# Patient Record
Sex: FEMALE | Race: OTHER | Employment: UNEMPLOYED | URBAN - METROPOLITAN AREA
[De-identification: names, ages, dates, MRNs, and addresses within clinical notes are randomized per-mention and may not be internally consistent; named-entity substitution may affect disease eponyms.]

---

## 2017-01-18 RX ORDER — ALBUTEROL SULFATE 90 UG/1
2 AEROSOL, METERED RESPIRATORY (INHALATION) EVERY 6 HOURS PRN
Qty: 1 INHALER | Refills: 3 | Status: SHIPPED | OUTPATIENT
Start: 2017-01-18 | End: 2018-04-19 | Stop reason: ALTCHOICE

## 2017-01-18 RX ORDER — ESOMEPRAZOLE MAGNESIUM 40 MG/1
40 CAPSULE, DELAYED RELEASE ORAL
Qty: 90 CAPSULE | Refills: 1 | Status: SHIPPED | OUTPATIENT
Start: 2017-01-18 | End: 2017-07-07

## 2017-01-18 NOTE — TELEPHONE ENCOUNTER
From: Jonathan Alberst  To: Imani Marmolejo MD  Sent: 1/18/2017 10:03 AM CST  Subject: Medication Renewal Request    Original authorizing provider: MD Jonathan Garcia would like a refill of the following medications:  Esomeprazole

## 2017-03-01 ENCOUNTER — OFFICE VISIT (OUTPATIENT)
Dept: FAMILY MEDICINE CLINIC | Facility: CLINIC | Age: 27
End: 2017-03-01

## 2017-03-01 VITALS
TEMPERATURE: 98 F | OXYGEN SATURATION: 98 % | HEART RATE: 82 BPM | DIASTOLIC BLOOD PRESSURE: 78 MMHG | BODY MASS INDEX: 37.49 KG/M2 | RESPIRATION RATE: 20 BRPM | HEIGHT: 65 IN | SYSTOLIC BLOOD PRESSURE: 122 MMHG | WEIGHT: 225 LBS

## 2017-03-01 DIAGNOSIS — J02.9 PHARYNGITIS, UNSPECIFIED ETIOLOGY: Primary | ICD-10-CM

## 2017-03-01 LAB — CONTROL LINE PRESENT WITH A CLEAR BACKGROUND (YES/NO): YES YES/NO

## 2017-03-01 PROCEDURE — 87147 CULTURE TYPE IMMUNOLOGIC: CPT | Performed by: PHYSICIAN ASSISTANT

## 2017-03-01 PROCEDURE — 87880 STREP A ASSAY W/OPTIC: CPT | Performed by: PHYSICIAN ASSISTANT

## 2017-03-01 PROCEDURE — 87081 CULTURE SCREEN ONLY: CPT | Performed by: PHYSICIAN ASSISTANT

## 2017-03-01 PROCEDURE — 99213 OFFICE O/P EST LOW 20 MIN: CPT | Performed by: PHYSICIAN ASSISTANT

## 2017-03-01 RX ORDER — AMOXICILLIN 875 MG/1
875 TABLET, COATED ORAL 2 TIMES DAILY
Qty: 20 TABLET | Refills: 0 | Status: SHIPPED | OUTPATIENT
Start: 2017-03-01 | End: 2017-05-31 | Stop reason: ALTCHOICE

## 2017-03-01 NOTE — PATIENT INSTRUCTIONS
Viral Pharyngitis (Sore Throat)    You (or your child, if your child is the patient) have pharyngitis (sore throat). This infection is caused by a virus. It can cause throat pain that is worse when swallowing, aching all over, headache, and fever.  The in · Fever as directed by your doctor.  For children, seek care if:  ¨ Your child is of any age and has repeated fevers above 104°F (40°C). ¨ Your child is younger than 3years of age and has a fever of 100.4°F (38°C) that continues for more than 1 day.   Regina Harrison

## 2017-03-01 NOTE — PROGRESS NOTES
CHIEF COMPLAINT:   Patient presents with:  Throat Problem: white spots at back of throat for 3 days        HPI:   Earlean Flank is 31year old female presents to clinic with complaint of right sided sore throat and noted white material on right tons nourished,in no apparent distress  SKIN: no rashes,no suspicious lesions  HEAD: atraumatic, normocephalic  EYES: conjunctiva clear, sclera white  EARS: TM's clear, non-injected, no bulging, retraction, or fluid bilaterally  NOSE: nares patent, mild nasal d days to a week.

## 2017-05-11 ENCOUNTER — TELEPHONE (OUTPATIENT)
Dept: INTERNAL MEDICINE CLINIC | Facility: CLINIC | Age: 27
End: 2017-05-11

## 2017-05-11 DIAGNOSIS — M54.2 NECK PAIN: Primary | ICD-10-CM

## 2017-05-11 NOTE — TELEPHONE ENCOUNTER
LMTCB, please transfer to [id number].need to Triage  Left detailed message need Appt for evaluation of New Problem/assessment

## 2017-05-11 NOTE — TELEPHONE ENCOUNTER
Pt is requesting to have a referral to see a chiropractor  Pt stts she is having tingling hands her hand are cold   Pt thinks she may have a pinched nerve   Please advise

## 2017-05-12 NOTE — TELEPHONE ENCOUNTER
Actions Requested: Referral for chiropractor/ pt declined OV   Pt states that she would like to try natural/alternative approaches for pain relief before making OV appt,  Chiropractor referral pended for MD approval  Situation/Background   Problem: muscle further assistance. Instructed pt to go to the ER if Sx worsen. Patient provided with clinic contact information, hours of operation and will call back if needed.  Patient was able to restate instructions in their own words, verbalizes understanding and

## 2017-05-12 NOTE — TELEPHONE ENCOUNTER
Patient called and informed with understanding. Patient stated she scheduled an appointment via [facility] for 5/31/17 and will discuss at that time.

## 2017-05-31 ENCOUNTER — OFFICE VISIT (OUTPATIENT)
Dept: INTERNAL MEDICINE CLINIC | Facility: CLINIC | Age: 27
End: 2017-05-31

## 2017-05-31 VITALS
HEART RATE: 86 BPM | RESPIRATION RATE: 16 BRPM | DIASTOLIC BLOOD PRESSURE: 77 MMHG | HEIGHT: 65 IN | TEMPERATURE: 99 F | SYSTOLIC BLOOD PRESSURE: 135 MMHG | BODY MASS INDEX: 40.82 KG/M2 | WEIGHT: 245 LBS

## 2017-05-31 DIAGNOSIS — L70.9 ACNE, UNSPECIFIED ACNE TYPE: ICD-10-CM

## 2017-05-31 DIAGNOSIS — M50.90 CERVICAL DISC DISEASE: Primary | ICD-10-CM

## 2017-05-31 DIAGNOSIS — E66.01 MORBID OBESITY DUE TO EXCESS CALORIES (HCC): ICD-10-CM

## 2017-05-31 PROCEDURE — 99212 OFFICE O/P EST SF 10 MIN: CPT | Performed by: INTERNAL MEDICINE

## 2017-05-31 PROCEDURE — 99214 OFFICE O/P EST MOD 30 MIN: CPT | Performed by: INTERNAL MEDICINE

## 2017-05-31 NOTE — PROGRESS NOTES
HPI:    Patient ID: Joan Quinonez is a 31year old female.     HPI    Ruptured disc lumbar  Workman's comp  2012  In that injured neck  As well was cleared neck problem 2014  when she injured back  PT does not think current neck pain is associated with Disp: 3 Package Rfl: 3     Allergies:No Known Allergies    HISTORY:  Past Medical History   Diagnosis Date   • Asthma    • Elective       EAB x 1   • Bulging lumbar disc      PT x 6 mos, cortisone injections   • Other ill-defined conditions(799.22) obesity due to excess calories ([facility] [address]  Plan: BARIATRICS - INTERNAL        Referral  WT loss adivsed lower carb intake  Lower overall caloric intake    (L70.9) Acne, unspecified acne type  Plan: DERM - INTERNAL            Patient voiced understanding  and ag

## 2017-06-29 ENCOUNTER — TELEPHONE (OUTPATIENT)
Dept: INTERNAL MEDICINE CLINIC | Facility: CLINIC | Age: 27
End: 2017-06-29

## 2017-06-29 NOTE — TELEPHONE ENCOUNTER
Lab called stating lab previously ordered almost a year ago for H Pylori is not being done anymore. If you want this test done it needs to be reordered as a stool test [id number] or breath test [id number]. Other test needs to be cancelled.

## 2017-07-07 ENCOUNTER — HOSPITAL ENCOUNTER (OUTPATIENT)
Dept: MRI IMAGING | Age: 27
Discharge: HOME | End: 2017-07-07
Attending: INTERNAL MEDICINE
Payer: COMMERCIAL

## 2017-07-07 DIAGNOSIS — M50.90 CERVICAL DISC DISEASE: ICD-10-CM

## 2017-07-07 PROCEDURE — 72141 MRI NECK SPINE W/O DYE: CPT | Performed by: INTERNAL MEDICINE

## 2017-07-07 RX ORDER — ESOMEPRAZOLE MAGNESIUM 40 MG/1
40 CAPSULE, DELAYED RELEASE ORAL
Qty: 90 CAPSULE | Refills: 1 | Status: SHIPPED
Start: 2017-07-07 | End: 2018-01-28

## 2017-07-07 NOTE — TELEPHONE ENCOUNTER
From: Milli Veloz  Sent: 7/7/2017 10:25 AM CDT  Subject: Medication Renewal Request    Milli Veloz would like a refill of the following medications:  Esomeprazole Magnesium (NEXIUM) 40 MG Oral Capsule Delayed Release Bess Mohr MD]

## 2017-07-10 ENCOUNTER — TELEPHONE (OUTPATIENT)
Dept: INTERNAL MEDICINE CLINIC | Facility: CLINIC | Age: 27
End: 2017-07-10

## 2017-07-10 DIAGNOSIS — M47.812 OSTEOARTHRITIS OF CERVICAL SPINE, UNSPECIFIED SPINAL OSTEOARTHRITIS COMPLICATION STATUS: Primary | ICD-10-CM

## 2017-07-10 NOTE — TELEPHONE ENCOUNTER
LMTCB. Transfer to [id number].    ----- Message from Aston Colorado MD sent at 7/8/2017  3:04 AM CDT -----    Ángel Myers  Female, 31year old, 09/21/1990  Last Weight:   245 lb (111.1 kg)  Preferred Phone:   [phone]  Home#:   None  Mobile#:   410 spinal stenosis, or foraminal narrowing.     CRANIOCERVICAL AREA:  Normal foramen magnum with no Chiari malformation.    PARASPINAL AREA:  Normal with no visible mass.    CORD:  Normal caliber, contour, and signal intensity.         Impression    CONCLUSIO

## 2017-07-10 NOTE — TELEPHONE ENCOUNTER
Patient returning call, reviewed Dr Rayo George note with her, patient had already reviewed the radiologist's report on MyChart. Patient is concerned about \"C4-C5:  Posterior disc osteophyte pelvic stone. No spinal canal stenosis.  Left paracentral uncal os

## 2017-07-14 NOTE — TELEPHONE ENCOUNTER
Called pt but she was unable to take the call, she requested a call back in 15 minutes. Upon call back, no answer, left message to call back for Dr's response.  (Per AFUA, unable to leave detailed message on cell phone.)    Referral to see Physiatry signed b

## 2017-07-14 NOTE — TELEPHONE ENCOUNTER
Spoke to patient   chiro and neuro referral generated  I answered her questions   Agreeable with plan

## 2018-01-02 RX ORDER — NORGESTIMATE-ETHINYL ESTRADIOL 7DAYSX3 28
1 TABLET ORAL DAILY
Qty: 84 TABLET | Refills: 0 | OUTPATIENT
Start: 2018-01-02

## 2018-01-28 RX ORDER — NORGESTIMATE AND ETHINYL ESTRADIOL 7DAYSX3 28
1 KIT ORAL DAILY
Qty: 3 PACKAGE | Refills: 3
Start: 2018-01-28

## 2018-01-30 RX ORDER — NORGESTIMATE AND ETHINYL ESTRADIOL 7DAYSX3 28
1 KIT ORAL DAILY
Qty: 3 PACKAGE | Refills: 3
Start: 2018-01-30

## 2018-01-31 RX ORDER — ESOMEPRAZOLE MAGNESIUM 40 MG/1
40 CAPSULE, DELAYED RELEASE ORAL
Qty: 90 CAPSULE | Refills: 0 | Status: SHIPPED
Start: 2018-01-31 | End: 2018-04-19

## 2018-01-31 NOTE — TELEPHONE ENCOUNTER
From: Matthew Patel  Sent: 1/28/2018 2:25 PM CST  Subject: Medication Renewal Request    [facility] would like a refill of the following medications:     Esomeprazole Magnesium (NEXIUM) 40 MG Oral Capsule Delayed Release Austin Gray MD]    Preferred pharmacy: [address] [place] [address] [address] AT [place] [address] [address] , [phone], [phone]        Medication renewals requested in this message routed separately:     Norgestim-Eth Estrad Triphasic (TRINESSA, 28,) 0.18/0.215/0.25 MG-35 MCG Oral Tab Sury Palencia MD]

## 2018-02-02 RX ORDER — ESOMEPRAZOLE MAGNESIUM 40 MG/1
40 CAPSULE, DELAYED RELEASE ORAL
Qty: 90 CAPSULE | Refills: 1 | OUTPATIENT
Start: 2018-02-02

## 2018-04-19 ENCOUNTER — OFFICE VISIT (OUTPATIENT)
Dept: FAMILY MEDICINE CLINIC | Facility: CLINIC | Age: 28
End: 2018-04-19

## 2018-04-19 VITALS
BODY MASS INDEX: 40.82 KG/M2 | DIASTOLIC BLOOD PRESSURE: 80 MMHG | WEIGHT: 245 LBS | RESPIRATION RATE: 16 BRPM | SYSTOLIC BLOOD PRESSURE: 130 MMHG | HEIGHT: 65 IN | TEMPERATURE: 98 F | HEART RATE: 76 BPM

## 2018-04-19 DIAGNOSIS — Z79.899 MEDICATION MANAGEMENT: ICD-10-CM

## 2018-04-19 DIAGNOSIS — M47.22 OSTEOARTHRITIS OF SPINE WITH RADICULOPATHY, CERVICAL REGION: ICD-10-CM

## 2018-04-19 DIAGNOSIS — Z00.00 LABORATORY EXAM ORDERED AS PART OF ROUTINE GENERAL MEDICAL EXAMINATION: ICD-10-CM

## 2018-04-19 DIAGNOSIS — Z30.09 ENCOUNTER FOR OTHER GENERAL COUNSELING OR ADVICE ON CONTRACEPTION: ICD-10-CM

## 2018-04-19 DIAGNOSIS — F41.9 ANXIETY: ICD-10-CM

## 2018-04-19 DIAGNOSIS — K21.9 GASTROESOPHAGEAL REFLUX DISEASE, ESOPHAGITIS PRESENCE NOT SPECIFIED: ICD-10-CM

## 2018-04-19 DIAGNOSIS — Z00.00 ROUTINE MEDICAL EXAM: Primary | ICD-10-CM

## 2018-04-19 PROBLEM — M51.26 BULGING LUMBAR DISC: Status: ACTIVE | Noted: 2018-04-19

## 2018-04-19 PROBLEM — M51.36 BULGING LUMBAR DISC: Status: ACTIVE | Noted: 2018-04-19

## 2018-04-19 PROBLEM — M51.369 BULGING LUMBAR DISC: Status: ACTIVE | Noted: 2018-04-19

## 2018-04-19 PROCEDURE — 99214 OFFICE O/P EST MOD 30 MIN: CPT | Performed by: FAMILY MEDICINE

## 2018-04-19 PROCEDURE — 99385 PREV VISIT NEW AGE 18-39: CPT | Performed by: FAMILY MEDICINE

## 2018-04-19 RX ORDER — ESOMEPRAZOLE MAGNESIUM 40 MG/1
40 CAPSULE, DELAYED RELEASE ORAL
Qty: 90 CAPSULE | Refills: 3 | Status: SHIPPED | OUTPATIENT
Start: 2018-04-19 | End: 2018-07-25

## 2018-04-19 NOTE — PROGRESS NOTES
Patient presents with:  New Patient: Here to Establish  Physical  Anxiety      HPI:  Ildefonso Lawrence is a 28year old female here today for preventative visit. Imms-  Patient is up-to-date with her Tdap.   She did have HPV vaccine ×2 but did not get t disease)    • Mild intermittent asthma      Past Surgical History:  2013: SIGMOIDOSCOPY,DIAGNOSTIC      Comment: normal per pt    Current Outpatient Prescriptions on File Prior to Visit:  Norgestim-Eth Estrad Triphasic (TRINESSA, 28,) 0.18/0.215/0.25 MG-35 thyromegaly or masses. PULMONARY:  Lungs clear to auscultation bilaterally. No wheezes, rales, or rhonchi. Normal respiratory effort. CARDIOVASCULAR:  Regular rate and rhythm. No murmurs, gallops, or rubs.   ABDOMEN:  + bowel sounds, soft, nontender, nond OBG - INTERNAL        Patient verbalized understanding and agrees to plan. Return if symptoms worsen or fail to improve, for we will call with results.

## 2018-07-25 DIAGNOSIS — K21.9 GASTROESOPHAGEAL REFLUX DISEASE, ESOPHAGITIS PRESENCE NOT SPECIFIED: ICD-10-CM

## 2018-07-25 RX ORDER — ESOMEPRAZOLE MAGNESIUM 40 MG/1
CAPSULE, DELAYED RELEASE ORAL
Qty: 90 CAPSULE | Refills: 0 | Status: SHIPPED | OUTPATIENT
Start: 2018-07-25 | End: 2019-08-22

## 2018-07-25 RX ORDER — ESOMEPRAZOLE MAGNESIUM 40 MG/1
40 CAPSULE, DELAYED RELEASE ORAL
Qty: 90 CAPSULE | Refills: 3 | Status: CANCELLED
Start: 2018-07-25

## 2018-07-25 NOTE — TELEPHONE ENCOUNTER
Requesting Nexium   LOV: 4/19/18  RTC: none   Last Relevant Labs: n/a   Filled: 4/19/18 #90 with 3 refills - denied, mychart message sent to patient. No future appointments.

## 2018-07-25 NOTE — TELEPHONE ENCOUNTER
From: Sami Patel  Sent: 7/25/2018 10:58 AM CDT  Subject: Medication Renewal Request    Rashida Romero would like a refill of the following medications:     Esomeprazole Magnesium (NEXIUM) 40 MG Oral Capsule Delayed Release [facility] Maribel Bryant,

## 2018-07-25 NOTE — TELEPHONE ENCOUNTER
Refill Protocol Appointment Criteria  · Appointment scheduled in the past 12 months or in the next 3 months  Recent Outpatient Visits            3 months ago Routine medical exam    Alesha Weiss, [address] [place] , [place], Judeth Sacks, [place]

## 2018-09-11 ENCOUNTER — TELEPHONE (OUTPATIENT)
Dept: OBGYN CLINIC | Facility: CLINIC | Age: 28
End: 2018-09-11

## 2018-09-11 NOTE — TELEPHONE ENCOUNTER
Patient calling to schedule NOB  LMP 8/8/2018  Insurance  [employer] PPO  Good time to return phone call  [phone]    Can call pt anytime

## 2018-09-11 NOTE — TELEPHONE ENCOUNTER
LMP: 18  EDC based on lmp: 19    8 wks on 10/2/18        Are cycles regular?: yes    Medical problems: back problems- L4-L5 and S1-S2 \"tears,\" IBS    Current medications: Nexium 40 mg/day, magnesium 400 mg/day, fish oil 1200 mg/day (ome

## 2018-10-03 ENCOUNTER — APPOINTMENT (OUTPATIENT)
Dept: OBGYN CLINIC | Facility: CLINIC | Age: 28
End: 2018-10-03
Payer: COMMERCIAL

## 2018-10-03 ENCOUNTER — LAB ENCOUNTER (OUTPATIENT)
Dept: LAB | Facility: HOSPITAL | Age: 28
End: 2018-10-03
Attending: OBSTETRICS & GYNECOLOGY
Payer: COMMERCIAL

## 2018-10-03 ENCOUNTER — INITIAL PRENATAL (OUTPATIENT)
Dept: OBGYN CLINIC | Facility: CLINIC | Age: 28
End: 2018-10-03
Payer: COMMERCIAL

## 2018-10-03 VITALS
HEIGHT: 65 IN | WEIGHT: 237 LBS | DIASTOLIC BLOOD PRESSURE: 70 MMHG | BODY MASS INDEX: 39.49 KG/M2 | SYSTOLIC BLOOD PRESSURE: 130 MMHG

## 2018-10-03 DIAGNOSIS — Z3A.08 8 WEEKS GESTATION OF PREGNANCY: Primary | ICD-10-CM

## 2018-10-03 DIAGNOSIS — Z79.899 MEDICATION MANAGEMENT: ICD-10-CM

## 2018-10-03 DIAGNOSIS — Z00.00 LABORATORY EXAM ORDERED AS PART OF ROUTINE GENERAL MEDICAL EXAMINATION: ICD-10-CM

## 2018-10-03 DIAGNOSIS — Z34.01 ENCOUNTER FOR SUPERVISION OF NORMAL FIRST PREGNANCY IN FIRST TRIMESTER: ICD-10-CM

## 2018-10-03 DIAGNOSIS — Z3A.08 8 WEEKS GESTATION OF PREGNANCY: ICD-10-CM

## 2018-10-03 PROCEDURE — 86901 BLOOD TYPING SEROLOGIC RH(D): CPT

## 2018-10-03 PROCEDURE — 86850 RBC ANTIBODY SCREEN: CPT

## 2018-10-03 PROCEDURE — 87591 N.GONORRHOEAE DNA AMP PROB: CPT | Performed by: OBSTETRICS & GYNECOLOGY

## 2018-10-03 PROCEDURE — 87086 URINE CULTURE/COLONY COUNT: CPT | Performed by: OBSTETRICS & GYNECOLOGY

## 2018-10-03 PROCEDURE — 86900 BLOOD TYPING SEROLOGIC ABO: CPT

## 2018-10-03 PROCEDURE — 80053 COMPREHEN METABOLIC PANEL: CPT

## 2018-10-03 PROCEDURE — 88175 CYTOPATH C/V AUTO FLUID REDO: CPT | Performed by: OBSTETRICS & GYNECOLOGY

## 2018-10-03 PROCEDURE — 87340 HEPATITIS B SURFACE AG IA: CPT

## 2018-10-03 PROCEDURE — 87491 CHLMYD TRACH DNA AMP PROBE: CPT | Performed by: OBSTETRICS & GYNECOLOGY

## 2018-10-03 PROCEDURE — 85025 COMPLETE CBC W/AUTO DIFF WBC: CPT

## 2018-10-03 PROCEDURE — 83735 ASSAY OF MAGNESIUM: CPT

## 2018-10-03 PROCEDURE — 80061 LIPID PANEL: CPT

## 2018-10-03 PROCEDURE — 86762 RUBELLA ANTIBODY: CPT

## 2018-10-03 PROCEDURE — 36415 COLL VENOUS BLD VENIPUNCTURE: CPT

## 2018-10-03 PROCEDURE — 87389 HIV-1 AG W/HIV-1&-2 AB AG IA: CPT

## 2018-10-03 PROCEDURE — 86780 TREPONEMA PALLIDUM: CPT

## 2018-10-03 NOTE — PATIENT INSTRUCTIONS
Medications Safe in Pregnancy  The following over-the-counter medications may be taken safely after 12 weeks gestation by any patient who is pregnant. Please follow the instructions on the package for adult dosage.   If you experience any symptoms leroy

## 2018-10-03 NOTE — PROGRESS NOTES
Here for initial prenatal visit with our group. 33year old  at [address] Planned pt. Some nausea otherwise no complaints    Patient's last menstrual period was 2018. Vasquez Bales      Last pap smear      OB History    Para Term  AB Living

## 2018-10-31 ENCOUNTER — ROUTINE PRENATAL (OUTPATIENT)
Dept: OBGYN CLINIC | Facility: CLINIC | Age: 28
End: 2018-10-31
Payer: COMMERCIAL

## 2018-10-31 VITALS — SYSTOLIC BLOOD PRESSURE: 134 MMHG | DIASTOLIC BLOOD PRESSURE: 76 MMHG | WEIGHT: 240 LBS | BODY MASS INDEX: 40 KG/M2

## 2018-10-31 DIAGNOSIS — O99.210 OBESITY AFFECTING PREGNANCY, ANTEPARTUM: ICD-10-CM

## 2018-10-31 DIAGNOSIS — Z34.01 ENCOUNTER FOR SUPERVISION OF NORMAL FIRST PREGNANCY IN FIRST TRIMESTER: Primary | ICD-10-CM

## 2018-10-31 NOTE — PROGRESS NOTES
Some bleeding from gums with brushing and flossing. Has seen regular dentist and no abnormalities found. If persists, may need to see periodontist.  No obstetrical issues. AFP / QS option for next time discussed.   BMI 39.5 - growth ultrasound at 32 weeks

## 2018-11-26 ENCOUNTER — TELEPHONE (OUTPATIENT)
Dept: FAMILY MEDICINE CLINIC | Facility: CLINIC | Age: 28
End: 2018-11-26

## 2018-11-26 NOTE — TELEPHONE ENCOUNTER
Patient dropped off physical form, physical was done 4/2018. Patient informed of $25 fee at . Will call her when form is ready. Placed in MA's folder.

## 2018-11-27 NOTE — TELEPHONE ENCOUNTER
Form completed and faxed to [phone]. Copy saved to green triage folder. Original to front to call patient to pickup.

## 2018-11-28 ENCOUNTER — ROUTINE PRENATAL (OUTPATIENT)
Dept: OBGYN CLINIC | Facility: CLINIC | Age: 28
End: 2018-11-28
Payer: COMMERCIAL

## 2018-11-28 VITALS
BODY MASS INDEX: 41.82 KG/M2 | HEIGHT: 65 IN | WEIGHT: 251 LBS | SYSTOLIC BLOOD PRESSURE: 144 MMHG | DIASTOLIC BLOOD PRESSURE: 70 MMHG

## 2018-11-28 DIAGNOSIS — Z34.02 ENCOUNTER FOR SUPERVISION OF NORMAL FIRST PREGNANCY IN SECOND TRIMESTER: ICD-10-CM

## 2018-11-28 DIAGNOSIS — Z3A.16 16 WEEKS GESTATION OF PREGNANCY: Primary | ICD-10-CM

## 2018-11-28 RX ORDER — MULTIVITAMIN WITH IRON
250 TABLET ORAL
COMMUNITY
End: 2019-09-05

## 2018-11-28 NOTE — PROGRESS NOTES
LUIS ALBERTO  Doing well  No complaints.  No LOF/VB/uctx  RH +  Genetic testing declines (first, quad/AFP)  Anatomy Scan on rtc (18-20weeks)

## 2018-12-20 ENCOUNTER — TELEPHONE (OUTPATIENT)
Dept: OBGYN CLINIC | Facility: CLINIC | Age: 28
End: 2018-12-20

## 2018-12-20 ENCOUNTER — ROUTINE PRENATAL (OUTPATIENT)
Dept: OBGYN CLINIC | Facility: CLINIC | Age: 28
End: 2018-12-20
Payer: COMMERCIAL

## 2018-12-20 VITALS — DIASTOLIC BLOOD PRESSURE: 56 MMHG | SYSTOLIC BLOOD PRESSURE: 140 MMHG | BODY MASS INDEX: 43 KG/M2 | WEIGHT: 259 LBS

## 2018-12-20 DIAGNOSIS — S30.1XXA CONTUSION OF ABDOMINAL WALL, INITIAL ENCOUNTER: ICD-10-CM

## 2018-12-20 DIAGNOSIS — Z3A.19 19 WEEKS GESTATION OF PREGNANCY: ICD-10-CM
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
## 2018-12-20 PROCEDURE — 99213 OFFICE O/P EST LOW 20 MIN: CPT | Performed by: OBSTETRICS & GYNECOLOGY

## 2018-12-20 NOTE — TELEPHONE ENCOUNTER
The records the patient brought to her appointment were discharge instructions.   Nurses can you please call [address] [place] [address] to try to obtain lab or ultrasound results if done at her ER visit per Dr. Ciara Hairston request.  Isa Hathaway is going to get a signed release fo

## 2018-12-20 NOTE — TELEPHONE ENCOUNTER
Medical records received from [facility] and in Dr. Rae Zaman office in [facility]
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
## 2018-12-20 NOTE — TELEPHONE ENCOUNTER
[facility] ER; per Carlene Solano, fax a signed release to [phone] and she will fax records to our office. Contacted [place] office, Alex Carrillo is faxing release.

## 2018-12-20 NOTE — TELEPHONE ENCOUNTER
Medical records request form completed and faxed to  [facility] Records [phone]  Forms in [facility] office

## 2018-12-21 ENCOUNTER — LAB ENCOUNTER (OUTPATIENT)
Dept: LAB | Age: 28
End: 2018-12-21
Attending: OBSTETRICS & GYNECOLOGY
Payer: COMMERCIAL

## 2018-12-21 ENCOUNTER — TELEPHONE (OUTPATIENT)
Dept: OBGYN CLINIC | Facility: CLINIC | Age: 28
End: 2018-12-21
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
## 2018-12-21 DIAGNOSIS — S30.1XXA CONTUSION OF ABDOMINAL WALL, INITIAL ENCOUNTER: ICD-10-CM

## 2018-12-21 PROCEDURE — 85460 HEMOGLOBIN FETAL: CPT

## 2018-12-21 PROCEDURE — 85025 COMPLETE CBC W/AUTO DIFF WBC: CPT

## 2018-12-21 PROCEDURE — 36415 COLL VENOUS BLD VENIPUNCTURE: CPT

## 2018-12-21 PROCEDURE — 85384 FIBRINOGEN ACTIVITY: CPT

## 2018-12-22 NOTE — TELEPHONE ENCOUNTER
Doing okay. Sore but no cramping or bleeding. Call for any issues. See as planned for visit and ultrasound next week.

## 2018-12-22 NOTE — PROGRESS NOTES
Never received labs from [facility] [address] today unremarkable. Discussed with pt.

## 2018-12-28 ENCOUNTER — ULTRASOUND ENCOUNTER (OUTPATIENT)
Dept: OBGYN CLINIC | Facility: CLINIC | Age: 28
End: 2018-12-28
Payer: COMMERCIAL

## 2018-12-28 ENCOUNTER — ROUTINE PRENATAL (OUTPATIENT)
Dept: OBGYN CLINIC | Facility: CLINIC | Age: 28
End: 2018-12-28
Payer: COMMERCIAL

## 2018-12-28 VITALS
SYSTOLIC BLOOD PRESSURE: 130 MMHG | DIASTOLIC BLOOD PRESSURE: 68 MMHG | BODY MASS INDEX: 43.62 KG/M2 | WEIGHT: 261.81 LBS | HEIGHT: 65 IN

## 2018-12-28 DIAGNOSIS — Z34.82 ENCOUNTER FOR SUPERVISION OF OTHER NORMAL PREGNANCY IN SECOND TRIMESTER: Primary | ICD-10-CM

## 2018-12-28 PROCEDURE — 76805 OB US >/= 14 WKS SNGL FETUS: CPT | Performed by: OBSTETRICS & GYNECOLOGY
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
## 2019-01-02 ENCOUNTER — HOSPITAL ENCOUNTER (OUTPATIENT)
Dept: GENERAL RADIOLOGY | Age: 29
Discharge: HOME | End: 2019-01-02
Attending: FAMILY MEDICINE
Payer: COMMERCIAL

## 2019-01-02 ENCOUNTER — OFFICE VISIT (OUTPATIENT)
Dept: FAMILY MEDICINE CLINIC | Facility: CLINIC | Age: 29
End: 2019-01-02
Payer: COMMERCIAL

## 2019-01-02 VITALS
WEIGHT: 264 LBS | BODY MASS INDEX: 43.99 KG/M2 | HEART RATE: 90 BPM | DIASTOLIC BLOOD PRESSURE: 70 MMHG | RESPIRATION RATE: 16 BRPM | HEIGHT: 65 IN | TEMPERATURE: 98 F | SYSTOLIC BLOOD PRESSURE: 140 MMHG
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
## 2019-01-02 DIAGNOSIS — M54.12 CERVICAL RADICULOPATHY: ICD-10-CM

## 2019-01-02 DIAGNOSIS — M79.641 PAIN OF RIGHT HAND: ICD-10-CM

## 2019-01-02 DIAGNOSIS — M54.2 NECK PAIN, ACUTE: ICD-10-CM

## 2019-01-02 DIAGNOSIS — M79.641 PAIN OF RIGHT HAND: Primary | ICD-10-CM

## 2019-01-02 DIAGNOSIS — Z3A.21 21 WEEKS GESTATION OF PREGNANCY: ICD-10-CM

## 2019-01-02 PROCEDURE — 72050 X-RAY EXAM NECK SPINE 4/5VWS: CPT | Performed by: FAMILY MEDICINE

## 2019-01-02 PROCEDURE — 99214 OFFICE O/P EST MOD 30 MIN: CPT | Performed by: FAMILY MEDICINE
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
## 2019-01-08 ENCOUNTER — TELEPHONE (OUTPATIENT)
Dept: FAMILY MEDICINE CLINIC | Facility: CLINIC | Age: 29
End: 2019-01-08

## 2019-01-08 NOTE — TELEPHONE ENCOUNTER
Dr. Mary Lagunas- Can you call patient with results from xrays for your cervical spine. Please call [phone].

## 2019-01-09 DIAGNOSIS — M62.838 NECK MUSCLE SPASM: Primary | ICD-10-CM

## 2019-01-14 ENCOUNTER — OFFICE VISIT (OUTPATIENT)
Dept: PERINATAL CARE | Facility: HOSPITAL | Age: 29
End: 2019-01-14
Attending: OBSTETRICS & GYNECOLOGY
Payer: COMMERCIAL

## 2019-01-14 VITALS
HEIGHT: 65 IN | SYSTOLIC BLOOD PRESSURE: 140 MMHG | WEIGHT: 261 LBS | BODY MASS INDEX: 43.49 KG/M2 | DIASTOLIC BLOOD PRESSURE: 83 MMHG | HEART RATE: 106 BPM

## 2019-01-14 DIAGNOSIS — O99.210 OBESITY AFFECTING PREGNANCY, ANTEPARTUM: ICD-10-CM

## 2019-01-14 DIAGNOSIS — E66.01 CLASS 3 SEVERE OBESITY DUE TO EXCESS CALORIES WITHOUT SERIOUS COMORBIDITY WITH BODY MASS INDEX (BMI) OF 40.0 TO 44.9 IN ADULT (HCC): ICD-10-CM

## 2019-01-14 DIAGNOSIS — O44.40 LOW-LYING PLACENTA: ICD-10-CM

## 2019-01-14 PROCEDURE — 99243 OFF/OP CNSLTJ NEW/EST LOW 30: CPT | Performed by: OBSTETRICS & GYNECOLOGY

## 2019-01-14 PROCEDURE — 76817 TRANSVAGINAL US OBSTETRIC: CPT | Performed by: OBSTETRICS & GYNECOLOGY

## 2019-01-14 PROCEDURE — 76811 OB US DETAILED SNGL FETUS: CPT | Performed by: OBSTETRICS & GYNECOLOGY

## 2019-01-14 NOTE — PROGRESS NOTES
Indication: sub views outflow tracts.   ____________________________________________________________________________  History: Age: 33 years.  : 2 Para: 0.  ____________________________________________________________________________  Dating:  LMP: Cervical length: 47 mm.  ____________________________________________________________________________  Comments:    Dear Dr. Bharat Cherry,       Thank you for requesting  an ultrasound and consultation for  [facility] regarding suboptimal heart views and ob obese state. It is reasonable to screen obese gravidas for undiagnosed pregestational diabetes in the first trimester.    Glucose intolerance associated with gestational diabetes generally resolves postpartum; however, obese women with a history of gestatio recommended. /83   Pulse 106   Ht 5' 5\" (1.651 m)   Wt 261 lb (118.4 kg)   LMP 08/17/2018   BMI 43.43 kg/m²   Alert and Oriented. No acute distress. Abdomen: soft, nontender      Ultrasound findings:     The fetal measurements are consistent 2.  Scan consistent with dates     3. No ultrasound evidence of structural abnormalities are seen   4.  morbid obesity  5.  low lying placenta 1.8 cm from internal os    Recommendations:    1.   Weekly NST at  34  wks     2.  growth US in 8wks

## 2019-01-24 ENCOUNTER — MED REC SCAN ONLY (OUTPATIENT)
Dept: OBGYN CLINIC | Facility: CLINIC | Age: 29
End: 2019-01-24

## 2019-01-26 ENCOUNTER — ROUTINE PRENATAL (OUTPATIENT)
Dept: OBGYN CLINIC | Facility: CLINIC | Age: 29
End: 2019-01-26
Payer: COMMERCIAL

## 2019-01-26 VITALS
HEIGHT: 65 IN | BODY MASS INDEX: 45.92 KG/M2 | DIASTOLIC BLOOD PRESSURE: 70 MMHG | HEART RATE: 116 BPM | WEIGHT: 275.63 LBS | SYSTOLIC BLOOD PRESSURE: 138 MMHG

## 2019-01-26 DIAGNOSIS — Z34.82 ENCOUNTER FOR SUPERVISION OF OTHER NORMAL PREGNANCY IN SECOND TRIMESTER: Primary | ICD-10-CM

## 2019-01-26 DIAGNOSIS — Z36.9 ENCOUNTER FOR ANTENATAL SCREENING OF MOTHER: ICD-10-CM

## 2019-01-26 DIAGNOSIS — O99.210 OBESITY AFFECTING PREGNANCY, ANTEPARTUM: ICD-10-CM

## 2019-01-26 NOTE — PROGRESS NOTES
No problems since last seen. Good FM. Did not find out sex with ultrasound. Posterior low lying placenta seen. GL ordered and asked to get asap. Repeat ultrasound for placental location at 32 weeks. See in one month.

## 2019-02-01 ENCOUNTER — HOSPITAL ENCOUNTER (OUTPATIENT)
Dept: MRI IMAGING | Age: 29
Discharge: HOME | End: 2019-02-01
Attending: FAMILY MEDICINE
Payer: COMMERCIAL

## 2019-02-01 DIAGNOSIS — M54.2 ACUTE NECK PAIN: ICD-10-CM

## 2019-02-01 DIAGNOSIS — S13.100D SUBLUXATION OF CERVICAL VERTEBRA, SUBSEQUENT ENCOUNTER: ICD-10-CM

## 2019-02-01 PROCEDURE — 72141 MRI NECK SPINE W/O DYE: CPT | Performed by: FAMILY MEDICINE

## 2019-02-05 ENCOUNTER — TELEPHONE (OUTPATIENT)
Dept: FAMILY MEDICINE CLINIC | Facility: CLINIC | Age: 29
End: 2019-02-05

## 2019-02-12 ENCOUNTER — TELEPHONE (OUTPATIENT)
Dept: NEUROLOGY | Facility: CLINIC | Age: 29
End: 2019-02-12

## 2019-02-12 NOTE — TELEPHONE ENCOUNTER
Left voice mail Dr. Alfonso Shields out of office 2/18/2019 rescheduled to [facility] 2/19/2019 @ 11am.  Please give office a call back to confirm

## 2019-02-20 ENCOUNTER — TELEPHONE (OUTPATIENT)
Dept: FAMILY MEDICINE CLINIC | Facility: CLINIC | Age: 29
End: 2019-02-20

## 2019-02-20 ENCOUNTER — LAB ENCOUNTER (OUTPATIENT)
Dept: LAB | Age: 29
End: 2019-02-20
Attending: OBSTETRICS & GYNECOLOGY
Payer: COMMERCIAL

## 2019-02-20 ENCOUNTER — APPOINTMENT (OUTPATIENT)
Dept: LAB | Age: 29
End: 2019-02-20
Attending: OBSTETRICS & GYNECOLOGY
Payer: COMMERCIAL

## 2019-02-20 DIAGNOSIS — Z36.9 ENCOUNTER FOR ANTENATAL SCREENING OF MOTHER: ICD-10-CM

## 2019-02-20 LAB
BASOPHILS # BLD AUTO: 0.03 X10(3) UL (ref 0–0.2)
BASOPHILS NFR BLD AUTO: 0.2 %
DEPRECATED RDW RBC AUTO: 47.5 FL (ref 35.1–46.3)
EOSINOPHIL # BLD AUTO: 0.13 X10(3) UL (ref 0–0.7)
EOSINOPHIL NFR BLD AUTO: 1.1 %
ERYTHROCYTE [DISTWIDTH] IN BLOOD BY AUTOMATED COUNT: 14 % (ref 11–15)
GLUCOSE 1H P GLC SERPL-MCNC: 129 MG/DL
HCT VFR BLD AUTO: 33 % (ref 35–48)
HGB BLD-MCNC: 10.8 G/DL (ref 12–16)
IMM GRANULOCYTES # BLD AUTO: 0.07 X10(3) UL (ref 0–1)
IMM GRANULOCYTES NFR BLD: 0.6 %
LYMPHOCYTES # BLD AUTO: 1.83 X10(3) UL (ref 1–4)
LYMPHOCYTES NFR BLD AUTO: 14.8 %
MCH RBC QN AUTO: 30.2 PG (ref 26–34)
MCHC RBC AUTO-ENTMCNC: 32.7 G/DL (ref 31–37)
MCV RBC AUTO: 92.2 FL (ref 80–100)
MONOCYTES # BLD AUTO: 1.01 X10(3) UL (ref 0.1–1)
MONOCYTES NFR BLD AUTO: 8.2 %
NEUTROPHILS # BLD AUTO: 9.3 X10 (3) UL (ref 1.5–7.7)
NEUTROPHILS # BLD AUTO: 9.3 X10(3) UL (ref 1.5–7.7)
NEUTROPHILS NFR BLD AUTO: 75.1 %
PLATELET # BLD AUTO: 341 10(3)UL (ref 150–450)
RBC # BLD AUTO: 3.58 X10(6)UL (ref 3.8–5.3)
WBC # BLD AUTO: 12.4 X10(3) UL (ref 4–11)

## 2019-02-20 PROCEDURE — 36415 COLL VENOUS BLD VENIPUNCTURE: CPT

## 2019-02-20 PROCEDURE — 82950 GLUCOSE TEST: CPT

## 2019-02-20 PROCEDURE — 85025 COMPLETE CBC W/AUTO DIFF WBC: CPT

## 2019-02-20 NOTE — TELEPHONE ENCOUNTER
Patient completed authorization for the release of her recent radiology reports. Reports given to patient.

## 2019-02-21 ENCOUNTER — ROUTINE PRENATAL (OUTPATIENT)
Dept: OBGYN CLINIC | Facility: CLINIC | Age: 29
End: 2019-02-21
Payer: COMMERCIAL

## 2019-02-21 VITALS — DIASTOLIC BLOOD PRESSURE: 78 MMHG | SYSTOLIC BLOOD PRESSURE: 120 MMHG | WEIGHT: 285.19 LBS | BODY MASS INDEX: 47 KG/M2

## 2019-02-21 DIAGNOSIS — O44.43 LOW LYING PLACENTA NOS OR WITHOUT HEMORRHAGE, THIRD TRIMESTER: ICD-10-CM

## 2019-02-21 DIAGNOSIS — Z34.03 ENCOUNTER FOR SUPERVISION OF NORMAL FIRST PREGNANCY IN THIRD TRIMESTER: Primary | ICD-10-CM

## 2019-02-21 NOTE — PATIENT INSTRUCTIONS
FETAL MOVEMENT CHART    Begin counting fetal movements at 32 weeks of pregnancy. You may find that your baby is more active after eating or drinking. We want you to time how long it takes to feel 10 movements (kicks, flutters, swishes or rolls).   Mando Isidro

## 2019-02-21 NOTE — PROGRESS NOTES
LUIS ALBERTO  Doing well, c/o congestion- discussed comfort measures  Wondering about normal fetal movement-Fetal movement instructions given, discussed normal patterns. She is to call if she feels she is not getting good movement after doing kick counts.   Denies V

## 2019-02-27 ENCOUNTER — OFFICE VISIT (OUTPATIENT)
Dept: SURGERY | Facility: CLINIC | Age: 29
End: 2019-02-27
Payer: COMMERCIAL

## 2019-02-27 VITALS
HEIGHT: 64 IN | BODY MASS INDEX: 48.65 KG/M2 | HEART RATE: 80 BPM | WEIGHT: 285 LBS | DIASTOLIC BLOOD PRESSURE: 78 MMHG | SYSTOLIC BLOOD PRESSURE: 128 MMHG

## 2019-02-27 DIAGNOSIS — G56.03 BILATERAL CARPAL TUNNEL SYNDROME: ICD-10-CM

## 2019-02-27 DIAGNOSIS — M54.2 CERVICALGIA: ICD-10-CM
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
## 2019-02-27 PROCEDURE — 99204 OFFICE O/P NEW MOD 45 MIN: CPT | Performed by: NEUROLOGICAL SURGERY
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
## 2019-02-27 NOTE — PROGRESS NOTES
Location of Pain:  Pt states that she has cervical pain. Pt states that she has bilateral shoulder pain and bilateral radiating arm pain. Pt states that she has numbness and tingling in the bilateral hands.  Pt states that she has issues with weakness in th

## 2019-03-06 PROBLEM — Z34.93 ENCOUNTER FOR SUPERVISION OF NORMAL PREGNANCY IN THIRD TRIMESTER (HCC): Status: ACTIVE | Noted: 2019-03-06

## 2019-03-06 PROBLEM — Z34.93 ENCOUNTER FOR SUPERVISION OF NORMAL PREGNANCY IN THIRD TRIMESTER: Status: ACTIVE | Noted: 2019-03-06

## 2019-03-06 PROBLEM — O44.40 LOW-LYING PLACENTA: Status: ACTIVE | Noted: 2019-03-06

## 2019-03-06 PROBLEM — O44.40 LOW-LYING PLACENTA (HCC): Status: ACTIVE | Noted: 2019-03-06

## 2019-03-07 ENCOUNTER — ROUTINE PRENATAL (OUTPATIENT)
Dept: OBGYN CLINIC | Facility: CLINIC | Age: 29
End: 2019-03-07
Payer: COMMERCIAL

## 2019-03-07 VITALS — DIASTOLIC BLOOD PRESSURE: 74 MMHG | WEIGHT: 290 LBS | SYSTOLIC BLOOD PRESSURE: 118 MMHG | BODY MASS INDEX: 50 KG/M2

## 2019-03-07 DIAGNOSIS — O44.40 LOW-LYING PLACENTA: ICD-10-CM

## 2019-03-07 DIAGNOSIS — Z23 NEED FOR VACCINATION: ICD-10-CM

## 2019-03-07 DIAGNOSIS — O99.210 OBESITY AFFECTING PREGNANCY, ANTEPARTUM: Primary | ICD-10-CM

## 2019-03-07 DIAGNOSIS — Z34.93 ENCOUNTER FOR SUPERVISION OF NORMAL PREGNANCY IN THIRD TRIMESTER, UNSPECIFIED GRAVIDITY: ICD-10-CM

## 2019-03-07 PROCEDURE — 90471 IMMUNIZATION ADMIN: CPT | Performed by: OBSTETRICS & GYNECOLOGY

## 2019-03-07 PROCEDURE — 90715 TDAP VACCINE 7 YRS/> IM: CPT | Performed by: OBSTETRICS & GYNECOLOGY

## 2019-03-07 NOTE — PROGRESS NOTES
LUIS ALBERTO   Doing well, +FM  Denies LOF/VB/uctx  Rh positive, TDAP received, EPDS 0  Fetal movement count given  Obesity, NST weekly at 34 weeks   Low Lying placenta about 1.8 cm from internal os 01/14/19, s/p MFM consult. Repeat in 8 weeks.  NST weekly at 34 wee

## 2019-03-07 NOTE — PATIENT INSTRUCTIONS
Tdap Vaccine: What You Need To Know    1. Why Get Vaccinated:    · Tetanus, diphtheria, and pertussis can be very serious diseases, even for adolescents and adults. Tdap vaccine can protect us from these diseases.     · Tetanus (Lockjaw) causes painful mu tetanus infection. Medications Safe in Pregnancy  The following over-the-counter medications may be taken safely after 12 weeks gestation by any patient who is pregnant. Please follow the instructions on the package for adult dosage.   If you

## 2019-03-18 ENCOUNTER — OFFICE VISIT (OUTPATIENT)
Dept: PERINATAL CARE | Facility: HOSPITAL | Age: 29
End: 2019-03-18
Attending: OBSTETRICS & GYNECOLOGY
Payer: COMMERCIAL

## 2019-03-18 VITALS
DIASTOLIC BLOOD PRESSURE: 88 MMHG | WEIGHT: 290 LBS | BODY MASS INDEX: 50 KG/M2 | SYSTOLIC BLOOD PRESSURE: 144 MMHG | HEART RATE: 102 BPM

## 2019-03-18 DIAGNOSIS — O16.3 ELEVATED BLOOD PRESSURE AFFECTING PREGNANCY IN THIRD TRIMESTER, ANTEPARTUM: ICD-10-CM

## 2019-03-18 DIAGNOSIS — O99.210 OBESITY AFFECTING PREGNANCY, ANTEPARTUM: ICD-10-CM

## 2019-03-18 PROCEDURE — 76819 FETAL BIOPHYS PROFIL W/O NST: CPT | Performed by: OBSTETRICS & GYNECOLOGY

## 2019-03-18 PROCEDURE — 76805 OB US >/= 14 WKS SNGL FETUS: CPT | Performed by: OBSTETRICS & GYNECOLOGY

## 2019-03-18 PROCEDURE — 99214 OFFICE O/P EST MOD 30 MIN: CPT | Performed by: OBSTETRICS & GYNECOLOGY
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
## 2019-03-21 ENCOUNTER — ROUTINE PRENATAL (OUTPATIENT)
Dept: OBGYN CLINIC | Facility: CLINIC | Age: 29
End: 2019-03-21
Payer: COMMERCIAL

## 2019-03-21 VITALS — BODY MASS INDEX: 51 KG/M2 | WEIGHT: 293 LBS | DIASTOLIC BLOOD PRESSURE: 68 MMHG | SYSTOLIC BLOOD PRESSURE: 152 MMHG

## 2019-03-21 DIAGNOSIS — Z34.93 ENCOUNTER FOR SUPERVISION OF NORMAL PREGNANCY IN THIRD TRIMESTER, UNSPECIFIED GRAVIDITY: Primary | ICD-10-CM

## 2019-03-21 DIAGNOSIS — O16.3 ELEVATED BLOOD PRESSURE AFFECTING PREGNANCY IN THIRD TRIMESTER, ANTEPARTUM: ICD-10-CM

## 2019-03-22 ENCOUNTER — ROUTINE PRENATAL (OUTPATIENT)
Dept: OBGYN CLINIC | Facility: CLINIC | Age: 29
End: 2019-03-22
Payer: COMMERCIAL

## 2019-03-22 ENCOUNTER — LAB ENCOUNTER (OUTPATIENT)
Dept: LAB | Age: 29
End: 2019-03-22
Attending: NURSE PRACTITIONER
Payer: COMMERCIAL

## 2019-03-22 VITALS — BODY MASS INDEX: 51 KG/M2 | SYSTOLIC BLOOD PRESSURE: 148 MMHG | WEIGHT: 293 LBS | DIASTOLIC BLOOD PRESSURE: 64 MMHG

## 2019-03-22 DIAGNOSIS — O16.3 ELEVATED BLOOD PRESSURE AFFECTING PREGNANCY IN THIRD TRIMESTER, ANTEPARTUM: ICD-10-CM

## 2019-03-22 DIAGNOSIS — Z34.03 ENCOUNTER FOR SUPERVISION OF NORMAL FIRST PREGNANCY IN THIRD TRIMESTER: Primary | ICD-10-CM

## 2019-03-22 DIAGNOSIS — Z34.03 ENCOUNTER FOR SUPERVISION OF NORMAL FIRST PREGNANCY IN THIRD TRIMESTER: ICD-10-CM

## 2019-03-22 LAB
ALBUMIN SERPL-MCNC: 2.6 G/DL (ref 3.4–5)
ALBUMIN/GLOB SERPL: 0.6 {RATIO} (ref 1–2)
ALP LIVER SERPL-CCNC: 82 U/L (ref 37–98)
ALT SERPL-CCNC: 18 U/L (ref 13–56)
ANION GAP SERPL CALC-SCNC: 9 MMOL/L (ref 0–18)
AST SERPL-CCNC: 18 U/L (ref 15–37)
BASOPHILS # BLD AUTO: 0.02 X10(3) UL (ref 0–0.2)
BASOPHILS NFR BLD AUTO: 0.2 %
BILIRUB SERPL-MCNC: 0.2 MG/DL (ref 0.1–2)
BUN BLD-MCNC: 8 MG/DL (ref 7–18)
BUN/CREAT SERPL: 13.3 (ref 10–20)
CALCIUM BLD-MCNC: 9.4 MG/DL (ref 8.5–10.1)
CHLORIDE SERPL-SCNC: 107 MMOL/L (ref 98–107)
CO2 SERPL-SCNC: 21 MMOL/L (ref 21–32)
CREAT BLD-MCNC: 0.6 MG/DL (ref 0.55–1.02)
CREAT UR-SCNC: 2.21 G/24 HR (ref 0.6–1.8)
DEPRECATED RDW RBC AUTO: 46.5 FL (ref 35.1–46.3)
EOSINOPHIL # BLD AUTO: 0.11 X10(3) UL (ref 0–0.7)
EOSINOPHIL NFR BLD AUTO: 0.9 %
ERYTHROCYTE [DISTWIDTH] IN BLOOD BY AUTOMATED COUNT: 14 % (ref 11–15)
GLOBULIN PLAS-MCNC: 4.6 G/DL (ref 2.8–4.4)
GLUCOSE BLD-MCNC: 104 MG/DL (ref 70–99)
HCT VFR BLD AUTO: 34.4 % (ref 35–48)
HGB BLD-MCNC: 11.3 G/DL (ref 12–16)
IMM GRANULOCYTES # BLD AUTO: 0.1 X10(3) UL (ref 0–1)
IMM GRANULOCYTES NFR BLD: 0.8 %
LYMPHOCYTES # BLD AUTO: 1.77 X10(3) UL (ref 1–4)
LYMPHOCYTES NFR BLD AUTO: 14 %
M PROTEIN 24H UR ELPH-MRATE: 200 MG/24 HR (ref ?–149.1)
M PROTEIN MFR SERPL ELPH: 7.2 G/DL (ref 6.4–8.2)
MCH RBC QN AUTO: 29.8 PG (ref 26–34)
MCHC RBC AUTO-ENTMCNC: 32.8 G/DL (ref 31–37)
MCV RBC AUTO: 90.8 FL (ref 80–100)
MONOCYTES # BLD AUTO: 0.91 X10(3) UL (ref 0.1–1)
MONOCYTES NFR BLD AUTO: 7.2 %
NEUTROPHILS # BLD AUTO: 9.76 X10 (3) UL (ref 1.5–7.7)
NEUTROPHILS # BLD AUTO: 9.76 X10(3) UL (ref 1.5–7.7)
NEUTROPHILS NFR BLD AUTO: 76.9 %
OSMOLALITY SERPL CALC.SUM OF ELEC: 283 MOSM/KG (ref 275–295)
PLATELET # BLD AUTO: 305 10(3)UL (ref 150–450)
POTASSIUM SERPL-SCNC: 3.7 MMOL/L (ref 3.5–5.1)
RBC # BLD AUTO: 3.79 X10(6)UL (ref 3.8–5.3)
SODIUM SERPL-SCNC: 137 MMOL/L (ref 136–145)
SPECIMEN VOL UR: 1250 ML
SPECIMEN VOL UR: 1250 ML
URATE SERPL-MCNC: 4.6 MG/DL (ref 2.6–6)
WBC # BLD AUTO: 12.7 X10(3) UL (ref 4–11)

## 2019-03-22 PROCEDURE — 84550 ASSAY OF BLOOD/URIC ACID: CPT

## 2019-03-22 PROCEDURE — 85025 COMPLETE CBC W/AUTO DIFF WBC: CPT

## 2019-03-22 PROCEDURE — 87389 HIV-1 AG W/HIV-1&-2 AB AG IA: CPT

## 2019-03-22 PROCEDURE — 84156 ASSAY OF PROTEIN URINE: CPT

## 2019-03-22 PROCEDURE — 80053 COMPREHEN METABOLIC PANEL: CPT

## 2019-03-22 PROCEDURE — 36415 COLL VENOUS BLD VENIPUNCTURE: CPT

## 2019-03-22 PROCEDURE — 82570 ASSAY OF URINE CREATININE: CPT

## 2019-03-22 NOTE — PROGRESS NOTES
Patient is here for a blood pressure reading, she dropped off her 24 hour urine collection earlier today. She denies any headaches, vision changes, or epigastric pain.   So far her [address] labs have been resulted and are all normal, we will anticipate the 24 h

## 2019-03-23 ENCOUNTER — TELEPHONE (OUTPATIENT)
Dept: OBGYN CLINIC | Facility: CLINIC | Age: 29
End: 2019-03-23

## 2019-03-23 NOTE — TELEPHONE ENCOUNTER
Pt called answering service. Concerned that she got in contact with someone who has influenza A (was visiting her)  No fever or chills, no tachycardia, CP or SOB. Advised to not get in contact with person again.    Watch for symptoms   Call if any of symp

## 2019-03-27 ENCOUNTER — ROUTINE PRENATAL (OUTPATIENT)
Dept: OBGYN CLINIC | Facility: CLINIC | Age: 29
End: 2019-03-27
Payer: COMMERCIAL

## 2019-03-27 ENCOUNTER — APPOINTMENT (OUTPATIENT)
Dept: OBGYN CLINIC | Facility: CLINIC | Age: 29
End: 2019-03-27
Payer: COMMERCIAL

## 2019-03-27 VITALS — DIASTOLIC BLOOD PRESSURE: 78 MMHG | SYSTOLIC BLOOD PRESSURE: 138 MMHG | BODY MASS INDEX: 52 KG/M2 | WEIGHT: 293 LBS

## 2019-03-27 DIAGNOSIS — Z34.03 ENCOUNTER FOR SUPERVISION OF NORMAL FIRST PREGNANCY IN THIRD TRIMESTER: Primary | ICD-10-CM

## 2019-03-27 DIAGNOSIS — O99.210 OBESITY AFFECTING PREGNANCY, ANTEPARTUM: ICD-10-CM

## 2019-03-27 DIAGNOSIS — O16.3 ELEVATED BLOOD PRESSURE AFFECTING PREGNANCY IN THIRD TRIMESTER, ANTEPARTUM: ICD-10-CM

## 2019-03-27 PROCEDURE — 59025 FETAL NON-STRESS TEST: CPT | Performed by: NURSE PRACTITIONER

## 2019-03-27 NOTE — PROGRESS NOTES
LUIS ALBERTO  Doing well,  GOOD FM  Denies VB/LOF/uctx  Swelling improved since she is resting/ hydrating.  Her home BP has been normal.   RTC in 1 week with an NST  NST reactive today  Fetal movement discussed

## 2019-04-04 ENCOUNTER — ROUTINE PRENATAL (OUTPATIENT)
Dept: OBGYN CLINIC | Facility: CLINIC | Age: 29
End: 2019-04-04
Payer: COMMERCIAL

## 2019-04-04 ENCOUNTER — HOSPITAL ENCOUNTER (INPATIENT)
Facility: HOSPITAL | Age: 29
LOS: 2 days | Discharge: HOME | DRG: 833 | End: 2019-04-06
Attending: OBSTETRICS & GYNECOLOGY | Admitting: OBSTETRICS & GYNECOLOGY
Payer: COMMERCIAL

## 2019-04-04 ENCOUNTER — APPOINTMENT (OUTPATIENT)
Dept: OBGYN CLINIC | Facility: CLINIC | Age: 29
End: 2019-04-04
Payer: COMMERCIAL

## 2019-04-04 VITALS — WEIGHT: 293 LBS | BODY MASS INDEX: 52 KG/M2 | DIASTOLIC BLOOD PRESSURE: 82 MMHG | SYSTOLIC BLOOD PRESSURE: 148 MMHG

## 2019-04-04 DIAGNOSIS — O13.3 GESTATIONAL HYPERTENSION, THIRD TRIMESTER: ICD-10-CM

## 2019-04-04 DIAGNOSIS — O99.210 OBESITY AFFECTING PREGNANCY, ANTEPARTUM: Primary | ICD-10-CM

## 2019-04-04 PROBLEM — Z34.90 PREGNANCY (HCC): Status: ACTIVE | Noted: 2019-04-04

## 2019-04-04 PROBLEM — O99.213 OBESITY AFFECTING PREGNANCY IN THIRD TRIMESTER: Status: ACTIVE | Noted: 2018-10-31

## 2019-04-04 PROBLEM — O99.213 OBESITY AFFECTING PREGNANCY IN THIRD TRIMESTER (HCC): Status: ACTIVE | Noted: 2018-10-31

## 2019-04-04 PROBLEM — Z34.90 PREGNANCY: Status: ACTIVE | Noted: 2019-04-04

## 2019-04-04 PROCEDURE — 87081 CULTURE SCREEN ONLY: CPT | Performed by: OBSTETRICS & GYNECOLOGY

## 2019-04-04 PROCEDURE — 59025 FETAL NON-STRESS TEST: CPT | Performed by: OBSTETRICS & GYNECOLOGY

## 2019-04-04 PROCEDURE — 99221 1ST HOSP IP/OBS SF/LOW 40: CPT | Performed by: OBSTETRICS & GYNECOLOGY

## 2019-04-04 RX ORDER — ZOLPIDEM TARTRATE 5 MG/1
5 TABLET ORAL NIGHTLY PRN
Status: DISCONTINUED | OUTPATIENT
Start: 2019-04-04 | End: 2019-04-06

## 2019-04-04 RX ORDER — ACETAMINOPHEN 500 MG
1000 TABLET ORAL EVERY 6 HOURS PRN
Status: DISCONTINUED | OUTPATIENT
Start: 2019-04-04 | End: 2019-04-06

## 2019-04-04 RX ORDER — CHOLECALCIFEROL (VITAMIN D3) 25 MCG
1 TABLET,CHEWABLE ORAL DAILY
Status: DISCONTINUED | OUTPATIENT
Start: 2019-04-04 | End: 2019-04-05

## 2019-04-04 RX ORDER — BETAMETHASONE SODIUM PHOSPHATE AND BETAMETHASONE ACETATE 3; 3 MG/ML; MG/ML
12 INJECTION, SUSPENSION INTRA-ARTICULAR; INTRALESIONAL; INTRAMUSCULAR; SOFT TISSUE EVERY 24 HOURS
Status: COMPLETED | OUTPATIENT
Start: 2019-04-04 | End: 2019-04-04

## 2019-04-04 RX ORDER — ACETAMINOPHEN 500 MG
500 TABLET ORAL EVERY 6 HOURS PRN
Status: DISCONTINUED | OUTPATIENT
Start: 2019-04-04 | End: 2019-04-06

## 2019-04-04 RX ORDER — DOCUSATE SODIUM 100 MG/1
100 CAPSULE, LIQUID FILLED ORAL 2 TIMES DAILY
Status: DISCONTINUED | OUTPATIENT
Start: 2019-04-04 | End: 2019-04-06

## 2019-04-04 RX ORDER — CALCIUM CARBONATE 200(500)MG
1000 TABLET,CHEWABLE ORAL
Status: DISCONTINUED | OUTPATIENT
Start: 2019-04-04 | End: 2019-04-06

## 2019-04-04 RX ORDER — NIFEDIPINE 10 MG/1
10 CAPSULE ORAL ONCE AS NEEDED
Status: ACTIVE | OUTPATIENT
Start: 2019-04-04 | End: 2019-04-04

## 2019-04-04 NOTE — PROGRESS NOTES
Pt is a 33year old female admitted to TRG5/TRG5-A. Patient presents with:  R/o Pih: NST in office, high blood pressures noted. Denies LOF or VB. Reports positive fetal movement. Pt is  34w4d intra-uterine pregnancy.   History obtained, consen

## 2019-04-04 NOTE — PROGRESS NOTES
LUIS ALBERTO  Doing well, +FM  Denies VB/LOF/uctx  Mode of delivery:  anticipated  GHTN. [address] labs wnl. 24 hr urine 200mg on 3/22. Elevated bp in office, sent to triage for [address] labs and BP monitoring.  discussed w/ MD on call Dr Martha Gayle deferred   GBS collected

## 2019-04-05 ENCOUNTER — APPOINTMENT (OUTPATIENT)
Dept: CT IMAGING | Facility: HOSPITAL | Age: 29
DRG: 833 | End: 2019-04-05
Attending: OBSTETRICS & GYNECOLOGY
Payer: COMMERCIAL

## 2019-04-05 ENCOUNTER — APPOINTMENT (OUTPATIENT)
Dept: GENERAL RADIOLOGY | Facility: HOSPITAL | Age: 29
DRG: 833 | End: 2019-04-05
Attending: HOSPITALIST
Payer: COMMERCIAL

## 2019-04-05 PROCEDURE — 71275 CT ANGIOGRAPHY CHEST: CPT | Performed by: OBSTETRICS & GYNECOLOGY

## 2019-04-05 PROCEDURE — 71045 X-RAY EXAM CHEST 1 VIEW: CPT | Performed by: HOSPITALIST

## 2019-04-05 PROCEDURE — 99252 IP/OBS CONSLTJ NEW/EST SF 35: CPT | Performed by: HOSPITALIST

## 2019-04-05 RX ORDER — NIFEDIPINE 30 MG/1
30 TABLET, EXTENDED RELEASE ORAL DAILY
Status: DISCONTINUED | OUTPATIENT
Start: 2019-04-06 | End: 2019-04-06

## 2019-04-05 RX ORDER — SODIUM CHLORIDE, SODIUM LACTATE, POTASSIUM CHLORIDE, CALCIUM CHLORIDE 600; 310; 30; 20 MG/100ML; MG/100ML; MG/100ML; MG/100ML
INJECTION, SOLUTION INTRAVENOUS CONTINUOUS
Status: DISCONTINUED | OUTPATIENT
Start: 2019-04-05 | End: 2019-04-05

## 2019-04-05 RX ORDER — MULTIVITAMIN/IRON/FOLIC ACID 18MG-0.4MG
250 TABLET ORAL DAILY
Status: DISCONTINUED | OUTPATIENT
Start: 2019-04-06 | End: 2019-04-06

## 2019-04-05 RX ORDER — MULTIVITAMIN/IRON/FOLIC ACID 18MG-0.4MG
250 TABLET ORAL DAILY
Status: DISCONTINUED | OUTPATIENT
Start: 2019-04-05 | End: 2019-04-05

## 2019-04-05 RX ORDER — NIFEDIPINE 10 MG/1
10 CAPSULE ORAL EVERY 8 HOURS SCHEDULED
Status: DISCONTINUED | OUTPATIENT
Start: 2019-04-05 | End: 2019-04-05

## 2019-04-05 RX ORDER — PANTOPRAZOLE SODIUM 40 MG/1
40 TABLET, DELAYED RELEASE ORAL
Status: DISCONTINUED | OUTPATIENT
Start: 2019-04-06 | End: 2019-04-06

## 2019-04-05 RX ORDER — NIFEDIPINE 10 MG/1
10 CAPSULE ORAL ONCE
Status: DISCONTINUED | OUTPATIENT
Start: 2019-04-05 | End: 2019-04-05

## 2019-04-05 RX ORDER — BETAMETHASONE SODIUM PHOSPHATE AND BETAMETHASONE ACETATE 3; 3 MG/ML; MG/ML
12 INJECTION, SUSPENSION INTRA-ARTICULAR; INTRALESIONAL; INTRAMUSCULAR; SOFT TISSUE ONCE
Status: COMPLETED | OUTPATIENT
Start: 2019-04-05 | End: 2019-04-05

## 2019-04-05 RX ORDER — FOLIC ACID/VIT B COMPLEX AND C 400 MCG
TABLET ORAL DAILY
Status: DISCONTINUED | OUTPATIENT
Start: 2019-04-06 | End: 2019-04-06

## 2019-04-05 RX ORDER — NIFEDIPINE 10 MG/1
10 CAPSULE ORAL EVERY 8 HOURS SCHEDULED
Status: COMPLETED | OUTPATIENT
Start: 2019-04-05 | End: 2019-04-05

## 2019-04-05 RX ORDER — CHOLECALCIFEROL (VITAMIN D3) 25 MCG
1 TABLET,CHEWABLE ORAL DAILY
Status: DISCONTINUED | OUTPATIENT
Start: 2019-04-06 | End: 2019-04-06

## 2019-04-05 NOTE — IMAGING NOTE
History: Age: 33 years.  : 2 Para: 0.  ____________________________________________________________________________  Dating:  LMP: 2018 EDC: 2019 GA by LMP: 34w5d  Current Scan on: 2019 EDC: 2019 GA by current scan: 35w4d  The

## 2019-04-05 NOTE — CONSULTS
[address] Patient Status:  Inpatient    1990 MRN [id number]   Location [facility] Attending [facility], [address] [place] [address] Day # 1 PCP Kraig Rush MD     Reason for consult: SOB    Req B Complex-C-Folic Acid Oral Tab Take by mouth. Disp:  Rfl:    magnesium 250 MG Oral Tab Take 250 mg by mouth.  Disp:  Rfl:    ESOMEPRAZOLE MAGNESIUM 40 MG Oral Capsule Delayed Release TAKE ONE CAPSULE BY MOUTH EVERY MORNING BEFORE BREAKFAST Disp: 90 capsu Epic.      ASSESSMENT / PLAN:     1. Dyspnea  1. Unclear etiology  2. Check CXR and d-dimer  1. CTA chest if d-dimer abnormal   2. Pregnancy at 34 weeks gestation  1. Per OB  3. Gestational HTN  1. BP controlled   4. Morbid obesity  1.  BMI 50    Quality:

## 2019-04-05 NOTE — PROGRESS NOTES
Pt's SOB returns, does not appear distressed, lung sounds clear bilaterally, pulse oximeter reapplied.

## 2019-04-05 NOTE — H&P
[facility]  Obstetrics and Gynecology  History & Physical    Clifm Smiley Patient Status:  Observation    1990 MRN [id number]   Location [facility] Attending Sabrina Sotelo 15 Day 0 PCP [address] Maternal Grandmother         Coronary artery disease   • Lipids Maternal Grandmother         Hyperlipidemia   • Other (Multiple Sclerosis) Mother      Social History: Social History    Tobacco Use      Smoking status: Former Smoker        Packs/day: 1.00 2019    BILT 0.2 2019    TP 7.3 2019    AST 18 2019    ALT 18 2019     Urine Pr:Cr  0.14     ASSESSMENT/ PLAN:    Andrew Carson is a 33year old  female at 31w1d Estimated Date of Delivery: 19 who is being adm

## 2019-04-05 NOTE — PROGRESS NOTES
4898 Pt returns from being up to the bathroom and c/o a tightness in her diaphragm and the sensation of being short of breath. Pt states she experienced a short episode of this same sensation following her morning administration of Nifedipine.  Pt settled i

## 2019-04-05 NOTE — PROGRESS NOTES
OB/GYN: Antepartum Progress Note     SUBJECTIVE:  Interval History:   Pt is a 33year old female  at 34w5d who was admitted on 2019 for elevated BP. Pt w/ elevated BP throughout pregnancy, 2 at less than 20 weeks, CHTN? Fetal Movement: pos.  V nifedipine 10mg this am.   -D/W M Dr Paulo Owusu, recommend scheduling oral antihypertensives,  Nifedipine 10mg TID. Cont to monitor BPs, if has persistent elevated bpsdespite oral antihypertensives would recommend delivery.    -second dose of BTMZ tonight  -n

## 2019-04-05 NOTE — PROGRESS NOTES
OB PN  Pt with SOB when talking. Slightly tachy mostly 110s. Occasionally in the 120s. BMI Body mass index is 50.85 kg/m².    O:   04/05/19  1625 04/05/19  1630 04/05/19  1635 04/05/19  1640   BP:       BP Location:       Pulse: 112 113 114 114   Resp:

## 2019-04-05 NOTE — CONSULTS
[facility]    Maternal-Fetal Medicine Inpatient Consultation    Date of Admission:  4/4/2019  Date of Consult:  4/5/2019    Reason for Consult:   Gestational hypertension    History of Present Illness:  Damari Barrientos is a a(n) 33year old female Lipids Maternal Grandmother         Hyperlipidemia   • Other (Multiple Sclerosis) Mother       reports that she quit smoking about 2 years ago. Her smoking use included cigarettes. She has a 13.00 pack-year smoking history.  she has never used smokeless tob 04/05/2019    K 4.2 04/05/2019     04/05/2019    CO2 20.0 04/05/2019     04/05/2019    CA 10.0 04/05/2019    ALB 2.9 04/05/2019    ALKPHO 99 04/05/2019    BILT 0.2 04/05/2019    TP 7.5 04/05/2019    AST 20 04/05/2019    ALT 20 04/05/2019     U every 8 hours; transition to nifedipine 30 mg XL dose in the a.m.   · Complete betamethaonse course  · The patient's blood pressures remained stable on oral nifedipine consider discharge home after betamethasone course completed  · If discharged home advise

## 2019-04-06 VITALS
WEIGHT: 293 LBS | HEART RATE: 110 BPM | RESPIRATION RATE: 20 BRPM | HEIGHT: 65 IN | BODY MASS INDEX: 48.82 KG/M2 | DIASTOLIC BLOOD PRESSURE: 65 MMHG | TEMPERATURE: 98 F | SYSTOLIC BLOOD PRESSURE: 135 MMHG | OXYGEN SATURATION: 98 %

## 2019-04-06 RX ORDER — NIFEDIPINE 30 MG/1
30 TABLET, FILM COATED, EXTENDED RELEASE ORAL DAILY
Qty: 30 TABLET | Refills: 1 | Status: ON HOLD | OUTPATIENT
Start: 2019-04-07 | End: 2019-04-16

## 2019-04-06 NOTE — PROGRESS NOTES
OB/GYN: Antepartum Progress Note     SUBJECTIVE:  Interval History:   Pt is a 33year old female  at 34w6d who was admitted on 2019 for elevated blood pressures. Denies headache, vision changes, RUQ pain  Fetal Movement: pos.  Vaginal Bleeding: consult  -appears to be well controlled on procardia 30mg XL, received first dose this am, switched from short acting nifedipine.   -will need twice weekly nsts and delivery at 37 weeks.  Scheduled for cdil on 4/21 at 7pm   SOB and tachycardia yesterday, re

## 2019-04-06 NOTE — PLAN OF CARE
Problem: COPING  Goal: Pt/Family able to verbalize concerns and demonstrate effective coping strategies  INTERVENTIONS:  - Assist patient/family to identify coping skills, available support systems and cultural and spiritual values  - Provide emotional sup Progressing      Problem: Patient/Family Goals  Goal: Patient/Family Long Term Goal  Patient's Long Term Goal: maintain pregnancy to fullterm gestation  Interventions:  - See additional Care Plan goals for specific interventions   Outcome: Desmond Watt

## 2019-04-06 NOTE — PLAN OF CARE
Pt anticipating d/c today. Understands all verbal discharge, follow up, medication  and complication recognition instructions per self and MD. Denies needs, discomforts or concerns at this time.

## 2019-04-06 NOTE — PROGRESS NOTES
Pt d/c per w/c accompanied by OBT and  after written d/c instructions verbally explained and copy given to pt. [facility] on record called, medication available for .

## 2019-04-09 ENCOUNTER — APPOINTMENT (OUTPATIENT)
Dept: OBGYN CLINIC | Facility: CLINIC | Age: 29
End: 2019-04-09
Payer: COMMERCIAL

## 2019-04-09 ENCOUNTER — ROUTINE PRENATAL (OUTPATIENT)
Dept: OBGYN CLINIC | Facility: CLINIC | Age: 29
End: 2019-04-09
Payer: COMMERCIAL

## 2019-04-09 VITALS — DIASTOLIC BLOOD PRESSURE: 62 MMHG | WEIGHT: 293 LBS | BODY MASS INDEX: 52 KG/M2 | SYSTOLIC BLOOD PRESSURE: 116 MMHG

## 2019-04-09 DIAGNOSIS — O99.210 OBESITY AFFECTING PREGNANCY, ANTEPARTUM: Primary | ICD-10-CM

## 2019-04-09 DIAGNOSIS — O13.3 GESTATIONAL HYPERTENSION, THIRD TRIMESTER: ICD-10-CM

## 2019-04-09 PROCEDURE — 59025 FETAL NON-STRESS TEST: CPT | Performed by: OBSTETRICS & GYNECOLOGY

## 2019-04-09 RX ORDER — NIFEDIPINE 30 MG/1
TABLET, EXTENDED RELEASE ORAL
Refills: 1 | Status: ON HOLD | COMMUNITY
Start: 2019-04-06 | End: 2019-04-16

## 2019-04-11 ENCOUNTER — TELEPHONE (OUTPATIENT)
Dept: OBGYN CLINIC | Facility: CLINIC | Age: 29
End: 2019-04-11

## 2019-04-11 ENCOUNTER — HOSPITAL ENCOUNTER (INPATIENT)
Facility: HOSPITAL | Age: 29
LOS: 5 days | Discharge: HOME | End: 2019-04-16
Attending: OBSTETRICS & GYNECOLOGY | Admitting: OBSTETRICS & GYNECOLOGY
Payer: COMMERCIAL

## 2019-04-11 ENCOUNTER — ANESTHESIA EVENT (OUTPATIENT)
Dept: OBGYN UNIT | Facility: HOSPITAL | Age: 29
End: 2019-04-11
Payer: COMMERCIAL

## 2019-04-11 ENCOUNTER — ANESTHESIA (OUTPATIENT)
Dept: OBGYN UNIT | Facility: HOSPITAL | Age: 29
End: 2019-04-11
Payer: COMMERCIAL

## 2019-04-11 DIAGNOSIS — O13.3 GESTATIONAL HYPERTENSION W/O SIGNIFICANT PROTEINURIA IN 3RD TRIMESTER: Primary | ICD-10-CM

## 2019-04-11 RX ORDER — LABETALOL HYDROCHLORIDE 5 MG/ML
20 INJECTION, SOLUTION INTRAVENOUS ONCE AS NEEDED
Status: COMPLETED | OUTPATIENT
Start: 2019-04-11 | End: 2019-04-11

## 2019-04-11 RX ORDER — FAMOTIDINE 10 MG/ML
20 INJECTION, SOLUTION INTRAVENOUS ONCE
Status: COMPLETED | OUTPATIENT
Start: 2019-04-11 | End: 2019-04-12

## 2019-04-11 RX ORDER — LABETALOL HYDROCHLORIDE 5 MG/ML
80 INJECTION, SOLUTION INTRAVENOUS ONCE AS NEEDED
Status: DISCONTINUED | OUTPATIENT
Start: 2019-04-11 | End: 2019-04-13

## 2019-04-11 RX ORDER — CALCIUM GLUCONATE 94 MG/ML
1 INJECTION, SOLUTION INTRAVENOUS ONCE AS NEEDED
Status: DISCONTINUED | OUTPATIENT
Start: 2019-04-11 | End: 2019-04-13

## 2019-04-11 RX ORDER — METOCLOPRAMIDE HYDROCHLORIDE 5 MG/ML
10 INJECTION INTRAMUSCULAR; INTRAVENOUS ONCE
Status: DISCONTINUED | OUTPATIENT
Start: 2019-04-11 | End: 2019-04-13 | Stop reason: HOSPADM

## 2019-04-11 RX ORDER — LABETALOL HYDROCHLORIDE 5 MG/ML
40 INJECTION, SOLUTION INTRAVENOUS ONCE AS NEEDED
Status: COMPLETED | OUTPATIENT
Start: 2019-04-11 | End: 2019-04-11

## 2019-04-11 RX ORDER — HYDRALAZINE HYDROCHLORIDE 20 MG/ML
10 INJECTION INTRAMUSCULAR; INTRAVENOUS ONCE AS NEEDED
Status: COMPLETED | OUTPATIENT
Start: 2019-04-11 | End: 2019-04-11

## 2019-04-11 RX ORDER — TRISODIUM CITRATE DIHYDRATE AND CITRIC ACID MONOHYDRATE 500; 334 MG/5ML; MG/5ML
30 SOLUTION ORAL ONCE
Status: COMPLETED | OUTPATIENT
Start: 2019-04-11 | End: 2019-04-11

## 2019-04-11 RX ORDER — FAMOTIDINE 20 MG/1
20 TABLET ORAL ONCE
Status: COMPLETED | OUTPATIENT
Start: 2019-04-11 | End: 2019-04-12

## 2019-04-11 RX ORDER — TRISODIUM CITRATE DIHYDRATE AND CITRIC ACID MONOHYDRATE 500; 334 MG/5ML; MG/5ML
30 SOLUTION ORAL ONCE
Status: DISCONTINUED | OUTPATIENT
Start: 2019-04-11 | End: 2019-04-13 | Stop reason: HOSPADM

## 2019-04-11 RX ORDER — METOCLOPRAMIDE 10 MG/1
10 TABLET ORAL ONCE
Status: DISCONTINUED | OUTPATIENT
Start: 2019-04-11 | End: 2019-04-13 | Stop reason: HOSPADM

## 2019-04-11 RX ORDER — HYDRALAZINE HYDROCHLORIDE 20 MG/ML
10 INJECTION INTRAMUSCULAR; INTRAVENOUS ONCE
Status: COMPLETED | OUTPATIENT
Start: 2019-04-11 | End: 2019-04-11

## 2019-04-11 RX ORDER — TRISODIUM CITRATE DIHYDRATE AND CITRIC ACID MONOHYDRATE 500; 334 MG/5ML; MG/5ML
SOLUTION ORAL
Status: COMPLETED
Start: 2019-04-11 | End: 2019-04-11

## 2019-04-11 RX ORDER — SODIUM CHLORIDE, SODIUM LACTATE, POTASSIUM CHLORIDE, CALCIUM CHLORIDE 600; 310; 30; 20 MG/100ML; MG/100ML; MG/100ML; MG/100ML
INJECTION, SOLUTION INTRAVENOUS CONTINUOUS
Status: DISCONTINUED | OUTPATIENT
Start: 2019-04-11 | End: 2019-04-12

## 2019-04-12 PROCEDURE — 59510 CESAREAN DELIVERY: CPT | Performed by: OBSTETRICS & GYNECOLOGY

## 2019-04-12 RX ORDER — MISOPROSTOL 200 UG/1
1000 TABLET ORAL ONCE
Status: COMPLETED | OUTPATIENT
Start: 2019-04-12 | End: 2019-04-12

## 2019-04-12 RX ORDER — MORPHINE SULFATE 0.5 MG/ML
0.2 INJECTION, SOLUTION EPIDURAL; INTRATHECAL; INTRAVENOUS ONCE
Status: DISCONTINUED | OUTPATIENT
Start: 2019-04-12 | End: 2019-04-13

## 2019-04-12 RX ORDER — SODIUM CHLORIDE, SODIUM LACTATE, POTASSIUM CHLORIDE, CALCIUM CHLORIDE 600; 310; 30; 20 MG/100ML; MG/100ML; MG/100ML; MG/100ML
INJECTION, SOLUTION INTRAVENOUS CONTINUOUS
Status: DISCONTINUED | OUTPATIENT
Start: 2019-04-12 | End: 2019-04-17

## 2019-04-12 RX ORDER — ONDANSETRON 2 MG/ML
INJECTION INTRAMUSCULAR; INTRAVENOUS
Status: COMPLETED
Start: 2019-04-12 | End: 2019-04-12

## 2019-04-12 RX ORDER — HYDROMORPHONE HYDROCHLORIDE 1 MG/ML
0.5 INJECTION, SOLUTION INTRAMUSCULAR; INTRAVENOUS; SUBCUTANEOUS EVERY 5 MIN PRN
Status: DISCONTINUED | OUTPATIENT
Start: 2019-04-12 | End: 2019-04-13 | Stop reason: HOSPADM

## 2019-04-12 RX ORDER — NALOXONE HYDROCHLORIDE 0.4 MG/ML
0.08 INJECTION, SOLUTION INTRAMUSCULAR; INTRAVENOUS; SUBCUTANEOUS
Status: ACTIVE | OUTPATIENT
Start: 2019-04-12 | End: 2019-04-13

## 2019-04-12 RX ORDER — HYDROMORPHONE HYDROCHLORIDE 1 MG/ML
0.5 INJECTION, SOLUTION INTRAMUSCULAR; INTRAVENOUS; SUBCUTANEOUS EVERY 2 HOUR PRN
Status: ACTIVE | OUTPATIENT
Start: 2019-04-12 | End: 2019-04-13

## 2019-04-12 RX ORDER — NALBUPHINE HCL 10 MG/ML
2.5 AMPUL (ML) INJECTION EVERY 4 HOURS PRN
Status: DISCONTINUED | OUTPATIENT
Start: 2019-04-12 | End: 2019-04-17

## 2019-04-12 RX ORDER — DIPHENHYDRAMINE HYDROCHLORIDE 50 MG/ML
12.5 INJECTION INTRAMUSCULAR; INTRAVENOUS EVERY 4 HOURS PRN
Status: DISCONTINUED | OUTPATIENT
Start: 2019-04-12 | End: 2019-04-17

## 2019-04-12 RX ORDER — ONDANSETRON 2 MG/ML
4 INJECTION INTRAMUSCULAR; INTRAVENOUS EVERY 6 HOURS PRN
Status: DISCONTINUED | OUTPATIENT
Start: 2019-04-12 | End: 2019-04-17

## 2019-04-12 RX ORDER — ENOXAPARIN SODIUM 100 MG/ML
40 INJECTION SUBCUTANEOUS DAILY
Status: DISCONTINUED | OUTPATIENT
Start: 2019-04-12 | End: 2019-04-17

## 2019-04-12 RX ORDER — KETOROLAC TROMETHAMINE 30 MG/ML
30 INJECTION, SOLUTION INTRAMUSCULAR; INTRAVENOUS EVERY 6 HOURS PRN
Status: DISPENSED | OUTPATIENT
Start: 2019-04-12 | End: 2019-04-13

## 2019-04-12 RX ORDER — NALBUPHINE HCL 10 MG/ML
2.5 AMPUL (ML) INJECTION
Status: COMPLETED | OUTPATIENT
Start: 2019-04-12 | End: 2019-04-12

## 2019-04-12 RX ORDER — ONDANSETRON 2 MG/ML
4 INJECTION INTRAMUSCULAR; INTRAVENOUS ONCE AS NEEDED
Status: ACTIVE | OUTPATIENT
Start: 2019-04-12 | End: 2019-04-12

## 2019-04-12 RX ORDER — MISOPROSTOL 200 UG/1
TABLET ORAL
Status: COMPLETED
Start: 2019-04-12 | End: 2019-04-12

## 2019-04-12 RX ORDER — DIPHENHYDRAMINE HYDROCHLORIDE 50 MG/ML
50 INJECTION INTRAMUSCULAR; INTRAVENOUS EVERY 4 HOURS PRN
Status: DISCONTINUED | OUTPATIENT
Start: 2019-04-12 | End: 2019-04-17

## 2019-04-12 RX ORDER — DIPHENHYDRAMINE HYDROCHLORIDE 50 MG/ML
25 INJECTION INTRAMUSCULAR; INTRAVENOUS ONCE AS NEEDED
Status: COMPLETED | OUTPATIENT
Start: 2019-04-12 | End: 2019-04-12

## 2019-04-12 RX ORDER — DIPHENHYDRAMINE HCL 25 MG
25 CAPSULE ORAL EVERY 4 HOURS PRN
Status: DISCONTINUED | OUTPATIENT
Start: 2019-04-12 | End: 2019-04-17

## 2019-04-12 RX ORDER — KETOROLAC TROMETHAMINE 30 MG/ML
30 INJECTION, SOLUTION INTRAMUSCULAR; INTRAVENOUS ONCE AS NEEDED
Status: ACTIVE | OUTPATIENT
Start: 2019-04-12 | End: 2019-04-12

## 2019-04-12 NOTE — PLAN OF CARE
Problem: Patient/Family Goals  Goal: Patient/Family Long Term Goal  Description  Patient's Long Term Goal: Safe,  delivery     Interventions:  -   - See additional Care Plan goals for specific interventions   Outcome: Progressing  Goal: Patient/F

## 2019-04-12 NOTE — ANESTHESIA PREPROCEDURE EVALUATION
PRE-OP EVALUATION    Patient Name: Andrew Carson    Pre-op Diagnosis: * No pre-op diagnosis entered *    Procedure(s):      Surgeon(s) and Role:     Nixon Pinedo MD - Primary    Pre-op vitals reviewed.   Temp: 99.2 °F (37.3 °C)  Pulse: 107  R Take 1 tablet (30 mg total) by mouth daily. Disp: 30 tablet Rfl: 1   Prenatal Vit-Fe Fumarate-FA (PRENAPLUS) 27-1 MG Oral Tab Take by mouth. Disp:  Rfl:    B Complex-C-Folic Acid Oral Tab Take by mouth.  Disp:  Rfl:    magnesium 250 MG Oral Tab Take 250 mg verified and Patient does not meet NPO guidelines. Post-procedure pain management plan discussed with surgeon and patient.     Comment: I was instructed by the surgeon this procedure is emergent and must proceed despite violation of the Hospital NPO guid

## 2019-04-12 NOTE — H&P
[facility] Patient Status:  Inpatient    1990 MRN [id number]   Location [facility] Attending Braxton Curran MD    0 PCP Scooter Schneider MD     CC: patient is here fo Maternal Grandmother         Coronary artery disease   • Lipids Maternal Grandmother         Hyperlipidemia   • Other (Multiple Sclerosis) Mother      Social History: Social History    Tobacco Use      Smoking status: Former Smoker        Packs/day: 1.00 2019    CA 9.9 2019    ALB 2.6 2019    ALKPHO 94 2019    BILT 0.2 2019    TP 7.1 2019    AST 11 2019    ALT 16 2019          ASSESSMENT/ PLAN:    33year old  at 35w4d here for GHTN, sustained severe r

## 2019-04-12 NOTE — PROGRESS NOTES
[facility]  Obstetrics and Gynecology    OB/GYN: Postpartum Progress Note     SUBJECTIVE:  Patient is a 33year old  female who is s/p PCS. She is POD# 0. Doing well. Denies fever, chills, N, V, chest pain and SOB.  Bleeding has been stab OBGYN

## 2019-04-12 NOTE — PROGRESS NOTES
Report from MANOLO Rubin RN @ this time. POC discussed and will enforce. Pt stable in bed with IV infusing and call light in reach. Pt verbalized understanding of POC. Will continue to monitor.

## 2019-04-12 NOTE — OPERATIVE REPORT
[facility]   Section - Operative Note    Gisela List Patient Status:  Inpatient    1990 MRN [id number]   Location [facility] Attending Quinn Oleary MD   Hosp Day # 1 PCP Claudy Bhat MD and applied. Bandage scissors used to extend incision on left side. Two pulls were performed, no pop offs. Infant head delivered without difficulty. The nose and mouth  were bulb suctioned. The remainder of the body was delivered without complication.   Keila Roberts
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
## 2019-04-12 NOTE — ANESTHESIA POSTPROCEDURE EVALUATION
[facility] Patient Status:  Inpatient   Age/Gender 33year old female MRN [id number]   Location 18[facility] Attending Usman Naranjo MD   [facility] Day # 1 PCP Pili Lawrence MD       Anesthesia Post-op

## 2019-04-12 NOTE — PROGRESS NOTES
Pt moved per cart per this RN and 1 OBT in stable condition from recovery room to room 120. IV infusing, pink draining, and pt verbalized understanding of POC. Call light in reach and will continue to monitor.

## 2019-04-12 NOTE — TELEPHONE ENCOUNTER
Pt is a 34 yo  at 35w4d with GHTN on procardia XL 30mg daily. Pt was having spotty vision, she checked her BP and reports it being 191/105, 158/93, 174/84  Also has dull headache  Reports good FM   Advised to go to L&D for further evaluation.  Will o

## 2019-04-12 NOTE — PROGRESS NOTES
Pt moved per cart per 2 RNs from triage 5 to [address] [facility], [facility] at this time in stable condition with IV infusing.  to recovery with all belongings. Will continue to monitor.

## 2019-04-12 NOTE — PLAN OF CARE
Problem: Patient/Family Goals  Goal: Patient/Family Long Term Goal  Description  Patient's Long Term Goal: Safe,  delivery     Interventions:  -   - See additional Care Plan goals for specific interventions   Outcome: Progressing  Goal: Patient/F injury  Description  INTERVENTIONS:  - Assess pt frequently for physical needs  - Identify cognitive and physical deficits and behaviors that affect risk of falls.   - [place] fall precautions as indicated by assessment.  - Educate pt/family on patient sa

## 2019-04-12 NOTE — PROGRESS NOTES
Report to MANOLO Guerrero RN at this time. POC discussed and enforced. Pt stable in bed with IV infusing and pink draining and call light in reach. Magnesium handoff completed. Pt verbalized understanding of POC.

## 2019-04-12 NOTE — PROGRESS NOTES
Pt continues to report \"severe\"itching, now on bilateral palms. No hives or rash noted with bedside bath. Ice wash cloths and linen change provided. Dr. Pinky Walker called at 65 and notified of pt concerns and medications given with little to no relief.

## 2019-04-13 RX ORDER — BISACODYL 10 MG
10 SUPPOSITORY, RECTAL RECTAL
Status: DISCONTINUED | OUTPATIENT
Start: 2019-04-13 | End: 2019-04-17

## 2019-04-13 RX ORDER — IBUPROFEN 600 MG/1
600 TABLET ORAL EVERY 6 HOURS SCHEDULED
Status: DISCONTINUED | OUTPATIENT
Start: 2019-04-13 | End: 2019-04-17

## 2019-04-13 RX ORDER — SIMETHICONE 80 MG
80 TABLET,CHEWABLE ORAL 3 TIMES DAILY PRN
Status: DISCONTINUED | OUTPATIENT
Start: 2019-04-13 | End: 2019-04-17

## 2019-04-13 RX ORDER — DOCUSATE SODIUM 100 MG/1
100 CAPSULE, LIQUID FILLED ORAL
Status: DISCONTINUED | OUTPATIENT
Start: 2019-04-13 | End: 2019-04-17

## 2019-04-13 RX ORDER — DEXTROSE, SODIUM CHLORIDE, SODIUM LACTATE, POTASSIUM CHLORIDE, AND CALCIUM CHLORIDE 5; .6; .31; .03; .02 G/100ML; G/100ML; G/100ML; G/100ML; G/100ML
INJECTION, SOLUTION INTRAVENOUS CONTINUOUS
Status: DISCONTINUED | OUTPATIENT
Start: 2019-04-13 | End: 2019-04-17

## 2019-04-13 RX ORDER — HYDROCODONE BITARTRATE AND ACETAMINOPHEN 10; 325 MG/1; MG/1
1 TABLET ORAL EVERY 4 HOURS PRN
Status: DISCONTINUED | OUTPATIENT
Start: 2019-04-13 | End: 2019-04-17

## 2019-04-13 RX ORDER — ZOLPIDEM TARTRATE 5 MG/1
5 TABLET ORAL NIGHTLY PRN
Status: DISCONTINUED | OUTPATIENT
Start: 2019-04-13 | End: 2019-04-17

## 2019-04-13 RX ORDER — HYDROCODONE BITARTRATE AND ACETAMINOPHEN 5; 325 MG/1; MG/1
1 TABLET ORAL EVERY 4 HOURS PRN
Status: DISCONTINUED | OUTPATIENT
Start: 2019-04-13 | End: 2019-04-17

## 2019-04-13 NOTE — PROGRESS NOTES
[facility]  Obstetrics and Gynecology    OB/GYN: Postpartum Progress Note     SUBJECTIVE:  Patient is a 33year old  female who is s/p PCS. She is POD# 1. Doing well. Denies fever, chills, N, V, chest pain and SOB.  Radha removed this AM.

## 2019-04-13 NOTE — PROGRESS NOTES
Pt assisted to standing position and then assisted to wheelchair for transfer to mother baby unit. Report called to [employer]. Pt transferred to room 2196 in stable condition. Pt to NICU to see her infant prior to room 2196.   This RN remained with patien

## 2019-04-13 NOTE — PLAN OF CARE
Problem: SAFETY ADULT - FALL  Goal: Free from fall injury  Description  INTERVENTIONS:  - Assess pt frequently for physical needs  - Identify cognitive and physical deficits and behaviors that affect risk of falls.   - [place] fall precautions as indica previous experience with breast feeding.  - Provide information as needed about early infant feeding cues (e.g., rooting, lip smacking, sucking fingers/hand) versus late cue of crying.  - Discuss/demonstrate breast feeding aids (e.g., infant sling, nursing services/case management support as needed.   Outcome: Progressing     Problem: GASTROINTESTINAL - ADULT  Goal: Minimal or absence of nausea and vomiting  Description  INTERVENTIONS:  - Maintain adequate hydration with IV or PO as ordered and tolerated  - N

## 2019-04-13 NOTE — PROGRESS NOTES
NURSING ADMISSION NOTE      Patient admitted via Wheelchair  Oriented to room. Safety precautions initiated. Bed in low position. Call light in reach.   Discharge folder at bedside

## 2019-04-13 NOTE — PLAN OF CARE
Problem: SAFETY ADULT - FALL  Goal: Free from fall injury  Description  INTERVENTIONS:  - Assess pt frequently for physical needs  - Identify cognitive and physical deficits and behaviors that affect risk of falls.   - [place] fall precautions as indica previous experience with breast feeding.  - Provide information as needed about early infant feeding cues (e.g., rooting, lip smacking, sucking fingers/hand) versus late cue of crying.  - Discuss/demonstrate breast feeding aids (e.g., infant sling, nursing as needed  - Evaluate fluid balance  Outcome: Completed  Goal: Maintains or returns to baseline bowel function  Description  INTERVENTIONS:  - Assess bowel function  - Maintain adequate hydration with IV or PO as ordered and tolerated  - Evaluate effective

## 2019-04-13 NOTE — PROGRESS NOTES
[facility]    Patients Name: Amie Luna  Attending Physician: Lui Estrada MD  CSN: [id number]    Location:  2196/2196-A  MRN: [id number]    YOB: 1990  Admission Date: 4/11/2019     Obstetric Anesthesia Pain Progress Note

## 2019-04-14 NOTE — PLAN OF CARE
Problem: SAFETY ADULT - FALL  Goal: Free from fall injury  Description  INTERVENTIONS:  - Assess pt frequently for physical needs  - Identify cognitive and physical deficits and behaviors that affect risk of falls.   - [place] fall precautions as indica previous experience with breast feeding.  - Provide information as needed about early infant feeding cues (e.g., rooting, lip smacking, sucking fingers/hand) versus late cue of crying.  - Discuss/demonstrate breast feeding aids (e.g., infant sling, nursing

## 2019-04-14 NOTE — PLAN OF CARE
Problem: SAFETY ADULT - FALL  Goal: Free from fall injury  Description  INTERVENTIONS:  - Assess pt frequently for physical needs  - Identify cognitive and physical deficits and behaviors that affect risk of falls.   - [place] fall precautions as indica previous experience with breast feeding.  - Provide information as needed about early infant feeding cues (e.g., rooting, lip smacking, sucking fingers/hand) versus late cue of crying.  - Discuss/demonstrate breast feeding aids (e.g., infant sling, nursing

## 2019-04-14 NOTE — PROGRESS NOTES
POD#2    Pt has no complaints, lochia min   04/14/19  0800   BP: 125/69   Pulse: 94   Resp: 20   Temp: 98.8 °F (37.1 °C)     Recent Labs   Lab 04/11/19  2131 04/13/19  0717   RBC 3.82 3.12*   HGB 11.4* 9.3*   HCT 33.6* 28.3*   MCV 88.0 90.7   MCH 29.8 29. 8

## 2019-04-15 ENCOUNTER — TELEPHONE (OUTPATIENT)
Dept: OBGYN CLINIC | Facility: CLINIC | Age: 29
End: 2019-04-15

## 2019-04-15 RX ORDER — CEPHALEXIN 500 MG/1
500 CAPSULE ORAL EVERY 6 HOURS
Status: DISCONTINUED | OUTPATIENT
Start: 2019-04-15 | End: 2019-04-16

## 2019-04-15 NOTE — PLAN OF CARE
Problem: SAFETY ADULT - FALL  Goal: Free from fall injury  Description  INTERVENTIONS:  - Assess pt frequently for physical needs  - Identify cognitive and physical deficits and behaviors that affect risk of falls.   - [place] fall precautions as indica

## 2019-04-15 NOTE — CM/SW NOTE
04/15/19 1000   Referral Data   Referral Source Self referral   Referral Reason Counseling/support;Psychosocial assessment     SW met with pt to offer support due to the NICU admission of her baby boy. SW reviewed chart, and spoke with RN.      Pt oanh

## 2019-04-15 NOTE — PROGRESS NOTES
[facility] Note:  Renal Adjustment for Keflex     Earleadavy Flank is a 33year old female who has been prescribed Keflex 500 mg every 12 hrs. CrCl is estimated creatinine clearance is 126.6 mL/min (based on SCr of 0.59 mg/dL).  so the dose has been ad

## 2019-04-15 NOTE — PLAN OF CARE
Problem: SAFETY ADULT - FALL  Goal: Free from fall injury  Description  INTERVENTIONS:  - Assess pt frequently for physical needs  - Identify cognitive and physical deficits and behaviors that affect risk of falls.   - [place] fall precautions as indica Encourage skin-to-skin contact. - Provide LC support as needed. - Assess for and manage engorgement. - Provide breast feeding education handouts and information on community breast feeding support.    Outcome: Progressing  Goal: Appropriate maternal - ne

## 2019-04-15 NOTE — PROGRESS NOTES
[facility]  Post-Partum Progress Note    [place] [facility] Patient Status:  Inpatient    1990 MRN [id number]   [facility] 2SW-J Attending Madhuri Burrows MD   [facility] Day # 4 PCP Dorrie Gitelman, MD     SUBJECTIVE:    Alexa Whalen

## 2019-04-15 NOTE — CM/SW NOTE
CM met with patient to review insurance and PCP for infant. Patient has [address] plan and is going to go on the web site or call insurance to verify PCP for infant. Patient plans to breast feed and is going to rent a breast pump from [place].  CM review

## 2019-04-16 VITALS
SYSTOLIC BLOOD PRESSURE: 141 MMHG | HEART RATE: 97 BPM | WEIGHT: 293 LBS | BODY MASS INDEX: 48.82 KG/M2 | RESPIRATION RATE: 18 BRPM | HEIGHT: 65 IN | OXYGEN SATURATION: 95 % | DIASTOLIC BLOOD PRESSURE: 71 MMHG | TEMPERATURE: 98 F

## 2019-04-16 RX ORDER — CEFUROXIME AXETIL 500 MG/1
500 TABLET ORAL EVERY 12 HOURS SCHEDULED
Status: DISCONTINUED | OUTPATIENT
Start: 2019-04-16 | End: 2019-04-17

## 2019-04-16 RX ORDER — ACYCLOVIR 400 MG/1
400 TABLET ORAL
Status: DISCONTINUED | OUTPATIENT
Start: 2019-04-16 | End: 2019-04-17

## 2019-04-16 RX ORDER — PSEUDOEPHEDRINE HCL 30 MG
100 TABLET ORAL 2 TIMES DAILY
Qty: 60 CAPSULE | Refills: 1 | Status: SHIPPED | OUTPATIENT
Start: 2019-04-16 | End: 2019-05-17

## 2019-04-16 RX ORDER — CEFUROXIME AXETIL 500 MG/1
500 TABLET ORAL EVERY 12 HOURS SCHEDULED
Qty: 20 TABLET | Refills: 0 | Status: SHIPPED | OUTPATIENT
Start: 2019-04-16 | End: 2019-04-25 | Stop reason: ALTCHOICE

## 2019-04-16 RX ORDER — ACYCLOVIR 400 MG/1
400 TABLET ORAL
Qty: 25 TABLET | Refills: 0 | Status: SHIPPED | OUTPATIENT
Start: 2019-04-16 | End: 2019-04-25 | Stop reason: ALTCHOICE

## 2019-04-16 RX ORDER — HYDROCODONE BITARTRATE AND ACETAMINOPHEN 10; 325 MG/1; MG/1
1 TABLET ORAL EVERY 4 HOURS PRN
Qty: 25 TABLET | Refills: 0 | Status: SHIPPED | OUTPATIENT
Start: 2019-04-16 | End: 2019-05-17

## 2019-04-16 NOTE — PROGRESS NOTES
NURSING DISCHARGE NOTE    Discharged Home via Wheelchair. Accompanied by RN  Belongings Taken by patient/family Verbalized good understanding of all D/C instructions.

## 2019-04-16 NOTE — PROGRESS NOTES
POD # 4. Doing better. Now has cold sore, using prescribed topical. Mild headache but probably related to poor sleep and worry about infant in NICU Voiding without issue. . Bleeding decreased.  + Flatus.  + BM. Infant stable in NICU.  Still having feedin

## 2019-04-16 NOTE — PLAN OF CARE
Problem: SAFETY ADULT - FALL  Goal: Free from fall injury  Description  INTERVENTIONS:  - Assess pt frequently for physical needs  - Identify cognitive and physical deficits and behaviors that affect risk of falls.   - [place] fall precautions as indica previous experience with breast feeding.  - Provide information as needed about early infant feeding cues (e.g., rooting, lip smacking, sucking fingers/hand) versus late cue of crying.  - Discuss/demonstrate breast feeding aids (e.g., infant sling, nursing

## 2019-04-16 NOTE — PROGRESS NOTES
Pt c/o mild-moderate HA today which improves with rest. Denies blurred vision or epigastric pain. Dr Jereld Heimlich aware. BP's stable. Pt desires to go home.

## 2019-04-17 NOTE — DISCHARGE SUMMARY
[facility]    PATIENT'S NAME: Fredyanahi Loyola   ATTENDING PHYSICIAN: May Beckett.  Cherelle Giraldo MD   PATIENT ACCOUNT#:   [id number]    LOCATION:  [address] [place] [address]  MEDICAL RECORD #:   [id number]       YOB: 1990  ADMISSION DATE:       0 to need chronic antihypertensive therapy. On postoperative day #3, the impression was early wound cellulitis, for which she was started on antibiotic therapy.   On postop day 4, she was found to have an outbreak of herpes labialis and was on antiviral the

## 2019-04-18 ENCOUNTER — TELEPHONE (OUTPATIENT)
Dept: OBGYN UNIT | Facility: HOSPITAL | Age: 29
End: 2019-04-18

## 2019-04-18 NOTE — PROGRESS NOTES
Outgoing cradle call completed. Mom reports that she is doing well. Has PP F/U visit scheduled. No complaints of PPB or PPD. Reviewed basic infant and self care; verbalizes understanding.   Encouraged to follow-up with MDs as directed with any further

## 2019-04-19 ENCOUNTER — POSTPARTUM (OUTPATIENT)
Dept: OBGYN CLINIC | Facility: CLINIC | Age: 29
End: 2019-04-19
Payer: COMMERCIAL

## 2019-04-19 VITALS — BODY MASS INDEX: 48 KG/M2 | DIASTOLIC BLOOD PRESSURE: 68 MMHG | SYSTOLIC BLOOD PRESSURE: 130 MMHG | WEIGHT: 290 LBS

## 2019-04-19 DIAGNOSIS — O13.3 GESTATIONAL HYPERTENSION, THIRD TRIMESTER: ICD-10-CM

## 2019-04-19 NOTE — PROGRESS NOTES
Here for f/u   Feels well    bp stable    Inc: cl/dry intact      Asking to stop antibiotics as is breastfeeding and the baby has had multiple liquid stools the last 36 hours. Samm would like to stop antibiotics if able      Will stop and watch.   If has si

## 2019-04-24 RX ORDER — ESOMEPRAZOLE MAGNESIUM 40 MG/1
CAPSULE, DELAYED RELEASE ORAL
Qty: 90 CAPSULE | Refills: 0 | Status: CANCELLED | OUTPATIENT
Start: 2019-04-24

## 2019-04-25 ENCOUNTER — POSTPARTUM (OUTPATIENT)
Dept: OBGYN CLINIC | Facility: CLINIC | Age: 29
End: 2019-04-25
Payer: COMMERCIAL

## 2019-04-25 VITALS
HEART RATE: 91 BPM | WEIGHT: 292 LBS | SYSTOLIC BLOOD PRESSURE: 130 MMHG | HEIGHT: 65 IN | BODY MASS INDEX: 48.65 KG/M2 | DIASTOLIC BLOOD PRESSURE: 82 MMHG

## 2019-04-25 PROCEDURE — 99024 POSTOP FOLLOW-UP VISIT: CPT | Performed by: NURSE PRACTITIONER

## 2019-04-25 NOTE — TELEPHONE ENCOUNTER
CSS please assist patient in scheduling a follow up appointment - thank you     My chart message also sent to patient

## 2019-04-25 NOTE — PROGRESS NOTES
S:  Patient is here for follow up incision and blood pressure reading. Her BP is stable without anti-hypertensive medication. She states her incision has been doing well since she discontinued her antibiotic.  She denies any fever, redness, drainage, or con

## 2019-04-30 ENCOUNTER — APPOINTMENT (OUTPATIENT)
Dept: LACTATION | Facility: HOSPITAL | Age: 29
End: 2019-04-30
Attending: OBSTETRICS & GYNECOLOGY
Payer: COMMERCIAL

## 2019-05-17 ENCOUNTER — POSTPARTUM (OUTPATIENT)
Dept: OBGYN CLINIC | Facility: CLINIC | Age: 29
End: 2019-05-17
Payer: COMMERCIAL

## 2019-05-17 VITALS
DIASTOLIC BLOOD PRESSURE: 72 MMHG | SYSTOLIC BLOOD PRESSURE: 124 MMHG | BODY MASS INDEX: 48.82 KG/M2 | WEIGHT: 293 LBS | HEIGHT: 65 IN

## 2019-05-18 NOTE — PROGRESS NOTES
GYNE postpartum note     S: patient is a 33year old yo   female who presents today for post partum visit. She underwent P C/S on 19 for severe uncontrollable blood pressures . She is doing well, breast feeding/ pumping. Has no complaints.  Felicia Avila

## 2019-05-22 RX ORDER — ESOMEPRAZOLE MAGNESIUM 40 MG/1
CAPSULE, DELAYED RELEASE ORAL
Qty: 90 CAPSULE | Refills: 0 | Status: CANCELLED | OUTPATIENT
Start: 2019-05-22

## 2019-05-22 NOTE — TELEPHONE ENCOUNTER
Pt needs refills on   Esomeprazole Magnesium (Capsule Delayed Release) NEXIUM 40 MG     Please advise

## 2019-05-22 NOTE — TELEPHONE ENCOUNTER
Requested Prescriptions     Pending Prescriptions Disp Refills   • Esomeprazole Magnesium 40 MG Oral Capsule Delayed Release 90 capsule 0     Sig: TAKE ONE CAPSULE BY MOUTH EVERY MORNING BEFORE BREAKFAST     LOV: 1/2/19  RTC: 2 months  Last Relevant Labs:

## 2019-05-22 NOTE — TELEPHONE ENCOUNTER
Usually Gi will have patients take esomeprazole 40mg 1# Po QD for 1-2 mo and then transition them to 20mg QD.  Please ask pt if she has ever tried the lower dose and if so/no problems on the 40mg please lower her down to esomeprazole 20mg 1# PO QD, #30, no

## 2019-05-23 NOTE — TELEPHONE ENCOUNTER
Informed pt of Dr. Bridger Baker' review and recommendation of refill, pt expressed understanding and full agreement to try the 20mg daily.  Pt reports she was on 20mg daily years ago, \"symptoms got worse and was increased to 40mg\", but is willing to try the lo
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
## 2019-05-31 ENCOUNTER — APPOINTMENT (OUTPATIENT)
Dept: LACTATION | Facility: HOSPITAL | Age: 29
End: 2019-05-31
Attending: OBSTETRICS & GYNECOLOGY
Payer: COMMERCIAL

## 2019-06-30 ENCOUNTER — APPOINTMENT (OUTPATIENT)
Dept: LACTATION | Facility: HOSPITAL | Age: 29
End: 2019-06-30
Attending: OBSTETRICS & GYNECOLOGY
Payer: COMMERCIAL

## 2019-07-17 ENCOUNTER — TELEPHONE (OUTPATIENT)
Dept: FAMILY MEDICINE CLINIC | Facility: CLINIC | Age: 29
End: 2019-07-17

## 2019-07-17 NOTE — TELEPHONE ENCOUNTER
See attached phone message. Set up 40mg #30 for review.      Last refill for Esomeprazole 20mg one every AM #30 5/23/19.     5/22/19 note:  Lara Crews MD    4:22 PM   Note      Usually Gi will have patients take esomeprazole 40mg 1# Po QD for

## 2019-07-17 NOTE — TELEPHONE ENCOUNTER
Patient states the 20 mg isn't enough, she needs to stay on the 40 mg. And she also needs a refill.    Esomeprazole Magnesium 20 MG Oral Capsule Delayed Release

## 2019-07-18 RX ORDER — ESOMEPRAZOLE MAGNESIUM 40 MG/1
40 CAPSULE, DELAYED RELEASE ORAL
Qty: 30 CAPSULE | Refills: 0 | Status: SHIPPED | OUTPATIENT
Start: 2019-07-18 | End: 2019-08-22

## 2019-07-31 ENCOUNTER — APPOINTMENT (OUTPATIENT)
Dept: LACTATION | Facility: HOSPITAL | Age: 29
End: 2019-07-31
Attending: OBSTETRICS & GYNECOLOGY
Payer: COMMERCIAL

## 2019-08-22 ENCOUNTER — OFFICE VISIT (OUTPATIENT)
Dept: FAMILY MEDICINE CLINIC | Facility: CLINIC | Age: 29
End: 2019-08-22
Payer: COMMERCIAL

## 2019-08-22 VITALS
BODY MASS INDEX: 45.73 KG/M2 | HEIGHT: 65 IN | WEIGHT: 274.5 LBS | SYSTOLIC BLOOD PRESSURE: 120 MMHG | TEMPERATURE: 98 F | HEART RATE: 69 BPM | RESPIRATION RATE: 16 BRPM | OXYGEN SATURATION: 99 % | DIASTOLIC BLOOD PRESSURE: 72 MMHG

## 2019-08-22 DIAGNOSIS — R14.0 BLOATING: ICD-10-CM

## 2019-08-22 DIAGNOSIS — K21.9 GASTROESOPHAGEAL REFLUX DISEASE, ESOPHAGITIS PRESENCE NOT SPECIFIED: ICD-10-CM

## 2019-08-22 DIAGNOSIS — R73.09 ELEVATED GLUCOSE: ICD-10-CM

## 2019-08-22 DIAGNOSIS — R10.84 GENERALIZED ABDOMINAL PAIN: ICD-10-CM

## 2019-08-22 DIAGNOSIS — R11.2 NON-INTRACTABLE VOMITING WITH NAUSEA, UNSPECIFIED VOMITING TYPE: ICD-10-CM

## 2019-08-22 DIAGNOSIS — Z79.899 MEDICATION MANAGEMENT: ICD-10-CM

## 2019-08-22 DIAGNOSIS — R10.13 EPIGASTRIC PAIN: Primary | ICD-10-CM

## 2019-08-22 PROBLEM — O99.213 OBESITY AFFECTING PREGNANCY IN THIRD TRIMESTER: Status: RESOLVED | Noted: 2018-10-31 | Resolved: 2019-08-22

## 2019-08-22 PROBLEM — Z34.93 ENCOUNTER FOR SUPERVISION OF NORMAL PREGNANCY IN THIRD TRIMESTER: Status: RESOLVED | Noted: 2019-03-06 | Resolved: 2019-08-22

## 2019-08-22 PROBLEM — Z34.90 PREGNANCY (HCC): Status: RESOLVED | Noted: 2019-04-04 | Resolved: 2019-08-22

## 2019-08-22 PROBLEM — O44.40 LOW-LYING PLACENTA: Status: RESOLVED | Noted: 2019-03-06 | Resolved: 2019-08-22

## 2019-08-22 PROBLEM — Z34.93 ENCOUNTER FOR SUPERVISION OF NORMAL PREGNANCY IN THIRD TRIMESTER (HCC): Status: RESOLVED | Noted: 2019-03-06 | Resolved: 2019-08-22

## 2019-08-22 PROBLEM — Z34.90 PREGNANCY: Status: RESOLVED | Noted: 2019-04-04 | Resolved: 2019-08-22

## 2019-08-22 PROBLEM — O44.40 LOW-LYING PLACENTA (HCC): Status: RESOLVED | Noted: 2019-03-06 | Resolved: 2019-08-22

## 2019-08-22 PROBLEM — O99.213 OBESITY AFFECTING PREGNANCY IN THIRD TRIMESTER (HCC): Status: RESOLVED | Noted: 2018-10-31 | Resolved: 2019-08-22

## 2019-08-22 PROCEDURE — 99214 OFFICE O/P EST MOD 30 MIN: CPT | Performed by: FAMILY MEDICINE

## 2019-08-22 RX ORDER — SUCRALFATE 1 G/1
1 TABLET ORAL
Qty: 120 TABLET | Refills: 0 | Status: SHIPPED | OUTPATIENT
Start: 2019-08-22 | End: 2020-03-03

## 2019-08-22 RX ORDER — ESOMEPRAZOLE MAGNESIUM 40 MG/1
40 CAPSULE, DELAYED RELEASE ORAL
Qty: 90 CAPSULE | Refills: 0 | Status: SHIPPED | OUTPATIENT
Start: 2019-08-22 | End: 2019-11-20

## 2019-08-22 NOTE — PROGRESS NOTES
[facility] Visit Note  8/22/2019      Subjective:      Patient ID: Mary Bravo is a 33year old female. Chief Complaint:  Patient presents with:  Stomach Pain: x 2 months - not going away - swtiched from nexium 40mg to 20mg.  states ever PLATELET; Future  - COMP METABOLIC PANEL (14); Future  - US ABDOMEN LIMITED (CPT=76705); Future  -increase back up to Esomeprazole Magnesium 40 MG Oral Capsule Delayed Release;  Take 1 capsule (40 mg total) by mouth every morning before breakfast. (failed l Dispense: 120 tablet; Refill: 0  - GASTRO - INTERNAL    6. Medication management  - MAGNESIUM; Future    7. Elevated glucose  - COMP METABOLIC PANEL (14);  Future  - HEMOGLOBIN A1C; Future        Return for will see you as planned for physcal call sooner if

## 2019-08-26 ENCOUNTER — TELEPHONE (OUTPATIENT)
Dept: PHYSICAL THERAPY | Age: 29
End: 2019-08-26

## 2019-08-27 ENCOUNTER — TELEPHONE (OUTPATIENT)
Dept: FAMILY MEDICINE CLINIC | Facility: CLINIC | Age: 29
End: 2019-08-27
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
## 2019-08-27 NOTE — TELEPHONE ENCOUNTER
Dr. Lopez Sender- Pt would like to discuss PT order. Pt would like to get an updated order Neck. Please call patient as she thought there was an order placed.     Please call [phone]

## 2019-08-28 ENCOUNTER — APPOINTMENT (OUTPATIENT)
Dept: PHYSICAL THERAPY | Age: 29
End: 2019-08-28
Payer: COMMERCIAL

## 2019-08-29 ENCOUNTER — APPOINTMENT (OUTPATIENT)
Dept: PHYSICAL THERAPY | Age: 29
End: 2019-08-29
Attending: FAMILY MEDICINE
Payer: COMMERCIAL

## 2019-08-31 ENCOUNTER — APPOINTMENT (OUTPATIENT)
Dept: LACTATION | Facility: HOSPITAL | Age: 29
End: 2019-08-31
Attending: OBSTETRICS & GYNECOLOGY
Payer: COMMERCIAL

## 2019-09-04 ENCOUNTER — APPOINTMENT (OUTPATIENT)
Dept: PHYSICAL THERAPY | Age: 29
End: 2019-09-04
Attending: FAMILY MEDICINE
Payer: COMMERCIAL

## 2019-09-05 ENCOUNTER — OFFICE VISIT (OUTPATIENT)
Dept: FAMILY MEDICINE CLINIC | Facility: CLINIC | Age: 29
End: 2019-09-05
Payer: COMMERCIAL

## 2019-09-05 VITALS
SYSTOLIC BLOOD PRESSURE: 124 MMHG | TEMPERATURE: 98 F | RESPIRATION RATE: 16 BRPM | WEIGHT: 272 LBS | HEIGHT: 65 IN | DIASTOLIC BLOOD PRESSURE: 72 MMHG | BODY MASS INDEX: 45.32 KG/M2 | HEART RATE: 71 BPM

## 2019-09-05 DIAGNOSIS — F32.A ANXIETY AND DEPRESSION: ICD-10-CM

## 2019-09-05 DIAGNOSIS — F41.9 ANXIETY AND DEPRESSION: ICD-10-CM

## 2019-09-05 DIAGNOSIS — Z00.00 LABORATORY EXAM ORDERED AS PART OF ROUTINE GENERAL MEDICAL EXAMINATION: ICD-10-CM

## 2019-09-05 DIAGNOSIS — Z12.39 BREAST CANCER SCREENING: ICD-10-CM

## 2019-09-05 DIAGNOSIS — Z00.00 ROUTINE MEDICAL EXAM: Primary | ICD-10-CM

## 2019-09-05 PROCEDURE — 99213 OFFICE O/P EST LOW 20 MIN: CPT | Performed by: FAMILY MEDICINE

## 2019-09-05 PROCEDURE — 99395 PREV VISIT EST AGE 18-39: CPT | Performed by: FAMILY MEDICINE

## 2019-09-05 RX ORDER — FLUOXETINE 10 MG/1
10 CAPSULE ORAL DAILY
Qty: 30 CAPSULE | Refills: 0 | Status: SHIPPED | OUTPATIENT
Start: 2019-09-05 | End: 2020-03-03 | Stop reason: ALTCHOICE

## 2019-09-05 NOTE — PROGRESS NOTES
Patient presents with:  Physical      HPI:  Lori Montalvo is a 33year old female here today for preventative visit. Imms-  Patient is up-to-date with her Tdap.   She did have HPV vaccine ×2 but did not get the 3rd one done and she is now over 32 mouth every morning before breakfast. (failed lower dose) Disp: 90 capsule Rfl: 0   sucralfate 1 g Oral Tab Take 1 tablet (1 g total) by mouth 4 (four) times daily before meals and nightly.  Disp: 120 tablet Rfl: 0     No current facility-administered medic Asked        Blood Transfusions: Not Asked        Caffeine Concern: Yes          coffee 1-2 cup daily        Occupational Exposure: Not Asked        Hobby Hazards: Not Asked        Sleep Concern: Not Asked        Stress Concern: Not Asked        Weight Con Normal gait. NEUROLOGIC:  Cranial nerves 2-12 grossly intact. PSYCHIATRIC:  stressed amd overwhelmed mood & affect, normal hygiene. ASSESSMENT/PLAN:    1. Routine medical exam    2. Breast cancer screening    3.  Laboratory exam ordered as part of rou

## 2019-09-07 ENCOUNTER — TELEPHONE (OUTPATIENT)
Dept: FAMILY MEDICINE CLINIC | Facility: CLINIC | Age: 29
End: 2019-09-07

## 2019-09-07 NOTE — TELEPHONE ENCOUNTER
Per LOV note on 09/05/19- , no indication if patient can take Maalox with current meds. Patient would like to take medication today and would like advise from a physician. Verbally spoke with Dr. Ambreen Pollock and would review chart.   Message routed

## 2019-09-07 NOTE — TELEPHONE ENCOUNTER
Spoke to patient. Informed her ok per Dr. Mary Jo Abel to take Maalox. Patient states she also just spoke to pharmacist as well. All questions answered. Patient has no further questions.

## 2019-09-07 NOTE — TELEPHONE ENCOUNTER
- Pt would like to know if she can take Malox or Pepto Bismol. Will this have an interaction the her medications.

## 2019-09-08 PROBLEM — F41.9 ANXIETY AND DEPRESSION: Status: ACTIVE | Noted: 2019-09-08

## 2019-09-08 PROBLEM — F32.A ANXIETY AND DEPRESSION: Status: ACTIVE | Noted: 2019-09-08

## 2019-09-11 ENCOUNTER — APPOINTMENT (OUTPATIENT)
Dept: PHYSICAL THERAPY | Age: 29
End: 2019-09-11
Attending: FAMILY MEDICINE
Payer: COMMERCIAL

## 2019-09-11 ENCOUNTER — OFFICE VISIT (OUTPATIENT)
Dept: PHYSICAL THERAPY | Age: 29
End: 2019-09-11
Attending: FAMILY MEDICINE
Payer: COMMERCIAL

## 2019-09-11 PROCEDURE — 97161 PT EVAL LOW COMPLEX 20 MIN: CPT | Performed by: PHYSICAL THERAPIST

## 2019-09-11 PROCEDURE — 97014 ELECTRIC STIMULATION THERAPY: CPT | Performed by: PHYSICAL THERAPIST

## 2019-09-11 PROCEDURE — 97140 MANUAL THERAPY 1/> REGIONS: CPT | Performed by: PHYSICAL THERAPIST

## 2019-09-11 NOTE — PROGRESS NOTES
SPINE EVALUATION:   Referring Physician: Dr. Jose Hagen  Diagnosis: Neck pain, cervical radiculopathy     Date of Service: 9/11/2019     PATIENT Anthony Lira is a 33year old female who presents to therapy today with complaints of neck pain w shoulder, looking up and down and doing household chores. Signs and symptoms are consistent with diagnosis of neck pain. Cervical radiculopathy. Pt and PT discussed evaluation findings, pathology, POC and HEP.   Pt voiced understanding and performs HEP helen examination and history, this evaluation shows involvement of 1-2 body structures involved / activity limitation and the condition is stable and low complexity.      PLAN OF CARE:    Goals: (to be met in 12 visits)   · Pt will improve cervical AROM flexion Date____________________    Certification From: 9/68/5412  To:12/10/2019

## 2019-09-16 ENCOUNTER — OFFICE VISIT (OUTPATIENT)
Dept: OBGYN CLINIC | Facility: CLINIC | Age: 29
End: 2019-09-16
Payer: COMMERCIAL

## 2019-09-16 VITALS
DIASTOLIC BLOOD PRESSURE: 76 MMHG | HEART RATE: 93 BPM | HEIGHT: 65 IN | WEIGHT: 266 LBS | SYSTOLIC BLOOD PRESSURE: 120 MMHG | BODY MASS INDEX: 44.32 KG/M2

## 2019-09-16 DIAGNOSIS — Z01.419 WELL WOMAN EXAM WITH ROUTINE GYNECOLOGICAL EXAM: Primary | ICD-10-CM

## 2019-09-16 DIAGNOSIS — Z30.011 OCP (ORAL CONTRACEPTIVE PILLS) INITIATION: ICD-10-CM

## 2019-09-16 DIAGNOSIS — Z12.39 ENCOUNTER FOR SCREENING BREAST EXAMINATION: ICD-10-CM

## 2019-09-16 PROCEDURE — 99395 PREV VISIT EST AGE 18-39: CPT | Performed by: NURSE PRACTITIONER

## 2019-09-16 RX ORDER — NORGESTIMATE AND ETHINYL ESTRADIOL 7DAYSX3 28
1 KIT ORAL DAILY
Qty: 3 PACKAGE | Refills: 4 | Status: SHIPPED | OUTPATIENT
Start: 2019-09-16 | End: 2019-10-14

## 2019-09-16 NOTE — PROGRESS NOTES
HPI:   Jose Rendon is a 33year old  who presents for an annual gynecological exam.  She denies a h/o thromboembolic events, smoking, thrombogenic mutations, DM, HTN, cancer, heart disease,  Lupus, migraines,         Menses: Patient's last me Years: 13.00        Pack years: 15        Types: Cigarettes        Quit date: 2017        Years since quittin.7      Smokeless tobacco: Never Used    Alcohol use: No      Alcohol/week: 0.0 standard drinks      Comment: never a problem    Drug use: masses  PSYCHIATRIC: Patient oriented to time, place and person; mood and affect appropriate    DISCUSSED:  · I encouraged incorporating 4x weekly cardiovascular activity to maintain good physical health.   · We discussed maintaining a healthy diet heavy in

## 2019-09-18 ENCOUNTER — OFFICE VISIT (OUTPATIENT)
Dept: PHYSICAL THERAPY | Age: 29
End: 2019-09-18
Attending: FAMILY MEDICINE
Payer: COMMERCIAL

## 2019-09-18 ENCOUNTER — APPOINTMENT (OUTPATIENT)
Dept: PHYSICAL THERAPY | Age: 29
End: 2019-09-18
Attending: FAMILY MEDICINE
Payer: COMMERCIAL

## 2019-09-18 PROCEDURE — 97140 MANUAL THERAPY 1/> REGIONS: CPT | Performed by: PHYSICAL THERAPIST

## 2019-09-18 PROCEDURE — 97110 THERAPEUTIC EXERCISES: CPT | Performed by: PHYSICAL THERAPIST

## 2019-09-18 PROCEDURE — 97014 ELECTRIC STIMULATION THERAPY: CPT | Performed by: PHYSICAL THERAPIST

## 2019-09-19 NOTE — PATIENT INSTRUCTIONS
Access Code: [email or web address]   URL: [email or web address]   Date: 09/18/2019   Prepared by: Arman Spine     Exercises   Seated Cervical Sidebending Stretch - 3 reps - 1 sets - 30 hold - 2x daily - 5x weekly   Seated Levator Scapulae Stretch -

## 2019-09-23 ENCOUNTER — OFFICE VISIT (OUTPATIENT)
Dept: PHYSICAL THERAPY | Age: 29
End: 2019-09-23
Attending: FAMILY MEDICINE
Payer: COMMERCIAL

## 2019-09-23 PROCEDURE — 97110 THERAPEUTIC EXERCISES: CPT | Performed by: PHYSICAL THERAPIST

## 2019-09-23 PROCEDURE — 97012 MECHANICAL TRACTION THERAPY: CPT | Performed by: PHYSICAL THERAPIST

## 2019-09-23 PROCEDURE — 97140 MANUAL THERAPY 1/> REGIONS: CPT | Performed by: PHYSICAL THERAPIST

## 2019-09-23 NOTE — PROGRESS NOTES
Dx: Neck pain, pain in R hand         Authorized # of Visits:  n/a         Next MD visit: none scheduled  Fall Risk: standard         Precautions: n/a             Subjective: Pt states she had less spasms in neck mm with kinesiotaping.  However, continued a thoracic extension 3 X 10 MAN THERE 10 MINS         STM to post cervical mm, subocciptal mm, central cervical PA glides Gr II        MAN THERE 15 MINS MECHANICAL TRACTION 15 MINS        STM to cervical  mm , 1st rib mob Cervical intermittent traction max:m

## 2019-09-23 NOTE — PROGRESS NOTES
Dx: Neck pain, pain in R hand         Authorized # of Visits:  n/a         Next MD visit: none scheduled  Fall Risk: standard         Precautions: n/a             Subjective: Pt states she was very sore after last visit.     Objective:       Assessment: Pt

## 2019-09-25 ENCOUNTER — APPOINTMENT (OUTPATIENT)
Dept: LAB | Age: 29
End: 2019-09-25
Attending: FAMILY MEDICINE
Payer: COMMERCIAL

## 2019-09-25 ENCOUNTER — HOSPITAL ENCOUNTER (OUTPATIENT)
Dept: ULTRASOUND IMAGING | Age: 29
Discharge: HOME | End: 2019-09-25
Attending: FAMILY MEDICINE
Payer: COMMERCIAL

## 2019-09-25 ENCOUNTER — OFFICE VISIT (OUTPATIENT)
Dept: PHYSICAL THERAPY | Age: 29
End: 2019-09-25
Attending: FAMILY MEDICINE
Payer: COMMERCIAL

## 2019-09-25 ENCOUNTER — APPOINTMENT (OUTPATIENT)
Dept: PHYSICAL THERAPY | Age: 29
End: 2019-09-25
Attending: FAMILY MEDICINE
Payer: COMMERCIAL

## 2019-09-25 DIAGNOSIS — R73.09 ELEVATED GLUCOSE: ICD-10-CM

## 2019-09-25 DIAGNOSIS — R11.2 NON-INTRACTABLE VOMITING WITH NAUSEA, UNSPECIFIED VOMITING TYPE: ICD-10-CM

## 2019-09-25 DIAGNOSIS — Z00.00 LABORATORY EXAM ORDERED AS PART OF ROUTINE GENERAL MEDICAL EXAMINATION: ICD-10-CM

## 2019-09-25 DIAGNOSIS — R10.13 EPIGASTRIC PAIN: ICD-10-CM

## 2019-09-25 DIAGNOSIS — R14.0 BLOATING: ICD-10-CM

## 2019-09-25 DIAGNOSIS — K21.9 GASTROESOPHAGEAL REFLUX DISEASE, ESOPHAGITIS PRESENCE NOT SPECIFIED: ICD-10-CM

## 2019-09-25 DIAGNOSIS — Z79.899 MEDICATION MANAGEMENT: ICD-10-CM

## 2019-09-25 DIAGNOSIS — R10.84 GENERALIZED ABDOMINAL PAIN: ICD-10-CM

## 2019-09-25 LAB
ALBUMIN SERPL-MCNC: 3.8 G/DL (ref 3.4–5)
ALBUMIN/GLOB SERPL: 1 {RATIO} (ref 1–2)
ALP LIVER SERPL-CCNC: 75 U/L (ref 37–98)
ALT SERPL-CCNC: 75 U/L (ref 13–56)
ANION GAP SERPL CALC-SCNC: 7 MMOL/L (ref 0–18)
AST SERPL-CCNC: 33 U/L (ref 15–37)
BILIRUB SERPL-MCNC: 0.5 MG/DL (ref 0.1–2)
BUN BLD-MCNC: 6 MG/DL (ref 7–18)
BUN/CREAT SERPL: 10 (ref 10–20)
CALCIUM BLD-MCNC: 9.5 MG/DL (ref 8.5–10.1)
CHLORIDE SERPL-SCNC: 109 MMOL/L (ref 98–112)
CHOLEST SMN-MCNC: 200 MG/DL (ref ?–200)
CO2 SERPL-SCNC: 23 MMOL/L (ref 21–32)
CREAT BLD-MCNC: 0.6 MG/DL (ref 0.55–1.02)
EST. AVERAGE GLUCOSE BLD GHB EST-MCNC: 126 MG/DL (ref 68–126)
GLOBULIN PLAS-MCNC: 3.7 G/DL (ref 2.8–4.4)
GLUCOSE BLD-MCNC: 91 MG/DL (ref 70–99)
HAV IGM SER QL: 1.9 MG/DL (ref 1.6–2.6)
HBA1C MFR BLD HPLC: 6 % (ref ?–5.7)
HDLC SERPL-MCNC: 47 MG/DL (ref 40–59)
LDLC SERPL CALC-MCNC: 124 MG/DL (ref ?–100)
M PROTEIN MFR SERPL ELPH: 7.5 G/DL (ref 6.4–8.2)
NONHDLC SERPL-MCNC: 153 MG/DL (ref ?–130)
OSMOLALITY SERPL CALC.SUM OF ELEC: 285 MOSM/KG (ref 275–295)
POTASSIUM SERPL-SCNC: 3.8 MMOL/L (ref 3.5–5.1)
SODIUM SERPL-SCNC: 139 MMOL/L (ref 136–145)
TRIGL SERPL-MCNC: 145 MG/DL (ref 30–149)
VLDLC SERPL CALC-MCNC: 29 MG/DL (ref 0–30)

## 2019-09-25 PROCEDURE — 80053 COMPREHEN METABOLIC PANEL: CPT

## 2019-09-25 PROCEDURE — 97012 MECHANICAL TRACTION THERAPY: CPT | Performed by: PHYSICAL THERAPIST

## 2019-09-25 PROCEDURE — 76700 US EXAM ABDOM COMPLETE: CPT | Performed by: FAMILY MEDICINE

## 2019-09-25 PROCEDURE — 83036 HEMOGLOBIN GLYCOSYLATED A1C: CPT

## 2019-09-25 PROCEDURE — 97110 THERAPEUTIC EXERCISES: CPT | Performed by: PHYSICAL THERAPIST

## 2019-09-25 PROCEDURE — 36415 COLL VENOUS BLD VENIPUNCTURE: CPT

## 2019-09-25 PROCEDURE — 97140 MANUAL THERAPY 1/> REGIONS: CPT | Performed by: PHYSICAL THERAPIST

## 2019-09-25 PROCEDURE — 80061 LIPID PANEL: CPT

## 2019-09-25 PROCEDURE — 83735 ASSAY OF MAGNESIUM: CPT

## 2019-09-25 NOTE — PROGRESS NOTES
Dx: Neck pain, pain in R hand         Authorized # of Visits:  n/a         Next MD visit: none scheduled  Fall Risk: standard         Precautions: n/a             Subjective: Pt states neck spasms are under control but no changes with tingling, numbness or 20 Rows G tubing X 20       Levator scapulae 30 sec X 3 1/2 FR cervical retraction X 20 1/2 FR cervical retraction X 20       Cervical retraction X 20 1/2 FR scapular retraction X 20 1/2 FR scapular retraction X 20       Scapular retraction X 20 1/2 FR tho

## 2019-09-30 ENCOUNTER — OFFICE VISIT (OUTPATIENT)
Dept: PHYSICAL THERAPY | Age: 29
End: 2019-09-30
Attending: FAMILY MEDICINE
Payer: COMMERCIAL

## 2019-09-30 ENCOUNTER — APPOINTMENT (OUTPATIENT)
Dept: LACTATION | Facility: HOSPITAL | Age: 29
End: 2019-09-30
Attending: OBSTETRICS & GYNECOLOGY
Payer: COMMERCIAL

## 2019-09-30 PROBLEM — K76.0 FATTY LIVER: Status: ACTIVE | Noted: 2019-09-25

## 2019-09-30 PROCEDURE — 97012 MECHANICAL TRACTION THERAPY: CPT | Performed by: PHYSICAL THERAPIST

## 2019-09-30 PROCEDURE — 97110 THERAPEUTIC EXERCISES: CPT | Performed by: PHYSICAL THERAPIST

## 2019-09-30 PROCEDURE — 97140 MANUAL THERAPY 1/> REGIONS: CPT | Performed by: PHYSICAL THERAPIST

## 2019-10-02 ENCOUNTER — APPOINTMENT (OUTPATIENT)
Dept: PHYSICAL THERAPY | Age: 29
End: 2019-10-02
Attending: FAMILY MEDICINE
Payer: COMMERCIAL

## 2019-10-03 PROBLEM — R14.1 GAS PAIN: Status: ACTIVE | Noted: 2019-10-03

## 2019-10-03 PROBLEM — R19.7 DIARRHEA: Status: ACTIVE | Noted: 2019-10-03

## 2019-10-03 PROBLEM — R11.12 PROJECTILE VOMITING: Status: ACTIVE | Noted: 2019-10-03

## 2019-10-03 NOTE — PROGRESS NOTES
Dx: Neck pain, pain in R hand         Authorized # of Visits:  n/a         Next MD visit: none scheduled  Fall Risk: standard         Precautions: n/a             Subjective: Pt states she is sore today and has spasms in neck.  Pt then stated that she had b cervical retraction X 20      Levator scapulae 30 sec X 3 1/2 FR cervical retraction X 20 1/2 FR cervical retraction X 20 Supine cervical retraction with rotation X 10      Cervical retraction X 20 1/2 FR scapular retraction X 20 1/2 FR scapular retraction

## 2019-10-07 ENCOUNTER — OFFICE VISIT (OUTPATIENT)
Dept: PHYSICAL THERAPY | Age: 29
End: 2019-10-07
Attending: FAMILY MEDICINE
Payer: COMMERCIAL

## 2019-10-07 PROCEDURE — 97110 THERAPEUTIC EXERCISES: CPT | Performed by: PHYSICAL THERAPIST

## 2019-10-07 PROCEDURE — 97530 THERAPEUTIC ACTIVITIES: CPT | Performed by: PHYSICAL THERAPIST

## 2019-10-07 PROCEDURE — 97140 MANUAL THERAPY 1/> REGIONS: CPT | Performed by: PHYSICAL THERAPIST

## 2019-10-07 PROCEDURE — 97012 MECHANICAL TRACTION THERAPY: CPT | Performed by: PHYSICAL THERAPIST

## 2019-10-07 NOTE — PROGRESS NOTES
Dx: Neck pain, pain in R hand         Authorized # of Visits:  n/a         Next MD visit: none scheduled  Fall Risk: standard         Precautions: n/a             Subjective: Pt states she is very sore since last 3 days but does not know why.     Objective: 5/12 Date: 10/7/2019  TX#: 6/12 Date:  TX#: 7/ Date:  TX#: 8/   THERE EX 30  MINS THERE EX 35  MINS THERE EX 30  MINS THERE EX 15  MINS THERE EX 13  MINS     UBE 3'/3' fwd/bkd UBE 3'/3' fwd/bkd UBE 3'/3' fwd/bkd UBE 3'/3' fwd/bkd UBE 3'/3' fwd/bkd     Door Mechanical traction X 1, there act X 1     Total Timed Treatment: 55 min  Total Treatment Time: 55 min

## 2019-10-09 ENCOUNTER — OFFICE VISIT (OUTPATIENT)
Dept: PHYSICAL THERAPY | Age: 29
End: 2019-10-09
Attending: FAMILY MEDICINE
Payer: COMMERCIAL

## 2019-10-09 ENCOUNTER — APPOINTMENT (OUTPATIENT)
Dept: PHYSICAL THERAPY | Age: 29
End: 2019-10-09
Attending: FAMILY MEDICINE
Payer: COMMERCIAL

## 2019-10-09 PROCEDURE — 97012 MECHANICAL TRACTION THERAPY: CPT | Performed by: PHYSICAL THERAPIST

## 2019-10-09 PROCEDURE — 97140 MANUAL THERAPY 1/> REGIONS: CPT | Performed by: PHYSICAL THERAPIST

## 2019-10-09 PROCEDURE — 97110 THERAPEUTIC EXERCISES: CPT | Performed by: PHYSICAL THERAPIST

## 2019-10-09 NOTE — PROGRESS NOTES
Dx: Neck pain, pain in R hand         Authorized # of Visits:  n/a         Next MD visit: none scheduled  Fall Risk: standard         Precautions: n/a             Subjective: Pt states she has less pain today compared to last visit with less spasms and pin Doorway stretch 30 sec X 3 Doorway stretch 30 sec X 3 Doorway stretch 30 sec X 3 Doorway stretch 30 sec X 3 Doorway stretch 30 sec X 3 Doorway stretch 30 sec X 3    UT stretch 30 sec X 3 Rows G tubing X 20 Rows G tubing X 20 Supine cervical retraction X (unattended) 15 MINS         IFC with CP to post cervical mm X 15 mins                               Charges: there ex X 2, man there X 1, Mechanical traction X 1    Total Timed Treatment: 55 min  Total Treatment Time: 55 min

## 2019-10-13 ENCOUNTER — APPOINTMENT (OUTPATIENT)
Dept: LACTATION | Facility: HOSPITAL | Age: 29
End: 2019-10-13
Attending: OBSTETRICS & GYNECOLOGY
Payer: COMMERCIAL

## 2019-10-14 ENCOUNTER — OFFICE VISIT (OUTPATIENT)
Dept: PHYSICAL THERAPY | Age: 29
End: 2019-10-14
Attending: FAMILY MEDICINE
Payer: COMMERCIAL

## 2019-10-14 PROCEDURE — 97140 MANUAL THERAPY 1/> REGIONS: CPT | Performed by: PHYSICAL THERAPIST

## 2019-10-14 PROCEDURE — 97110 THERAPEUTIC EXERCISES: CPT | Performed by: PHYSICAL THERAPIST

## 2019-10-14 NOTE — PROGRESS NOTES
Dx: Neck pain, pain in R hand         Authorized # of Visits:  n/a         Next MD visit: none scheduled  Fall Risk: standard         Precautions: n/a             Subjective: Pt states she always notices when she does not have the tape on.  Pt reports pain fwd/bkd UBE 3'/3' fwd/bkd UBE 3'/3' fwd/bkd        Doorway stretch 30 sec X 3 Doorway stretch 30 sec X 3 Doorway stretch 30 sec X 3        TRX flex, abd 5 sec hold X 10 ea TRX rows 2 X 10 TRX rows 2 X 10        Rows GTB X 20 Cervical ROM exercises with las

## 2019-10-16 ENCOUNTER — APPOINTMENT (OUTPATIENT)
Dept: PHYSICAL THERAPY | Age: 29
End: 2019-10-16
Attending: FAMILY MEDICINE
Payer: COMMERCIAL

## 2019-10-16 ENCOUNTER — OFFICE VISIT (OUTPATIENT)
Dept: PHYSICAL THERAPY | Age: 29
End: 2019-10-16
Attending: FAMILY MEDICINE
Payer: COMMERCIAL

## 2019-10-16 PROCEDURE — 97140 MANUAL THERAPY 1/> REGIONS: CPT | Performed by: PHYSICAL THERAPIST

## 2019-10-16 PROCEDURE — 97110 THERAPEUTIC EXERCISES: CPT | Performed by: PHYSICAL THERAPIST

## 2019-10-17 NOTE — PATIENT INSTRUCTIONS
Access Code: [email or web address]   URL: [email or web address]   Date: 10/16/2019   Prepared by: Yary Farrell     Exercises   Shoulder External Rotation with Resistance - 10 reps - 2 sets - 5 hold - 2x daily - 5x weekly   Low Row with TRX® - 10 rep

## 2019-10-17 NOTE — PROGRESS NOTES
Dx: Neck pain, pain in R hand         Authorized # of Visits:  n/a         Next MD visit: none scheduled  Fall Risk: standard         Precautions: n/a             Subjective: Pt reports improvement in neck pain since last visit.     Objective: Cervical flex 3 Doorway stretch 30 sec X 3 Doorway stretch 30 sec X 3       TRX flex, abd 5 sec hold X 10 ea TRX rows 2 X 10 TRX rows 2 X 10 TRX rows 2 X 10       Rows GTB X 20 Cervical ROM exercises with laser 5 sec hold X 10 flex, ext, rot R/L, SB R/L X 10 ea Rows G t

## 2019-10-23 ENCOUNTER — APPOINTMENT (OUTPATIENT)
Dept: PHYSICAL THERAPY | Age: 29
End: 2019-10-23
Attending: FAMILY MEDICINE
Payer: COMMERCIAL

## 2019-10-23 ENCOUNTER — OFFICE VISIT (OUTPATIENT)
Dept: PHYSICAL THERAPY | Age: 29
End: 2019-10-23
Attending: FAMILY MEDICINE
Payer: COMMERCIAL

## 2019-10-23 PROCEDURE — 97110 THERAPEUTIC EXERCISES: CPT | Performed by: PHYSICAL THERAPIST

## 2019-10-23 PROCEDURE — 97140 MANUAL THERAPY 1/> REGIONS: CPT | Performed by: PHYSICAL THERAPIST

## 2019-10-23 PROCEDURE — 97012 MECHANICAL TRACTION THERAPY: CPT | Performed by: PHYSICAL THERAPIST

## 2019-10-24 NOTE — PROGRESS NOTES
Dx: Neck pain, pain in R hand         Authorized # of Visits:  n/a         Next MD visit: none scheduled  Fall Risk: standard         Precautions: n/a             Subjective: Pt reports decrease in mm spasms in last week.  Pt states she felt that traction h MINS THERE EX 30  MINS THERE EX 30  MINS      UBE 3'/3' fwd/bkd UBE 3'/3' fwd/bkd UBE 3'/3' fwd/bkd UBE 3'/3' fwd/bkd UBE 3'/3' fwd/bkd      Doorway stretch 30 sec X 3 Doorway stretch 30 sec X 3 Doorway stretch 30 sec X 3 Doorway stretch 30 sec X 3 Doorway ex X 2, man there X 1, mechanical traction X 1    Total Timed Treatment: 60 min  Total Treatment Time: 60 min

## 2019-10-28 ENCOUNTER — OFFICE VISIT (OUTPATIENT)
Dept: PHYSICAL THERAPY | Age: 29
End: 2019-10-28
Attending: FAMILY MEDICINE
Payer: COMMERCIAL

## 2019-10-28 PROCEDURE — 97110 THERAPEUTIC EXERCISES: CPT | Performed by: PHYSICAL THERAPIST

## 2019-10-28 PROCEDURE — 97140 MANUAL THERAPY 1/> REGIONS: CPT | Performed by: PHYSICAL THERAPIST

## 2019-10-28 PROCEDURE — 97012 MECHANICAL TRACTION THERAPY: CPT | Performed by: PHYSICAL THERAPIST

## 2019-10-29 NOTE — PROGRESS NOTES
Dx: Neck pain, pain in R hand         Authorized # of Visits:  n/a         Next MD visit: none scheduled  Fall Risk: standard         Precautions: n/a             Subjective: Pt states she was doing good until 2 days later when she took off the tape.  Pt st fwd/bkd UBE 3'/3' fwd/bkd UBE 3'/3' fwd/bkd UBE 3'/3' fwd/bkd UBE 3'/3' fwd/bkd     Doorway stretch 30 sec X 3 Doorway stretch 30 sec X 3 Doorway stretch 30 sec X 3 Doorway stretch 30 sec X 3 Doorway stretch 30 sec X 3 Doorway stretch 30 sec X 3     TRX fl neuroscience education   kinesiotaping done to post cervical and UT mm Central PA glide, lateral glide, STM to cervical and thoracic spine Cervical intermittent traction max:min 15#:5#, on:off 30 sec: 5 sec X 15 mins Cervical intermittent traction max:min

## 2019-10-30 ENCOUNTER — OFFICE VISIT (OUTPATIENT)
Dept: PHYSICAL THERAPY | Age: 29
End: 2019-10-30
Attending: FAMILY MEDICINE
Payer: COMMERCIAL

## 2019-10-30 PROCEDURE — 97110 THERAPEUTIC EXERCISES: CPT | Performed by: PHYSICAL THERAPIST

## 2019-10-30 NOTE — PROGRESS NOTES
Dx: Neck pain, pain in R hand         Authorized # of Visits:  n/a         Next MD visit: none scheduled  Fall Risk: standard         Precautions: n/a            Progress Summary    Pt has attended 12 visits in Physical Therapy.      Subjective: Ms. Deidre Prather improve tolerance for turning head to check blind spot while driving  · Pt will have improved thoracic PA mobility to WNL to improve cervical ROM as well as promote upright posturing and decreased pain with sitting for 30 mins MET (10/30/2019)  · Pt will b stretch 30 sec X 3 Doorway stretch 30 sec X 3 Doorway stretch 30 sec X 3 Doorway stretch 30 sec X 3 Doorway stretch 30 sec X 3    TRX flex, abd 5 sec hold X 10 ea TRX rows 2 X 10 TRX rows 2 X 10 TRX rows 2 X 10 Rows G tubing X 20 1/2 FR thoracic rotation X lateral glide, STM to cervical and thoracic spine Cervical intermittent traction max:min 15#:5#, on:off 30 sec: 5 sec X 15 mins Cervical intermittent traction max:min 18#:5#, on:off 30 sec: 5 sec X 10 mins                                                 Ch

## 2019-11-04 ENCOUNTER — OFFICE VISIT (OUTPATIENT)
Dept: PHYSICAL THERAPY | Age: 29
End: 2019-11-04
Attending: FAMILY MEDICINE
Payer: COMMERCIAL

## 2019-11-04 PROCEDURE — 97140 MANUAL THERAPY 1/> REGIONS: CPT | Performed by: PHYSICAL THERAPIST

## 2019-11-04 PROCEDURE — 97012 MECHANICAL TRACTION THERAPY: CPT | Performed by: PHYSICAL THERAPIST

## 2019-11-04 PROCEDURE — 97110 THERAPEUTIC EXERCISES: CPT | Performed by: PHYSICAL THERAPIST

## 2019-11-05 NOTE — PROGRESS NOTES
Dx: Neck pain, pain in R hand         Authorized # of Visits:  n/a         Next MD visit: none scheduled  Fall Risk: standard         Precautions: n/a             Subjective: Pt states she was sore for a couple of days after removing the tape but is ok now fwd/bkd UBE 3'/3' fwd/bkd        Doorway stretch 30 sec X 3 Doorway stretch 30 sec X 3 Doorway stretch 30 sec X 3        1/2 FR thoracic rotation X 10 1/2 FR thoracic rotation X 10 1/2 FR thoracic rotation X 10        1/2 FR cervical and scapular retractio

## 2019-11-08 ENCOUNTER — TELEPHONE (OUTPATIENT)
Dept: OBGYN CLINIC | Facility: CLINIC | Age: 29
End: 2019-11-08

## 2019-11-08 NOTE — TELEPHONE ENCOUNTER
Started ocp last month; took pills for 2 weeks and started bleeding, so she stopped the pills assisted through the first pack to allow for a bleed. Pt is now in the last week of the 2nd pack; currently bleeding; will start new pack on Sunday.   Pt advised t

## 2019-11-08 NOTE — TELEPHONE ENCOUNTER
Pt calling in stating second month on birth control and she started bleeding before she was suppose to.   She wanted to know if this was normal. Please advise

## 2019-11-11 ENCOUNTER — OFFICE VISIT (OUTPATIENT)
Dept: PHYSICAL THERAPY | Age: 29
End: 2019-11-11
Attending: FAMILY MEDICINE
Payer: COMMERCIAL

## 2019-11-11 PROCEDURE — 97110 THERAPEUTIC EXERCISES: CPT | Performed by: PHYSICAL THERAPIST

## 2019-11-11 PROCEDURE — 97140 MANUAL THERAPY 1/> REGIONS: CPT | Performed by: PHYSICAL THERAPIST

## 2019-11-12 NOTE — PATIENT INSTRUCTIONS
Access Code: [email or web address]   URL: [email or web address]   Date: 11/11/2019   Prepared by: Virgil Freitas     Exercises   Prone Scapular Retraction and Row - 10 reps - 2 sets - 5 hold - 2x daily - 5x weekly   Prone Shoulder Horizontal Abduction

## 2019-11-20 ENCOUNTER — APPOINTMENT (OUTPATIENT)
Dept: PHYSICAL THERAPY | Age: 29
End: 2019-11-20
Attending: FAMILY MEDICINE
Payer: COMMERCIAL

## 2019-11-20 DIAGNOSIS — K21.9 GASTROESOPHAGEAL REFLUX DISEASE, ESOPHAGITIS PRESENCE NOT SPECIFIED: ICD-10-CM

## 2019-11-20 DIAGNOSIS — R11.2 NON-INTRACTABLE VOMITING WITH NAUSEA, UNSPECIFIED VOMITING TYPE: ICD-10-CM

## 2019-11-20 DIAGNOSIS — R10.84 GENERALIZED ABDOMINAL PAIN: ICD-10-CM

## 2019-11-20 DIAGNOSIS — R10.13 EPIGASTRIC PAIN: ICD-10-CM

## 2019-11-22 RX ORDER — ESOMEPRAZOLE MAGNESIUM 40 MG/1
40 CAPSULE, DELAYED RELEASE ORAL
Qty: 90 CAPSULE | Refills: 0 | Status: SHIPPED | OUTPATIENT
Start: 2019-11-22 | End: 2020-02-11

## 2019-11-23 ENCOUNTER — OFFICE VISIT (OUTPATIENT)
Dept: FAMILY MEDICINE CLINIC | Facility: CLINIC | Age: 29
End: 2019-11-23
Payer: COMMERCIAL

## 2019-11-23 VITALS
HEART RATE: 67 BPM | SYSTOLIC BLOOD PRESSURE: 120 MMHG | DIASTOLIC BLOOD PRESSURE: 60 MMHG | RESPIRATION RATE: 16 BRPM | BODY MASS INDEX: 41.48 KG/M2 | OXYGEN SATURATION: 99 % | HEIGHT: 65 IN | TEMPERATURE: 98 F | WEIGHT: 249 LBS

## 2019-11-23 DIAGNOSIS — H66.003 NON-RECURRENT ACUTE SUPPURATIVE OTITIS MEDIA OF BOTH EARS WITHOUT SPONTANEOUS RUPTURE OF TYMPANIC MEMBRANES: Primary | ICD-10-CM

## 2019-11-23 DIAGNOSIS — J40 BRONCHITIS: ICD-10-CM

## 2019-11-23 PROCEDURE — 99213 OFFICE O/P EST LOW 20 MIN: CPT | Performed by: NURSE PRACTITIONER

## 2019-11-23 RX ORDER — ALBUTEROL SULFATE 90 UG/1
2 AEROSOL, METERED RESPIRATORY (INHALATION) EVERY 6 HOURS PRN
Qty: 1 INHALER | Refills: 0 | Status: SHIPPED | OUTPATIENT
Start: 2019-11-23 | End: 2020-03-03 | Stop reason: ALTCHOICE

## 2019-11-23 RX ORDER — DEXTROMETHORPHAN HYDROBROMIDE AND PROMETHAZINE HYDROCHLORIDE 15; 6.25 MG/5ML; MG/5ML
5 SOLUTION ORAL EVERY 4 HOURS PRN
Qty: 210 ML | Refills: 0 | Status: SHIPPED | OUTPATIENT
Start: 2019-11-23 | End: 2019-11-30

## 2019-11-23 RX ORDER — AMOXICILLIN AND CLAVULANATE POTASSIUM 875; 125 MG/1; MG/1
1 TABLET, FILM COATED ORAL 2 TIMES DAILY
Qty: 14 TABLET | Refills: 0 | Status: SHIPPED | OUTPATIENT
Start: 2019-11-23 | End: 2019-11-30

## 2019-11-23 NOTE — PATIENT INSTRUCTIONS
Viral Bronchitis (Adult)    You have a viral bronchitis. Bronchitis is inflammation and swelling of the lining of the lungs. This is often caused by an infection. Symptoms include a dry, hacking cough that is worse at night.  The cough may bring up yellow · Over-the-counter cough, cold, and sore-throat medicines will not shorten the length of the illness, but they may help to reduce symptoms. Don't use decongestants if you have high blood pressure.   Follow-up care  Follow up with your healthcare provider, o The following are general care guidelines:  · Finish all of the antibiotic medicine given, even though you may feel better after the first few days.   · You may use over-the-counter medicine, such as acetaminophen or ibuprofen, to control pain and fever, un

## 2019-11-24 NOTE — PROGRESS NOTES
CHIEF COMPLAINT:   Patient presents with:  Sinus Problem      HPI:   Lori Montalvo is a 26year old female who presents to clinic today with complaints of bilateral ear pain, nasal congestion, cough and sinus pain. Has had for 4  days.  Pain is descr • GERD (gastroesophageal reflux disease)    • Mild intermittent asthma       Social History:  Social History    Tobacco Use      Smoking status: Former Smoker        Packs/day: 1.00        Years: 13.00        Pack years: 13        Types: Cigarettes Non-recurrent acute suppurative otitis media of both ears without spontaneous rupture of tympanic membranes  (primary encounter diagnosis)  Bronchitis    PLAN: Meds as listed below.   Comfort measures as described in Patient Instructions    Meds & Refills f This illness is contagious during the first few days and is spread through the air by coughing and sneezing, or by direct contact (touching the sick person and then touching your own eyes, nose, or mouth).   Most viral illnesses resolve within 10 to 14 days If you are age 72 or older, or if you have a chronic lung disease or condition that affects your immune system, or you smoke, ask your healthcare provider about getting a pneumococcal vaccine and a yearly flu shot (influenza vaccine).   When to seek medical · You may use over-the-counter medicine, such as acetaminophen or ibuprofen, to control pain and fever, unless something else was prescribed.  If you have chronic liver or kidney disease or have ever had a stomach ulcer or gastrointestinal bleeding, talk wi

## 2019-11-25 ENCOUNTER — APPOINTMENT (OUTPATIENT)
Dept: PHYSICAL THERAPY | Age: 29
End: 2019-11-25
Attending: FAMILY MEDICINE
Payer: COMMERCIAL

## 2019-11-25 ENCOUNTER — TELEPHONE (OUTPATIENT)
Dept: PHYSICAL THERAPY | Age: 29
End: 2019-11-25

## 2019-11-27 ENCOUNTER — APPOINTMENT (OUTPATIENT)
Dept: PHYSICAL THERAPY | Age: 29
End: 2019-11-27
Attending: FAMILY MEDICINE
Payer: COMMERCIAL

## 2019-12-02 ENCOUNTER — APPOINTMENT (OUTPATIENT)
Dept: PHYSICAL THERAPY | Age: 29
End: 2019-12-02
Payer: COMMERCIAL

## 2019-12-03 ENCOUNTER — APPOINTMENT (OUTPATIENT)
Dept: PHYSICAL THERAPY | Age: 29
End: 2019-12-03
Attending: FAMILY MEDICINE
Payer: COMMERCIAL

## 2019-12-04 ENCOUNTER — APPOINTMENT (OUTPATIENT)
Dept: PHYSICAL THERAPY | Age: 29
End: 2019-12-04
Payer: COMMERCIAL

## 2019-12-05 ENCOUNTER — APPOINTMENT (OUTPATIENT)
Dept: PHYSICAL THERAPY | Age: 29
End: 2019-12-05
Attending: FAMILY MEDICINE
Payer: COMMERCIAL

## 2019-12-09 ENCOUNTER — TELEPHONE (OUTPATIENT)
Dept: PHYSICAL THERAPY | Age: 29
End: 2019-12-09

## 2019-12-09 ENCOUNTER — APPOINTMENT (OUTPATIENT)
Dept: PHYSICAL THERAPY | Age: 29
End: 2019-12-09
Payer: COMMERCIAL

## 2019-12-11 ENCOUNTER — OFFICE VISIT (OUTPATIENT)
Dept: PHYSICAL THERAPY | Age: 29
End: 2019-12-11
Attending: FAMILY MEDICINE
Payer: COMMERCIAL

## 2019-12-11 PROCEDURE — 97140 MANUAL THERAPY 1/> REGIONS: CPT | Performed by: PHYSICAL THERAPIST

## 2019-12-11 PROCEDURE — 97012 MECHANICAL TRACTION THERAPY: CPT | Performed by: PHYSICAL THERAPIST

## 2019-12-11 PROCEDURE — 97110 THERAPEUTIC EXERCISES: CPT | Performed by: PHYSICAL THERAPIST

## 2019-12-13 NOTE — PROGRESS NOTES
Dx: Neck pain, pain in R hand         Authorized # of Visits:  n/a         Next MD visit: none scheduled  Fall Risk: standard         Precautions: n/a             Subjective: Pt has not been in PT for 1 month due to having bronchitis.  Pt states she is sore Date:  TX#: /24 Date:  TX#: /24 Date:  TX#: /24   THERE EX 25  MINS THERE EX 45  MINS THERE EX 25  MINS THERE EX 30  MINS       UBE 3'/3' fwd/bkd UBE 3'/3' fwd/bkd UBE 3'/3' fwd/bkd UBE 3'/3' fwd/bkd UBE 3'/3' fwd/bkd      Doorway stretch 30 sec X 3 Preston Naylor Treatment Time: 45 min

## 2019-12-16 ENCOUNTER — OFFICE VISIT (OUTPATIENT)
Dept: PHYSICAL THERAPY | Age: 29
End: 2019-12-16
Attending: FAMILY MEDICINE
Payer: COMMERCIAL

## 2019-12-16 PROCEDURE — 97110 THERAPEUTIC EXERCISES: CPT | Performed by: PHYSICAL THERAPIST

## 2019-12-16 PROCEDURE — 97140 MANUAL THERAPY 1/> REGIONS: CPT | Performed by: PHYSICAL THERAPIST

## 2019-12-17 NOTE — PROGRESS NOTES
Dx: Neck pain, pain in R hand         Authorized # of Visits:  n/a         Next MD visit: none scheduled  Fall Risk: standard         Precautions: n/a             Subjective: Pt states she has difficulty with turning her head sergey to R.  Pt states she has di maintain progress achieved in PT ONGOING      Plan: Continue with scapular and cervical strengthening.     Date: 10/28/2019  TX#: 11/12 Date: 10/30/2019  TX#: 12/12 Date: 11/4/2019  TX#: 13/24 Date: 11/11/2019  TX#: 14/24 Date: 12/12/2019  TX#: 15/24 Date: STM suboccipital and post cervical mm MECHANICAL TRACTION 10 MINS      MECHANICAL TRACTION 10 MINS  MECHANICAL TRACTION 15 MINS HEP pictures given Cervical intermittent traction max:min 18#:5#, on:off 30 sec: 5 sec X 10 mins      Cervical intermittent trac

## 2019-12-18 ENCOUNTER — TELEPHONE (OUTPATIENT)
Dept: PHYSICAL THERAPY | Age: 29
End: 2019-12-18

## 2019-12-18 ENCOUNTER — APPOINTMENT (OUTPATIENT)
Dept: PHYSICAL THERAPY | Age: 29
End: 2019-12-18
Attending: FAMILY MEDICINE
Payer: COMMERCIAL

## 2019-12-23 ENCOUNTER — OFFICE VISIT (OUTPATIENT)
Dept: PHYSICAL THERAPY | Age: 29
End: 2019-12-23
Attending: FAMILY MEDICINE
Payer: COMMERCIAL

## 2019-12-23 PROCEDURE — 97140 MANUAL THERAPY 1/> REGIONS: CPT | Performed by: PHYSICAL THERAPIST

## 2019-12-23 PROCEDURE — 97110 THERAPEUTIC EXERCISES: CPT | Performed by: PHYSICAL THERAPIST

## 2019-12-24 NOTE — PROGRESS NOTES
Dx: Neck pain, pain in R hand         Authorized # of Visits:  n/a         Next MD visit: none scheduled  Fall Risk: standard         Precautions: n/a             Subjective: Pt states she woke up with tightness in scapular mm.  Pt states she was better aft 11/11/2019  TX#: 14/24 Date: 12/12/2019  TX#: 15/24 Date: 12/16/2019  TX#: 16/24 Date: 12/23/2019  TX#: 17/24 Date:  TX#: /24   THERE EX 25  MINS THERE EX 45  MINS THERE EX 25  MINS THERE EX 30  MINS THERE EX 15  MINS THERE EX 30  MINS THERE EX 30  MINS suboccipital and post cervical mm MECHANICAL TRACTION 10 MINS  R parascapular and L post cervical STM, rib cage mobilization on R    MECHANICAL TRACTION 10 MINS  MECHANICAL TRACTION 15 MINS HEP pictures given Cervical intermittent traction max:min 18#:5#,

## 2019-12-30 ENCOUNTER — OFFICE VISIT (OUTPATIENT)
Dept: PHYSICAL THERAPY | Age: 29
End: 2019-12-30
Attending: FAMILY MEDICINE
Payer: COMMERCIAL

## 2019-12-30 PROCEDURE — 97140 MANUAL THERAPY 1/> REGIONS: CPT | Performed by: PHYSICAL THERAPIST

## 2019-12-30 PROCEDURE — 97110 THERAPEUTIC EXERCISES: CPT | Performed by: PHYSICAL THERAPIST

## 2019-12-31 NOTE — PROGRESS NOTES
Dx: Neck pain, pain in R hand         Authorized # of Visits:  n/a         Next MD visit: none scheduled  Fall Risk: standard         Precautions: n/a             Subjective: Pt states she feels a lot better after last visit and is now at levels that was b Date:   TX#: /24 Date:   TX#: /24   THERE EX 30  MINS THERE EX 30  MINS THERE EX 35  MINS        UBE 3'/3' fwd/bkd UBE 3'/3' fwd/bkd UBE 3'/3' fwd/bkd        Rows G tubing X 20 UT stretch 30 sec X 3 Doorway stretch 30 sec X 3        Cervical AROM with end

## 2020-01-13 ENCOUNTER — OFFICE VISIT (OUTPATIENT)
Dept: PHYSICAL THERAPY | Age: 30
End: 2020-01-13
Attending: FAMILY MEDICINE
Payer: COMMERCIAL

## 2020-01-13 PROCEDURE — 97110 THERAPEUTIC EXERCISES: CPT | Performed by: PHYSICAL THERAPIST

## 2020-01-14 NOTE — PROGRESS NOTES
Dx: Neck pain, pain in R hand         Authorized # of Visits:  n/a         Next MD visit: none scheduled  Fall Risk: standard         Precautions: n/a             Subjective: Pt states she is noticing gradual improvement but continues to have good and bad 12/30/2019  TX#: 18/24 Date: 1/13/2020  TX#: 19/24 Date:   TX#: /24 Date:   TX#: /24 Date:   TX#: /24 Date:   TX#: /24   THERE EX 30  MINS THERE EX 30  MINS THERE EX 35  MINS THERE EX 39  MINS       UBE 3'/3' fwd/bkd UBE 3'/3' fwd/bkd UBE 3'/3' fwd/bkd UBE

## 2020-01-15 ENCOUNTER — OFFICE VISIT (OUTPATIENT)
Dept: PHYSICAL THERAPY | Age: 30
End: 2020-01-15
Attending: FAMILY MEDICINE
Payer: COMMERCIAL

## 2020-01-15 PROCEDURE — 97110 THERAPEUTIC EXERCISES: CPT | Performed by: PHYSICAL THERAPIST

## 2020-01-15 PROCEDURE — 97140 MANUAL THERAPY 1/> REGIONS: CPT | Performed by: PHYSICAL THERAPIST

## 2020-01-16 NOTE — PROGRESS NOTES
Dx: Neck pain, pain in R hand         Authorized # of Visits:  n/a         Next MD visit: none scheduled  Fall Risk: standard         Precautions: n/a             Subjective: Pt states her neck muscles were sore after last visit.  Pt states she has started 1/15/2020  TX#: 20/24 Date:   TX#: /24 Date:   TX#: /24 Date:   TX#: /24   THERE EX 30  MINS THERE EX 30  MINS THERE EX 35  MINS THERE EX 39  MINS       UBE 3'/3' fwd/bkd UBE 3'/3' fwd/bkd UBE 3'/3' fwd/bkd UBE 3'/3' fwd/bkd UBE 3'/3' fwd/bkd      Lakshmi KENYON t

## 2020-01-17 ENCOUNTER — TELEPHONE (OUTPATIENT)
Dept: FAMILY MEDICINE CLINIC | Facility: CLINIC | Age: 30
End: 2020-01-17

## 2020-01-17 DIAGNOSIS — R20.0 NUMBNESS OF FINGERS: ICD-10-CM

## 2020-01-17 DIAGNOSIS — M54.2 CERVICAL PAIN: Primary | ICD-10-CM
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
## 2020-01-17 NOTE — TELEPHONE ENCOUNTER
Patient will like to be referred to Neurologist. Needs referral from provider, unable to schedule appt without referral.

## 2020-01-17 NOTE — TELEPHONE ENCOUNTER
APPROVE/DENY set up Referral Neurologist. Pt has [employer] PPO.     9/5/19 OV Dr. Daniel Peter and Depression    Spoke to pt, she is requesting Referral for Neurology for Neck pain as Physical Therapist is recommending a consult and not with a Neurosurgeo

## 2020-01-20 ENCOUNTER — APPOINTMENT (OUTPATIENT)
Dept: PHYSICAL THERAPY | Age: 30
End: 2020-01-20
Payer: COMMERCIAL

## 2020-01-22 ENCOUNTER — OFFICE VISIT (OUTPATIENT)
Dept: PHYSICAL THERAPY | Age: 30
End: 2020-01-22
Attending: FAMILY MEDICINE
Payer: COMMERCIAL

## 2020-01-22 PROCEDURE — 97110 THERAPEUTIC EXERCISES: CPT | Performed by: PHYSICAL THERAPIST

## 2020-01-23 NOTE — PROGRESS NOTES
Dx: Neck pain, pain in R hand         Authorized # of Visits:  n/a         Next MD visit: none scheduled  Fall Risk: standard         Precautions: n/a             Subjective: Pt states tingling in hands are more at night, improving during the day.  Pt state елена.     Date: 12/16/2019  TX#: 16/24 Date: 12/23/2019  TX#: 17/24 Date: 12/30/2019  TX#: 18/24 Date: 1/13/2020  TX#: 19/24 Date: 1/15/2020  TX#: 20/24 Date: 1/22/2020  TX#: 21/24 Date:   TX#: /24 Date:   TX#: /24   THERE EX 30  MINS THERE EX 30 distraction Green digiflex 2 X 10 ea digit and                                                  Charges: there ex X 3 Total Timed Treatment: 45 min  Total Treatment Time: 45 min

## 2020-02-03 ENCOUNTER — OFFICE VISIT (OUTPATIENT)
Dept: FAMILY MEDICINE CLINIC | Facility: CLINIC | Age: 30
End: 2020-02-03
Payer: COMMERCIAL

## 2020-02-03 VITALS
HEART RATE: 90 BPM | SYSTOLIC BLOOD PRESSURE: 120 MMHG | DIASTOLIC BLOOD PRESSURE: 70 MMHG | OXYGEN SATURATION: 98 % | TEMPERATURE: 98 F | WEIGHT: 243 LBS | RESPIRATION RATE: 19 BRPM | HEIGHT: 65 IN | BODY MASS INDEX: 40.48 KG/M2

## 2020-02-03 DIAGNOSIS — J20.9 ACUTE BRONCHITIS, UNSPECIFIED ORGANISM: Primary | ICD-10-CM

## 2020-02-03 DIAGNOSIS — H66.001 ACUTE SUPPURATIVE OTITIS MEDIA OF RIGHT EAR WITHOUT SPONTANEOUS RUPTURE OF TYMPANIC MEMBRANE, RECURRENCE NOT SPECIFIED: ICD-10-CM

## 2020-02-03 PROCEDURE — 99214 OFFICE O/P EST MOD 30 MIN: CPT | Performed by: PHYSICIAN ASSISTANT

## 2020-02-03 PROCEDURE — 94640 AIRWAY INHALATION TREATMENT: CPT | Performed by: PHYSICIAN ASSISTANT

## 2020-02-03 RX ORDER — AZITHROMYCIN 250 MG/1
TABLET, FILM COATED ORAL
Qty: 6 TABLET | Refills: 0 | Status: SHIPPED | OUTPATIENT
Start: 2020-02-03 | End: 2020-03-03 | Stop reason: ALTCHOICE

## 2020-02-03 RX ORDER — FLUTICASONE PROPIONATE 50 MCG
2 SPRAY, SUSPENSION (ML) NASAL DAILY
Qty: 1 BOTTLE | Refills: 0 | Status: SHIPPED | OUTPATIENT
Start: 2020-02-03 | End: 2020-02-17

## 2020-02-03 RX ORDER — PREDNISONE 20 MG/1
TABLET ORAL
Qty: 10 TABLET | Refills: 0 | Status: SHIPPED | OUTPATIENT
Start: 2020-02-03 | End: 2020-03-03 | Stop reason: ALTCHOICE

## 2020-02-03 RX ORDER — ALBUTEROL SULFATE 90 UG/1
AEROSOL, METERED RESPIRATORY (INHALATION)
Qty: 1 INHALER | Refills: 0 | Status: SHIPPED | OUTPATIENT
Start: 2020-02-03 | End: 2020-03-03 | Stop reason: ALTCHOICE

## 2020-02-03 RX ORDER — IPRATROPIUM BROMIDE AND ALBUTEROL SULFATE 2.5; .5 MG/3ML; MG/3ML
3 SOLUTION RESPIRATORY (INHALATION) ONCE
Status: COMPLETED | OUTPATIENT
Start: 2020-02-03 | End: 2020-02-03

## 2020-02-03 RX ADMIN — IPRATROPIUM BROMIDE AND ALBUTEROL SULFATE 3 ML: 2.5; .5 SOLUTION RESPIRATORY (INHALATION) at 19:01:00

## 2020-02-04 NOTE — PATIENT INSTRUCTIONS
1. Azithromycin  2. Prednisone  3. Albuterol  4. Flonase following Prednisone  5. Follow up with PCP      Acute Bronchitis  Your healthcare provider has told you that you have acute bronchitis.  Bronchitis is infection or inflammation of the bronchial tub · Take medicine as directed. You may be told to take ibuprofen or other over-the-counter medicines. These help relieve inflammation in your bronchial tubes. Your healthcare provider may prescribe an inhaler to help open up the bronchial tubes.  Most of the

## 2020-02-04 NOTE — PROGRESS NOTES
CHIEF COMPLAINT:   Patient presents with:  Cough: cough, congestion, ears are clogged, sob , wheezing (10days)         HPI:   Elspeth Nyhan is a 26year old female who presents for cough for 10 day. Cough is minimally productive and is worse at night. PT x 6 mos, cortisone injections   • Cervical spondylosis 2017   • Elective      EAB x 1   • Fatty liver 2019   • GERD (gastroesophageal reflux disease)    • Mild intermittent asthma       Social History:  Social History    Tobacco Use Mary Bravo is a 26year old female who presents with:     ASSESSMENT:  Acute suppurative otitis media of right ear without spontaneous rupture of tympanic membrane, recurrence not specified  Acute bronchitis, unspecified organism  (primary encounte Acute bronchitis almost always starts as a viral respiratory infection, such as a cold or the flu. Certain factors make it more likely for a cold or flu to turn into bronchitis.  These include being very young, being elderly, having a heart or lung problem, Follow up with your doctor as you are told. You will likely feel better in a week or two. But a dry cough can linger beyond that time.  Let your doctor know if you still have symptoms (other than a dry cough) after 2 weeks, or if you’re prone to getting bro

## 2020-02-11 DIAGNOSIS — K21.9 GASTROESOPHAGEAL REFLUX DISEASE, ESOPHAGITIS PRESENCE NOT SPECIFIED: ICD-10-CM

## 2020-02-11 DIAGNOSIS — R11.2 NON-INTRACTABLE VOMITING WITH NAUSEA, UNSPECIFIED VOMITING TYPE: ICD-10-CM

## 2020-02-11 DIAGNOSIS — R10.13 EPIGASTRIC PAIN: ICD-10-CM

## 2020-02-11 DIAGNOSIS — R10.84 GENERALIZED ABDOMINAL PAIN: ICD-10-CM

## 2020-02-11 RX ORDER — ESOMEPRAZOLE MAGNESIUM 40 MG/1
40 CAPSULE, DELAYED RELEASE ORAL
Qty: 90 CAPSULE | Refills: 0 | Status: SHIPPED | OUTPATIENT
Start: 2020-02-11 | End: 2020-02-19

## 2020-02-11 NOTE — TELEPHONE ENCOUNTER
Requested Prescriptions     Pending Prescriptions Disp Refills   • Esomeprazole Magnesium 40 MG Oral Capsule Delayed Release 90 capsule 0     Sig: Take 1 capsule (40 mg total) by mouth every morning before breakfast. (failed lower dose)       LOV: 9/5/2019

## 2020-02-12 ENCOUNTER — OFFICE VISIT (OUTPATIENT)
Dept: PHYSICAL THERAPY | Age: 30
End: 2020-02-12
Attending: FAMILY MEDICINE
Payer: COMMERCIAL

## 2020-02-12 PROCEDURE — 97140 MANUAL THERAPY 1/> REGIONS: CPT | Performed by: PHYSICAL THERAPIST

## 2020-02-12 PROCEDURE — 97110 THERAPEUTIC EXERCISES: CPT | Performed by: PHYSICAL THERAPIST

## 2020-02-13 ENCOUNTER — TELEPHONE (OUTPATIENT)
Dept: FAMILY MEDICINE CLINIC | Facility: CLINIC | Age: 30
End: 2020-02-13

## 2020-02-13 NOTE — PROGRESS NOTES
Dx: Neck pain, pain in R hand         Authorized # of Visits:  n/a         Next MD visit: none scheduled  Fall Risk: standard         Precautions: n/a             Subjective: Pt states she has noticed tingling only at night, with certain movements only dur 30 mins MET (10/30/2019)  · Pt will be independent and compliant with comprehensive HEP to maintain progress achieved in PT ONGOING    New Goals: 2/12/2020  Pt will report decrease in tingling and numbness in R hand by 90%.   Pt will report decrease in fati done to post cervical mm  MAN THERE 15 MINS SB prone H abd 2 X 10 Wall swims 2 X 10 Rows R tubing X 20 Low rows G tubing X 20 Prone rows 2# 2 X 10     R parascapular and L post cervical STM, rib cage mobilization on R MAN THERE 10 MINS Rows R tubing X 20 L

## 2020-02-13 NOTE — TELEPHONE ENCOUNTER
PA submitted via [facility] for Esomeprazole Magnesium 40 mg DR Capsules, awaiting a response. Key: [id number]  Your information has been submitted to [email or web address]. [employer] is reviewing the PA request and you will receive an electronic response.  You may check

## 2020-02-14 NOTE — TELEPHONE ENCOUNTER
Additional Information Letter written by Dr. Amaya Moulton faxed to [facility] Review department, fax # [phone]. Fax confirmation received, awaiting a response.

## 2020-02-14 NOTE — TELEPHONE ENCOUNTER
Received fax from [address] [place] [address],[address] requesting chart notes be submitted documenting failure to two alternatives (prescription, OTC) and/ or chart notes documenting available alternatives are contraindicated, likely to be less effective, or cause an advers

## 2020-02-17 ENCOUNTER — TELEPHONE (OUTPATIENT)
Dept: FAMILY MEDICINE CLINIC | Facility: CLINIC | Age: 30
End: 2020-02-17

## 2020-02-17 NOTE — TELEPHONE ENCOUNTER
Letter of approval received from [employer] granting approval from 02/14/20 thru 02/14/21 for the Esomeprazole Magnesiun 40 mg dr capsules.  Teodora Toro @ [facility] notified of the approval.

## 2020-02-17 NOTE — TELEPHONE ENCOUNTER
Not covered, cost is $75.   Esomeprazole Magnesium 40 MG Oral Capsule Delayed Release 90 capsule 0 2/11/2020    Sig:   Take 1 capsule (40 mg total) by mouth every morning before breakfast. (failed lower dose)       Patient states that her insurance covers

## 2020-02-17 NOTE — TELEPHONE ENCOUNTER
Esomeprazole Magnesium 40 MG Oral Capsule Delayed Release    Pt would like to speak to a nurse regarding her medication above.  Please call pt back at [phone],

## 2020-02-19 ENCOUNTER — OFFICE VISIT (OUTPATIENT)
Dept: PHYSICAL THERAPY | Age: 30
End: 2020-02-19
Attending: FAMILY MEDICINE
Payer: COMMERCIAL

## 2020-02-19 PROCEDURE — 97140 MANUAL THERAPY 1/> REGIONS: CPT | Performed by: PHYSICAL THERAPIST

## 2020-02-19 PROCEDURE — 97110 THERAPEUTIC EXERCISES: CPT | Performed by: PHYSICAL THERAPIST

## 2020-02-19 RX ORDER — OMEPRAZOLE 40 MG/1
40 CAPSULE, DELAYED RELEASE ORAL DAILY
Qty: 90 CAPSULE | Refills: 1 | Status: SHIPPED | OUTPATIENT
Start: 2020-02-19 | End: 2020-04-17

## 2020-02-19 NOTE — TELEPHONE ENCOUNTER
Spoke with pt, informed Rx sent to pharmacy. Pt verbalized understanding and agreement. Task completed.

## 2020-02-20 NOTE — PROGRESS NOTES
Dx: Neck pain, pain in R hand         Authorized # of Visits:  n/a         Next MD visit: none scheduled  Fall Risk: standard         Precautions: n/a             Subjective: Pt states she cannot sleep on her R side due to shoulder pain and tingling/ numbn comprehensive HEP to maintain progress achieved in PT ONGOING    New Goals: 2/12/2020  Pt will report decrease in tingling and numbness in R hand by 90%.   Pt will report decrease in fatigue of neck muscles and will not require to physically hold head up at

## 2020-02-24 ENCOUNTER — TELEPHONE (OUTPATIENT)
Dept: OBGYN CLINIC | Facility: CLINIC | Age: 30
End: 2020-02-24

## 2020-02-24 NOTE — TELEPHONE ENCOUNTER
Patient is 10 MONTHS PP. She delivered in 2019. She stated she had two menses in January which is not normal for her. The second on 2020 was like a normal menses.  Advised patient we will base it off of this date and she should change ap

## 2020-02-24 NOTE — TELEPHONE ENCOUNTER
Patient calling to initiate prenatal care  LMP 1/6/20  Patient is 7-8 weeks on 3/2/20  Confirmation Ultrasound and Appointment scheduled on   Future Appointments   Date Time Provider Ayala Adams   2/26/2020  6:15 PM Amena Gloria PT PF PT [facility]

## 2020-02-26 ENCOUNTER — APPOINTMENT (OUTPATIENT)
Dept: PHYSICAL THERAPY | Age: 30
End: 2020-02-26
Attending: FAMILY MEDICINE
Payer: COMMERCIAL

## 2020-03-02 ENCOUNTER — OFFICE VISIT (OUTPATIENT)
Dept: PHYSICAL THERAPY | Age: 30
End: 2020-03-02
Attending: FAMILY MEDICINE
Payer: COMMERCIAL

## 2020-03-02 PROCEDURE — 97140 MANUAL THERAPY 1/> REGIONS: CPT | Performed by: PHYSICAL THERAPIST

## 2020-03-02 PROCEDURE — 97110 THERAPEUTIC EXERCISES: CPT | Performed by: PHYSICAL THERAPIST

## 2020-03-03 PROBLEM — M54.2 NECK PAIN: Status: ACTIVE | Noted: 2020-03-03

## 2020-03-03 PROBLEM — Z87.828 HISTORY OF MOTOR VEHICLE ACCIDENT: Status: ACTIVE | Noted: 2020-03-03

## 2020-03-03 PROBLEM — M53.82 WEAKNESS OF NECK: Status: ACTIVE | Noted: 2020-03-03

## 2020-03-03 PROBLEM — R20.2 TINGLING OF RIGHT UPPER EXTREMITY: Status: ACTIVE | Noted: 2020-03-03

## 2020-03-03 PROBLEM — R20.2 TINGLING SENSATION: Status: ACTIVE | Noted: 2020-03-03
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
## 2020-03-03 NOTE — PROGRESS NOTES
Dx: Neck pain, pain in R hand         Authorized # of Visits:  n/a         Next MD visit: none scheduled  Fall Risk: standard         Precautions: n/a            Discharge Summary  Pt has attended 24 visits in Physical Therapy.      Subjective: Ms. Kiran Prince will have improved thoracic PA mobility to WNL to improve cervical ROM as well as promote upright posturing and decreased pain with sitting for 30 mins MET (10/30/2019)  · Pt will be independent and compliant with comprehensive HEP to maintain progress ach 10 ea digit and  Supine deep neck flexor hold 30 sec X 3 MAN THERE 15 MINS     MAN THERE 10 MINS STM to post cervical mm, lateral cervical glide     Upper cervical and post cervical mm STM, subocciptal distraction                     Charges: there ex

## 2020-03-16 ENCOUNTER — ULTRASOUND ENCOUNTER (OUTPATIENT)
Dept: OBGYN CLINIC | Facility: CLINIC | Age: 30
End: 2020-03-16
Payer: COMMERCIAL

## 2020-03-16 ENCOUNTER — OFFICE VISIT (OUTPATIENT)
Dept: OBGYN CLINIC | Facility: CLINIC | Age: 30
End: 2020-03-16
Payer: COMMERCIAL

## 2020-03-16 VITALS
HEIGHT: 65 IN | WEIGHT: 249.81 LBS | SYSTOLIC BLOOD PRESSURE: 132 MMHG | DIASTOLIC BLOOD PRESSURE: 70 MMHG | HEART RATE: 87 BPM | BODY MASS INDEX: 41.62 KG/M2

## 2020-03-16 DIAGNOSIS — N91.1 SECONDARY AMENORRHEA: Primary | ICD-10-CM

## 2020-03-16 PROCEDURE — 76830 TRANSVAGINAL US NON-OB: CPT | Performed by: OBSTETRICS & GYNECOLOGY

## 2020-03-16 PROCEDURE — 99213 OFFICE O/P EST LOW 20 MIN: CPT | Performed by: OBSTETRICS & GYNECOLOGY
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
## 2020-03-16 NOTE — PATIENT INSTRUCTIONS
Genetic screening      1) First trimester screening:  - Performed at 13 weeks and includes blood test and ultrasound  - Screens for Trisomy 13, 18 and 21 (Down Syndrome)   - You will need to schedule an appointment with the Maternal Fetal Medicine office - Please verify insurance coverage:   CPT code: 92304  Diagnosis code: Cystic fibrosis screening - Z13.228     During pregnancy, here are some general recommendations:  1.  Prenatal Vitamins: Pregnant women should consume the following each day through diet 13. Oral health and routine dental procedures should continue as scheduled during pregnancy. These include cleanings, extraction, scaling, root          canal, radiographs (assuming the abdomen and thyroid are shielded), and restoration and fillings  14.  P

## 2020-03-18 ENCOUNTER — TELEPHONE (OUTPATIENT)
Dept: OBGYN CLINIC | Facility: CLINIC | Age: 30
End: 2020-03-18

## 2020-03-18 ENCOUNTER — TELEPHONE (OUTPATIENT)
Dept: PERINATAL CARE | Facility: HOSPITAL | Age: 30
End: 2020-03-18

## 2020-03-18 NOTE — TELEPHONE ENCOUNTER
Contacted patient. Questions answered. She desires to  [id number] test form from [facility] office. She will call MFM later in pregnancy to schedule 20 wk u/s. Routed to RN in [facility] for order form.

## 2020-03-18 NOTE — TELEPHONE ENCOUNTER
Patient has not had her NOB visit yet  Per Dr. Abril Pino notes, discuss genetic testing for them to discuss at [facility] visit. Patient has to wait until 10 weeks to have [id number] test done, so she can receive the order at her next visit.       Patient notified

## 2020-03-18 NOTE — TELEPHONE ENCOUNTER
Pt wants to go through with the genetic testing ([id number]) and will need order, please call when ready for    States ins will cover CPT 73064/ 17129

## 2020-04-09 ENCOUNTER — INITIAL PRENATAL (OUTPATIENT)
Dept: OBGYN CLINIC | Facility: CLINIC | Age: 30
End: 2020-04-09
Payer: COMMERCIAL

## 2020-04-09 VITALS
DIASTOLIC BLOOD PRESSURE: 60 MMHG | HEIGHT: 65.5 IN | SYSTOLIC BLOOD PRESSURE: 118 MMHG | BODY MASS INDEX: 41.65 KG/M2 | HEART RATE: 68 BPM | WEIGHT: 253 LBS

## 2020-04-09 DIAGNOSIS — O99.210 OBESITY AFFECTING PREGNANCY, ANTEPARTUM: ICD-10-CM

## 2020-04-09 DIAGNOSIS — Z36.9 PRENATAL SCREENING ENCOUNTER: ICD-10-CM

## 2020-04-09 DIAGNOSIS — O09.299 HISTORY OF PRE-ECLAMPSIA IN PRIOR PREGNANCY, CURRENTLY PREGNANT: ICD-10-CM

## 2020-04-09 DIAGNOSIS — Z34.80 SUPERVISION OF OTHER NORMAL PREGNANCY, ANTEPARTUM: Primary | ICD-10-CM

## 2020-04-09 PROCEDURE — 87491 CHLMYD TRACH DNA AMP PROBE: CPT | Performed by: OBSTETRICS & GYNECOLOGY

## 2020-04-09 PROCEDURE — 87086 URINE CULTURE/COLONY COUNT: CPT | Performed by: OBSTETRICS & GYNECOLOGY

## 2020-04-09 PROCEDURE — 87624 HPV HI-RISK TYP POOLED RSLT: CPT | Performed by: OBSTETRICS & GYNECOLOGY

## 2020-04-09 PROCEDURE — 87591 N.GONORRHOEAE DNA AMP PROB: CPT | Performed by: OBSTETRICS & GYNECOLOGY

## 2020-04-09 PROCEDURE — 81002 URINALYSIS NONAUTO W/O SCOPE: CPT | Performed by: OBSTETRICS & GYNECOLOGY

## 2020-04-09 PROCEDURE — 88175 CYTOPATH C/V AUTO FLUID REDO: CPT | Performed by: OBSTETRICS & GYNECOLOGY

## 2020-04-09 NOTE — PROGRESS NOTES
[facility]  Obstetrics and Gynecology  History & Physical    CC: Patient is here to establish prenatal care     Subjective:     HPI:  Clifford Mcdonald is a 26year old  female at 13w3d who presents today to establish prenatal care.  Boyd Grandmother         Coronary artery disease, x 4   • Lipids Maternal Grandmother    • Other (Multiple Sclerosis) Mother    • No Known Problems Brother    • Diabetes Maternal Grandfather    • No Known Problems Paternal Grandmother    • No Known Problems Pat Counseled on taking a PNV with 4.1VZ of folic acid   - Limiting intake of alcohol, coffee, tea, soda, and other foods containing caffeine  - Limit intake of fish to twice weekly to limit mercury exposure  - Pregnancy BMI guidelines: > 30 = obese = 11 - 20

## 2020-04-10 ENCOUNTER — PATIENT MESSAGE (OUTPATIENT)
Dept: FAMILY MEDICINE CLINIC | Facility: CLINIC | Age: 30
End: 2020-04-10

## 2020-04-10 ENCOUNTER — LAB ENCOUNTER (OUTPATIENT)
Dept: LAB | Age: 30
End: 2020-04-10
Attending: OBSTETRICS & GYNECOLOGY
Payer: COMMERCIAL

## 2020-04-10 DIAGNOSIS — O35.1XX0: ICD-10-CM

## 2020-04-10 DIAGNOSIS — R10.30 LOWER ABDOMINAL PAIN: ICD-10-CM

## 2020-04-10 DIAGNOSIS — R19.7 DIARRHEA, UNSPECIFIED TYPE: ICD-10-CM

## 2020-04-10 DIAGNOSIS — O09.299 HISTORY OF PRE-ECLAMPSIA IN PRIOR PREGNANCY, CURRENTLY PREGNANT: ICD-10-CM

## 2020-04-10 DIAGNOSIS — Z34.80 SUPERVISION OF OTHER NORMAL PREGNANCY, ANTEPARTUM: ICD-10-CM

## 2020-04-10 DIAGNOSIS — Z34.80 SUPERVISION OF OTHER NORMAL PREGNANCY: Primary | ICD-10-CM

## 2020-04-10 DIAGNOSIS — R74.01 ELEVATED ALT MEASUREMENT: ICD-10-CM

## 2020-04-10 DIAGNOSIS — O99.210 OBESITY AFFECTING PREGNANCY, ANTEPARTUM: ICD-10-CM

## 2020-04-10 DIAGNOSIS — R53.1 WEAKNESS: ICD-10-CM

## 2020-04-10 PROCEDURE — 86900 BLOOD TYPING SEROLOGIC ABO: CPT

## 2020-04-10 PROCEDURE — 86780 TREPONEMA PALLIDUM: CPT

## 2020-04-10 PROCEDURE — 85025 COMPLETE CBC W/AUTO DIFF WBC: CPT

## 2020-04-10 PROCEDURE — 86850 RBC ANTIBODY SCREEN: CPT

## 2020-04-10 PROCEDURE — 86762 RUBELLA ANTIBODY: CPT

## 2020-04-10 PROCEDURE — 82746 ASSAY OF FOLIC ACID SERUM: CPT

## 2020-04-10 PROCEDURE — 86235 NUCLEAR ANTIGEN ANTIBODY: CPT

## 2020-04-10 PROCEDURE — 86431 RHEUMATOID FACTOR QUANT: CPT

## 2020-04-10 PROCEDURE — 84238 ASSAY NONENDOCRINE RECEPTOR: CPT

## 2020-04-10 PROCEDURE — 82570 ASSAY OF URINE CREATININE: CPT

## 2020-04-10 PROCEDURE — 85652 RBC SED RATE AUTOMATED: CPT

## 2020-04-10 PROCEDURE — 83516 IMMUNOASSAY NONANTIBODY: CPT

## 2020-04-10 PROCEDURE — 82550 ASSAY OF CK (CPK): CPT

## 2020-04-10 PROCEDURE — 84443 ASSAY THYROID STIM HORMONE: CPT

## 2020-04-10 PROCEDURE — 86225 DNA ANTIBODY NATIVE: CPT

## 2020-04-10 PROCEDURE — 86038 ANTINUCLEAR ANTIBODIES: CPT

## 2020-04-10 PROCEDURE — 82248 BILIRUBIN DIRECT: CPT

## 2020-04-10 PROCEDURE — 36415 COLL VENOUS BLD VENIPUNCTURE: CPT

## 2020-04-10 PROCEDURE — 82306 VITAMIN D 25 HYDROXY: CPT

## 2020-04-10 PROCEDURE — 82950 GLUCOSE TEST: CPT

## 2020-04-10 PROCEDURE — 86140 C-REACTIVE PROTEIN: CPT

## 2020-04-10 PROCEDURE — 84156 ASSAY OF PROTEIN URINE: CPT

## 2020-04-10 PROCEDURE — 83519 RIA NONANTIBODY: CPT

## 2020-04-10 PROCEDURE — 86901 BLOOD TYPING SEROLOGIC RH(D): CPT

## 2020-04-10 PROCEDURE — 80053 COMPREHEN METABOLIC PANEL: CPT

## 2020-04-10 PROCEDURE — 87340 HEPATITIS B SURFACE AG IA: CPT

## 2020-04-10 PROCEDURE — 82085 ASSAY OF ALDOLASE: CPT

## 2020-04-10 PROCEDURE — 87389 HIV-1 AG W/HIV-1&-2 AB AG IA: CPT

## 2020-04-10 PROCEDURE — 82784 ASSAY IGA/IGD/IGG/IGM EACH: CPT

## 2020-04-10 PROCEDURE — 82607 VITAMIN B-12: CPT

## 2020-04-13 ENCOUNTER — TELEPHONE (OUTPATIENT)
Dept: NEUROLOGY | Facility: CLINIC | Age: 30
End: 2020-04-13

## 2020-04-13 ENCOUNTER — TELEPHONE (OUTPATIENT)
Dept: OBGYN CLINIC | Facility: CLINIC | Age: 30
End: 2020-04-13

## 2020-04-13 DIAGNOSIS — R89.9 ABNORMAL LABORATORY TEST RESULT: Primary | ICD-10-CM

## 2020-04-13 NOTE — TELEPHONE ENCOUNTER
Rene Fajardo from 's called stating that patients [id number] test results will be cancelled because there were no identifiers on the tubs.  Please call them with any questions at [phone]

## 2020-04-13 NOTE — TELEPHONE ENCOUNTER
Form completed and patient will  in [facility] office. Patient's voicemail box was full. Form at .

## 2020-04-13 NOTE — TELEPHONE ENCOUNTER
From: Angeles Patel  To: Jimy Naranjo MD  Sent: 4/10/2020 1:00 PM CDT  Subject: Test Results Question    Hello Dr. Chel Dimas,   I received a call from my Neurologist (Dr. Ele Clark) and she expressed concern for a lab result that came back.  It tur

## 2020-04-13 NOTE — TELEPHONE ENCOUNTER
[employer]; spoke with [employer]. Pt sample tubes have no identifiers on them. They cannot correlate sample with paperwork, therefore, pt needs to be redrawn. Pt has not been billed for first sample. Patient notified.   Pt states she went to [address] [place] [address] 94

## 2020-04-13 NOTE — TELEPHONE ENCOUNTER
I called her, no answer. Mail box is full. , please call her, let her know, her ESR and ROSAS is positive , Anti Janeth Kocher is high of unclear reason, she needs to see rheumatologist, ask her to make one with them, no urgent. After covid 19 crisis is over.

## 2020-04-14 PROBLEM — E55.9 VITAMIN D DEFICIENCY: Status: ACTIVE | Noted: 2020-04-10

## 2020-04-14 NOTE — TELEPHONE ENCOUNTER
Virtual Telephone Check-In    Tonny Rodriguez verbally consents to a Virtual/Telephone Check-In visit on 04/14/20.     Patient understands and accepts financial responsibility for any deductible, co-insurance and/or co-pays associated with this servi stressed and anxious at time, has flashbacks, but thinks she is handling things fairly well. -Declines referral to a different counselor to see if there is a better fit. -Declines meds          1.  Anxiety  -to cont doing what works for her with you tube

## 2020-04-14 NOTE — TELEPHONE ENCOUNTER
Relayed below information to pt. Gave rheumatology number to call. Verbalized understanding, no further questions.

## 2020-04-16 ENCOUNTER — LAB ENCOUNTER (OUTPATIENT)
Dept: LAB | Age: 30
End: 2020-04-16
Attending: OBSTETRICS & GYNECOLOGY
Payer: COMMERCIAL

## 2020-04-16 DIAGNOSIS — Z34.80 SUPERVISION OF OTHER NORMAL PREGNANCY: Primary | ICD-10-CM

## 2020-04-20 ENCOUNTER — TELEPHONE (OUTPATIENT)
Dept: OBGYN CLINIC | Facility: CLINIC | Age: 30
End: 2020-04-20

## 2020-04-20 NOTE — TELEPHONE ENCOUNTER
Received Materni T21 Result in [address] in nurse bin for now [address]

## 2020-04-21 ENCOUNTER — MED REC SCAN ONLY (OUTPATIENT)
Dept: OBGYN CLINIC | Facility: CLINIC | Age: 30
End: 2020-04-21

## 2020-04-21 NOTE — TELEPHONE ENCOUNTER
Patient informed of normal results. Verbalized understanding. Consistent with male. No further questions or concerns. Sent to scanning.

## 2020-04-30 ENCOUNTER — ROUTINE PRENATAL (OUTPATIENT)
Dept: OBGYN CLINIC | Facility: CLINIC | Age: 30
End: 2020-04-30
Payer: COMMERCIAL

## 2020-04-30 VITALS
TEMPERATURE: 98 F | WEIGHT: 261.38 LBS | SYSTOLIC BLOOD PRESSURE: 120 MMHG | OXYGEN SATURATION: 98 % | DIASTOLIC BLOOD PRESSURE: 70 MMHG | BODY MASS INDEX: 44 KG/M2 | HEART RATE: 88 BPM

## 2020-04-30 DIAGNOSIS — Z36.9 PRENATAL SCREENING ENCOUNTER: ICD-10-CM

## 2020-04-30 DIAGNOSIS — O99.210 OBESITY AFFECTING PREGNANCY, ANTEPARTUM: Primary | ICD-10-CM

## 2020-04-30 PROCEDURE — 81002 URINALYSIS NONAUTO W/O SCOPE: CPT | Performed by: OBSTETRICS & GYNECOLOGY

## 2020-04-30 NOTE — PROGRESS NOTES
LUIS ALBERTO.   Doing well. Excess weight gain; > 8 lbs in past 3 weeks. Eating mostly eggs and hash browns, lots of fruits. Advised her to modify diet and reduce carbs. More fiber and protein. TSH recently checked. Some nausea still. Vit B6 and unisom advised.

## 2020-04-30 NOTE — PATIENT INSTRUCTIONS
Take Vitamin B6 and Unisom at night     Please call the Maternal-Fetal Medicine doctors office at [phone] in order to schedule the consult and the ultrasound.   Call as soon as possible to allow them time to process your insurance in case you need prio

## 2020-05-01 ENCOUNTER — TELEPHONE (OUTPATIENT)
Dept: PERINATAL CARE | Facility: HOSPITAL | Age: 30
End: 2020-05-01

## 2020-05-27 NOTE — PROGRESS NOTES
Outpatient Maternal-Fetal Medicine Consultation    Dear Dr. Mercedez Rachel    Thank you for requesting ultrasound evaluation and maternal fetal medicine consultation on your patient Shreyas Graves.   As you are aware she is a 26year old female  with a sin Capsule Delayed Release, Take 1 capsule (40 mg total) by mouth before breakfast., Disp: 90 capsule, Rfl: 3  •  prenatal multivitamin plus DHA 27-0.8-228 MG Oral Cap, Take 1 capsule by mouth daily. , Disp: , Rfl:   Allergies: No Known Allergies    PHYSICAL E abdominal wall, gastrointestinal tract, kidneys, bladder, extremities, skeleton.     Brain: Visualized and normal appearance: brain parenchyma, cerebral ventricles, choroid plexus, Cisterna Magna, midline falx, cerebellum, cerebellar lobes, posterior fossa, often have PCOS or isolated insulin resistance. Due to its strong association with obesity in the general population, type 2 diabetes mellitus is one of the two most common medical complications of the obese .  The increased risk of t anomalies, and the risk may increase with increasing maternal weight. The risk of neural tube defects increased significantly with maternal weight.     The analysis found that overweight and obese pregnant women experienced significantly more stillbirths t pregnancy (does not apply to abortions).  These relationships were illustrated by a series of 80 women with severe second-trimester preeclampsia followed for five years: 72 percent developed recurrent preeclampsia and 35 percent were normotensive in their thrombophilias (such as Factor V Leiden mutation, prothrombin gene mutation, protein C or S deficiency, antithrombin III deficiency) are not associated with preeclampsia.  Therefore, screening pregnant women for inherited thrombophilias is not useful for pr not prevent recurrence. There is no evidence that any therapy prevents recurrent HELLP syndrome, but data are limited.     Antiplatelet Agents  The observation that preeclampsia is associated with increased platelet turnover and increased platelet-derived · Multifetal gestation  · Chronic hypertension   · Type 1 or 2 diabetes mellitus  · Chronic kidney disease  · Autoimmune disease (antiphospholipid syndrome, systemic lupus erythematosus)    Women with multiple moderate risk factors for preeclampsia are c 325 mg tablet. For prevention of myocardial infarction and stroke in nonpregnant individuals, aspirin appears to be equally effective at doses between 75 and 325 mg per day.   The safety of low-dose aspirin use in the second and third trimesters is well est prevention of preeclampsia-related stroke.   · The US Preventive Services Task Force (USPSTF) recommends the use of low-dose aspirin (81 mg/day) in women at high risk of developing preeclampsia to reduce the risk for preeclampsia,  birth, and fetal g recommends the use of low-dose aspirin (75 mg/day) for high-risk women (history of preeclampsia, diabetes, chronic hypertension, renal or autoimmune disease, or multifetal pregnancies).   · The Society of Obstetricians and Gynaecologists of [facility]) recommend

## 2020-05-28 ENCOUNTER — OFFICE VISIT (OUTPATIENT)
Dept: PERINATAL CARE | Facility: HOSPITAL | Age: 30
End: 2020-05-28
Attending: OBSTETRICS & GYNECOLOGY
Payer: COMMERCIAL

## 2020-05-28 VITALS
HEART RATE: 103 BPM | BODY MASS INDEX: 42.59 KG/M2 | SYSTOLIC BLOOD PRESSURE: 139 MMHG | WEIGHT: 265 LBS | HEIGHT: 66 IN | DIASTOLIC BLOOD PRESSURE: 87 MMHG

## 2020-05-28 DIAGNOSIS — O09.292 HX OF PREECLAMPSIA, PRIOR PREGNANCY, CURRENTLY PREGNANT, SECOND TRIMESTER: ICD-10-CM

## 2020-05-28 DIAGNOSIS — O99.210 OBESITY AFFECTING PREGNANCY, ANTEPARTUM: Primary | ICD-10-CM

## 2020-05-28 DIAGNOSIS — Z03.75 SUSPECTED SHORTENING OF CERVIX NOT FOUND: ICD-10-CM

## 2020-05-28 PROCEDURE — 76811 OB US DETAILED SNGL FETUS: CPT | Performed by: OBSTETRICS & GYNECOLOGY

## 2020-05-28 PROCEDURE — 99243 OFF/OP CNSLTJ NEW/EST LOW 30: CPT | Performed by: OBSTETRICS & GYNECOLOGY

## 2020-05-28 PROCEDURE — 76817 TRANSVAGINAL US OBSTETRIC: CPT | Performed by: OBSTETRICS & GYNECOLOGY

## 2020-05-29 ENCOUNTER — ROUTINE PRENATAL (OUTPATIENT)
Dept: OBGYN CLINIC | Facility: CLINIC | Age: 30
End: 2020-05-29
Payer: COMMERCIAL

## 2020-05-29 VITALS
WEIGHT: 266 LBS | DIASTOLIC BLOOD PRESSURE: 74 MMHG | TEMPERATURE: 99 F | BODY MASS INDEX: 43 KG/M2 | SYSTOLIC BLOOD PRESSURE: 132 MMHG

## 2020-05-29 DIAGNOSIS — Z98.891 HISTORY OF CESAREAN SECTION: ICD-10-CM

## 2020-05-29 DIAGNOSIS — Z36.9 PRENATAL SCREENING ENCOUNTER: Primary | ICD-10-CM

## 2020-05-29 DIAGNOSIS — O09.292 HX OF PREECLAMPSIA, PRIOR PREGNANCY, CURRENTLY PREGNANT, SECOND TRIMESTER: ICD-10-CM

## 2020-05-29 DIAGNOSIS — O99.210 OBESITY AFFECTING PREGNANCY, ANTEPARTUM: ICD-10-CM

## 2020-05-29 DIAGNOSIS — O13.3 GESTATIONAL HYPERTENSION W/O SIGNIFICANT PROTEINURIA IN 3RD TRIMESTER: ICD-10-CM

## 2020-05-29 DIAGNOSIS — Z34.80 SUPERVISION OF OTHER NORMAL PREGNANCY, ANTEPARTUM: ICD-10-CM

## 2020-05-29 PROBLEM — Z30.011 OCP (ORAL CONTRACEPTIVE PILLS) INITIATION: Status: RESOLVED | Noted: 2019-09-16 | Resolved: 2020-05-29

## 2020-05-29 PROBLEM — O16.3 ELEVATED BLOOD PRESSURE AFFECTING PREGNANCY IN THIRD TRIMESTER, ANTEPARTUM: Status: RESOLVED | Noted: 2019-03-22 | Resolved: 2020-05-29

## 2020-05-29 PROBLEM — O16.3 ELEVATED BLOOD PRESSURE AFFECTING PREGNANCY IN THIRD TRIMESTER, ANTEPARTUM (HCC): Status: RESOLVED | Noted: 2019-03-22 | Resolved: 2020-05-29

## 2020-05-29 PROCEDURE — 81002 URINALYSIS NONAUTO W/O SCOPE: CPT | Performed by: OBSTETRICS & GYNECOLOGY

## 2020-05-29 NOTE — PROGRESS NOTES
LUIS ALBERTO--20w4d    Doing ok. Many questions/issues today  1. Reports severe joint pain. She has had a + ROSAS. Was told to see rheum but has not. They are unsure if she has lupus or RA. 2. Unsure about delivery plan  3. Frustrated with weight gain.  Reports diet

## 2020-06-12 ENCOUNTER — LAB ENCOUNTER (OUTPATIENT)
Dept: LAB | Age: 30
End: 2020-06-12
Attending: OBSTETRICS & GYNECOLOGY
Payer: COMMERCIAL

## 2020-06-12 DIAGNOSIS — Z34.80 SUPERVISION OF OTHER NORMAL PREGNANCY, ANTEPARTUM: ICD-10-CM

## 2020-06-12 PROCEDURE — 36415 COLL VENOUS BLD VENIPUNCTURE: CPT

## 2020-06-12 PROCEDURE — 85025 COMPLETE CBC W/AUTO DIFF WBC: CPT

## 2020-06-12 PROCEDURE — 82950 GLUCOSE TEST: CPT

## 2020-06-15 RX ORDER — FERROUS SULFATE 325(65) MG
325 TABLET ORAL
Qty: 30 TABLET | Refills: 3 | Status: SHIPPED | OUTPATIENT
Start: 2020-06-15 | End: 2020-10-28

## 2020-06-25 NOTE — PROGRESS NOTES
Ari Bennett    Dear Dr. Ginger Valencia    Thank you for requesting ultrasound evaluation and maternal fetal medicine consultation on your patient Cassandra Mora.   As you are aware she is a 26year old female  with a single results and reviewed them with the patient. DISCUSSION  During her visit we discussed and reviewed the following issues:  OBESITY  See prior MFM notes for a detailed review.     PRIOR PREECLAMPSIA  History:  The patient had severe preeclampsia in her las

## 2020-06-26 ENCOUNTER — OFFICE VISIT (OUTPATIENT)
Dept: PERINATAL CARE | Facility: HOSPITAL | Age: 30
End: 2020-06-26
Attending: OBSTETRICS & GYNECOLOGY
Payer: COMMERCIAL

## 2020-06-26 VITALS
SYSTOLIC BLOOD PRESSURE: 149 MMHG | WEIGHT: 265 LBS | BODY MASS INDEX: 43 KG/M2 | HEART RATE: 93 BPM | DIASTOLIC BLOOD PRESSURE: 84 MMHG

## 2020-06-26 DIAGNOSIS — O99.212 OBESITY AFFECTING PREGNANCY IN SECOND TRIMESTER: ICD-10-CM

## 2020-06-26 PROCEDURE — 93325 DOPPLER ECHO COLOR FLOW MAPG: CPT | Performed by: OBSTETRICS & GYNECOLOGY

## 2020-06-26 PROCEDURE — 76827 ECHO EXAM OF FETAL HEART: CPT | Performed by: OBSTETRICS & GYNECOLOGY

## 2020-06-26 PROCEDURE — 76825 ECHO EXAM OF FETAL HEART: CPT | Performed by: OBSTETRICS & GYNECOLOGY

## 2020-06-26 PROCEDURE — 99214 OFFICE O/P EST MOD 30 MIN: CPT | Performed by: OBSTETRICS & GYNECOLOGY

## 2020-07-03 ENCOUNTER — ROUTINE PRENATAL (OUTPATIENT)
Dept: OBGYN CLINIC | Facility: CLINIC | Age: 30
End: 2020-07-03
Payer: COMMERCIAL

## 2020-07-03 VITALS
BODY MASS INDEX: 43.65 KG/M2 | HEIGHT: 66 IN | WEIGHT: 271.63 LBS | SYSTOLIC BLOOD PRESSURE: 138 MMHG | DIASTOLIC BLOOD PRESSURE: 76 MMHG

## 2020-07-03 DIAGNOSIS — O09.292 HX OF PREECLAMPSIA, PRIOR PREGNANCY, CURRENTLY PREGNANT, SECOND TRIMESTER: ICD-10-CM

## 2020-07-03 DIAGNOSIS — Z98.891 HISTORY OF CESAREAN SECTION: ICD-10-CM

## 2020-07-03 DIAGNOSIS — Z34.80 SUPERVISION OF OTHER NORMAL PREGNANCY, ANTEPARTUM: Primary | ICD-10-CM

## 2020-07-03 RX ORDER — ASPIRIN 81 MG/1
81 TABLET ORAL DAILY
Status: ON HOLD | COMMUNITY
End: 2020-09-26

## 2020-07-03 NOTE — PROGRESS NOTES
LUIS ALBERTO  Doing well  FM is good  RH positive  Recent fetal echo was normal but few views not obtained, they recommend an echo after birth  She will start NST weekly at 36 weeks, growth US in the third trimester.  Continue regular activity and daily BASA  1 hr g

## 2020-07-24 ENCOUNTER — ROUTINE PRENATAL (OUTPATIENT)
Dept: OBGYN CLINIC | Facility: CLINIC | Age: 30
End: 2020-07-24
Payer: COMMERCIAL

## 2020-07-24 VITALS
SYSTOLIC BLOOD PRESSURE: 138 MMHG | DIASTOLIC BLOOD PRESSURE: 72 MMHG | WEIGHT: 278.81 LBS | BODY MASS INDEX: 45 KG/M2 | HEART RATE: 96 BPM

## 2020-07-24 DIAGNOSIS — Z36.9 PRENATAL SCREENING ENCOUNTER: Primary | ICD-10-CM

## 2020-07-24 DIAGNOSIS — Z34.80 SUPERVISION OF OTHER NORMAL PREGNANCY, ANTEPARTUM: ICD-10-CM

## 2020-07-24 LAB — MULTISTIX LOT#: NORMAL NUMERIC

## 2020-07-24 PROCEDURE — 81002 URINALYSIS NONAUTO W/O SCOPE: CPT | Performed by: OBSTETRICS & GYNECOLOGY

## 2020-07-24 PROCEDURE — 3078F DIAST BP <80 MM HG: CPT | Performed by: OBSTETRICS & GYNECOLOGY

## 2020-07-24 PROCEDURE — 3075F SYST BP GE 130 - 139MM HG: CPT | Performed by: OBSTETRICS & GYNECOLOGY

## 2020-07-25 NOTE — PROGRESS NOTES
LUIS ALBERTO--28w5d    Doing ok. Upset today as I was running 30 min behind and her FOB is waiting for her.  Initially wanted to have appointment done as quickly as possible, however, then reported multiple issues-back pain, joint pain, severe fatigue, concern regar

## 2020-07-28 ENCOUNTER — TELEPHONE (OUTPATIENT)
Dept: OBGYN CLINIC | Facility: CLINIC | Age: 30
End: 2020-07-28

## 2020-07-28 NOTE — TELEPHONE ENCOUNTER
----- Message from Dieudonne Chester MD sent at 7/25/2020  1:54 PM CDT -----  Regarding: schuyler for C/S  Please schedule repeat C/S for 39 weeks.   Providers per call schedule  RUBEN is 74/72/91  Complications: morbid obesity  Thank you

## 2020-08-02 ENCOUNTER — PATIENT MESSAGE (OUTPATIENT)
Dept: FAMILY MEDICINE CLINIC | Facility: CLINIC | Age: 30
End: 2020-08-02

## 2020-08-03 DIAGNOSIS — Z34.90 PREGNANCY: Primary | ICD-10-CM

## 2020-08-03 NOTE — TELEPHONE ENCOUNTER
From: Becky Patel  To: Loan Keen MD  Sent: 8/2/2020 8:14 PM CDT  Subject: Prescription Tigre Knott,   I’m hoping you can refill my omeprazole 40 mg tablets I use daily.  I had to put in to add a medication on rx request. b

## 2020-08-07 ENCOUNTER — ROUTINE PRENATAL (OUTPATIENT)
Dept: OBGYN CLINIC | Facility: CLINIC | Age: 30
End: 2020-08-07
Payer: COMMERCIAL

## 2020-08-07 VITALS
SYSTOLIC BLOOD PRESSURE: 142 MMHG | HEIGHT: 66 IN | WEIGHT: 284.19 LBS | DIASTOLIC BLOOD PRESSURE: 78 MMHG | BODY MASS INDEX: 45.67 KG/M2

## 2020-08-07 DIAGNOSIS — Z36.9 PRENATAL SCREENING ENCOUNTER: Primary | ICD-10-CM

## 2020-08-07 DIAGNOSIS — O09.299 HISTORY OF PRE-ECLAMPSIA IN PRIOR PREGNANCY, CURRENTLY PREGNANT: ICD-10-CM

## 2020-08-07 DIAGNOSIS — Z98.891 HISTORY OF CESAREAN SECTION: ICD-10-CM

## 2020-08-07 DIAGNOSIS — Z3A.30 30 WEEKS GESTATION OF PREGNANCY: ICD-10-CM

## 2020-08-07 LAB
ALBUMIN SERPL-MCNC: 3.1 G/DL (ref 3.4–5)
ALBUMIN/GLOB SERPL: 0.7 {RATIO} (ref 1–2)
ALP LIVER SERPL-CCNC: 68 U/L (ref 37–98)
ALT SERPL-CCNC: 16 U/L (ref 13–56)
ANION GAP SERPL CALC-SCNC: 6 MMOL/L (ref 0–18)
AST SERPL-CCNC: 8 U/L (ref 15–37)
BASOPHILS # BLD AUTO: 0.05 X10(3) UL (ref 0–0.2)
BASOPHILS NFR BLD AUTO: 0.3 %
BILIRUB SERPL-MCNC: 0.2 MG/DL (ref 0.1–2)
BUN BLD-MCNC: 8 MG/DL (ref 7–18)
BUN/CREAT SERPL: 17 (ref 10–20)
CALCIUM BLD-MCNC: 9.5 MG/DL (ref 8.5–10.1)
CHLORIDE SERPL-SCNC: 108 MMOL/L (ref 98–112)
CO2 SERPL-SCNC: 23 MMOL/L (ref 21–32)
CREAT BLD-MCNC: 0.47 MG/DL (ref 0.55–1.02)
DEPRECATED RDW RBC AUTO: 54.5 FL (ref 35.1–46.3)
EOSINOPHIL # BLD AUTO: 0.1 X10(3) UL (ref 0–0.7)
EOSINOPHIL NFR BLD AUTO: 0.7 %
ERYTHROCYTE [DISTWIDTH] IN BLOOD BY AUTOMATED COUNT: 16.7 % (ref 11–15)
GLOBULIN PLAS-MCNC: 4.2 G/DL (ref 2.8–4.4)
GLUCOSE BLD-MCNC: 85 MG/DL (ref 70–99)
HCT VFR BLD AUTO: 33.1 % (ref 35–48)
HGB BLD-MCNC: 10.6 G/DL (ref 12–16)
IMM GRANULOCYTES # BLD AUTO: 0.16 X10(3) UL (ref 0–1)
IMM GRANULOCYTES NFR BLD: 1.1 %
LYMPHOCYTES # BLD AUTO: 2.28 X10(3) UL (ref 1–4)
LYMPHOCYTES NFR BLD AUTO: 15.2 %
M PROTEIN MFR SERPL ELPH: 7.3 G/DL (ref 6.4–8.2)
MCH RBC QN AUTO: 28.9 PG (ref 26–34)
MCHC RBC AUTO-ENTMCNC: 32 G/DL (ref 31–37)
MCV RBC AUTO: 90.2 FL (ref 80–100)
MONOCYTES # BLD AUTO: 1.34 X10(3) UL (ref 0.1–1)
MONOCYTES NFR BLD AUTO: 8.9 %
MULTISTIX LOT#: NORMAL NUMERIC
NEUTROPHILS # BLD AUTO: 11.11 X10 (3) UL (ref 1.5–7.7)
NEUTROPHILS # BLD AUTO: 11.11 X10(3) UL (ref 1.5–7.7)
NEUTROPHILS NFR BLD AUTO: 73.8 %
OSMOLALITY SERPL CALC.SUM OF ELEC: 282 MOSM/KG (ref 275–295)
PATIENT FASTING Y/N/NP: NO
PLATELET # BLD AUTO: 330 10(3)UL (ref 150–450)
POTASSIUM SERPL-SCNC: 3.7 MMOL/L (ref 3.5–5.1)
RBC # BLD AUTO: 3.67 X10(6)UL (ref 3.8–5.3)
SODIUM SERPL-SCNC: 137 MMOL/L (ref 136–145)
URATE SERPL-MCNC: 3.8 MG/DL (ref 2.6–6)
WBC # BLD AUTO: 15 X10(3) UL (ref 4–11)

## 2020-08-07 PROCEDURE — 85025 COMPLETE CBC W/AUTO DIFF WBC: CPT | Performed by: OBSTETRICS & GYNECOLOGY

## 2020-08-07 PROCEDURE — 3078F DIAST BP <80 MM HG: CPT | Performed by: OBSTETRICS & GYNECOLOGY

## 2020-08-07 PROCEDURE — 3008F BODY MASS INDEX DOCD: CPT | Performed by: OBSTETRICS & GYNECOLOGY

## 2020-08-07 PROCEDURE — 80053 COMPREHEN METABOLIC PANEL: CPT | Performed by: OBSTETRICS & GYNECOLOGY

## 2020-08-07 PROCEDURE — 81002 URINALYSIS NONAUTO W/O SCOPE: CPT | Performed by: OBSTETRICS & GYNECOLOGY

## 2020-08-07 PROCEDURE — 84550 ASSAY OF BLOOD/URIC ACID: CPT | Performed by: OBSTETRICS & GYNECOLOGY

## 2020-08-07 PROCEDURE — 3077F SYST BP >= 140 MM HG: CPT | Performed by: OBSTETRICS & GYNECOLOGY

## 2020-08-07 RX ORDER — OMEPRAZOLE 40 MG/1
CAPSULE, DELAYED RELEASE ORAL
Status: ON HOLD | COMMUNITY
Start: 2012-01-01 | End: 2020-09-26

## 2020-08-07 NOTE — PATIENT INSTRUCTIONS
FETAL MOVEMENT CHART    Begin counting fetal movements at 32 weeks of pregnancy. You may find that your baby is more active after eating or drinking. We want you to time how long it takes to feel 10 movements (kicks, flutters, swishes or rolls).   Abdullahi Garcia

## 2020-08-07 NOTE — PROGRESS NOTES
LUIS ALBERTO  Doing well, +FM  Denies VB/LOF/uctx  Rh +, TDAP received, EPDS  Aches and pains of pregnancy  Had caffeine right before visit  bp borderline  Will check pih labs with hiv today  RTC in 1 wks  Fetal movement instructions given

## 2020-08-12 RX ORDER — OMEPRAZOLE 40 MG/1
CAPSULE, DELAYED RELEASE ORAL
Qty: 90 CAPSULE | Refills: 0 | OUTPATIENT
Start: 2020-08-12

## 2020-08-12 NOTE — TELEPHONE ENCOUNTER
OMEPRAZOLE 40MG CAPSULES          Will file in chart as: OMEPRAZOLE 40 MG Oral Capsule Delayed Release         Sig: TAKE 1 CAPSULE(40 MG) BY MOUTH DAILY    Disp:  90 capsule    Refills:  0 (Pharmacy requested: Not specified)    Start: 8/12/2020    Class: N

## 2020-08-13 ENCOUNTER — ROUTINE PRENATAL (OUTPATIENT)
Dept: OBGYN CLINIC | Facility: CLINIC | Age: 30
End: 2020-08-13
Payer: COMMERCIAL

## 2020-08-13 VITALS
SYSTOLIC BLOOD PRESSURE: 134 MMHG | WEIGHT: 286.63 LBS | BODY MASS INDEX: 46.06 KG/M2 | HEIGHT: 66 IN | DIASTOLIC BLOOD PRESSURE: 62 MMHG

## 2020-08-13 DIAGNOSIS — O09.299 HISTORY OF PRE-ECLAMPSIA IN PRIOR PREGNANCY, CURRENTLY PREGNANT: ICD-10-CM

## 2020-08-13 DIAGNOSIS — Z36.9 PRENATAL SCREENING ENCOUNTER: Primary | ICD-10-CM

## 2020-08-13 DIAGNOSIS — O13.3 GESTATIONAL HYPERTENSION W/O SIGNIFICANT PROTEINURIA IN 3RD TRIMESTER: ICD-10-CM

## 2020-08-13 LAB — MULTISTIX LOT#: NORMAL NUMERIC

## 2020-08-13 PROCEDURE — 3075F SYST BP GE 130 - 139MM HG: CPT | Performed by: NURSE PRACTITIONER

## 2020-08-13 PROCEDURE — 81002 URINALYSIS NONAUTO W/O SCOPE: CPT | Performed by: NURSE PRACTITIONER

## 2020-08-13 PROCEDURE — 3008F BODY MASS INDEX DOCD: CPT | Performed by: NURSE PRACTITIONER

## 2020-08-13 PROCEDURE — 3078F DIAST BP <80 MM HG: CPT | Performed by: NURSE PRACTITIONER

## 2020-08-13 NOTE — PROGRESS NOTES
LUIS ALBERTO  Doing well, aches and pains, fatigue. She plans to get a maternity belt.    Initial BP today was 140/60 recheck 134/62  GOOD FM  Denies VB/LOF/uctx  RTC in 2 wks unless BP elevated with MFM then she is to call and schedule a visit for next week  Fetal
TEMP 97.7
Patient Refused

## 2020-08-17 ENCOUNTER — ULTRASOUND ENCOUNTER (OUTPATIENT)
Dept: PERINATAL CARE | Facility: HOSPITAL | Age: 30
End: 2020-08-17
Attending: OBSTETRICS & GYNECOLOGY
Payer: COMMERCIAL

## 2020-08-17 VITALS
BODY MASS INDEX: 46.12 KG/M2 | HEIGHT: 66 IN | HEART RATE: 99 BPM | SYSTOLIC BLOOD PRESSURE: 140 MMHG | DIASTOLIC BLOOD PRESSURE: 82 MMHG | WEIGHT: 287 LBS

## 2020-08-17 DIAGNOSIS — O09.292 HX OF PREECLAMPSIA, PRIOR PREGNANCY, CURRENTLY PREGNANT, SECOND TRIMESTER: ICD-10-CM

## 2020-08-17 DIAGNOSIS — Z98.891 HISTORY OF CESAREAN SECTION: ICD-10-CM

## 2020-08-17 DIAGNOSIS — E66.01 CLASS 3 SEVERE OBESITY DUE TO EXCESS CALORIES WITHOUT SERIOUS COMORBIDITY WITH BODY MASS INDEX (BMI) OF 40.0 TO 44.9 IN ADULT (HCC): Primary | ICD-10-CM

## 2020-08-17 DIAGNOSIS — E66.01 CLASS 3 SEVERE OBESITY DUE TO EXCESS CALORIES WITHOUT SERIOUS COMORBIDITY WITH BODY MASS INDEX (BMI) OF 40.0 TO 44.9 IN ADULT (HCC): ICD-10-CM

## 2020-08-17 PROCEDURE — 99213 OFFICE O/P EST LOW 20 MIN: CPT | Performed by: OBSTETRICS & GYNECOLOGY

## 2020-08-17 PROCEDURE — 76816 OB US FOLLOW-UP PER FETUS: CPT | Performed by: OBSTETRICS & GYNECOLOGY

## 2020-08-17 PROCEDURE — 76819 FETAL BIOPHYS PROFIL W/O NST: CPT | Performed by: OBSTETRICS & GYNECOLOGY

## 2020-08-17 PROCEDURE — 76819 FETAL BIOPHYS PROFIL W/O NST: CPT

## 2020-08-17 NOTE — PROGRESS NOTES
Ranjit Zapien    Dear Dr. Rosina Wilson    Thank you for requesting ultrasound evaluation and maternal fetal medicine consultation on your patient Mary Bravo.   As you are aware she is a 26year old female  with a single weeks.  · Limit weight gain to 11-20 pounds. · Post-belinda echocardiogram prior to  discharge    Thank you for allowing me to participate in the care of your patient. Please do not hesitate to contact me if additional questions or concerns arise.

## 2020-08-27 ENCOUNTER — ROUTINE PRENATAL (OUTPATIENT)
Dept: OBGYN CLINIC | Facility: CLINIC | Age: 30
End: 2020-08-27
Payer: COMMERCIAL

## 2020-08-27 VITALS
HEIGHT: 66 IN | DIASTOLIC BLOOD PRESSURE: 62 MMHG | WEIGHT: 290.38 LBS | SYSTOLIC BLOOD PRESSURE: 132 MMHG | BODY MASS INDEX: 46.67 KG/M2

## 2020-08-27 DIAGNOSIS — Z34.80 SUPERVISION OF OTHER NORMAL PREGNANCY, ANTEPARTUM: ICD-10-CM

## 2020-08-27 DIAGNOSIS — Z36.9 PRENATAL SCREENING ENCOUNTER: Primary | ICD-10-CM

## 2020-08-27 DIAGNOSIS — O09.299 HISTORY OF PRE-ECLAMPSIA IN PRIOR PREGNANCY, CURRENTLY PREGNANT: ICD-10-CM

## 2020-08-27 LAB
MULTISTIX LOT#: NORMAL NUMERIC
PROTEIN (URINE DIPSTICK): 30 MG/DL

## 2020-08-27 PROCEDURE — 3075F SYST BP GE 130 - 139MM HG: CPT | Performed by: NURSE PRACTITIONER

## 2020-08-27 PROCEDURE — 3078F DIAST BP <80 MM HG: CPT | Performed by: NURSE PRACTITIONER

## 2020-08-27 PROCEDURE — 3008F BODY MASS INDEX DOCD: CPT | Performed by: NURSE PRACTITIONER

## 2020-08-27 PROCEDURE — 81002 URINALYSIS NONAUTO W/O SCOPE: CPT | Performed by: NURSE PRACTITIONER

## 2020-08-27 NOTE — PATIENT INSTRUCTIONS
[employer] (my friend is Reanna Walters- you can ask who she recommends you see)

## 2020-08-27 NOTE — PROGRESS NOTES
LUIS ALBERTO  Doing well,  GOOD FM  Denies VB/LOF/uctx  She is experiencing anxiety and crying spells, I recommend she see a counselor as she is not interested in medication. She is amenable to this.   RTC in 1-2 wks  Fetal movement discussed

## 2020-09-10 ENCOUNTER — ROUTINE PRENATAL (OUTPATIENT)
Dept: OBGYN CLINIC | Facility: CLINIC | Age: 30
End: 2020-09-10
Payer: COMMERCIAL

## 2020-09-10 VITALS — BODY MASS INDEX: 48 KG/M2 | WEIGHT: 293 LBS | DIASTOLIC BLOOD PRESSURE: 76 MMHG | SYSTOLIC BLOOD PRESSURE: 130 MMHG

## 2020-09-10 DIAGNOSIS — Z98.891 HISTORY OF CESAREAN SECTION: ICD-10-CM

## 2020-09-10 DIAGNOSIS — Z36.9 PRENATAL SCREENING ENCOUNTER: ICD-10-CM

## 2020-09-10 DIAGNOSIS — O09.293 HX OF PREECLAMPSIA, PRIOR PREGNANCY, CURRENTLY PREGNANT, THIRD TRIMESTER: ICD-10-CM

## 2020-09-10 DIAGNOSIS — O99.210 OBESITY AFFECTING PREGNANCY, ANTEPARTUM: ICD-10-CM

## 2020-09-10 DIAGNOSIS — Z34.93 ENCOUNTER FOR SUPERVISION OF NORMAL PREGNANCY IN THIRD TRIMESTER, UNSPECIFIED GRAVIDITY: Primary | ICD-10-CM

## 2020-09-10 DIAGNOSIS — O13.3 GESTATIONAL HYPERTENSION, THIRD TRIMESTER: ICD-10-CM

## 2020-09-10 LAB
GLUCOSE (URINE DIPSTICK): NEGATIVE MG/DL
MULTISTIX LOT#: NORMAL NUMERIC

## 2020-09-10 PROCEDURE — 3075F SYST BP GE 130 - 139MM HG: CPT | Performed by: OBSTETRICS & GYNECOLOGY

## 2020-09-10 PROCEDURE — 81002 URINALYSIS NONAUTO W/O SCOPE: CPT | Performed by: OBSTETRICS & GYNECOLOGY

## 2020-09-10 PROCEDURE — 3078F DIAST BP <80 MM HG: CPT | Performed by: OBSTETRICS & GYNECOLOGY

## 2020-09-10 NOTE — TELEPHONE ENCOUNTER
Dr. Chetan Cavazos rescheduled RCS 9/24/2020 at 36  Form updated and faxed to labor and delivery  Updated calendar  Pt informed.

## 2020-09-10 NOTE — PATIENT INSTRUCTIONS
Labor Instructions    How do I know if it’s true labor? • One of the most important aspects of any pregnancy is being able to recognize the onset of labor.   Unfortunately, on occasion it can be difficult or confusing, especially if you have had one or mor of the contractions. Having regular (usually closer), longer lasting (35-70 seconds), and sharper (more painful) contractions are the common symptoms of actual labor.   The second way in which labor can begin which occurs in approximately 30% of all patien the room during your labor. During the delivery, the nurses will inform you of the hospital policy and how many coaches are allowed. You may desire pain medication or anesthesia for pain.   You probably discussed some aspects of pain medication with us dur Please call the office within a few days after you are discharged from the hospital to schedule your post-partum visit, which is usually 4-6 weeks after delivery. Any medications necessary will be discussed on an individual basis.   If you decide to kami Time M T W Th F S S                                                                                                           Time M T W Th F S S

## 2020-09-10 NOTE — PROGRESS NOTES
LUIS ALBERTO  [id number]  GA: 35w3d   09/10/20  1430   BP: 130/76   Weight: 296 lb (134.3 kg)       Doing well, +FM  Denies LOF/VB/uctx  Mode of delivery: RCS anticipated  SVE deferred   GBS at next visit   Fetal movement count given  Labor precautions provided   [facility]

## 2020-09-12 ENCOUNTER — LAB ENCOUNTER (OUTPATIENT)
Dept: LAB | Age: 30
End: 2020-09-12
Attending: OBSTETRICS & GYNECOLOGY
Payer: COMMERCIAL

## 2020-09-12 DIAGNOSIS — Z34.80 SUPERVISION OF OTHER NORMAL PREGNANCY, ANTEPARTUM: ICD-10-CM

## 2020-09-12 DIAGNOSIS — O13.3 GESTATIONAL HYPERTENSION, THIRD TRIMESTER: ICD-10-CM

## 2020-09-12 LAB
ALBUMIN SERPL-MCNC: 2.9 G/DL (ref 3.4–5)
ALBUMIN/GLOB SERPL: 0.7 {RATIO} (ref 1–2)
ALP LIVER SERPL-CCNC: 81 U/L (ref 37–98)
ALT SERPL-CCNC: 14 U/L (ref 13–56)
ANION GAP SERPL CALC-SCNC: 9 MMOL/L (ref 0–18)
AST SERPL-CCNC: 9 U/L (ref 15–37)
BASOPHILS # BLD AUTO: 0.03 X10(3) UL (ref 0–0.2)
BASOPHILS NFR BLD AUTO: 0.2 %
BILIRUB SERPL-MCNC: 0.2 MG/DL (ref 0.1–2)
BUN BLD-MCNC: 9 MG/DL (ref 7–18)
BUN/CREAT SERPL: 16.4 (ref 10–20)
CALCIUM BLD-MCNC: 9.3 MG/DL (ref 8.5–10.1)
CHLORIDE SERPL-SCNC: 105 MMOL/L (ref 98–112)
CO2 SERPL-SCNC: 22 MMOL/L (ref 21–32)
CREAT BLD-MCNC: 0.55 MG/DL (ref 0.55–1.02)
DEPRECATED RDW RBC AUTO: 55.1 FL (ref 35.1–46.3)
EOSINOPHIL # BLD AUTO: 0.08 X10(3) UL (ref 0–0.7)
EOSINOPHIL NFR BLD AUTO: 0.6 %
ERYTHROCYTE [DISTWIDTH] IN BLOOD BY AUTOMATED COUNT: 16.4 % (ref 11–15)
GLOBULIN PLAS-MCNC: 4 G/DL (ref 2.8–4.4)
GLUCOSE BLD-MCNC: 121 MG/DL (ref 70–99)
HCT VFR BLD AUTO: 35.9 % (ref 35–48)
HGB BLD-MCNC: 11.3 G/DL (ref 12–16)
IMM GRANULOCYTES # BLD AUTO: 0.08 X10(3) UL (ref 0–1)
IMM GRANULOCYTES NFR BLD: 0.6 %
LYMPHOCYTES # BLD AUTO: 2.04 X10(3) UL (ref 1–4)
LYMPHOCYTES NFR BLD AUTO: 15.1 %
M PROTEIN MFR SERPL ELPH: 6.9 G/DL (ref 6.4–8.2)
MCH RBC QN AUTO: 28.8 PG (ref 26–34)
MCHC RBC AUTO-ENTMCNC: 31.5 G/DL (ref 31–37)
MCV RBC AUTO: 91.3 FL (ref 80–100)
MONOCYTES # BLD AUTO: 0.78 X10(3) UL (ref 0.1–1)
MONOCYTES NFR BLD AUTO: 5.8 %
NEUTROPHILS # BLD AUTO: 10.5 X10 (3) UL (ref 1.5–7.7)
NEUTROPHILS # BLD AUTO: 10.5 X10(3) UL (ref 1.5–7.7)
NEUTROPHILS NFR BLD AUTO: 77.7 %
OSMOLALITY SERPL CALC.SUM OF ELEC: 282 MOSM/KG (ref 275–295)
PATIENT FASTING Y/N/NP: NO
PLATELET # BLD AUTO: 316 10(3)UL (ref 150–450)
POTASSIUM SERPL-SCNC: 3.6 MMOL/L (ref 3.5–5.1)
RBC # BLD AUTO: 3.93 X10(6)UL (ref 3.8–5.3)
SODIUM SERPL-SCNC: 136 MMOL/L (ref 136–145)
WBC # BLD AUTO: 13.5 X10(3) UL (ref 4–11)

## 2020-09-12 PROCEDURE — 36415 COLL VENOUS BLD VENIPUNCTURE: CPT

## 2020-09-12 PROCEDURE — 87389 HIV-1 AG W/HIV-1&-2 AB AG IA: CPT

## 2020-09-12 PROCEDURE — 85025 COMPLETE CBC W/AUTO DIFF WBC: CPT

## 2020-09-12 PROCEDURE — 80053 COMPREHEN METABOLIC PANEL: CPT

## 2020-09-14 ENCOUNTER — ULTRASOUND ENCOUNTER (OUTPATIENT)
Dept: PERINATAL CARE | Facility: HOSPITAL | Age: 30
End: 2020-09-14
Attending: OBSTETRICS & GYNECOLOGY
Payer: COMMERCIAL

## 2020-09-14 VITALS
SYSTOLIC BLOOD PRESSURE: 149 MMHG | BODY MASS INDEX: 48 KG/M2 | DIASTOLIC BLOOD PRESSURE: 84 MMHG | HEART RATE: 98 BPM | WEIGHT: 293 LBS

## 2020-09-14 DIAGNOSIS — F32.A ANXIETY AND DEPRESSION: ICD-10-CM

## 2020-09-14 DIAGNOSIS — O09.899 HX OF PRETERM DELIVERY, CURRENTLY PREGNANT: ICD-10-CM

## 2020-09-14 DIAGNOSIS — O99.210 OBESITY AFFECTING PREGNANCY, ANTEPARTUM: ICD-10-CM

## 2020-09-14 DIAGNOSIS — O13.3 GESTATIONAL HYPERTENSION, THIRD TRIMESTER: ICD-10-CM

## 2020-09-14 DIAGNOSIS — O13.3 GESTATIONAL HYPERTENSION, THIRD TRIMESTER: Primary | ICD-10-CM

## 2020-09-14 DIAGNOSIS — F41.9 ANXIETY AND DEPRESSION: ICD-10-CM

## 2020-09-14 PROCEDURE — 76819 FETAL BIOPHYS PROFIL W/O NST: CPT | Performed by: OBSTETRICS & GYNECOLOGY

## 2020-09-14 PROCEDURE — 76816 OB US FOLLOW-UP PER FETUS: CPT | Performed by: OBSTETRICS & GYNECOLOGY

## 2020-09-14 PROCEDURE — 99214 OFFICE O/P EST MOD 30 MIN: CPT | Performed by: OBSTETRICS & GYNECOLOGY

## 2020-09-14 PROCEDURE — 76819 FETAL BIOPHYS PROFIL W/O NST: CPT

## 2020-09-14 NOTE — PROGRESS NOTES
Arthur Arceo    Dear Dr. Héctor Jain    Thank you for requesting ultrasound evaluation and maternal fetal medicine consultation on your patient Lori Montalvo.   As you are aware she is a 26year old female  with a single disease. Hypertension diagnosed after 20 weeks is consider gestational hypertension. It is likely to progress to preeclampsia when diagnosed prior to 35 weeks. BP usually returns to normal after about 12 weeks postpartum.   If BP is >160  Or >110

## 2020-09-17 ENCOUNTER — APPOINTMENT (OUTPATIENT)
Dept: OBGYN CLINIC | Facility: CLINIC | Age: 30
End: 2020-09-17
Payer: COMMERCIAL

## 2020-09-17 ENCOUNTER — ROUTINE PRENATAL (OUTPATIENT)
Dept: OBGYN CLINIC | Facility: CLINIC | Age: 30
End: 2020-09-17
Payer: COMMERCIAL

## 2020-09-17 VITALS — WEIGHT: 293 LBS | SYSTOLIC BLOOD PRESSURE: 130 MMHG | DIASTOLIC BLOOD PRESSURE: 76 MMHG | BODY MASS INDEX: 48 KG/M2

## 2020-09-17 DIAGNOSIS — O13.3 GESTATIONAL HYPERTENSION, THIRD TRIMESTER: Primary | ICD-10-CM

## 2020-09-17 DIAGNOSIS — Z36.9 PRENATAL SCREENING ENCOUNTER: ICD-10-CM

## 2020-09-17 DIAGNOSIS — Z36.9 ANTENATAL SCREENING ENCOUNTER: ICD-10-CM

## 2020-09-17 DIAGNOSIS — O99.210 OBESITY AFFECTING PREGNANCY, ANTEPARTUM: ICD-10-CM

## 2020-09-17 PROBLEM — Z87.828 HISTORY OF MOTOR VEHICLE ACCIDENT: Status: RESOLVED | Noted: 2020-03-03 | Resolved: 2020-09-17

## 2020-09-17 PROBLEM — R20.2 TINGLING SENSATION: Status: RESOLVED | Noted: 2020-03-03 | Resolved: 2020-09-17

## 2020-09-17 PROBLEM — R10.84 GENERALIZED ABDOMINAL PAIN: Status: RESOLVED | Noted: 2019-08-22 | Resolved: 2020-09-17

## 2020-09-17 PROBLEM — R10.13 EPIGASTRIC PAIN: Status: RESOLVED | Noted: 2019-08-22 | Resolved: 2020-09-17

## 2020-09-17 PROBLEM — R20.2 TINGLING OF RIGHT UPPER EXTREMITY: Status: RESOLVED | Noted: 2020-03-03 | Resolved: 2020-09-17

## 2020-09-17 PROBLEM — Z34.93 ENCOUNTER FOR SUPERVISION OF NORMAL PREGNANCY IN THIRD TRIMESTER: Status: RESOLVED | Noted: 2019-03-06 | Resolved: 2020-09-17

## 2020-09-17 PROBLEM — R14.1 GAS PAIN: Status: RESOLVED | Noted: 2019-10-03 | Resolved: 2020-09-17

## 2020-09-17 PROBLEM — R19.7 DIARRHEA: Status: RESOLVED | Noted: 2019-10-03 | Resolved: 2020-09-17

## 2020-09-17 PROBLEM — R14.0 BLOATING: Status: RESOLVED | Noted: 2019-08-22 | Resolved: 2020-09-17

## 2020-09-17 PROBLEM — R11.2 NON-INTRACTABLE VOMITING WITH NAUSEA: Status: RESOLVED | Noted: 2019-08-22 | Resolved: 2020-09-17

## 2020-09-17 PROBLEM — F41.9 ANXIETY: Status: RESOLVED | Noted: 2018-04-19 | Resolved: 2020-09-17

## 2020-09-17 PROBLEM — R11.12 PROJECTILE VOMITING: Status: RESOLVED | Noted: 2019-10-03 | Resolved: 2020-09-17

## 2020-09-17 PROBLEM — M54.2 NECK PAIN: Status: RESOLVED | Noted: 2020-03-03 | Resolved: 2020-09-17

## 2020-09-17 PROBLEM — Z34.93 ENCOUNTER FOR SUPERVISION OF NORMAL PREGNANCY IN THIRD TRIMESTER (HCC): Status: RESOLVED | Noted: 2019-03-06 | Resolved: 2020-09-17

## 2020-09-17 PROBLEM — M53.82 WEAKNESS OF NECK: Status: RESOLVED | Noted: 2020-03-03 | Resolved: 2020-09-17

## 2020-09-17 LAB
GLUCOSE (URINE DIPSTICK): NEGATIVE MG/DL
MULTISTIX LOT#: NORMAL NUMERIC

## 2020-09-17 PROCEDURE — 59025 FETAL NON-STRESS TEST: CPT | Performed by: OBSTETRICS & GYNECOLOGY

## 2020-09-17 PROCEDURE — 3078F DIAST BP <80 MM HG: CPT | Performed by: OBSTETRICS & GYNECOLOGY

## 2020-09-17 PROCEDURE — 87653 STREP B DNA AMP PROBE: CPT | Performed by: OBSTETRICS & GYNECOLOGY

## 2020-09-17 PROCEDURE — 81002 URINALYSIS NONAUTO W/O SCOPE: CPT | Performed by: OBSTETRICS & GYNECOLOGY

## 2020-09-17 PROCEDURE — 87081 CULTURE SCREEN ONLY: CPT | Performed by: OBSTETRICS & GYNECOLOGY

## 2020-09-17 PROCEDURE — 3075F SYST BP GE 130 - 139MM HG: CPT | Performed by: OBSTETRICS & GYNECOLOGY

## 2020-09-17 NOTE — PROGRESS NOTES
Patient presents with:  Pregnancy: LUIS ALBERTO +NST, and discuss flu shot     Routine prenatal visit without complaints. No headaches, visual changes or epigastric pain. Patient denies any bleeding, leaking fluid, cramping, or regular uterine contractions.   Good

## 2020-09-21 ENCOUNTER — ROUTINE PRENATAL (OUTPATIENT)
Dept: OBGYN CLINIC | Facility: CLINIC | Age: 30
End: 2020-09-21
Payer: COMMERCIAL

## 2020-09-21 ENCOUNTER — TELEPHONE (OUTPATIENT)
Dept: OBGYN CLINIC | Facility: CLINIC | Age: 30
End: 2020-09-21

## 2020-09-21 ENCOUNTER — LAB ENCOUNTER (OUTPATIENT)
Dept: LAB | Age: 30
End: 2020-09-21
Attending: OBSTETRICS & GYNECOLOGY
Payer: COMMERCIAL

## 2020-09-21 VITALS — BODY MASS INDEX: 48 KG/M2 | SYSTOLIC BLOOD PRESSURE: 142 MMHG | WEIGHT: 293 LBS | DIASTOLIC BLOOD PRESSURE: 72 MMHG

## 2020-09-21 DIAGNOSIS — Z34.90 PREGNANCY: ICD-10-CM

## 2020-09-21 DIAGNOSIS — O13.3 GESTATIONAL HYPERTENSION, THIRD TRIMESTER: ICD-10-CM

## 2020-09-21 DIAGNOSIS — Z23 NEED FOR VACCINATION: ICD-10-CM

## 2020-09-21 DIAGNOSIS — Z36.9 PRENATAL SCREENING ENCOUNTER: Primary | ICD-10-CM

## 2020-09-21 LAB — MULTISTIX LOT#: NORMAL NUMERIC

## 2020-09-21 PROCEDURE — 81002 URINALYSIS NONAUTO W/O SCOPE: CPT | Performed by: OBSTETRICS & GYNECOLOGY

## 2020-09-21 PROCEDURE — 90471 IMMUNIZATION ADMIN: CPT | Performed by: OBSTETRICS & GYNECOLOGY

## 2020-09-21 PROCEDURE — 3077F SYST BP >= 140 MM HG: CPT | Performed by: OBSTETRICS & GYNECOLOGY

## 2020-09-21 PROCEDURE — 90686 IIV4 VACC NO PRSV 0.5 ML IM: CPT | Performed by: OBSTETRICS & GYNECOLOGY

## 2020-09-21 PROCEDURE — 3078F DIAST BP <80 MM HG: CPT | Performed by: OBSTETRICS & GYNECOLOGY

## 2020-09-21 NOTE — TELEPHONE ENCOUNTER
Patient has C/S scheduled for 09/24/2020. Patient states she thought she would of received a call from L&D by now w/ instructions. Patient informed she should complete her COVID test today and quarantine until delivery.    She should arrive 2 hours prior

## 2020-09-21 NOTE — PROGRESS NOTES
Patient presents with:  Prenatal Care: LUIS ALBERTO     Routine prenatal visit without complaints. Patient denies any bleeding, leaking fluid, cramping, or regular uterine contractions. Good fetal movement.   No headaches, visual changes, epigastric pain  Assessmen

## 2020-09-21 NOTE — TELEPHONE ENCOUNTER
Patient called and stated she called on Friday and was told that they would reach out to L&D for patient information regarding Covid testing and other infromation. Still has not heard anything and will need a call back today please.  Did not see encounter m

## 2020-09-22 ENCOUNTER — LAB ENCOUNTER (OUTPATIENT)
Dept: LAB | Facility: HOSPITAL | Age: 30
End: 2020-09-22
Attending: OBSTETRICS & GYNECOLOGY
Payer: COMMERCIAL

## 2020-09-22 ENCOUNTER — TELEPHONE (OUTPATIENT)
Dept: OBGYN UNIT | Facility: HOSPITAL | Age: 30
End: 2020-09-22

## 2020-09-22 DIAGNOSIS — Z20.828 EXPOSURE TO PARVOVIRUS: ICD-10-CM

## 2020-09-22 DIAGNOSIS — O09.293 HX OF PREECLAMPSIA, PRIOR PREGNANCY, CURRENTLY PREGNANT, THIRD TRIMESTER: ICD-10-CM

## 2020-09-22 PROCEDURE — 86747 PARVOVIRUS ANTIBODY: CPT

## 2020-09-23 ENCOUNTER — TELEPHONE (OUTPATIENT)
Dept: OBGYN UNIT | Facility: HOSPITAL | Age: 30
End: 2020-09-23

## 2020-09-23 ENCOUNTER — TELEPHONE (OUTPATIENT)
Dept: OBGYN CLINIC | Facility: CLINIC | Age: 30
End: 2020-09-23

## 2020-09-23 LAB — SARS-COV-2 RNA RESP QL NAA+PROBE: NOT DETECTED

## 2020-09-23 RX ORDER — MELATONIN
1000 DAILY
COMMUNITY

## 2020-09-23 NOTE — TELEPHONE ENCOUNTER
Call made to patient. She is very upset as she was advised to have lab done STAT and there are no results. Call made to lab, this test is a sendout and is processed in [place]. Results will not be available for a few days regardless of STAT order.

## 2020-09-23 NOTE — TELEPHONE ENCOUNTER
Patient calling because she is concerned that the results from her parvovirus have not come in yet. She thought it was suppose to be STAT. She feels like no one is taking this serious enough as she is having her baby soon.

## 2020-09-24 ENCOUNTER — ANESTHESIA EVENT (OUTPATIENT)
Dept: OBGYN UNIT | Facility: HOSPITAL | Age: 30
End: 2020-09-24
Payer: COMMERCIAL

## 2020-09-24 ENCOUNTER — ANESTHESIA (OUTPATIENT)
Dept: OBGYN UNIT | Facility: HOSPITAL | Age: 30
End: 2020-09-24
Payer: COMMERCIAL

## 2020-09-24 ENCOUNTER — HOSPITAL ENCOUNTER (INPATIENT)
Facility: HOSPITAL | Age: 30
LOS: 2 days | Discharge: HOME | End: 2020-09-26
Attending: OBSTETRICS & GYNECOLOGY | Admitting: OBSTETRICS & GYNECOLOGY
Payer: COMMERCIAL

## 2020-09-24 PROBLEM — Z34.90 PREGNANCY: Status: ACTIVE | Noted: 2020-09-24

## 2020-09-24 PROBLEM — O13.9 GESTATIONAL HYPERTENSION, ANTEPARTUM: Status: ACTIVE | Noted: 2020-09-10

## 2020-09-24 PROBLEM — O13.9 GESTATIONAL HYPERTENSION, ANTEPARTUM (HCC): Status: ACTIVE | Noted: 2020-09-10

## 2020-09-24 PROBLEM — Z34.90 PREGNANCY (HCC): Status: ACTIVE | Noted: 2020-09-24

## 2020-09-24 LAB
ANTIBODY SCREEN: NEGATIVE
BASOPHILS # BLD AUTO: 0.02 X10(3) UL (ref 0–0.2)
BASOPHILS NFR BLD AUTO: 0.2 %
DEPRECATED RDW RBC AUTO: 54.2 FL (ref 35.1–46.3)
EOSINOPHIL # BLD AUTO: 0.15 X10(3) UL (ref 0–0.7)
EOSINOPHIL NFR BLD AUTO: 1.2 %
ERYTHROCYTE [DISTWIDTH] IN BLOOD BY AUTOMATED COUNT: 16 % (ref 11–15)
HCT VFR BLD AUTO: 35.5 %
HGB BLD-MCNC: 11.4 G/DL
IMM GRANULOCYTES # BLD AUTO: 0.06 X10(3) UL (ref 0–1)
IMM GRANULOCYTES NFR BLD: 0.5 %
LYMPHOCYTES # BLD AUTO: 2.42 X10(3) UL (ref 1–4)
LYMPHOCYTES NFR BLD AUTO: 18.8 %
MCH RBC QN AUTO: 29.5 PG (ref 26–34)
MCHC RBC AUTO-ENTMCNC: 32.1 G/DL (ref 31–37)
MCV RBC AUTO: 92 FL
MONOCYTES # BLD AUTO: 1.1 X10(3) UL (ref 0.1–1)
MONOCYTES NFR BLD AUTO: 8.5 %
NEUTROPHILS # BLD AUTO: 9.12 X10 (3) UL (ref 1.5–7.7)
NEUTROPHILS # BLD AUTO: 9.12 X10(3) UL (ref 1.5–7.7)
NEUTROPHILS NFR BLD AUTO: 70.8 %
PARVOVIRUS B19 ANTIBODY IGG: 0.14 IV
PARVOVIRUS B19 ANTIBODY IGM: 0.6 IV
PLATELET # BLD AUTO: 274 10(3)UL (ref 150–450)
RBC # BLD AUTO: 3.86 X10(6)UL
RH BLOOD TYPE: POSITIVE
T PALLIDUM AB SER QL IA: NONREACTIVE
WBC # BLD AUTO: 12.9 X10(3) UL (ref 4–11)

## 2020-09-24 PROCEDURE — 59514 CESAREAN DELIVERY ONLY: CPT | Performed by: OBSTETRICS & GYNECOLOGY

## 2020-09-24 PROCEDURE — 59510 CESAREAN DELIVERY: CPT | Performed by: OBSTETRICS & GYNECOLOGY

## 2020-09-24 RX ORDER — DIPHENHYDRAMINE HYDROCHLORIDE 50 MG/ML
INJECTION INTRAMUSCULAR; INTRAVENOUS AS NEEDED
Status: DISCONTINUED | OUTPATIENT
Start: 2020-09-24 | End: 2020-09-24 | Stop reason: SURG

## 2020-09-24 RX ORDER — DOCUSATE SODIUM 100 MG/1
100 CAPSULE, LIQUID FILLED ORAL
Status: DISCONTINUED | OUTPATIENT
Start: 2020-09-25 | End: 2020-09-26

## 2020-09-24 RX ORDER — PHENYLEPHRINE HCL 10 MG/ML
VIAL (ML) INJECTION AS NEEDED
Status: DISCONTINUED | OUTPATIENT
Start: 2020-09-24 | End: 2020-09-24 | Stop reason: SURG

## 2020-09-24 RX ORDER — SODIUM CHLORIDE, SODIUM LACTATE, POTASSIUM CHLORIDE, CALCIUM CHLORIDE 600; 310; 30; 20 MG/100ML; MG/100ML; MG/100ML; MG/100ML
INJECTION, SOLUTION INTRAVENOUS CONTINUOUS
Status: DISCONTINUED | OUTPATIENT
Start: 2020-09-24 | End: 2020-09-24 | Stop reason: HOSPADM

## 2020-09-24 RX ORDER — CEFAZOLIN SODIUM/WATER 2 G/20 ML
2 SYRINGE (ML) INTRAVENOUS ONCE
Status: DISCONTINUED | OUTPATIENT
Start: 2020-09-24 | End: 2020-09-24

## 2020-09-24 RX ORDER — KETOROLAC TROMETHAMINE 30 MG/ML
30 INJECTION, SOLUTION INTRAMUSCULAR; INTRAVENOUS EVERY 6 HOURS PRN
Status: DISPENSED | OUTPATIENT
Start: 2020-09-24 | End: 2020-09-25

## 2020-09-24 RX ORDER — METOCLOPRAMIDE HYDROCHLORIDE 5 MG/ML
INJECTION INTRAMUSCULAR; INTRAVENOUS AS NEEDED
Status: DISCONTINUED | OUTPATIENT
Start: 2020-09-24 | End: 2020-09-24 | Stop reason: SURG

## 2020-09-24 RX ORDER — DIPHENHYDRAMINE HCL 25 MG
25 CAPSULE ORAL EVERY 4 HOURS PRN
Status: DISCONTINUED | OUTPATIENT
Start: 2020-09-24 | End: 2020-09-26

## 2020-09-24 RX ORDER — HYDROMORPHONE HYDROCHLORIDE 1 MG/ML
0.4 INJECTION, SOLUTION INTRAMUSCULAR; INTRAVENOUS; SUBCUTANEOUS EVERY 2 HOUR PRN
Status: DISPENSED | OUTPATIENT
Start: 2020-09-24 | End: 2020-09-25

## 2020-09-24 RX ORDER — ONDANSETRON 2 MG/ML
4 INJECTION INTRAMUSCULAR; INTRAVENOUS ONCE AS NEEDED
Status: DISCONTINUED | OUTPATIENT
Start: 2020-09-24 | End: 2020-09-24 | Stop reason: HOSPADM

## 2020-09-24 RX ORDER — DIPHENHYDRAMINE HYDROCHLORIDE 50 MG/ML
25 INJECTION INTRAMUSCULAR; INTRAVENOUS ONCE AS NEEDED
Status: DISCONTINUED | OUTPATIENT
Start: 2020-09-24 | End: 2020-09-24 | Stop reason: HOSPADM

## 2020-09-24 RX ORDER — HYDROCODONE BITARTRATE AND ACETAMINOPHEN 5; 325 MG/1; MG/1
1 TABLET ORAL EVERY 4 HOURS PRN
Status: DISCONTINUED | OUTPATIENT
Start: 2020-09-24 | End: 2020-09-26

## 2020-09-24 RX ORDER — ONDANSETRON 2 MG/ML
4 INJECTION INTRAMUSCULAR; INTRAVENOUS EVERY 6 HOURS PRN
Status: DISCONTINUED | OUTPATIENT
Start: 2020-09-24 | End: 2020-09-26

## 2020-09-24 RX ORDER — SODIUM CHLORIDE, SODIUM LACTATE, POTASSIUM CHLORIDE, CALCIUM CHLORIDE 600; 310; 30; 20 MG/100ML; MG/100ML; MG/100ML; MG/100ML
INJECTION, SOLUTION INTRAVENOUS CONTINUOUS
Status: DISCONTINUED | OUTPATIENT
Start: 2020-09-24 | End: 2020-09-26

## 2020-09-24 RX ORDER — ONDANSETRON 2 MG/ML
INJECTION INTRAMUSCULAR; INTRAVENOUS AS NEEDED
Status: DISCONTINUED | OUTPATIENT
Start: 2020-09-24 | End: 2020-09-24 | Stop reason: SURG

## 2020-09-24 RX ORDER — HYDROMORPHONE HYDROCHLORIDE 1 MG/ML
0.4 INJECTION, SOLUTION INTRAMUSCULAR; INTRAVENOUS; SUBCUTANEOUS EVERY 5 MIN PRN
Status: DISCONTINUED | OUTPATIENT
Start: 2020-09-24 | End: 2020-09-24 | Stop reason: HOSPADM

## 2020-09-24 RX ORDER — DIPHENHYDRAMINE HYDROCHLORIDE 50 MG/ML
25 INJECTION INTRAMUSCULAR; INTRAVENOUS EVERY 4 HOURS PRN
Status: DISCONTINUED | OUTPATIENT
Start: 2020-09-24 | End: 2020-09-26

## 2020-09-24 RX ORDER — EPHEDRINE SULFATE 50 MG/ML
INJECTION, SOLUTION INTRAVENOUS AS NEEDED
Status: DISCONTINUED | OUTPATIENT
Start: 2020-09-24 | End: 2020-09-24 | Stop reason: SURG

## 2020-09-24 RX ORDER — MORPHINE SULFATE 2 MG/ML
INJECTION, SOLUTION INTRAMUSCULAR; INTRAVENOUS AS NEEDED
Status: DISCONTINUED | OUTPATIENT
Start: 2020-09-24 | End: 2020-09-24 | Stop reason: SURG

## 2020-09-24 RX ORDER — TRISODIUM CITRATE DIHYDRATE AND CITRIC ACID MONOHYDRATE 500; 334 MG/5ML; MG/5ML
30 SOLUTION ORAL ONCE
Status: DISCONTINUED | OUTPATIENT
Start: 2020-09-24 | End: 2020-09-24 | Stop reason: HOSPADM

## 2020-09-24 RX ORDER — DIPHENHYDRAMINE HYDROCHLORIDE 50 MG/ML
12.5 INJECTION INTRAMUSCULAR; INTRAVENOUS EVERY 4 HOURS PRN
Status: DISCONTINUED | OUTPATIENT
Start: 2020-09-24 | End: 2020-09-26

## 2020-09-24 RX ORDER — BISACODYL 10 MG
10 SUPPOSITORY, RECTAL RECTAL
Status: DISCONTINUED | OUTPATIENT
Start: 2020-09-24 | End: 2020-09-26

## 2020-09-24 RX ORDER — SIMETHICONE 80 MG
80 TABLET,CHEWABLE ORAL 3 TIMES DAILY PRN
Status: DISCONTINUED | OUTPATIENT
Start: 2020-09-24 | End: 2020-09-26

## 2020-09-24 RX ORDER — NALBUPHINE HCL 10 MG/ML
10 AMPUL (ML) INJECTION
Status: DISCONTINUED | OUTPATIENT
Start: 2020-09-24 | End: 2020-09-26

## 2020-09-24 RX ORDER — NALOXONE HYDROCHLORIDE 0.4 MG/ML
0.08 INJECTION, SOLUTION INTRAMUSCULAR; INTRAVENOUS; SUBCUTANEOUS
Status: ACTIVE | OUTPATIENT
Start: 2020-09-24 | End: 2020-09-25

## 2020-09-24 RX ORDER — HYDROCODONE BITARTRATE AND ACETAMINOPHEN 10; 325 MG/1; MG/1
1 TABLET ORAL EVERY 4 HOURS PRN
Status: DISCONTINUED | OUTPATIENT
Start: 2020-09-24 | End: 2020-09-26

## 2020-09-24 RX ORDER — KETOROLAC TROMETHAMINE 30 MG/ML
30 INJECTION, SOLUTION INTRAMUSCULAR; INTRAVENOUS ONCE AS NEEDED
Status: COMPLETED | OUTPATIENT
Start: 2020-09-24 | End: 2020-09-24

## 2020-09-24 RX ORDER — KETOROLAC TROMETHAMINE 30 MG/ML
INJECTION, SOLUTION INTRAMUSCULAR; INTRAVENOUS
Status: COMPLETED
Start: 2020-09-24 | End: 2020-09-24

## 2020-09-24 RX ORDER — ENOXAPARIN SODIUM 100 MG/ML
40 INJECTION SUBCUTANEOUS EVERY 24 HOURS
Status: DISCONTINUED | OUTPATIENT
Start: 2020-09-24 | End: 2020-09-26

## 2020-09-24 RX ORDER — IBUPROFEN 600 MG/1
600 TABLET ORAL EVERY 6 HOURS SCHEDULED
Status: DISCONTINUED | OUTPATIENT
Start: 2020-09-25 | End: 2020-09-26

## 2020-09-24 RX ADMIN — SODIUM CHLORIDE, SODIUM LACTATE, POTASSIUM CHLORIDE, CALCIUM CHLORIDE: 600; 310; 30; 20 INJECTION, SOLUTION INTRAVENOUS at 07:37:00

## 2020-09-24 RX ADMIN — EPHEDRINE SULFATE 10 MG: 50 INJECTION, SOLUTION INTRAVENOUS at 07:42:00

## 2020-09-24 RX ADMIN — PHENYLEPHRINE HCL 50 MCG: 10 MG/ML VIAL (ML) INJECTION at 07:44:00

## 2020-09-24 RX ADMIN — PHENYLEPHRINE HCL 50 MCG: 10 MG/ML VIAL (ML) INJECTION at 07:48:00

## 2020-09-24 RX ADMIN — DIPHENHYDRAMINE HYDROCHLORIDE 50 MG: 50 INJECTION INTRAMUSCULAR; INTRAVENOUS at 08:55:00

## 2020-09-24 RX ADMIN — SODIUM CHLORIDE, SODIUM LACTATE, POTASSIUM CHLORIDE, CALCIUM CHLORIDE: 600; 310; 30; 20 INJECTION, SOLUTION INTRAVENOUS at 08:37:00

## 2020-09-24 RX ADMIN — ONDANSETRON 4 MG: 2 INJECTION INTRAMUSCULAR; INTRAVENOUS at 07:20:00

## 2020-09-24 RX ADMIN — EPHEDRINE SULFATE 10 MG: 50 INJECTION, SOLUTION INTRAVENOUS at 07:46:00

## 2020-09-24 RX ADMIN — MORPHINE SULFATE 0.2 MG: 2 INJECTION, SOLUTION INTRAMUSCULAR; INTRAVENOUS at 07:40:00

## 2020-09-24 RX ADMIN — METOCLOPRAMIDE HYDROCHLORIDE 10 MG: 5 INJECTION INTRAMUSCULAR; INTRAVENOUS at 07:20:00

## 2020-09-24 NOTE — PLAN OF CARE
Problem: SAFETY ADULT - FALL  Goal: Free from fall injury  Description: INTERVENTIONS:  - Assess pt frequently for physical needs  - Identify cognitive and physical deficits and behaviors that affect risk of falls.   - [place] fall precautions as indica previous experience with breast feeding.  - Provide information as needed about early infant feeding cues (e.g., rooting, lip smacking, sucking fingers/hand) versus late cue of crying.  - Discuss/demonstrate breast feeding aids (e.g., infant sling, nursing services/case management support as needed.   Outcome: Progressing

## 2020-09-24 NOTE — H&P
[facility]  Obstetrics and Gynecology  History & Physical    Leslie Laasad Patient Status:  Inpatient    1990 MRN [id number]   Location [address] [facility] Attending Sabrina Cabezas 15 Day 0 PCP Adri SECTION N/A 4/11/2019    Performed by Catracho Donaldson MD at [facility] L+D OR   • COLONOSCOPY  10/03/2019    repeat when 46 y/o, Dr. Rani Sinha, [facility] GI   • EGD  10/03/2019    normal, Dr. Rani Sinha, [facility] GI   • [facility]  2013    normal per pt bilaterally, negative calf tenderness bilaterally   FHT: moderate variability/140 BPM / Positive accelerations/Negative decelerations   TOCO: quiet       Prenatal Labs Brief Review   Blood Type:   Lab Results   Component Value Date    ABO A 09/24/2020    R

## 2020-09-24 NOTE — PROGRESS NOTES
ADMISSION NOTE  Patient admitted to room in stable condition via:cart     Oriented to room, Safety precautions initiated. Bed in low position, call light in reach.  Report received and ID Bands checked & verified with:RN   Plan of care and safety instructio

## 2020-09-24 NOTE — ANESTHESIA PREPROCEDURE EVALUATION
PRE-OP EVALUATION    Patient Name: Genia Fothergill    Pre-op Diagnosis: * No pre-op diagnosis entered *    Procedure(s):      Surgeon(s) and Role:     * Kelly Troy MD - Primary     * Ingrid Herrera MD - Assisting Surgeon    Pre-op vitals reviewed.   Te • COLONOSCOPY  10/03/2019    repeat when 46 y/o, Dr. Karin Fletcher, [facility] GI   • EGD  10/03/2019    normal, Dr. Karin Fletcher, [facility] GI   • [facility]  2013    normal per pt     Social History    Tobacco Use      Smoking status: Former Smoker

## 2020-09-24 NOTE — ANESTHESIA POSTPROCEDURE EVALUATION
[address] Patient Status:  Inpatient   Age/Gender 31year old female MRN [id number]   Location [facility] Attending Kaylee Montero MD   Hosp Day # 0 PCP Shannan Stanton MD       Anesthesia Post-op Not

## 2020-09-24 NOTE — OPERATIVE REPORT
[facility]  Obstetrics and Gynecology   Section - Operative Note    Candis Bodily Patient Status:  Inpatient    1990 MRN [id number]   Location 18[address] [facility] Attending Shawnee Cheng MD   Hosp Day # 0 PCP [facility] assessed by neonatology who was present for delivery. The placenta was delivered intact with a 3 vessel cord. The pelvic organs were not exteriorized but palpated to be normal. The uterine cavity was swept clean using a wet lap.  The hysterotomy was closed

## 2020-09-24 NOTE — ANESTHESIA PROCEDURE NOTES
Spinal Block  Performed by: Mary Bedoya MD  Authorized by: Mary Bedoya MD       General Information and Staff    Start Time:   Anesthesiologist: Mary Bedoya MD  Performed by:   Anesthesiologist  Site identification: surface landmarks    Pr

## 2020-09-25 LAB
BASOPHILS # BLD AUTO: 0.04 X10(3) UL (ref 0–0.2)
BASOPHILS NFR BLD AUTO: 0.2 %
DEPRECATED RDW RBC AUTO: 54.1 FL (ref 35.1–46.3)
EOSINOPHIL # BLD AUTO: 0.1 X10(3) UL (ref 0–0.7)
EOSINOPHIL NFR BLD AUTO: 0.6 %
ERYTHROCYTE [DISTWIDTH] IN BLOOD BY AUTOMATED COUNT: 16.2 % (ref 11–15)
HCT VFR BLD AUTO: 30.2 %
HGB BLD-MCNC: 9.8 G/DL
IMM GRANULOCYTES # BLD AUTO: 0.11 X10(3) UL (ref 0–1)
IMM GRANULOCYTES NFR BLD: 0.6 %
LYMPHOCYTES # BLD AUTO: 3.24 X10(3) UL (ref 1–4)
LYMPHOCYTES NFR BLD AUTO: 18.5 %
MCH RBC QN AUTO: 29.7 PG (ref 26–34)
MCHC RBC AUTO-ENTMCNC: 32.5 G/DL (ref 31–37)
MCV RBC AUTO: 91.5 FL
MONOCYTES # BLD AUTO: 1.48 X10(3) UL (ref 0.1–1)
MONOCYTES NFR BLD AUTO: 8.4 %
NEUTROPHILS # BLD AUTO: 12.59 X10 (3) UL (ref 1.5–7.7)
NEUTROPHILS # BLD AUTO: 12.59 X10(3) UL (ref 1.5–7.7)
NEUTROPHILS NFR BLD AUTO: 71.7 %
PLATELET # BLD AUTO: 250 10(3)UL (ref 150–450)
RBC # BLD AUTO: 3.3 X10(6)UL
WBC # BLD AUTO: 17.6 X10(3) UL (ref 4–11)

## 2020-09-25 RX ORDER — MELATONIN
325
Status: DISCONTINUED | OUTPATIENT
Start: 2020-09-25 | End: 2020-09-26

## 2020-09-25 NOTE — PLAN OF CARE
Problem: SAFETY ADULT - FALL  Goal: Free from fall injury  Description: INTERVENTIONS:  - Assess pt frequently for physical needs  - Identify cognitive and physical deficits and behaviors that affect risk of falls.   - [place] fall precautions as indica previous experience with breast feeding.  - Provide information as needed about early infant feeding cues (e.g., rooting, lip smacking, sucking fingers/hand) versus late cue of crying.  - Discuss/demonstrate breast feeding aids (e.g., infant sling, nursing services/case management support as needed.   Outcome: Progressing     Problem: GASTROINTESTINAL - ADULT  Goal: Minimal or absence of nausea and vomiting  Description: INTERVENTIONS:  - Maintain adequate hydration with IV or PO as ordered and tolerated  - N

## 2020-09-25 NOTE — PROGRESS NOTES
[address] [facility]  Obstetrics and Gynecology    OB/GYN: Postpartum Progress Note     SUBJECTIVE:  Patient is a 31year old  female who is s/p RCS. She is POD# 1. Doing well. Denies fever, chills, N, V, chest pain and SOB.  Bleeding has been sta IgG/IgM results with patient. Patient informed that no evidence for acute infection at this time. However, patient is non-immune. Therefore, recommend continue distanced from parvovirus exposure and precautions provided.    Plan for discharge to home POD#3

## 2020-09-25 NOTE — PROGRESS NOTES
[facility] 2SW-J davy    [facility] Patient Status:  Inpatient   Age/Gender 31year old female MRN [id number]   [facility] 2SW-J Attending Rk Hernadez MD   [address] Day # 1 PCP Vishnu Huntley MD      Anesthesia Pain P

## 2020-09-25 NOTE — PLAN OF CARE
Problem: SAFETY ADULT - FALL  Goal: Free from fall injury  Description: INTERVENTIONS:  - Assess pt frequently for physical needs  - Identify cognitive and physical deficits and behaviors that affect risk of falls.   - [place] fall precautions as indica previous experience with breast feeding.  - Provide information as needed about early infant feeding cues (e.g., rooting, lip smacking, sucking fingers/hand) versus late cue of crying.  - Discuss/demonstrate breast feeding aids (e.g., infant sling, nursing services/case management support as needed.   Outcome: Progressing

## 2020-09-26 VITALS
HEART RATE: 75 BPM | DIASTOLIC BLOOD PRESSURE: 88 MMHG | OXYGEN SATURATION: 97 % | RESPIRATION RATE: 20 BRPM | HEIGHT: 65.51 IN | TEMPERATURE: 99 F | WEIGHT: 293 LBS | SYSTOLIC BLOOD PRESSURE: 144 MMHG | BODY MASS INDEX: 48.23 KG/M2

## 2020-09-26 RX ORDER — IBUPROFEN 600 MG/1
600 TABLET ORAL EVERY 8 HOURS PRN
Qty: 30 TABLET | Refills: 0 | Status: SHIPPED | OUTPATIENT
Start: 2020-09-26 | End: 2020-10-28

## 2020-09-26 RX ORDER — HYDROCODONE BITARTRATE AND ACETAMINOPHEN 5; 325 MG/1; MG/1
1 TABLET ORAL EVERY 6 HOURS PRN
Qty: 20 TABLET | Refills: 0 | Status: SHIPPED | OUTPATIENT
Start: 2020-09-26 | End: 2020-10-28

## 2020-09-26 NOTE — PROGRESS NOTES
Ambulating to NICU during day and evening, got some sleep overnight but states she does not sleep well in hospital.  Had some pain in evening, Norco q4 and simethicon given with some relief. Still c/o itching, benadryl and nubain given.

## 2020-09-26 NOTE — PROGRESS NOTES
SECTION POSTOPERATIVE DAY 2    Subjective:   Patient without complaints. Ambulating without difficulty. Pain well controlled. Tolerating general diet.     Objective:     Patient Vitals for the past 24 hrs:   BP Temp Temp src Pulse Resp

## 2020-09-26 NOTE — DISCHARGE SUMMARY
SECTION DISCHARGE SUMMARY     Admit date: 2020    Discharge date: 2020     Attending Physician: Joni Dobbins MD     Discharge Diagnoses: Term pregnancy, previous  section    Procedures: Repeat low transverse  section

## 2020-09-26 NOTE — PLAN OF CARE
Problem: SAFETY ADULT - FALL  Goal: Free from fall injury  Description: INTERVENTIONS:  - Assess pt frequently for physical needs  - Identify cognitive and physical deficits and behaviors that affect risk of falls.   - [place] fall precautions as indica previous experience with breast feeding.  - Provide information as needed about early infant feeding cues (e.g., rooting, lip smacking, sucking fingers/hand) versus late cue of crying.  - Discuss/demonstrate breast feeding aids (e.g., infant sling, nursing services/case management support as needed.   Outcome: Completed     Problem: GASTROINTESTINAL - ADULT  Goal: Minimal or absence of nausea and vomiting  Description: INTERVENTIONS:  - Maintain adequate hydration with IV or PO as ordered and tolerated  - [place]

## 2020-09-29 ENCOUNTER — TELEPHONE (OUTPATIENT)
Dept: OBGYN UNIT | Facility: HOSPITAL | Age: 30
End: 2020-09-29

## 2020-09-29 ENCOUNTER — TELEPHONE (OUTPATIENT)
Dept: OBGYN CLINIC | Facility: CLINIC | Age: 30
End: 2020-09-29

## 2020-09-29 NOTE — TELEPHONE ENCOUNTER
Pt called stating she's had a headache for a full day now. She had baby on 9/24/20. Was told to come in to be seen for BP check in 3 days. Pt would like to speak to a nurse.  BP ran 148/84 at 1:30 pm    She has appt scheduled    Future Appointments   Da

## 2020-09-29 NOTE — TELEPHONE ENCOUNTER
S/p C section on 9/24/20. Discharged home on 9/26/20. Gestational HTN this pregnancy; advised to RTO in 3 days for BP check. Pt states she has a headache since last night. Pt alternating Ibuprofen and Norco.  Denies any concerns with incision.   Pt den

## 2020-09-29 NOTE — PROGRESS NOTES
Lonny Valladares Call completed: Mom reports that she isnt feeling too well. C/O headache that pain meds help a little, falls asleep, headache goes away, but slowly starts again. Asked she has taken BP, answered NO.  Encouraged to check BP and notify MD

## 2020-09-30 ENCOUNTER — POSTPARTUM (OUTPATIENT)
Dept: OBGYN CLINIC | Facility: CLINIC | Age: 30
End: 2020-09-30
Payer: COMMERCIAL

## 2020-09-30 ENCOUNTER — APPOINTMENT (OUTPATIENT)
Dept: LACTATION | Facility: HOSPITAL | Age: 30
End: 2020-09-30
Attending: OBSTETRICS & GYNECOLOGY
Payer: COMMERCIAL

## 2020-09-30 VITALS — DIASTOLIC BLOOD PRESSURE: 68 MMHG | SYSTOLIC BLOOD PRESSURE: 128 MMHG | BODY MASS INDEX: 48 KG/M2 | WEIGHT: 291 LBS

## 2020-09-30 DIAGNOSIS — O13.9 GESTATIONAL HYPERTENSION, ANTEPARTUM: Primary | ICD-10-CM

## 2020-09-30 PROCEDURE — 3074F SYST BP LT 130 MM HG: CPT | Performed by: OBSTETRICS & GYNECOLOGY

## 2020-09-30 PROCEDURE — 3078F DIAST BP <80 MM HG: CPT | Performed by: OBSTETRICS & GYNECOLOGY

## 2020-09-30 PROCEDURE — 1111F DSCHRG MED/CURRENT MED MERGE: CPT | Performed by: OBSTETRICS & GYNECOLOGY

## 2020-09-30 RX ORDER — OMEPRAZOLE 40 MG/1
40 CAPSULE, DELAYED RELEASE ORAL DAILY
COMMUNITY
End: 2021-07-20

## 2020-10-01 ENCOUNTER — LAB ENCOUNTER (OUTPATIENT)
Dept: LAB | Age: 30
End: 2020-10-01
Attending: OBSTETRICS & GYNECOLOGY
Payer: COMMERCIAL

## 2020-10-01 DIAGNOSIS — O13.9 GESTATIONAL HYPERTENSION, ANTEPARTUM: ICD-10-CM

## 2020-10-01 PROCEDURE — 85025 COMPLETE CBC W/AUTO DIFF WBC: CPT

## 2020-10-01 PROCEDURE — 36415 COLL VENOUS BLD VENIPUNCTURE: CPT

## 2020-10-01 PROCEDURE — 80053 COMPREHEN METABOLIC PANEL: CPT

## 2020-10-01 NOTE — PROGRESS NOTES
Patient presents with:  Postpartum Care: pt here for BP check and incision check     Doing well. Feels a bit tired. Waking up every 2 hrs to pump  Baby in NICU, feeding issues.    Persistent headache but has not tried anything to alleviate it. contributing

## 2020-10-15 ENCOUNTER — TELEPHONE (OUTPATIENT)
Dept: OBGYN CLINIC | Facility: CLINIC | Age: 30
End: 2020-10-15

## 2020-10-28 ENCOUNTER — POSTPARTUM (OUTPATIENT)
Dept: OBGYN CLINIC | Facility: CLINIC | Age: 30
End: 2020-10-28
Payer: COMMERCIAL

## 2020-10-28 VITALS
SYSTOLIC BLOOD PRESSURE: 110 MMHG | DIASTOLIC BLOOD PRESSURE: 70 MMHG | BODY MASS INDEX: 46 KG/M2 | TEMPERATURE: 98 F | WEIGHT: 277.81 LBS

## 2020-10-28 PROBLEM — Z34.90 PREGNANCY: Status: RESOLVED | Noted: 2020-09-24 | Resolved: 2020-10-28

## 2020-10-28 PROBLEM — Z34.90 PREGNANCY (HCC): Status: RESOLVED | Noted: 2020-09-24 | Resolved: 2020-10-28

## 2020-10-28 PROBLEM — O13.9 GESTATIONAL HYPERTENSION, ANTEPARTUM (HCC): Status: RESOLVED | Noted: 2020-09-10 | Resolved: 2020-10-28

## 2020-10-28 PROBLEM — O99.210 OBESITY AFFECTING PREGNANCY, ANTEPARTUM: Status: RESOLVED | Noted: 2018-10-31 | Resolved: 2020-10-28

## 2020-10-28 PROBLEM — O13.9 GESTATIONAL HYPERTENSION, ANTEPARTUM: Status: RESOLVED | Noted: 2020-09-10 | Resolved: 2020-10-28

## 2020-10-28 PROBLEM — O09.293 HX OF PREECLAMPSIA, PRIOR PREGNANCY, CURRENTLY PREGNANT, THIRD TRIMESTER: Status: RESOLVED | Noted: 2020-05-28 | Resolved: 2020-10-28

## 2020-10-28 PROBLEM — O99.210 OBESITY AFFECTING PREGNANCY, ANTEPARTUM (HCC): Status: RESOLVED | Noted: 2018-10-31 | Resolved: 2020-10-28

## 2020-10-28 PROBLEM — O09.293 HX OF PREECLAMPSIA, PRIOR PREGNANCY, CURRENTLY PREGNANT, THIRD TRIMESTER (HCC): Status: RESOLVED | Noted: 2020-05-28 | Resolved: 2020-10-28

## 2020-10-28 PROCEDURE — 3078F DIAST BP <80 MM HG: CPT | Performed by: OBSTETRICS & GYNECOLOGY

## 2020-10-28 PROCEDURE — 3074F SYST BP LT 130 MM HG: CPT | Performed by: OBSTETRICS & GYNECOLOGY

## 2020-10-28 NOTE — PROGRESS NOTES
[facility]  Obstetrics and Gynecology   Postpartum Progress Note    Subjective:     Sravani Nava is a 31year old  female who is s/p CS. Antepartum issues:   1. Obesity   2. prior to CS  3. Gestational hypertension.      She reports reports would like to continue oral contraceptives     All of the findings and plan were discussed with the patient. She notes understanding and agrees with the plan of care. All questions were answered to the best of my ability at this time.     RTC in 6

## 2020-10-31 ENCOUNTER — APPOINTMENT (OUTPATIENT)
Dept: LACTATION | Facility: HOSPITAL | Age: 30
End: 2020-10-31
Attending: OBSTETRICS & GYNECOLOGY
Payer: COMMERCIAL

## 2020-11-02 ENCOUNTER — TELEPHONE (OUTPATIENT)
Dept: FAMILY MEDICINE CLINIC | Facility: CLINIC | Age: 30
End: 2020-11-02

## 2020-11-02 DIAGNOSIS — R73.09 ELEVATED GLUCOSE: ICD-10-CM

## 2020-11-02 DIAGNOSIS — E55.9 VITAMIN D DEFICIENCY: ICD-10-CM

## 2020-11-02 DIAGNOSIS — Z00.00 LABORATORY EXAMINATION ORDERED AS PART OF A ROUTINE GENERAL MEDICAL EXAMINATION: Primary | ICD-10-CM

## 2020-11-02 NOTE — TELEPHONE ENCOUNTER
Pt would like an order for labs prior to her visit. Please make sure to include CBC. Please call patient once orders are placed.   Future Appointments   Date Time Provider Ayala Adams   11/23/2020 11:00 AM Lory Soriano, DO [facility] [address]

## 2020-11-23 ENCOUNTER — OFFICE VISIT (OUTPATIENT)
Dept: RHEUMATOLOGY | Facility: CLINIC | Age: 30
End: 2020-11-23
Payer: COMMERCIAL

## 2020-11-23 VITALS
DIASTOLIC BLOOD PRESSURE: 74 MMHG | TEMPERATURE: 98 F | BODY MASS INDEX: 45.93 KG/M2 | WEIGHT: 275.69 LBS | HEART RATE: 70 BPM | HEIGHT: 65 IN | SYSTOLIC BLOOD PRESSURE: 138 MMHG | RESPIRATION RATE: 18 BRPM

## 2020-11-23 DIAGNOSIS — M25.50 POLYARTHRALGIA: ICD-10-CM

## 2020-11-23 DIAGNOSIS — Z82.0 FAMILY HISTORY OF MULTIPLE SCLEROSIS: ICD-10-CM

## 2020-11-23 DIAGNOSIS — R76.8 JO-1 ANTIBODY POSITIVE: ICD-10-CM

## 2020-11-23 DIAGNOSIS — M79.10 MYALGIA: ICD-10-CM

## 2020-11-23 DIAGNOSIS — Z82.61 FAMILY HISTORY OF RHEUMATOID ARTHRITIS: ICD-10-CM

## 2020-11-23 DIAGNOSIS — M79.7 FIBROMYALGIA: ICD-10-CM

## 2020-11-23 DIAGNOSIS — Z86.59 HISTORY OF DEPRESSION: ICD-10-CM

## 2020-11-23 DIAGNOSIS — R53.82 CHRONIC FATIGUE: ICD-10-CM

## 2020-11-23 DIAGNOSIS — R76.8 POSITIVE ANA (ANTINUCLEAR ANTIBODY): Primary | ICD-10-CM

## 2020-11-23 PROCEDURE — 3078F DIAST BP <80 MM HG: CPT | Performed by: INTERNAL MEDICINE

## 2020-11-23 PROCEDURE — 3075F SYST BP GE 130 - 139MM HG: CPT | Performed by: INTERNAL MEDICINE

## 2020-11-23 PROCEDURE — 99244 OFF/OP CNSLTJ NEW/EST MOD 40: CPT | Performed by: INTERNAL MEDICINE

## 2020-11-23 PROCEDURE — 99072 ADDL SUPL MATRL&STAF TM PHE: CPT | Performed by: INTERNAL MEDICINE

## 2020-11-23 PROCEDURE — 3008F BODY MASS INDEX DOCD: CPT | Performed by: INTERNAL MEDICINE

## 2020-11-23 RX ORDER — IBUPROFEN 800 MG/1
TABLET ORAL
COMMUNITY
Start: 2020-11-20 | End: 2021-05-20

## 2020-11-23 NOTE — PROGRESS NOTES
?  RHEUMATOLOGY NEW PATIENT   Date of visit: 11/23/2020  ? Patient presents with:  Establish Care: new pt. Dr. Kristine Tinoco (neurologist) referrral. Joint and muscle pain that isnt going away. Elevated ROSAS that neuro wanted to look into further.   Neck pain discuss further with neurology. Patient verbalized understanding of above instructions. No further questions at this time.   Spent 45 min face to face with patient, >50% of that time spent discussing potential etiology to symptoms, treatment options and c shoulders- pain over ankles and inner thighs. Feels pain radiating down the arms. Numbness/tingling in her hands b/l.   States some of the neck pain has become more intermittent since delivering her child  + shaking when symptoms severe  + depression/anxi dry mouth, recurrent cavities, or swelling of the cheeks or under the jawbone. No fevers, chills, lymphadenopathy, night sweats,  easy bruising or bleeding. Denies chronic sinus infections/disease or epistaxis. Denies chronic cough or hemoptysis.    Nori Amanda Grandmother    • Other (Multiple Sclerosis) Mother    • Other (rheumatoid arthritis) Mother    • No Known Problems Brother    • Diabetes Maternal Grandfather    • No Known Problems Paternal Grandmother    • No Known Problems Paternal Grandfather    • No Kn Psychiatric/Behavioral: Negative for depression. The patient is not nervous/anxious and does not have insomnia.       PHYSICAL EXAM   Today's Vitals:  Temperature Blood Pressure Heart Rate Resp Rate SpO2   Temp: 97.7 °F (36.5 °C) BP: 138/74 Pulse: 70 Resp Neurological: She is alert and oriented to person, place, and time. No cranial nerve deficit.    Strength testing:  Proximal upper extremity 5/5, equal bilaterally  Distal upper extremity 5/5, equal bilaterally  Proximal lower extremity 5/5, equal bilater (CPT=72141), 7/07/2017, 16:11. [place], XR CERVICAL SPINE (4VIEWS) (CPT=72050), 1/02/2019, 10:19.      INDICATIONS:  S13.100D Subluxation of unspecified cervical vertebrae, subsequent encounter M54.2 Cervicalgia     TECHNIQUE:  Multiplanar T1 and T2 essie 29.5 10/01/2020    MCHC 31.6 10/01/2020    RDW 15.5 (H) 10/01/2020    NEPRELIM 6.32 10/01/2020    NEPERCENT 66.5 10/01/2020    LYPERCENT 22.8 10/01/2020    MOPERCENT 7.5 10/01/2020    EOPERCENT 2.4 10/01/2020    BAPERCENT 0.3 10/01/2020    NE 6.32 10/01/20

## 2020-11-30 ENCOUNTER — APPOINTMENT (OUTPATIENT)
Dept: LACTATION | Facility: HOSPITAL | Age: 30
End: 2020-11-30
Attending: OBSTETRICS & GYNECOLOGY
Payer: COMMERCIAL

## 2020-12-04 ENCOUNTER — TELEPHONE (OUTPATIENT)
Dept: OBGYN CLINIC | Facility: CLINIC | Age: 30
End: 2020-12-04

## 2020-12-04 NOTE — TELEPHONE ENCOUNTER
Returned patient's call. She was desiring to change OCP back to [place]. She was taking this before pregnancy and desires to go back on it now that she has finished breast feeding.  States that she contacted office about this 11/12/20 but did not get a res

## 2020-12-04 NOTE — TELEPHONE ENCOUNTER
Patient called and she has been waiting for response per her [employer] [place] message we were going to get back to her this week she stated    Thank you

## 2020-12-05 PROBLEM — Z87.59 HISTORY OF GESTATIONAL HYPERTENSION: Status: ACTIVE | Noted: 2020-05-28

## 2020-12-05 RX ORDER — NORGESTIMATE AND ETHINYL ESTRADIOL 7DAYSX3 28
1 KIT ORAL DAILY
Qty: 4 PACKAGE | Refills: 0 | Status: SHIPPED | OUTPATIENT
Start: 2020-12-05 | End: 2020-12-17

## 2020-12-08 ENCOUNTER — LAB ENCOUNTER (OUTPATIENT)
Dept: LAB | Facility: HOSPITAL | Age: 30
End: 2020-12-08
Attending: FAMILY MEDICINE
Payer: COMMERCIAL

## 2020-12-08 DIAGNOSIS — R76.8 POSITIVE ANA (ANTINUCLEAR ANTIBODY): ICD-10-CM

## 2020-12-08 DIAGNOSIS — E55.9 VITAMIN D DEFICIENCY: ICD-10-CM

## 2020-12-08 DIAGNOSIS — R76.8 JO-1 ANTIBODY POSITIVE: ICD-10-CM

## 2020-12-08 DIAGNOSIS — Z00.00 LABORATORY EXAMINATION ORDERED AS PART OF A ROUTINE GENERAL MEDICAL EXAMINATION: ICD-10-CM

## 2020-12-08 DIAGNOSIS — M79.10 MYALGIA: ICD-10-CM

## 2020-12-08 PROCEDURE — 86140 C-REACTIVE PROTEIN: CPT

## 2020-12-08 PROCEDURE — 82306 VITAMIN D 25 HYDROXY: CPT

## 2020-12-08 PROCEDURE — 36415 COLL VENOUS BLD VENIPUNCTURE: CPT

## 2020-12-08 PROCEDURE — 80053 COMPREHEN METABOLIC PANEL: CPT

## 2020-12-08 PROCEDURE — 82550 ASSAY OF CK (CPK): CPT

## 2020-12-08 PROCEDURE — 83516 IMMUNOASSAY NONANTIBODY: CPT

## 2020-12-08 PROCEDURE — 80061 LIPID PANEL: CPT

## 2020-12-08 PROCEDURE — 86038 ANTINUCLEAR ANTIBODIES: CPT

## 2020-12-08 PROCEDURE — 85652 RBC SED RATE AUTOMATED: CPT

## 2020-12-08 PROCEDURE — 86235 NUCLEAR ANTIGEN ANTIBODY: CPT

## 2020-12-08 PROCEDURE — 82085 ASSAY OF ALDOLASE: CPT

## 2020-12-08 PROCEDURE — 85025 COMPLETE CBC W/AUTO DIFF WBC: CPT

## 2020-12-10 ENCOUNTER — TELEPHONE (OUTPATIENT)
Dept: RHEUMATOLOGY | Facility: CLINIC | Age: 30
End: 2020-12-10

## 2020-12-10 ENCOUNTER — APPOINTMENT (OUTPATIENT)
Dept: LACTATION | Facility: HOSPITAL | Age: 30
End: 2020-12-10
Attending: OBSTETRICS & GYNECOLOGY
Payer: COMMERCIAL

## 2020-12-10 NOTE — TELEPHONE ENCOUNTER
Called pt and left a voicemail that someone would call her when all her lab results are in just so she isnt getting multiple calls and  Has the full picture. Left the office number in case she had any questions.

## 2020-12-15 ENCOUNTER — TELEPHONE (OUTPATIENT)
Dept: OBGYN CLINIC | Facility: CLINIC | Age: 30
End: 2020-12-15

## 2020-12-15 ENCOUNTER — TELEPHONE (OUTPATIENT)
Dept: RHEUMATOLOGY | Facility: CLINIC | Age: 30
End: 2020-12-15

## 2020-12-15 NOTE — TELEPHONE ENCOUNTER
Pt calling because she has been continuously bleeding since 11/22/20. She had a baby in Sept and this was her 1st period after delivery. She didn't know if she could be possibly miscarrying since she stopped take birth control.

## 2020-12-15 NOTE — TELEPHONE ENCOUNTER
LVM for pt to call back to discuss results. Chuck Castro DO  [facility]  12/15/2020    Called  patient. Discussed test results in detail.   Locally [place] 1 antibody is still positive, however remainder of labs including ESR aldolase were normal.  C

## 2020-12-15 NOTE — TELEPHONE ENCOUNTER
32 y/o called c/o irregular and excessive bleeding. She delivered in September and had her first period starting on 11/22/2020. She was bleeding for 10 days. She states she has been spotting since.  She started OCP on 12/07 and then discontinued 3 days late

## 2020-12-16 NOTE — TELEPHONE ENCOUNTER
Nothing further at this point. Have her monitor and have her keep a menstrual calendar. If the pattern remains to be heavy in the next 1 to 2 months, then she needs to come see me in the office. Give her ER precautions.

## 2020-12-17 ENCOUNTER — APPOINTMENT (OUTPATIENT)
Dept: ULTRASOUND IMAGING | Age: 30
End: 2020-12-17
Attending: NURSE PRACTITIONER
Payer: COMMERCIAL

## 2020-12-17 ENCOUNTER — HOSPITAL ENCOUNTER (EMERGENCY)
Age: 30
Discharge: HOME | End: 2020-12-17
Attending: EMERGENCY MEDICINE
Payer: COMMERCIAL

## 2020-12-17 VITALS
OXYGEN SATURATION: 99 % | BODY MASS INDEX: 43.32 KG/M2 | TEMPERATURE: 98 F | DIASTOLIC BLOOD PRESSURE: 69 MMHG | HEIGHT: 65 IN | WEIGHT: 260 LBS | HEART RATE: 74 BPM | RESPIRATION RATE: 18 BRPM | SYSTOLIC BLOOD PRESSURE: 115 MMHG

## 2020-12-17 DIAGNOSIS — N93.8 DYSFUNCTIONAL UTERINE BLEEDING: Primary | ICD-10-CM

## 2020-12-17 PROCEDURE — 99285 EMERGENCY DEPT VISIT HI MDM: CPT

## 2020-12-17 PROCEDURE — 87086 URINE CULTURE/COLONY COUNT: CPT | Performed by: NURSE PRACTITIONER

## 2020-12-17 PROCEDURE — 81001 URINALYSIS AUTO W/SCOPE: CPT | Performed by: NURSE PRACTITIONER

## 2020-12-17 PROCEDURE — 80048 BASIC METABOLIC PNL TOTAL CA: CPT | Performed by: EMERGENCY MEDICINE

## 2020-12-17 PROCEDURE — 85025 COMPLETE CBC W/AUTO DIFF WBC: CPT | Performed by: NURSE PRACTITIONER

## 2020-12-17 PROCEDURE — 96360 HYDRATION IV INFUSION INIT: CPT

## 2020-12-17 PROCEDURE — 76830 TRANSVAGINAL US NON-OB: CPT | Performed by: NURSE PRACTITIONER

## 2020-12-17 PROCEDURE — 81015 MICROSCOPIC EXAM OF URINE: CPT | Performed by: NURSE PRACTITIONER

## 2020-12-17 PROCEDURE — 96361 HYDRATE IV INFUSION ADD-ON: CPT

## 2020-12-17 PROCEDURE — 81025 URINE PREGNANCY TEST: CPT

## 2020-12-17 PROCEDURE — 76856 US EXAM PELVIC COMPLETE: CPT | Performed by: NURSE PRACTITIONER

## 2020-12-17 PROCEDURE — 93975 VASCULAR STUDY: CPT | Performed by: NURSE PRACTITIONER

## 2020-12-17 RX ORDER — NORGESTIMATE AND ETHINYL ESTRADIOL 0.25-0.035
1 KIT ORAL DAILY
Qty: 28 TABLET | Refills: 0 | Status: SHIPPED | OUTPATIENT
Start: 2020-12-17 | End: 2020-12-28

## 2020-12-17 NOTE — TELEPHONE ENCOUNTER
Patient informed of recommendations below. Patient states she continues to saturate a pad an hour, has some cramping. Advised patient that we do not have the means to control bleeding and/or run appropriate tests ASAP as she could in the ER.  If she is co

## 2020-12-17 NOTE — ED INITIAL ASSESSMENT (HPI)
Had  on -- began bleeding about 1 month ago- heavy bleeding over the last 5-7 days- changing pad about every hour with abd cramping

## 2020-12-21 ENCOUNTER — TELEPHONE (OUTPATIENT)
Dept: OBGYN CLINIC | Facility: CLINIC | Age: 30
End: 2020-12-21

## 2020-12-21 NOTE — TELEPHONE ENCOUNTER
Spoke to patient. She stated her baby has an anal fistula and she has a lot going on right now. She is trying to set up surgery for him. She was started on OCP after ER visit and states the bleeding has resolved completely.    She does not have time to co

## 2020-12-21 NOTE — TELEPHONE ENCOUNTER
[address] EMANUEL [facility] [place] Nurse             Please check on patient and schedule her for a follow up appt   She is in ER, stable. They should start her on OCP taper. Left message for patient to call back.

## 2020-12-22 ENCOUNTER — TELEPHONE (OUTPATIENT)
Dept: RHEUMATOLOGY | Facility: CLINIC | Age: 30
End: 2020-12-22

## 2020-12-28 ENCOUNTER — TELEMEDICINE (OUTPATIENT)
Dept: OBGYN CLINIC | Facility: CLINIC | Age: 30
End: 2020-12-28
Payer: COMMERCIAL

## 2020-12-28 DIAGNOSIS — N93.9 ABNORMAL UTERINE BLEEDING (AUB): Primary | ICD-10-CM

## 2020-12-28 DIAGNOSIS — N92.1 BREAKTHROUGH BLEEDING ON OCPS: ICD-10-CM

## 2020-12-28 PROCEDURE — 99213 OFFICE O/P EST LOW 20 MIN: CPT | Performed by: OBSTETRICS & GYNECOLOGY

## 2020-12-28 RX ORDER — NORGESTIMATE AND ETHINYL ESTRADIOL 0.25-0.035
1 KIT ORAL DAILY
Qty: 84 TABLET | Refills: 0 | Status: SHIPPED | OUTPATIENT
Start: 2020-12-28 | End: 2021-03-22 | Stop reason: WASHOUT

## 2020-12-28 NOTE — PROGRESS NOTES
CC: Patient presents with: Follow - Up: On OCP , Pt doing okay , no bleeding       This visit is conducted using Telemedicine with live, interactive video and audio.     Patient has been referred to the [facility] website at [email or web address] to review (obsessive compulsive disorder)    • Post partum depression    • Pregnancy-induced hypertension     ghtn   • Vitamin D deficiency 04/10/2020     Past Surgical History:   Procedure Laterality Date   •      •  SECTION N/A 2020    Pe Not Currently      Alcohol/week: 0.0 standard drinks      Comment: never a problem    Drug use: Yes      Types: Cannabis      ROS:   A comprehensive 10 point review of systems was completed. Pertinent positives and negatives noted in the the HPI.     Yogi Epstein

## 2020-12-31 ENCOUNTER — TELEPHONE (OUTPATIENT)
Dept: OBGYN CLINIC | Facility: CLINIC | Age: 30
End: 2020-12-31

## 2020-12-31 RX ORDER — NORGESTIMATE AND ETHINYL ESTRADIOL 0.25-0.035
1 KIT ORAL DAILY
Qty: 3 PACKAGE | Refills: 0 | Status: SHIPPED | OUTPATIENT
Start: 2020-12-31 | End: 2021-03-25 | Stop reason: WASHOUT

## 2021-03-29 ENCOUNTER — OFFICE VISIT (OUTPATIENT)
Dept: RHEUMATOLOGY | Facility: CLINIC | Age: 31
End: 2021-03-29
Payer: COMMERCIAL

## 2021-03-29 VITALS
BODY MASS INDEX: 44.32 KG/M2 | DIASTOLIC BLOOD PRESSURE: 70 MMHG | RESPIRATION RATE: 16 BRPM | SYSTOLIC BLOOD PRESSURE: 132 MMHG | TEMPERATURE: 98 F | HEART RATE: 78 BPM | HEIGHT: 65 IN | WEIGHT: 266 LBS

## 2021-03-29 DIAGNOSIS — E66.01 CLASS 3 SEVERE OBESITY DUE TO EXCESS CALORIES WITHOUT SERIOUS COMORBIDITY WITH BODY MASS INDEX (BMI) OF 40.0 TO 44.9 IN ADULT (HCC): ICD-10-CM

## 2021-03-29 DIAGNOSIS — E55.9 VITAMIN D DEFICIENCY: ICD-10-CM

## 2021-03-29 DIAGNOSIS — M79.7 FIBROMYALGIA: Primary | ICD-10-CM

## 2021-03-29 DIAGNOSIS — R53.82 CHRONIC FATIGUE: ICD-10-CM

## 2021-03-29 DIAGNOSIS — M25.50 POLYARTHRALGIA: ICD-10-CM

## 2021-03-29 DIAGNOSIS — G62.9 NEUROPATHY: ICD-10-CM

## 2021-03-29 DIAGNOSIS — R76.8 POSITIVE ANA (ANTINUCLEAR ANTIBODY): ICD-10-CM

## 2021-03-29 DIAGNOSIS — R76.8 JO-1 ANTIBODY POSITIVE: ICD-10-CM

## 2021-03-29 PROCEDURE — 3075F SYST BP GE 130 - 139MM HG: CPT | Performed by: INTERNAL MEDICINE

## 2021-03-29 PROCEDURE — 3008F BODY MASS INDEX DOCD: CPT | Performed by: INTERNAL MEDICINE

## 2021-03-29 PROCEDURE — 3078F DIAST BP <80 MM HG: CPT | Performed by: INTERNAL MEDICINE

## 2021-03-29 PROCEDURE — 99215 OFFICE O/P EST HI 40 MIN: CPT | Performed by: INTERNAL MEDICINE
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
## 2021-04-06 ENCOUNTER — TELEPHONE (OUTPATIENT)
Dept: INTERNAL MEDICINE CLINIC | Facility: CLINIC | Age: 31
End: 2021-04-06

## 2021-04-06 NOTE — TELEPHONE ENCOUNTER
Pt called left vmail for appt 4/6 at 135pm       Returned pts call- offered appts with soonest available provider , pt declined , would like to see Dr Vanna Bernal wait list for Dr Blas Caceres   Pt stated she will call back

## 2021-05-20 ENCOUNTER — OFFICE VISIT (OUTPATIENT)
Dept: OBGYN CLINIC | Facility: CLINIC | Age: 31
End: 2021-05-20
Payer: COMMERCIAL

## 2021-05-20 VITALS
SYSTOLIC BLOOD PRESSURE: 128 MMHG | WEIGHT: 262 LBS | DIASTOLIC BLOOD PRESSURE: 80 MMHG | BODY MASS INDEX: 44.19 KG/M2 | HEIGHT: 64.5 IN

## 2021-05-20 DIAGNOSIS — Z01.419 WELL WOMAN EXAM WITH ROUTINE GYNECOLOGICAL EXAM: Primary | ICD-10-CM

## 2021-05-20 DIAGNOSIS — Z30.41 SURVEILLANCE FOR BIRTH CONTROL, ORAL CONTRACEPTIVES: ICD-10-CM

## 2021-05-20 PROCEDURE — 3074F SYST BP LT 130 MM HG: CPT | Performed by: NURSE PRACTITIONER

## 2021-05-20 PROCEDURE — 3079F DIAST BP 80-89 MM HG: CPT | Performed by: NURSE PRACTITIONER

## 2021-05-20 PROCEDURE — 3008F BODY MASS INDEX DOCD: CPT | Performed by: NURSE PRACTITIONER

## 2021-05-20 PROCEDURE — 99395 PREV VISIT EST AGE 18-39: CPT | Performed by: NURSE PRACTITIONER

## 2021-05-20 RX ORDER — NORGESTIMATE AND ETHINYL ESTRADIOL 0.25-0.035
1 KIT ORAL DAILY
Qty: 3 PACKAGE | Refills: 4 | Status: SHIPPED | OUTPATIENT
Start: 2021-05-20

## 2021-05-20 NOTE — PROGRESS NOTES
Here for Routine Annual Exam  No concerns or questions. Menses are regular, no abnormal bleeding noted. Contraception- OCP, considering conception again. No C/O- last pap 4/2020    ROS: No Cardiac, Respiratory, GI,  or Neurological symptoms.     PE

## 2021-06-15 NOTE — PATIENT INSTRUCTIONS
Plan:  Continue with medications:   Go to the lab for blood work   Follow up with me in 1 month  Schedule follow up appointments: Russell Graff (dietitian) or Jeanne Roper (presurgery dietitian)   Check for insurance coverage for dietitian and labwork pr for sodium issues)   -hummus with vegetables  -bean dip with vegetables    FRUIT  Low carb fruit options   Raspberries: Half a cup (60 grams) contains 3 grams of carbs. Blackberries: Half a cup (70 grams) contains 4 grams of carbs.   Strawberries: Half a c

## 2021-06-15 NOTE — PROGRESS NOTES
HISTORY OF PRESENT ILLNESS  Patient presents with:  Weight Problem: referred rhumetologist, no previous meds      Antonia Norton is a 31year old female new to our office today for initiation of medical weight loss program.  Patient presents today with Joint-related conditions:negative   Liver disease: negative   Renal disease: negative   Diabetes: negative  Thyroid disease: negative   Constipation: negative  Miscarriage: negative   DVT: negative    Family or personal history of Pancreatic issues / Constantine Dee Component Value Date    GLU 89 12/17/2020    BUN 13 12/17/2020    BUNCREA 20.3 (H) 12/17/2020    CREATSERUM 0.64 12/17/2020    ANIONGAP 7 12/17/2020    GFRNAA 119 12/17/2020    GFRAA 138 12/17/2020    CA 9.2 12/17/2020    OSMOCALC 292 12/17/2020    ALKPHO DIETITIAN [facility] WLC (WLC USE ONLY)  -     HEMOGLOBIN A1C; Future    Vitamin D deficiency  -     OP REFERRAL TO DIETITIAN [facility] (WLC USE ONLY)  -     HEMOGLOBIN A1C; Future    Family history of diabetes mellitus (DM)    Other orders  -     Lisdexamfetamine hummus and carrots, yogurt and berries, nuts (1/4 cup), tuna and crackers    Premier protein shakes  Quest bars  Power crunch bars   Siggi yogurt (9% milk fat)   [place] balanced breaks (cheese and nuts)- without chocolate   5.  Reduce carbohydrates which with Vadim Piedra   Losing 100 lbs with Corinne   Brain over Binge by Mabel Schneider         Return in about 4 weeks (around 7/13/2021) for weight management. Patient verbalizes understanding.     Minda Singleton MD  6/15/2021

## 2021-06-23 ENCOUNTER — TELEPHONE (OUTPATIENT)
Dept: INTERNAL MEDICINE CLINIC | Facility: CLINIC | Age: 31
End: 2021-06-23

## 2021-06-23 NOTE — TELEPHONE ENCOUNTER
Patient left voicemail on nurse line to verify if she needs to fast for A1c lab. I called her back to let her know she does NOT fast for this lab.

## 2021-07-09 ENCOUNTER — OFFICE VISIT (OUTPATIENT)
Dept: INTERNAL MEDICINE CLINIC | Facility: CLINIC | Age: 31
End: 2021-07-09
Payer: COMMERCIAL

## 2021-07-09 VITALS
BODY MASS INDEX: 44.19 KG/M2 | DIASTOLIC BLOOD PRESSURE: 70 MMHG | WEIGHT: 262 LBS | SYSTOLIC BLOOD PRESSURE: 132 MMHG | HEIGHT: 64.5 IN | RESPIRATION RATE: 16 BRPM | HEART RATE: 82 BPM

## 2021-07-09 DIAGNOSIS — F41.9 ANXIETY AND DEPRESSION: ICD-10-CM

## 2021-07-09 DIAGNOSIS — R73.01 IFG (IMPAIRED FASTING GLUCOSE): ICD-10-CM

## 2021-07-09 DIAGNOSIS — K76.0 FATTY LIVER: ICD-10-CM

## 2021-07-09 DIAGNOSIS — K21.9 GASTROESOPHAGEAL REFLUX DISEASE WITHOUT ESOPHAGITIS: ICD-10-CM

## 2021-07-09 DIAGNOSIS — F50.81 BINGE EATING DISORDER: ICD-10-CM

## 2021-07-09 DIAGNOSIS — E66.01 CLASS 3 SEVERE OBESITY WITH SERIOUS COMORBIDITY AND BODY MASS INDEX (BMI) OF 40.0 TO 44.9 IN ADULT, UNSPECIFIED OBESITY TYPE (HCC): ICD-10-CM

## 2021-07-09 DIAGNOSIS — Z51.81 THERAPEUTIC DRUG MONITORING: Primary | ICD-10-CM

## 2021-07-09 DIAGNOSIS — F32.A ANXIETY AND DEPRESSION: ICD-10-CM

## 2021-07-09 PROCEDURE — 99214 OFFICE O/P EST MOD 30 MIN: CPT | Performed by: PHYSICIAN ASSISTANT

## 2021-07-09 PROCEDURE — 3008F BODY MASS INDEX DOCD: CPT | Performed by: PHYSICIAN ASSISTANT

## 2021-07-09 PROCEDURE — 3078F DIAST BP <80 MM HG: CPT | Performed by: PHYSICIAN ASSISTANT

## 2021-07-09 PROCEDURE — 3075F SYST BP GE 130 - 139MM HG: CPT | Performed by: PHYSICIAN ASSISTANT

## 2021-07-09 NOTE — PROGRESS NOTES
HISTORY OF PRESENT ILLNESS  Patient presents with:  Weight Check: down [address] is a 31year old female here for follow up in medical weight loss program.   Down 7lbs  Dr. Kristopher Venegas pt  Was started on vyvanse   Denies chest pain, shortness of b MCV 88.4 12/17/2020    MCH 29.1 12/17/2020    MCHC 32.9 12/17/2020    RDW 13.9 12/17/2020    .0 12/17/2020     Lab Results   Component Value Date    GLU 89 12/17/2020    BUN 13 12/17/2020    BUNCREA 20.3 (H) 12/17/2020    CREATSERUM 0.64 12/17/20 Dimesylate (VYVANSE) 40 MG Oral Cap; Take 1 capsule (40 mg total) by mouth every morning.         PLAN  Initial Weight: 269lbs  Initial Weight Date: 6/15/21  Today's Weight: 262lbs  5% Goal: 13.45lbs  10% Goal: 26.9lbs  Total Weight Loss: Net loss 7lbs

## 2021-07-12 ENCOUNTER — OFFICE VISIT (OUTPATIENT)
Dept: FAMILY MEDICINE CLINIC | Facility: CLINIC | Age: 31
End: 2021-07-12
Payer: COMMERCIAL

## 2021-07-12 VITALS
SYSTOLIC BLOOD PRESSURE: 130 MMHG | WEIGHT: 260 LBS | TEMPERATURE: 98 F | DIASTOLIC BLOOD PRESSURE: 70 MMHG | BODY MASS INDEX: 43.85 KG/M2 | OXYGEN SATURATION: 98 % | RESPIRATION RATE: 16 BRPM | HEART RATE: 75 BPM | HEIGHT: 64.5 IN

## 2021-07-12 DIAGNOSIS — F41.9 ANXIETY AND DEPRESSION: ICD-10-CM

## 2021-07-12 DIAGNOSIS — Z00.00 LABORATORY EXAM ORDERED AS PART OF ROUTINE GENERAL MEDICAL EXAMINATION: ICD-10-CM

## 2021-07-12 DIAGNOSIS — Z00.00 ROUTINE MEDICAL EXAM: Primary | ICD-10-CM

## 2021-07-12 DIAGNOSIS — F32.A ANXIETY AND DEPRESSION: ICD-10-CM

## 2021-07-12 PROCEDURE — 3008F BODY MASS INDEX DOCD: CPT | Performed by: FAMILY MEDICINE

## 2021-07-12 PROCEDURE — 3078F DIAST BP <80 MM HG: CPT | Performed by: FAMILY MEDICINE

## 2021-07-12 PROCEDURE — 99395 PREV VISIT EST AGE 18-39: CPT | Performed by: FAMILY MEDICINE

## 2021-07-12 PROCEDURE — 3075F SYST BP GE 130 - 139MM HG: CPT | Performed by: FAMILY MEDICINE
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
## 2021-07-26 ENCOUNTER — PATIENT MESSAGE (OUTPATIENT)
Dept: NEUROLOGY | Facility: CLINIC | Age: 31
End: 2021-07-26

## 2021-07-26 DIAGNOSIS — R20.2 PARESTHESIA OF BOTH HANDS: Primary | ICD-10-CM

## 2021-07-26 NOTE — TELEPHONE ENCOUNTER
From: Yarelis Patel  To: Jerzy Hickey MD  Sent: 7/26/2021 1:42 PM CDT  Subject: Non-Urgent Sintia Leary,  I know it's been a while since I've seen you in person but I finally had 2nd my baby!  I'm ready to get my EM[facility] but the schedul

## 2021-08-13 ENCOUNTER — OFFICE VISIT (OUTPATIENT)
Dept: INTERNAL MEDICINE CLINIC | Facility: CLINIC | Age: 31
End: 2021-08-13
Payer: COMMERCIAL

## 2021-08-13 VITALS
BODY MASS INDEX: 43.17 KG/M2 | SYSTOLIC BLOOD PRESSURE: 130 MMHG | HEIGHT: 64.5 IN | DIASTOLIC BLOOD PRESSURE: 76 MMHG | HEART RATE: 83 BPM | RESPIRATION RATE: 16 BRPM | WEIGHT: 256 LBS | OXYGEN SATURATION: 98 %

## 2021-08-13 DIAGNOSIS — F41.9 ANXIETY AND DEPRESSION: ICD-10-CM

## 2021-08-13 DIAGNOSIS — Z51.81 THERAPEUTIC DRUG MONITORING: Primary | ICD-10-CM

## 2021-08-13 DIAGNOSIS — F50.81 BINGE EATING DISORDER: ICD-10-CM

## 2021-08-13 DIAGNOSIS — K21.9 GASTROESOPHAGEAL REFLUX DISEASE WITHOUT ESOPHAGITIS: ICD-10-CM

## 2021-08-13 DIAGNOSIS — K76.0 FATTY LIVER: ICD-10-CM

## 2021-08-13 DIAGNOSIS — E78.2 MIXED HYPERLIPIDEMIA: ICD-10-CM

## 2021-08-13 DIAGNOSIS — E66.01 CLASS 3 SEVERE OBESITY WITH SERIOUS COMORBIDITY AND BODY MASS INDEX (BMI) OF 40.0 TO 44.9 IN ADULT, UNSPECIFIED OBESITY TYPE (HCC): ICD-10-CM

## 2021-08-13 DIAGNOSIS — R73.01 IFG (IMPAIRED FASTING GLUCOSE): ICD-10-CM

## 2021-08-13 DIAGNOSIS — F32.A ANXIETY AND DEPRESSION: ICD-10-CM

## 2021-08-13 PROCEDURE — 3075F SYST BP GE 130 - 139MM HG: CPT | Performed by: PHYSICIAN ASSISTANT

## 2021-08-13 PROCEDURE — 3078F DIAST BP <80 MM HG: CPT | Performed by: PHYSICIAN ASSISTANT

## 2021-08-13 PROCEDURE — 3008F BODY MASS INDEX DOCD: CPT | Performed by: PHYSICIAN ASSISTANT

## 2021-08-13 PROCEDURE — 99214 OFFICE O/P EST MOD 30 MIN: CPT | Performed by: PHYSICIAN ASSISTANT

## 2021-08-13 NOTE — PROGRESS NOTES
HISTORY OF PRESENT ILLNESS  Patient presents with:  Weight Check: down [address] is a 31year old female here for follow up in medical weight loss program.   Down 6lbs  Compliant on vyvanse  Denies chest pain, shortness of breath, dizziness 4.13 12/17/2020    HGB 12.0 12/17/2020    HCT 36.5 12/17/2020    MCV 88.4 12/17/2020    MCH 29.1 12/17/2020    MCHC 32.9 12/17/2020    RDW 13.9 12/17/2020    .0 12/17/2020     Lab Results   Component Value Date    GLU 89 12/17/2020    BUN 13 12/17/2 obesity type ([facility]    Binge eating disorder    IFG (impaired fasting glucose)    Mixed hyperlipidemia    Fatty liver    Gastroesophageal reflux disease without esophagitis    Anxiety and depression    Other orders  -     Lisdexamfetamine Dimesylate (VYVANS

## 2021-08-15 ENCOUNTER — IMMUNIZATION (OUTPATIENT)
Dept: LAB | Facility: HOSPITAL | Age: 31
End: 2021-08-15
Attending: EMERGENCY MEDICINE
Payer: COMMERCIAL

## 2021-08-15 DIAGNOSIS — Z23 NEED FOR VACCINATION: Primary | ICD-10-CM

## 2021-08-15 PROCEDURE — 0001A SARSCOV2 VAC 30MCG/0.3ML IM: CPT

## 2021-09-05 ENCOUNTER — IMMUNIZATION (OUTPATIENT)
Dept: LAB | Facility: HOSPITAL | Age: 31
End: 2021-09-05
Attending: EMERGENCY MEDICINE
Payer: COMMERCIAL

## 2021-09-05 DIAGNOSIS — Z23 NEED FOR VACCINATION: Primary | ICD-10-CM

## 2021-09-05 PROCEDURE — 0002A SARSCOV2 VAC 30MCG/0.3ML IM: CPT

## 2021-09-07 ENCOUNTER — OFFICE VISIT (OUTPATIENT)
Dept: INTERNAL MEDICINE CLINIC | Facility: CLINIC | Age: 31
End: 2021-09-07
Payer: COMMERCIAL

## 2021-09-07 VITALS
DIASTOLIC BLOOD PRESSURE: 78 MMHG | HEART RATE: 80 BPM | RESPIRATION RATE: 16 BRPM | HEIGHT: 64.5 IN | SYSTOLIC BLOOD PRESSURE: 130 MMHG | BODY MASS INDEX: 43.01 KG/M2 | WEIGHT: 255 LBS

## 2021-09-07 DIAGNOSIS — F50.81 BINGE EATING DISORDER: ICD-10-CM

## 2021-09-07 DIAGNOSIS — R73.01 IFG (IMPAIRED FASTING GLUCOSE): ICD-10-CM

## 2021-09-07 DIAGNOSIS — E66.01 CLASS 3 SEVERE OBESITY WITH SERIOUS COMORBIDITY AND BODY MASS INDEX (BMI) OF 40.0 TO 44.9 IN ADULT, UNSPECIFIED OBESITY TYPE (HCC): ICD-10-CM

## 2021-09-07 DIAGNOSIS — Z51.81 THERAPEUTIC DRUG MONITORING: Primary | ICD-10-CM

## 2021-09-07 PROCEDURE — 3075F SYST BP GE 130 - 139MM HG: CPT | Performed by: INTERNAL MEDICINE

## 2021-09-07 PROCEDURE — 99213 OFFICE O/P EST LOW 20 MIN: CPT | Performed by: INTERNAL MEDICINE

## 2021-09-07 PROCEDURE — 3008F BODY MASS INDEX DOCD: CPT | Performed by: INTERNAL MEDICINE

## 2021-09-07 PROCEDURE — 3078F DIAST BP <80 MM HG: CPT | Performed by: INTERNAL MEDICINE

## 2021-09-07 RX ORDER — DOCUSATE SODIUM 100 MG/1
100 CAPSULE, LIQUID FILLED ORAL 2 TIMES DAILY
COMMUNITY

## 2021-09-07 NOTE — PROGRESS NOTES
HISTORY OF PRESENT ILLNESS  Patient presents with:  Weight Check: down 1 lb      Andrew Carson is a 31year old female here for follow up in medical weight loss program.   Down 1 lbs  Compliant on vyvanse  Denies chest pain, shortness of breath, dizzi 12/17/2020    GFRNAA 119 12/17/2020    GFRAA 138 12/17/2020    CA 9.2 12/17/2020    OSMOCALC 292 12/17/2020    ALKPHO 73 12/08/2020    AST 17 12/08/2020    ALT 30 12/08/2020    ALKPHOS 46 11/27/2013    BILT 0.5 12/08/2020    TP 7.7 12/08/2020    ALB 3.9 12 269lbs  Initial Weight Date: 6/15/21  Down 1 lb   Down 14 lb total  · Sleep better, increase vyvanse to 50 mg q day   · May also benefit from contrave in the future  · GERD - stable on omeprazole, discussed symptom relief with continued weight loss  · IFG

## 2021-09-11 ENCOUNTER — LAB ENCOUNTER (OUTPATIENT)
Dept: LAB | Age: 31
End: 2021-09-11
Attending: FAMILY MEDICINE
Payer: COMMERCIAL

## 2021-09-11 DIAGNOSIS — R76.8 POSITIVE ANA (ANTINUCLEAR ANTIBODY): ICD-10-CM

## 2021-09-11 DIAGNOSIS — Z51.81 THERAPEUTIC DRUG MONITORING: ICD-10-CM

## 2021-09-11 DIAGNOSIS — F41.9 ANXIETY AND DEPRESSION: ICD-10-CM

## 2021-09-11 DIAGNOSIS — K21.9 GASTROESOPHAGEAL REFLUX DISEASE WITHOUT ESOPHAGITIS: ICD-10-CM

## 2021-09-11 DIAGNOSIS — E66.01 MORBID OBESITY (HCC): ICD-10-CM

## 2021-09-11 DIAGNOSIS — M79.7 FIBROMYALGIA: ICD-10-CM

## 2021-09-11 DIAGNOSIS — G62.9 NEUROPATHY: ICD-10-CM

## 2021-09-11 DIAGNOSIS — F32.A ANXIETY AND DEPRESSION: ICD-10-CM

## 2021-09-11 DIAGNOSIS — E55.9 VITAMIN D DEFICIENCY: ICD-10-CM

## 2021-09-11 DIAGNOSIS — R76.8 JO-1 ANTIBODY POSITIVE: ICD-10-CM

## 2021-09-11 DIAGNOSIS — Z00.00 LABORATORY EXAM ORDERED AS PART OF ROUTINE GENERAL MEDICAL EXAMINATION: ICD-10-CM

## 2021-09-11 LAB
ALBUMIN SERPL-MCNC: 3.7 G/DL (ref 3.4–5)
ALBUMIN/GLOB SERPL: 0.9 {RATIO} (ref 1–2)
ALP LIVER SERPL-CCNC: 60 U/L
ALT SERPL-CCNC: 23 U/L
ANION GAP SERPL CALC-SCNC: 5 MMOL/L (ref 0–18)
AST SERPL-CCNC: 15 U/L (ref 15–37)
BILIRUB SERPL-MCNC: 0.4 MG/DL (ref 0.1–2)
BUN BLD-MCNC: 14 MG/DL (ref 7–18)
CALCIUM BLD-MCNC: 9.4 MG/DL (ref 8.5–10.1)
CHLORIDE SERPL-SCNC: 109 MMOL/L (ref 98–112)
CHOLEST SMN-MCNC: 259 MG/DL (ref ?–200)
CK SERPL-CCNC: 277 U/L
CO2 SERPL-SCNC: 24 MMOL/L (ref 21–32)
CREAT BLD-MCNC: 0.68 MG/DL
EST. AVERAGE GLUCOSE BLD GHB EST-MCNC: 126 MG/DL (ref 68–126)
GLOBULIN PLAS-MCNC: 3.9 G/DL (ref 2.8–4.4)
GLUCOSE BLD-MCNC: 88 MG/DL (ref 70–99)
HBA1C MFR BLD HPLC: 6 % (ref ?–5.7)
HDLC SERPL-MCNC: 50 MG/DL (ref 40–59)
LDLC SERPL CALC-MCNC: 180 MG/DL (ref ?–100)
M PROTEIN MFR SERPL ELPH: 7.6 G/DL (ref 6.4–8.2)
NONHDLC SERPL-MCNC: 209 MG/DL (ref ?–130)
OSMOLALITY SERPL CALC.SUM OF ELEC: 286 MOSM/KG (ref 275–295)
PATIENT FASTING Y/N/NP: YES
PATIENT FASTING Y/N/NP: YES
POTASSIUM SERPL-SCNC: 4 MMOL/L (ref 3.5–5.1)
SODIUM SERPL-SCNC: 138 MMOL/L (ref 136–145)
TRIGL SERPL-MCNC: 156 MG/DL (ref 30–149)
VIT B12 SERPL-MCNC: 276 PG/ML (ref 193–986)
VIT D+METAB SERPL-MCNC: 38.7 NG/ML (ref 30–100)
VLDLC SERPL CALC-MCNC: 32 MG/DL (ref 0–30)

## 2021-09-11 PROCEDURE — 86235 NUCLEAR ANTIGEN ANTIBODY: CPT

## 2021-09-11 PROCEDURE — 82607 VITAMIN B-12: CPT

## 2021-09-11 PROCEDURE — 36415 COLL VENOUS BLD VENIPUNCTURE: CPT

## 2021-09-11 PROCEDURE — 83036 HEMOGLOBIN GLYCOSYLATED A1C: CPT

## 2021-09-11 PROCEDURE — 86225 DNA ANTIBODY NATIVE: CPT

## 2021-09-11 PROCEDURE — 83516 IMMUNOASSAY NONANTIBODY: CPT

## 2021-09-11 PROCEDURE — 82550 ASSAY OF CK (CPK): CPT

## 2021-09-11 PROCEDURE — 80061 LIPID PANEL: CPT

## 2021-09-11 PROCEDURE — 80053 COMPREHEN METABOLIC PANEL: CPT

## 2021-09-11 PROCEDURE — 82306 VITAMIN D 25 HYDROXY: CPT

## 2021-09-11 PROCEDURE — 86038 ANTINUCLEAR ANTIBODIES: CPT

## 2021-09-13 LAB
ANA SCREEN: POSITIVE
CENTROMERE AUTOAB: <100 AU/ML (ref ?–100)
DSDNA AUTOAB: <100 IU/ML (ref ?–100)
HISTONE AUTOAB: <100 AU/ML (ref ?–100)
JO-1 AUTOAB: 260 AU/ML (ref ?–100)
RNP AUTOAB: <100 AU/ML (ref ?–100)
SCL-70 AUTOAB: <100 AU/ML (ref ?–100)
SM AUTOAB (SMITH): <100 AU/ML (ref ?–100)
SSA AUTOAB: <100 AU/ML (ref ?–100)
SSB AUTOAB: <100 AU/ML (ref ?–100)

## 2021-09-20 ENCOUNTER — TELEPHONE (OUTPATIENT)
Dept: INTERNAL MEDICINE CLINIC | Facility: CLINIC | Age: 31
End: 2021-09-20

## 2021-09-23 ENCOUNTER — TELEPHONE (OUTPATIENT)
Dept: FAMILY MEDICINE CLINIC | Facility: CLINIC | Age: 31
End: 2021-09-23

## 2021-09-23 NOTE — TELEPHONE ENCOUNTER
Patient calling, will like to schedule VV for discussion of labs. Patient states it is difficult to do in-person visit with her small children. Also requesting referral for Podiatry. Please advise if ok to do VV for lab results.

## 2021-09-23 NOTE — TELEPHONE ENCOUNTER
Future Appointments   Date Time Provider Ayala Angie   9/27/2021  3:00 PM Dru Hughes MD [facility] [address] [facility] [address]   9/29/2021  1:30 PM Anais Caban MD [facility] Angela Ortega   10/4/2021  1:00 PM Barbara Hernandez PhD Debbie Speaker   10/13/2021

## 2021-09-25 LAB
EJ (GLYCYL - TRNA SYNTHETASE) ANTIBODY: NEGATIVE
FIBRILLARIN (U3 RNP) AB, IGG: NEGATIVE
JO-1 (HISTIDY1-TRNA SYNTHETASE) AB, IGG: 19 AU/ML
KU ANTIBODY: NEGATIVE
MDA5 (CADM-140) ANTIBODY: NEGATIVE
MI-2 (NUCLEAR HELICASE PROTEIN) ANTIBODY: NEGATIVE
NXP-2 (NUCLEAR MATRIX PROTEIN-2) AB: NEGATIVE
OJ (ISOLEUCYL-TRNA SYNTHETASE) ANTIBODY: NEGATIVE
P155/140 (TIF-1 GAMMA) ANTIBODY: NEGATIVE
PL-12 (ALANYL-TRNA SYNTHETASE) ANTIBODY: NEGATIVE
PL-7 (THREONYL-TRNA SYNTHETASE) ANTIBODY: NEGATIVE
PM_SCL 100 ANTIBODY, IGG: NEGATIVE
RIBONUCLEIC PROTEIN (U1) (ENA) AB, IGG: 5 UNITS
SAE1 (SUMO ACTIVATING ENZYME) ANTIBODY: NEGATIVE
SRP (SINGLE RECOGNITION PARTICLE) AB: NEGATIVE
SSA 52 (RO) (ENA) ANTIBODY, IGG: 3 AU/ML
SSA 60 (RO) (ENA) ANTIBODY, IGG: 0 AU/ML
TIF1-GAMMA ANTIBODY: NEGATIVE

## 2021-09-27 ENCOUNTER — TELEMEDICINE (OUTPATIENT)
Dept: FAMILY MEDICINE CLINIC | Facility: CLINIC | Age: 31
End: 2021-09-27

## 2021-09-27 DIAGNOSIS — E78.2 MIXED HYPERLIPIDEMIA: Primary | ICD-10-CM

## 2021-09-27 DIAGNOSIS — F32.A ANXIETY AND DEPRESSION: ICD-10-CM

## 2021-09-27 DIAGNOSIS — F42.2 MIXED OBSESSIONAL THOUGHTS AND ACTS: ICD-10-CM

## 2021-09-27 DIAGNOSIS — F41.9 ANXIETY AND DEPRESSION: ICD-10-CM

## 2021-09-27 DIAGNOSIS — R73.01 IMPAIRED FASTING GLUCOSE: ICD-10-CM

## 2021-09-27 PROCEDURE — 99214 OFFICE O/P EST MOD 30 MIN: CPT | Performed by: FAMILY MEDICINE

## 2021-09-27 NOTE — PROGRESS NOTES
Video Visit- Michael   This is a telemedicine visit with live, interactive video and audio.      Sravani Nava understands and accepts financial responsibility for any deductible, co-insurance and/or co-p Dizziness 2019   • Elective      EAB x 1   • Fatty liver 2019   • Flatulence/gas pain/belching 2019   • Food intolerance 2009    Lactose intolerant   • GERD (gastroesophageal reflux disease)    • Gestational hypertension, antepartum  NCAT, EOMI, mucus membranes moist  NECK:  No obvious goiter  PULMONARY:  Speaking in full sentences comfortably, normal work of breathing  ABDOMEN:  + obese  MUSCULOSKELETAL: Sitting upright. NEUROLOGIC:  Cranial nerves 2-12 grossly intact.   PSYCHIATRIC:

## 2021-09-29 ENCOUNTER — OFFICE VISIT (OUTPATIENT)
Dept: ELECTROPHYSIOLOGY | Facility: HOSPITAL | Age: 31
End: 2021-09-29
Attending: Other
Payer: COMMERCIAL

## 2021-09-29 DIAGNOSIS — G56.03 BILATERAL CARPAL TUNNEL SYNDROME: Primary | ICD-10-CM

## 2021-09-29 DIAGNOSIS — R20.2 PARESTHESIA OF BOTH HANDS: ICD-10-CM

## 2021-09-29 PROCEDURE — 95886 MUSC TEST DONE W/N TEST COMP: CPT | Performed by: OTHER

## 2021-09-29 PROCEDURE — 95910 NRV CNDJ TEST 7-8 STUDIES: CPT | Performed by: OTHER

## 2021-09-30 NOTE — PROCEDURES
[address]      PATIENT'S NAME: Vaz Ngoc   REFERRING PHYSICIAN: Igor Campos M.D.    PATIENT ACCOUNT #: [de-identified] LOCATION: [facility]   MEDICAL RECORD #: [id number] DATE OF BIRTH: [birth year]

## 2021-10-05 RX ORDER — METFORMIN HYDROCHLORIDE 750 MG/1
750 TABLET, EXTENDED RELEASE ORAL
Qty: 90 TABLET | Refills: 0 | Status: SHIPPED | OUTPATIENT
Start: 2021-10-05 | End: 2021-10-25

## 2021-10-05 NOTE — TELEPHONE ENCOUNTER
Pharmacy requesting a 90 day supply  Requesting  Metformin 759 mg 1 daily  LOV: 9/7/21  RTC:   Last Relevant Labs:  Filled: 9/20/21 #30 with 1 refills    Future Appointments   Date Time Provider Ayala Adams   10/13/2021  2:00 PM Maria G Vieira

## 2021-10-12 RX ORDER — CYANOCOBALAMIN 1000 UG/ML
1000 INJECTION INTRAMUSCULAR; SUBCUTANEOUS ONCE
Status: CANCELLED | OUTPATIENT
Start: 2021-10-12 | End: 2021-10-12

## 2021-10-13 ENCOUNTER — OFFICE VISIT (OUTPATIENT)
Dept: INTERNAL MEDICINE CLINIC | Facility: CLINIC | Age: 31
End: 2021-10-13
Payer: COMMERCIAL

## 2021-10-13 VITALS
SYSTOLIC BLOOD PRESSURE: 130 MMHG | DIASTOLIC BLOOD PRESSURE: 70 MMHG | HEIGHT: 64.5 IN | HEART RATE: 80 BPM | WEIGHT: 246 LBS | RESPIRATION RATE: 16 BRPM | BODY MASS INDEX: 41.49 KG/M2

## 2021-10-13 DIAGNOSIS — E66.01 CLASS 3 SEVERE OBESITY WITH SERIOUS COMORBIDITY AND BODY MASS INDEX (BMI) OF 40.0 TO 44.9 IN ADULT, UNSPECIFIED OBESITY TYPE (HCC): ICD-10-CM

## 2021-10-13 DIAGNOSIS — Z51.81 THERAPEUTIC DRUG MONITORING: Primary | ICD-10-CM

## 2021-10-13 DIAGNOSIS — F50.81 BINGE EATING DISORDER: ICD-10-CM

## 2021-10-13 DIAGNOSIS — K76.0 FATTY LIVER: ICD-10-CM

## 2021-10-13 DIAGNOSIS — E78.2 MIXED HYPERLIPIDEMIA: ICD-10-CM

## 2021-10-13 DIAGNOSIS — R73.01 IFG (IMPAIRED FASTING GLUCOSE): ICD-10-CM

## 2021-10-13 PROCEDURE — 3078F DIAST BP <80 MM HG: CPT | Performed by: PHYSICIAN ASSISTANT

## 2021-10-13 PROCEDURE — 3075F SYST BP GE 130 - 139MM HG: CPT | Performed by: PHYSICIAN ASSISTANT

## 2021-10-13 PROCEDURE — 3008F BODY MASS INDEX DOCD: CPT | Performed by: PHYSICIAN ASSISTANT

## 2021-10-13 PROCEDURE — 99214 OFFICE O/P EST MOD 30 MIN: CPT | Performed by: PHYSICIAN ASSISTANT

## 2021-10-13 NOTE — PROGRESS NOTES
HISTORY OF PRESENT ILLNESS  Patient presents with:  Weight Check: down 9 lbs       Natasha Drew is a 27year old female here for follow up in medical weight loss program.   Down 9lbs  Compliant on vyvanse  Not taking metformin  Denies chest pain, pam edema    Lab Results   Component Value Date    WBC 7.6 12/17/2020    RBC 4.13 12/17/2020    HGB 12.0 12/17/2020    HCT 36.5 12/17/2020    MCV 88.4 12/17/2020    MCH 29.1 12/17/2020    MCHC 32.9 12/17/2020    RDW 13.9 12/17/2020    .0 12/17/2020 data:       Diagnoses and all orders for this visit:    Therapeutic drug monitoring    Class 3 severe obesity with serious comorbidity and body mass index (BMI) of 40.0 to 44.9 in adult, unspecified obesity type ([facility]    Binge eating disorder    IFG (impai understanding.     Pam Ludwig PA-C  10/13/2021

## 2021-10-25 ENCOUNTER — OFFICE VISIT (OUTPATIENT)
Dept: RHEUMATOLOGY | Facility: CLINIC | Age: 31
End: 2021-10-25
Payer: COMMERCIAL

## 2021-10-25 VITALS
HEIGHT: 65 IN | RESPIRATION RATE: 16 BRPM | DIASTOLIC BLOOD PRESSURE: 70 MMHG | WEIGHT: 244 LBS | HEART RATE: 94 BPM | SYSTOLIC BLOOD PRESSURE: 148 MMHG | OXYGEN SATURATION: 99 % | BODY MASS INDEX: 40.65 KG/M2 | TEMPERATURE: 98 F

## 2021-10-25 DIAGNOSIS — F12.90 MARIJUANA USE: ICD-10-CM

## 2021-10-25 DIAGNOSIS — R76.8 JO-1 ANTIBODY POSITIVE: ICD-10-CM

## 2021-10-25 DIAGNOSIS — M25.562 CHRONIC PAIN OF LEFT KNEE: ICD-10-CM

## 2021-10-25 DIAGNOSIS — M25.50 POLYARTHRALGIA: ICD-10-CM

## 2021-10-25 DIAGNOSIS — G62.9 NEUROPATHY: ICD-10-CM

## 2021-10-25 DIAGNOSIS — R53.82 CHRONIC FATIGUE: ICD-10-CM

## 2021-10-25 DIAGNOSIS — G56.03 BILATERAL CARPAL TUNNEL SYNDROME: ICD-10-CM

## 2021-10-25 DIAGNOSIS — M79.7 FIBROMYALGIA: Primary | ICD-10-CM

## 2021-10-25 DIAGNOSIS — R76.8 POSITIVE ANA (ANTINUCLEAR ANTIBODY): ICD-10-CM

## 2021-10-25 DIAGNOSIS — R74.8 ELEVATED CK: ICD-10-CM

## 2021-10-25 DIAGNOSIS — G89.29 CHRONIC PAIN OF LEFT KNEE: ICD-10-CM

## 2021-10-25 PROCEDURE — 3077F SYST BP >= 140 MM HG: CPT | Performed by: INTERNAL MEDICINE

## 2021-10-25 PROCEDURE — 3008F BODY MASS INDEX DOCD: CPT | Performed by: INTERNAL MEDICINE

## 2021-10-25 PROCEDURE — 99215 OFFICE O/P EST HI 40 MIN: CPT | Performed by: INTERNAL MEDICINE

## 2021-10-25 PROCEDURE — 3078F DIAST BP <80 MM HG: CPT | Performed by: INTERNAL MEDICINE
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
## 2021-11-23 ENCOUNTER — OFFICE VISIT (OUTPATIENT)
Dept: INTERNAL MEDICINE CLINIC | Facility: CLINIC | Age: 31
End: 2021-11-23
Payer: COMMERCIAL

## 2021-11-23 ENCOUNTER — TELEPHONE (OUTPATIENT)
Dept: INTERNAL MEDICINE CLINIC | Facility: CLINIC | Age: 31
End: 2021-11-23

## 2021-11-23 VITALS
WEIGHT: 242 LBS | RESPIRATION RATE: 16 BRPM | SYSTOLIC BLOOD PRESSURE: 128 MMHG | BODY MASS INDEX: 40.81 KG/M2 | HEART RATE: 78 BPM | HEIGHT: 64.5 IN | DIASTOLIC BLOOD PRESSURE: 60 MMHG

## 2021-11-23 DIAGNOSIS — R73.01 IFG (IMPAIRED FASTING GLUCOSE): ICD-10-CM

## 2021-11-23 DIAGNOSIS — Z51.81 THERAPEUTIC DRUG MONITORING: Primary | ICD-10-CM

## 2021-11-23 DIAGNOSIS — F50.81 BINGE EATING DISORDER: ICD-10-CM

## 2021-11-23 PROCEDURE — 99214 OFFICE O/P EST MOD 30 MIN: CPT | Performed by: INTERNAL MEDICINE

## 2021-11-23 PROCEDURE — 3074F SYST BP LT 130 MM HG: CPT | Performed by: INTERNAL MEDICINE

## 2021-11-23 PROCEDURE — 3078F DIAST BP <80 MM HG: CPT | Performed by: INTERNAL MEDICINE

## 2021-11-23 PROCEDURE — 3008F BODY MASS INDEX DOCD: CPT | Performed by: INTERNAL MEDICINE

## 2021-11-23 RX ORDER — SEMAGLUTIDE 1.34 MG/ML
0.5 INJECTION, SOLUTION SUBCUTANEOUS WEEKLY
Qty: 1 EACH | Refills: 0 | Status: SHIPPED | OUTPATIENT
Start: 2021-11-23 | End: 2022-02-01

## 2021-11-23 NOTE — PROGRESS NOTES
Patient teaching on Ozempic performed. Patient demonstrated back, all questions were answered and patient verbalized understanding.  GREGOR

## 2021-11-23 NOTE — TELEPHONE ENCOUNTER
PT left a message she was just seen and given RX for Ozempic. She wanted to know if she is to continue taking the vyvanse. I called her back and let her know she is to continue all medicine vyvanse and Ozempic.

## 2021-11-23 NOTE — PROGRESS NOTES
HISTORY OF PRESENT ILLNESS  Patient presents with:  Weight Check: down 4 lb      Lori Montalvo is a 27year old female here for follow up in medical weight loss program.   Down 4 lb   Has been working  Closely with therapist   Has been out of vyvanse 09/11/2021    BUN 14 09/11/2021    BUNCREA 20.3 (H) 12/17/2020    CREATSERUM 0.68 09/11/2021    ANIONGAP 5 09/11/2021    GFRNAA 117 09/11/2021    GFRAA 135 09/11/2021    CA 9.4 09/11/2021    OSMOCALC 286 09/11/2021    ALKPHO 60 09/11/2021    AST 15 09/11/2 Inject 0.5 mg into the skin once a week.         PLAN  Initial Weight: 269lbs  Initial Weight Date: 6/15/21  Today's VPOGBT: 675  Total Weight Loss: Down 4 lbs/ Net loss 27lbs    · Will continue vyvanse - advised side effects and adverse effects of medicati

## 2021-12-06 ENCOUNTER — TELEPHONE (OUTPATIENT)
Dept: INTERNAL MEDICINE CLINIC | Facility: CLINIC | Age: 31
End: 2021-12-06

## 2021-12-06 NOTE — TELEPHONE ENCOUNTER
I called patient to discuss her Ozempic use. She did not know an RX would be sent to [facility] for her. She has the sample.   I explained that we give a sample and send the next dose to make sure we get approval for coverage if needed instead of waiting until p

## 2021-12-23 ENCOUNTER — TELEPHONE (OUTPATIENT)
Dept: RHEUMATOLOGY | Facility: CLINIC | Age: 31
End: 2021-12-23

## 2021-12-23 ENCOUNTER — TELEPHONE (OUTPATIENT)
Dept: INTERNAL MEDICINE CLINIC | Facility: CLINIC | Age: 31
End: 2021-12-23

## 2021-12-23 DIAGNOSIS — M25.862 IMPINGEMENT SYNDROME INVOLVING PATELLAR FAT PAD OF LEFT KNEE: Primary | ICD-10-CM

## 2021-12-23 DIAGNOSIS — M46.1 SACROILIITIS (HCC): ICD-10-CM

## 2021-12-23 DIAGNOSIS — S86.912D MUSCLE STRAIN OF LEFT KNEE, SUBSEQUENT ENCOUNTER: ICD-10-CM

## 2021-12-23 DIAGNOSIS — G89.29 CHRONIC PAIN OF LEFT KNEE: ICD-10-CM

## 2021-12-23 DIAGNOSIS — M25.562 CHRONIC PAIN OF LEFT KNEE: ICD-10-CM

## 2021-12-23 RX ORDER — MELOXICAM 15 MG/1
15 TABLET ORAL DAILY
Qty: 14 TABLET | Refills: 0 | Status: SHIPPED | OUTPATIENT
Start: 2021-12-23 | End: 2022-01-06

## 2021-12-23 NOTE — PROGRESS NOTES
Phoned health plan, no PA required for bilateral carpal tunnel injections.  Call [phone]  Message left for pt to call our office

## 2021-12-23 NOTE — TELEPHONE ENCOUNTER
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  The mri of the thigh seems more consistent with a muscle strain rather than myositis, the myositis ab panel was negative

## 2021-12-23 NOTE — TELEPHONE ENCOUNTER
Pt returned my call, notified that no Pa required for carpal tunnel injections. Will call pt with availably to schedule. Pt recently completed MRI thigh and knee, and would like to be called with test results.

## 2021-12-23 NOTE — TELEPHONE ENCOUNTER
Pt called left vmail - pt has questions regarding rx ozempic injection that she is suppose to give herself on Monday

## 2021-12-23 NOTE — TELEPHONE ENCOUNTER
Pt returned my call, notified of imaging results per Dr. 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  The mri of the thigh seems more consistent with a muscle strain rather than myositis

## 2021-12-28 ENCOUNTER — OFFICE VISIT (OUTPATIENT)
Dept: RHEUMATOLOGY | Facility: CLINIC | Age: 31
End: 2021-12-28
Payer: COMMERCIAL

## 2021-12-28 ENCOUNTER — TELEPHONE (OUTPATIENT)
Dept: RHEUMATOLOGY | Facility: CLINIC | Age: 31
End: 2021-12-28

## 2021-12-28 VITALS — DIASTOLIC BLOOD PRESSURE: 64 MMHG | TEMPERATURE: 97 F | SYSTOLIC BLOOD PRESSURE: 130 MMHG

## 2021-12-28 DIAGNOSIS — G56.01 RIGHT CARPAL TUNNEL SYNDROME: Primary | ICD-10-CM

## 2021-12-28 PROCEDURE — 20526 THER INJECTION CARP TUNNEL: CPT | Performed by: INTERNAL MEDICINE

## 2021-12-28 PROCEDURE — 3075F SYST BP GE 130 - 139MM HG: CPT | Performed by: INTERNAL MEDICINE

## 2021-12-28 PROCEDURE — 76942 ECHO GUIDE FOR BIOPSY: CPT | Performed by: INTERNAL MEDICINE

## 2021-12-28 PROCEDURE — 3078F DIAST BP <80 MM HG: CPT | Performed by: INTERNAL MEDICINE

## 2021-12-28 RX ORDER — LIDOCAINE HYDROCHLORIDE 10 MG/ML
2 INJECTION, SOLUTION INFILTRATION; PERINEURAL ONCE
Status: COMPLETED | OUTPATIENT
Start: 2021-12-28 | End: 2021-12-28

## 2021-12-28 RX ORDER — METHYLPREDNISOLONE ACETATE 40 MG/ML
40 INJECTION, SUSPENSION INTRA-ARTICULAR; INTRALESIONAL; INTRAMUSCULAR; SOFT TISSUE ONCE
Status: COMPLETED | OUTPATIENT
Start: 2021-12-28 | End: 2021-12-28

## 2021-12-28 RX ADMIN — METHYLPREDNISOLONE ACETATE 40 MG: 40 INJECTION, SUSPENSION INTRA-ARTICULAR; INTRALESIONAL; INTRAMUSCULAR; SOFT TISSUE at 09:15:00

## 2021-12-28 RX ADMIN — LIDOCAINE HYDROCHLORIDE 2 ML: 10 INJECTION, SOLUTION INFILTRATION; PERINEURAL at 09:14:00

## 2021-12-28 NOTE — PROCEDURES
Carpal Tunnel Procedure Note    Ultrasound examination of the right  carpal tunnel  Equipment: Maven II  Indication: Pain/paresthesias in fingers    Findings: Ultrasound examination is performed according to standard EU LAR recommendations(1).  . Us transverse and longitudinal views as well as identifying neurovascular structures the area was cleansed with chloraprep x 2.  Then using sterile gel and a 13-6 MHz transducer the area around the median nerve was injected with a total solution of 7 mL of whi

## 2021-12-29 ENCOUNTER — TELEMEDICINE (OUTPATIENT)
Dept: INTERNAL MEDICINE CLINIC | Facility: CLINIC | Age: 31
End: 2021-12-29

## 2021-12-29 DIAGNOSIS — E66.01 CLASS 3 SEVERE OBESITY WITH SERIOUS COMORBIDITY AND BODY MASS INDEX (BMI) OF 40.0 TO 44.9 IN ADULT, UNSPECIFIED OBESITY TYPE (HCC): ICD-10-CM

## 2021-12-29 DIAGNOSIS — F50.81 BINGE EATING DISORDER: ICD-10-CM

## 2021-12-29 DIAGNOSIS — R73.01 IFG (IMPAIRED FASTING GLUCOSE): ICD-10-CM

## 2021-12-29 DIAGNOSIS — Z51.81 THERAPEUTIC DRUG MONITORING: Primary | ICD-10-CM

## 2021-12-29 DIAGNOSIS — E78.2 MIXED HYPERLIPIDEMIA: ICD-10-CM

## 2021-12-29 DIAGNOSIS — F32.A ANXIETY AND DEPRESSION: ICD-10-CM

## 2021-12-29 DIAGNOSIS — F41.9 ANXIETY AND DEPRESSION: ICD-10-CM

## 2021-12-29 PROCEDURE — 99213 OFFICE O/P EST LOW 20 MIN: CPT | Performed by: PHYSICIAN ASSISTANT

## 2021-12-29 NOTE — PROGRESS NOTES
This visit is conducted using Telemedicine with live, interactive video and audio. Patient has been referred to the [facility] website at [email or web address] to review the yearly Consent to Treat document.     Patient understands and accepts financial res Component Value Date    WBC 7.6 12/17/2020    RBC 4.13 12/17/2020    HGB 12.0 12/17/2020    HCT 36.5 12/17/2020    MCV 88.4 12/17/2020    MCH 29.1 12/17/2020    MCHC 32.9 12/17/2020    RDW 13.9 12/17/2020    .0 12/17/2020     Lab Results   Compone file prior to visit. ASSESSMENT  Analyzed weight data:       Diagnoses and all orders for this visit:    Therapeutic drug monitoring  -     Lisdexamfetamine Dimesylate (VYVANSE) 50 MG Oral Cap; Take 1 capsule (50 mg total) by mouth every morning.     C JEN  12/29/2021    Please note that the following visit was completed using two-way, real-time interactive audio and video communication.   This has been done in good ace to provide continuity of care in the best interest of the provider-patient relation

## 2022-01-04 ENCOUNTER — TELEPHONE (OUTPATIENT)
Dept: RHEUMATOLOGY | Facility: CLINIC | Age: 32
End: 2022-01-04

## 2022-01-04 NOTE — TELEPHONE ENCOUNTER
Had discussed with pt that we will wait until MRI completed before considering course of steroids. Please reiterate my prior conversation with pt. I will follow up on her MRI results when available.      Sowmya Baird DO  [facility]  1/4/2022

## 2022-01-04 NOTE — TELEPHONE ENCOUNTER
Pt returned my call; states she is not getting any relief from meloxicam.  She agrees to continue taking it until she can discuss with Dr Carrol Dinh. She has MRI SI joints tomorrow. She is aware Dr MULLER is out of the office until Monday.   She verbalized unders

## 2022-01-04 NOTE — TELEPHONE ENCOUNTER
LOV 12-28-21, procedure  LOV 10-25-21  Future Appointments   Date Time Provider Ayala Adams   3/7/2022  1:00 PM Lory Soriano,  [facility] [address]     Called pt to check status; lm on vm to cb. Main dept # given.

## 2022-01-04 NOTE — TELEPHONE ENCOUNTER
Called pt and reinterated that Dr. Marcelo Francis will consider switching to a steroid after the MRI is resulted. Pt verbalized understanding and that she thought as much. She will continue with the meloxicam until then.

## 2022-01-19 ENCOUNTER — TELEPHONE (OUTPATIENT)
Dept: RHEUMATOLOGY | Facility: CLINIC | Age: 32
End: 2022-01-19

## 2022-01-19 DIAGNOSIS — R74.8 ELEVATED CK: ICD-10-CM

## 2022-01-19 DIAGNOSIS — R76.8 JO-1 ANTIBODY POSITIVE: Primary | ICD-10-CM

## 2022-01-19 DIAGNOSIS — M53.3 SACRAL PAIN: ICD-10-CM

## 2022-01-19 DIAGNOSIS — M25.50 POLYARTHRALGIA: ICD-10-CM

## 2022-01-19 DIAGNOSIS — R93.89 ABNORMAL MRI: ICD-10-CM

## 2022-01-19 NOTE — TELEPHONE ENCOUNTER
Had an MRI done and waiting for the results. She went to [facility] and they didn't put the ordering provider in the system when uploading the results.  I explained this is why no one called her with the results because eventhough she can see them on [facility],

## 2022-01-20 RX ORDER — PREDNISONE 10 MG/1
TABLET ORAL
Qty: 66 TABLET | Refills: 0 | Status: SHIPPED | OUTPATIENT
Start: 2022-01-20

## 2022-01-20 NOTE — ADDENDUM NOTE
Addended by: Kaylee Abbott on: 1/20/2022 02:39 PM     Modules accepted: Orders Pt c/o nausea, vomiting, and diarrhea. C/O generalized abd pain/ache = 5/10. Worse when lying down. Pt had removed dairy from her diet 3 months ago. Ate dairy on Friday. Pain was worse at 0200 on Saturday morning. Taking Pepto without relief.

## 2022-01-20 NOTE — TELEPHONE ENCOUNTER
Returned pt's call. Spent almost 20 min on phone  For some reason, I never received MRI results from [facility] (via epic nor faxed).    Pt states she had called day before MRI and I had told staff that I wanted to wait until MRI completed before prescribing

## 2022-01-30 ENCOUNTER — HOSPITAL ENCOUNTER (OUTPATIENT)
Dept: CT IMAGING | Age: 32
Discharge: HOME | End: 2022-01-30
Attending: INTERNAL MEDICINE
Payer: COMMERCIAL

## 2022-01-30 DIAGNOSIS — M53.3 SACRAL PAIN: ICD-10-CM

## 2022-01-30 DIAGNOSIS — R93.89 ABNORMAL MRI: ICD-10-CM

## 2022-01-30 PROCEDURE — 72192 CT PELVIS W/O DYE: CPT | Performed by: INTERNAL MEDICINE

## 2022-01-31 ENCOUNTER — TELEPHONE (OUTPATIENT)
Dept: RHEUMATOLOGY | Facility: CLINIC | Age: 32
End: 2022-01-31

## 2022-01-31 NOTE — TELEPHONE ENCOUNTER
Called patient. Discussed CT sacrum results. No sign of active inflammation from sacroiliitis. However there is still this area of minimal sclerosis within the caudal aspect of the sacrum. Per report it may be seen in very early setting of malignancy.

## 2022-02-01 NOTE — TELEPHONE ENCOUNTER
Requesting Ozempic 0.5 mg  LOV: 12/29/21  RTC: not noted  Last Relevant Labs: 9/11/21  Filled: 11/23/21 #1 with 0 refills    Last saw Des Ventura

## 2022-02-02 RX ORDER — SEMAGLUTIDE 1.34 MG/ML
0.5 INJECTION, SOLUTION SUBCUTANEOUS WEEKLY
Qty: 1.5 ML | Refills: 0 | Status: SHIPPED | OUTPATIENT
Start: 2022-02-02 | End: 2022-02-09 | Stop reason: DRUGHIGH

## 2022-02-03 RX ORDER — NORGESTIMATE AND ETHINYL ESTRADIOL 0.25-0.035
1 KIT ORAL DAILY
Refills: 0 | OUTPATIENT
Start: 2022-02-03

## 2022-02-03 NOTE — TELEPHONE ENCOUNTER
Last annual 05/2021  Refilled for 1yr 05/2021  Next scheduled annual 05/25/2022  Refill requested too soon.   Patient should have enough pills to last until May 2022

## 2022-02-08 NOTE — TELEPHONE ENCOUNTER
Requesting Vyvanse  LOV: 12/29/21  RTC: not noted  Last Relevant Labs: na  Filled: 12/29/21 #30 with 0 refills  Last [place] 12[place]29/21 [place]    Future Appointments   Date Time Provider Ayala Adams   2/23/2022 12:30 PM Franklin Dickey, PhD Jillian Harrison   2/25/2022 10:20 AM John Reid PA-C [facility] [address]

## 2022-02-08 NOTE — TELEPHONE ENCOUNTER
Pt called to request rx refill on vynase , pt out of med for 5 days       [facility]/PHARMACY #[id number]- [place], [place] - [address] [address], [phone], [phone]

## 2022-02-09 ENCOUNTER — TELEPHONE (OUTPATIENT)
Dept: INTERNAL MEDICINE CLINIC | Facility: CLINIC | Age: 32
End: 2022-02-09

## 2022-02-09 NOTE — TELEPHONE ENCOUNTER
Patient called to verify dose of ozembic prior to picking up her refill. Pt was advised to titrate up to 0.5mg at her 12/29/21 visit. Pt states she feels she's hit a plateau on the 6.2KA dose and she would like dose increased to 1.0mg. Pt states she usaully takes ozembic on Monday but she missed her Monday dose and would like to take the med today when she picks it up. Pt would  like to know moving forward if she should take ozembic on mondays or wednesdays. Please advise.  Thank you

## 2022-02-09 NOTE — TELEPHONE ENCOUNTER
Per Ada Corporal okay to increase to 1 mg Ozempic. Sent to pharmacy. I called patient to let her know. She will take the shot today and give again in one week from now.

## 2022-02-25 ENCOUNTER — OFFICE VISIT (OUTPATIENT)
Dept: INTERNAL MEDICINE CLINIC | Facility: CLINIC | Age: 32
End: 2022-02-25
Payer: COMMERCIAL

## 2022-02-25 VITALS
SYSTOLIC BLOOD PRESSURE: 120 MMHG | HEIGHT: 64.5 IN | HEART RATE: 77 BPM | RESPIRATION RATE: 16 BRPM | BODY MASS INDEX: 37.61 KG/M2 | DIASTOLIC BLOOD PRESSURE: 60 MMHG | WEIGHT: 223 LBS

## 2022-02-25 DIAGNOSIS — K76.0 FATTY LIVER: ICD-10-CM

## 2022-02-25 DIAGNOSIS — F50.81 BINGE EATING DISORDER: ICD-10-CM

## 2022-02-25 DIAGNOSIS — Z51.81 THERAPEUTIC DRUG MONITORING: Primary | ICD-10-CM

## 2022-02-25 DIAGNOSIS — R73.01 IFG (IMPAIRED FASTING GLUCOSE): ICD-10-CM

## 2022-02-25 DIAGNOSIS — E78.2 MIXED HYPERLIPIDEMIA: ICD-10-CM

## 2022-02-25 DIAGNOSIS — E66.9 CLASS 2 OBESITY WITH BODY MASS INDEX (BMI) OF 37.0 TO 37.9 IN ADULT, UNSPECIFIED OBESITY TYPE, UNSPECIFIED WHETHER SERIOUS COMORBIDITY PRESENT: ICD-10-CM

## 2022-02-25 DIAGNOSIS — F41.9 ANXIETY AND DEPRESSION: ICD-10-CM

## 2022-02-25 DIAGNOSIS — F32.A ANXIETY AND DEPRESSION: ICD-10-CM

## 2022-02-25 PROCEDURE — 99214 OFFICE O/P EST MOD 30 MIN: CPT | Performed by: PHYSICIAN ASSISTANT

## 2022-02-25 PROCEDURE — 3008F BODY MASS INDEX DOCD: CPT | Performed by: PHYSICIAN ASSISTANT

## 2022-02-25 PROCEDURE — 3074F SYST BP LT 130 MM HG: CPT | Performed by: PHYSICIAN ASSISTANT

## 2022-02-25 PROCEDURE — 3078F DIAST BP <80 MM HG: CPT | Performed by: PHYSICIAN ASSISTANT

## 2022-03-07 ENCOUNTER — OFFICE VISIT (OUTPATIENT)
Dept: RHEUMATOLOGY | Facility: CLINIC | Age: 32
End: 2022-03-07
Payer: COMMERCIAL

## 2022-03-07 ENCOUNTER — TELEPHONE (OUTPATIENT)
Dept: RHEUMATOLOGY | Facility: CLINIC | Age: 32
End: 2022-03-07

## 2022-03-07 VITALS
TEMPERATURE: 98 F | SYSTOLIC BLOOD PRESSURE: 138 MMHG | DIASTOLIC BLOOD PRESSURE: 68 MMHG | HEART RATE: 90 BPM | OXYGEN SATURATION: 99 % | WEIGHT: 221 LBS | BODY MASS INDEX: 36.82 KG/M2 | RESPIRATION RATE: 16 BRPM | HEIGHT: 65 IN

## 2022-03-07 DIAGNOSIS — R74.8 ELEVATED CK: ICD-10-CM

## 2022-03-07 DIAGNOSIS — R76.8 JO-1 ANTIBODY POSITIVE: ICD-10-CM

## 2022-03-07 DIAGNOSIS — M79.7 FIBROMYALGIA: Primary | ICD-10-CM

## 2022-03-07 DIAGNOSIS — R93.89 ABNORMAL MRI: ICD-10-CM

## 2022-03-07 DIAGNOSIS — M25.50 POLYARTHRALGIA: ICD-10-CM

## 2022-03-07 PROCEDURE — 3078F DIAST BP <80 MM HG: CPT | Performed by: INTERNAL MEDICINE

## 2022-03-07 PROCEDURE — 3075F SYST BP GE 130 - 139MM HG: CPT | Performed by: INTERNAL MEDICINE

## 2022-03-07 PROCEDURE — 99215 OFFICE O/P EST HI 40 MIN: CPT | Performed by: INTERNAL MEDICINE

## 2022-03-07 PROCEDURE — 3008F BODY MASS INDEX DOCD: CPT | Performed by: INTERNAL MEDICINE

## 2022-03-07 NOTE — TELEPHONE ENCOUNTER
Pt calling in, wanting to inform Dr. Ambrose Billingsley that she has scheduled an appt with Dr. Flip Bueno on 04/25 at 1pm. Would like to know if Dr. Ambrose Billingsley would be able to expedite her appointment .  Please advise

## 2022-03-07 NOTE — TELEPHONE ENCOUNTER
Called pt and informed her that Dr. Selvin Sam will reach out to Dr. Hernesto Lindsay and present her case. It is up to him if he deems it necessary to get her in sooner. Pt verbalized understanding.

## 2022-03-08 NOTE — PROGRESS NOTES
Called pt and informed her that the CT of her pelvis should not be done as she already had it done recently. Her PCP is the one to follow with this issue. Pt then said that it was an MRI that she was supposed to get and would like Dr. Hannah Lea to order that. I explained that her PCP should be the one to order that since Dr. Hannah Lea is deferring the treatment and issue to her PCP. Pt said she was seeing her PCP on Monday and would bring it up.

## 2022-03-10 ENCOUNTER — TELEMEDICINE (OUTPATIENT)
Dept: FAMILY MEDICINE CLINIC | Facility: CLINIC | Age: 32
End: 2022-03-10

## 2022-03-10 DIAGNOSIS — S76.312D TEAR OF LEFT HAMSTRING, SUBSEQUENT ENCOUNTER: ICD-10-CM

## 2022-03-10 DIAGNOSIS — M79.7 FIBROMYALGIA: Primary | ICD-10-CM

## 2022-03-10 DIAGNOSIS — F32.A ANXIETY AND DEPRESSION: ICD-10-CM

## 2022-03-10 DIAGNOSIS — R25.2 JERKING MOVEMENTS OF EXTREMITIES: ICD-10-CM

## 2022-03-10 DIAGNOSIS — F40.240 CLAUSTROPHOBIA: ICD-10-CM

## 2022-03-10 DIAGNOSIS — Z82.0 FAMILY HISTORY OF MULTIPLE SCLEROSIS: ICD-10-CM

## 2022-03-10 DIAGNOSIS — F41.9 ANXIETY AND DEPRESSION: ICD-10-CM

## 2022-03-10 DIAGNOSIS — M25.862 IMPINGEMENT SYNDROME INVOLVING PATELLAR FAT PAD OF LEFT KNEE: ICD-10-CM

## 2022-03-10 DIAGNOSIS — M53.3 SACRAL PAIN: ICD-10-CM

## 2022-03-10 PROCEDURE — 99215 OFFICE O/P EST HI 40 MIN: CPT | Performed by: FAMILY MEDICINE

## 2022-03-10 RX ORDER — PREGABALIN 25 MG/1
CAPSULE ORAL
Qty: 42 CAPSULE | Refills: 0 | Status: SHIPPED | OUTPATIENT
Start: 2022-03-10 | End: 2022-04-07

## 2022-03-10 RX ORDER — ALPRAZOLAM 0.25 MG/1
0.25 TABLET ORAL AS NEEDED
Qty: 5 TABLET | Refills: 0 | Status: SHIPPED | OUTPATIENT
Start: 2022-03-10

## 2022-03-14 ENCOUNTER — TELEPHONE (OUTPATIENT)
Dept: RHEUMATOLOGY | Facility: CLINIC | Age: 32
End: 2022-03-14

## 2022-03-14 NOTE — TELEPHONE ENCOUNTER
Offered pt appt this wed for her carpal tunnel inj pt took appt and quickly cxld, pt is back on the cxl list

## 2022-04-01 ENCOUNTER — LAB ENCOUNTER (OUTPATIENT)
Dept: LAB | Age: 32
End: 2022-04-01
Attending: FAMILY MEDICINE
Payer: COMMERCIAL

## 2022-04-01 ENCOUNTER — TELEMEDICINE (OUTPATIENT)
Dept: FAMILY MEDICINE CLINIC | Facility: CLINIC | Age: 32
End: 2022-04-01

## 2022-04-01 DIAGNOSIS — Z20.822 CLOSE EXPOSURE TO COVID-19 VIRUS: ICD-10-CM

## 2022-04-01 DIAGNOSIS — Z20.822 CLOSE EXPOSURE TO COVID-19 VIRUS: Primary | ICD-10-CM

## 2022-04-01 PROCEDURE — 99213 OFFICE O/P EST LOW 20 MIN: CPT | Performed by: FAMILY MEDICINE

## 2022-04-02 LAB — SARS-COV-2 RNA RESP QL NAA+PROBE: NOT DETECTED

## 2022-04-07 ENCOUNTER — TELEPHONE (OUTPATIENT)
Dept: INTERNAL MEDICINE CLINIC | Facility: CLINIC | Age: 32
End: 2022-04-07

## 2022-04-07 NOTE — TELEPHONE ENCOUNTER
I called and spoke with patient. Goldy Contreras offered to do video visit with patient. Scheduled for this Tuesday. She has enough med until then. Scheduled in office visit for June - discussed with patient how each provider is booking up in advance so we want to get her scheduled earlier than waiting until due. IF she is not asked to schedule her f/u upon leaving I advised her to ask to schedule it. Patient verbalized understanding. She knows how to check in for Video visit and will be in person in [place] on June 3rd.

## 2022-04-07 NOTE — TELEPHONE ENCOUNTER
Pt is yahir that the first available in not until June she is requesting a phone call from either [place] or her nurse. Pt chose not to schedule at this time.

## 2022-04-12 ENCOUNTER — TELEMEDICINE (OUTPATIENT)
Dept: INTERNAL MEDICINE CLINIC | Facility: CLINIC | Age: 32
End: 2022-04-12

## 2022-04-12 DIAGNOSIS — K76.0 FATTY LIVER: ICD-10-CM

## 2022-04-12 DIAGNOSIS — R73.01 IFG (IMPAIRED FASTING GLUCOSE): ICD-10-CM

## 2022-04-12 DIAGNOSIS — E78.2 MIXED HYPERLIPIDEMIA: ICD-10-CM

## 2022-04-12 DIAGNOSIS — Z51.81 THERAPEUTIC DRUG MONITORING: Primary | ICD-10-CM

## 2022-04-12 DIAGNOSIS — E66.9 CLASS 2 OBESITY WITH BODY MASS INDEX (BMI) OF 37.0 TO 37.9 IN ADULT, UNSPECIFIED OBESITY TYPE, UNSPECIFIED WHETHER SERIOUS COMORBIDITY PRESENT: ICD-10-CM

## 2022-04-12 DIAGNOSIS — F50.81 BINGE EATING DISORDER: ICD-10-CM

## 2022-04-12 PROCEDURE — 99213 OFFICE O/P EST LOW 20 MIN: CPT | Performed by: PHYSICIAN ASSISTANT

## 2022-04-25 ENCOUNTER — OFFICE VISIT (OUTPATIENT)
Dept: NEUROLOGY | Facility: CLINIC | Age: 32
End: 2022-04-25
Payer: COMMERCIAL

## 2022-04-25 VITALS
WEIGHT: 221 LBS | DIASTOLIC BLOOD PRESSURE: 65 MMHG | HEART RATE: 82 BPM | BODY MASS INDEX: 36.82 KG/M2 | RESPIRATION RATE: 16 BRPM | SYSTOLIC BLOOD PRESSURE: 134 MMHG | HEIGHT: 65 IN

## 2022-04-25 DIAGNOSIS — M51.9 LUMBAR DISC DISEASE: Primary | ICD-10-CM

## 2022-04-25 DIAGNOSIS — M79.10 MYALGIA: ICD-10-CM

## 2022-04-25 DIAGNOSIS — R20.9 SENSATION DISTURBANCE OF SKIN: ICD-10-CM

## 2022-04-25 DIAGNOSIS — G44.201 ACUTE INTRACTABLE TENSION-TYPE HEADACHE: ICD-10-CM

## 2022-04-25 DIAGNOSIS — Z82.0 FAMILY HISTORY OF MS (MULTIPLE SCLEROSIS): ICD-10-CM

## 2022-04-27 ENCOUNTER — TELEPHONE (OUTPATIENT)
Dept: RHEUMATOLOGY | Facility: CLINIC | Age: 32
End: 2022-04-27

## 2022-04-27 NOTE — TELEPHONE ENCOUNTER
Order CT Pelvis with contrast came into the work queue directly on their end. Pt called to schedule this and MRI's ordered by Dr Valentin Álvarez.   Please confirm if a CT other than 1-30-22 has been ordered for pt by Dr MULLER.

## 2022-04-27 NOTE — TELEPHONE ENCOUNTER
No further imaging from my standpoint. I had recommended she follow up with PCP or ortho regarding bone lesions.      Anne-Marie Chacon DO  [facility]  4/27/2022

## 2022-05-02 ENCOUNTER — TELEPHONE (OUTPATIENT)
Dept: RHEUMATOLOGY | Facility: CLINIC | Age: 32
End: 2022-05-02

## 2022-05-02 NOTE — TELEPHONE ENCOUNTER
Called pt today to get her in for a carpal tunnel inj, pt has declined the last two offers, pt feels she shouldn't get anymore inj at this time and will hold off

## 2022-05-11 ENCOUNTER — TELEPHONE (OUTPATIENT)
Dept: NEUROLOGY | Facility: CLINIC | Age: 32
End: 2022-05-11

## 2022-05-25 ENCOUNTER — OFFICE VISIT (OUTPATIENT)
Dept: OBGYN CLINIC | Facility: CLINIC | Age: 32
End: 2022-05-25
Payer: COMMERCIAL

## 2022-05-25 VITALS
SYSTOLIC BLOOD PRESSURE: 128 MMHG | WEIGHT: 206.38 LBS | BODY MASS INDEX: 34.38 KG/M2 | HEART RATE: 78 BPM | HEIGHT: 65 IN | DIASTOLIC BLOOD PRESSURE: 82 MMHG

## 2022-05-25 DIAGNOSIS — M53.3 SI (SACROILIAC) JOINT DYSFUNCTION: Primary | ICD-10-CM

## 2022-05-25 DIAGNOSIS — N94.2 VAGINISMUS: ICD-10-CM

## 2022-05-25 PROCEDURE — 3074F SYST BP LT 130 MM HG: CPT | Performed by: NURSE PRACTITIONER

## 2022-05-25 PROCEDURE — 3008F BODY MASS INDEX DOCD: CPT | Performed by: NURSE PRACTITIONER

## 2022-05-25 PROCEDURE — 99213 OFFICE O/P EST LOW 20 MIN: CPT | Performed by: NURSE PRACTITIONER

## 2022-05-25 PROCEDURE — 3079F DIAST BP 80-89 MM HG: CPT | Performed by: NURSE PRACTITIONER

## 2022-05-26 ENCOUNTER — TELEPHONE (OUTPATIENT)
Dept: FAMILY MEDICINE CLINIC | Facility: CLINIC | Age: 32
End: 2022-05-26

## 2022-05-26 ENCOUNTER — LAB ENCOUNTER (OUTPATIENT)
Dept: LAB | Age: 32
End: 2022-05-26
Attending: FAMILY MEDICINE
Payer: COMMERCIAL

## 2022-05-26 ENCOUNTER — TELEPHONE (OUTPATIENT)
Dept: NEUROLOGY | Facility: CLINIC | Age: 32
End: 2022-05-26

## 2022-05-26 DIAGNOSIS — M25.50 POLYARTHRALGIA: ICD-10-CM

## 2022-05-26 DIAGNOSIS — R73.01 IMPAIRED FASTING GLUCOSE: ICD-10-CM

## 2022-05-26 DIAGNOSIS — G44.201 ACUTE INTRACTABLE TENSION-TYPE HEADACHE: ICD-10-CM

## 2022-05-26 DIAGNOSIS — R20.9 SENSATION DISTURBANCE OF SKIN: ICD-10-CM

## 2022-05-26 DIAGNOSIS — E78.2 MIXED HYPERLIPIDEMIA: ICD-10-CM

## 2022-05-26 DIAGNOSIS — Z82.0 FAMILY HISTORY OF MS (MULTIPLE SCLEROSIS): ICD-10-CM

## 2022-05-26 DIAGNOSIS — R74.8 ELEVATED CK: ICD-10-CM

## 2022-05-26 DIAGNOSIS — R76.8 JO-1 ANTIBODY POSITIVE: ICD-10-CM

## 2022-05-26 DIAGNOSIS — M51.9 LUMBAR DISC DISEASE: ICD-10-CM

## 2022-05-26 DIAGNOSIS — M79.10 MYALGIA: ICD-10-CM

## 2022-05-26 LAB
CHOLEST SERPL-MCNC: 224 MG/DL (ref ?–200)
CK SERPL-CCNC: 258 U/L
EST. AVERAGE GLUCOSE BLD GHB EST-MCNC: 111 MG/DL (ref 68–126)
FASTING PATIENT LIPID ANSWER: NO
HBA1C MFR BLD: 5.5 % (ref ?–5.7)
HDLC SERPL-MCNC: 59 MG/DL (ref 40–59)
LDH SERPL L TO P-CCNC: 242 U/L
LDLC SERPL CALC-MCNC: 140 MG/DL (ref ?–100)
NONHDLC SERPL-MCNC: 165 MG/DL (ref ?–130)
THYROPEROXIDASE AB SERPL-ACNC: 49 U/ML (ref ?–60)
TRIGL SERPL-MCNC: 141 MG/DL (ref 30–149)
TSI SER-ACNC: 1.31 MIU/ML (ref 0.36–3.74)
VIT B12 SERPL-MCNC: 300 PG/ML (ref 193–986)
VLDLC SERPL CALC-MCNC: 26 MG/DL (ref 0–30)

## 2022-05-26 PROCEDURE — 86376 MICROSOMAL ANTIBODY EACH: CPT

## 2022-05-26 PROCEDURE — 36415 COLL VENOUS BLD VENIPUNCTURE: CPT

## 2022-05-26 PROCEDURE — 83615 LACTATE (LD) (LDH) ENZYME: CPT

## 2022-05-26 PROCEDURE — 82085 ASSAY OF ALDOLASE: CPT

## 2022-05-26 PROCEDURE — 86235 NUCLEAR ANTIGEN ANTIBODY: CPT

## 2022-05-26 PROCEDURE — 82607 VITAMIN B-12: CPT

## 2022-05-26 PROCEDURE — 80061 LIPID PANEL: CPT

## 2022-05-26 PROCEDURE — 82550 ASSAY OF CK (CPK): CPT

## 2022-05-26 PROCEDURE — 84443 ASSAY THYROID STIM HORMONE: CPT

## 2022-05-26 PROCEDURE — 83036 HEMOGLOBIN GLYCOSYLATED A1C: CPT

## 2022-05-26 RX ORDER — METHYLPREDNISOLONE 4 MG/1
TABLET ORAL
Qty: 1 EACH | Refills: 0 | Status: SHIPPED | OUTPATIENT
Start: 2022-05-26

## 2022-05-26 NOTE — TELEPHONE ENCOUNTER
RN received a call from patient requesting our office to call her insurance company to confirm which lab completes the MTHFR test. RN informed the patient unfortunately we do not have anyone in our office that can help her with that. Pt was unhappy with that answer and hung up the phone.

## 2022-05-26 NOTE — TELEPHONE ENCOUNTER
LAKESHIA from Dr. Dakotah Valiente: likely sciatica- send medrol dose edmundo. Go to ER if in severe pain otherwise will see patient tomorrow. RX sent to pharmacy. Spoke to patient. Patient advised. Verbalized understanding. States tingling sensation goes down entire right leg but does not feel she needs to go to ER. She plans on completing labs today.

## 2022-05-26 NOTE — TELEPHONE ENCOUNTER
RN spoke to the [facility] the  advising her the patient needs to look for another lab to complete the lab. RN advised to check with her insurance company to make sure it is covered by her insurance. Pt verbalized understanding and did not have any further questions.

## 2022-05-26 NOTE — TELEPHONE ENCOUNTER
Pt called to schedule apt. She is feeling a painful lump on her lower back/spine. She says that she is feeling sharp pains going down her leg into her toes. She says that just her Right foot is going numb. She describes it as a tingling sensation like \"little ants\". She states that dr did order MRI's etc for her lower back and pelvis, but at the time the lump wasn't there. She would like to know if in the meantime dr can recommend something for her to do? She requests a call back if so.    Future Appointments   Date Time Provider Ayala Brisenoi   5/27/2022  2:00 PM Maged Harper MD [facility] [address] [facility] [address]   6/3/2022 10:20 AM Liliana Francis PA-C [facility] [address]   6/15/2022 11:00 AM Arina Light, PhD Edson Hitchcock   7/11/2022  3:00 PM Lory Soriano,  [facility] [address]

## 2022-05-26 NOTE — TELEPHONE ENCOUNTER
Received call from Pia at THE [facility] lab. States she researched lab and found out that the order of MTHFR lab is discontinued. Please advise alternative lab to draw.

## 2022-05-27 ENCOUNTER — OFFICE VISIT (OUTPATIENT)
Dept: FAMILY MEDICINE CLINIC | Facility: CLINIC | Age: 32
End: 2022-05-27
Payer: COMMERCIAL

## 2022-05-27 VITALS
WEIGHT: 206 LBS | RESPIRATION RATE: 16 BRPM | BODY MASS INDEX: 34.32 KG/M2 | HEART RATE: 100 BPM | TEMPERATURE: 98 F | HEIGHT: 65 IN | DIASTOLIC BLOOD PRESSURE: 72 MMHG | SYSTOLIC BLOOD PRESSURE: 122 MMHG | OXYGEN SATURATION: 97 %

## 2022-05-27 DIAGNOSIS — E78.2 MIXED HYPERLIPIDEMIA: ICD-10-CM

## 2022-05-27 DIAGNOSIS — M62.830 LUMBAR PARASPINAL MUSCLE SPASM: Primary | ICD-10-CM

## 2022-05-27 LAB — JO-1 AUTOAB: <100 AU/ML (ref ?–100)

## 2022-05-27 PROCEDURE — 3074F SYST BP LT 130 MM HG: CPT | Performed by: FAMILY MEDICINE

## 2022-05-27 PROCEDURE — 3078F DIAST BP <80 MM HG: CPT | Performed by: FAMILY MEDICINE

## 2022-05-27 PROCEDURE — 99213 OFFICE O/P EST LOW 20 MIN: CPT | Performed by: FAMILY MEDICINE

## 2022-05-27 PROCEDURE — 3008F BODY MASS INDEX DOCD: CPT | Performed by: FAMILY MEDICINE

## 2022-05-27 RX ORDER — CYCLOBENZAPRINE HCL 5 MG
5 TABLET ORAL 3 TIMES DAILY PRN
Qty: 60 TABLET | Refills: 0 | Status: SHIPPED | OUTPATIENT
Start: 2022-05-27

## 2022-05-28 LAB — ALDOLASE, SERUM: 3.2 U/L

## 2022-06-20 ENCOUNTER — OFFICE VISIT (OUTPATIENT)
Dept: INTERNAL MEDICINE CLINIC | Facility: CLINIC | Age: 32
End: 2022-06-20
Payer: COMMERCIAL

## 2022-06-20 VITALS
HEIGHT: 64.5 IN | SYSTOLIC BLOOD PRESSURE: 126 MMHG | DIASTOLIC BLOOD PRESSURE: 70 MMHG | HEART RATE: 72 BPM | WEIGHT: 202 LBS | BODY MASS INDEX: 34.07 KG/M2 | RESPIRATION RATE: 16 BRPM

## 2022-06-20 DIAGNOSIS — E78.2 MIXED HYPERLIPIDEMIA: ICD-10-CM

## 2022-06-20 DIAGNOSIS — K76.0 FATTY LIVER: ICD-10-CM

## 2022-06-20 DIAGNOSIS — Z51.81 THERAPEUTIC DRUG MONITORING: Primary | ICD-10-CM

## 2022-06-20 DIAGNOSIS — F50.81 BINGE EATING DISORDER: ICD-10-CM

## 2022-06-20 DIAGNOSIS — E66.9 CLASS 1 OBESITY WITH BODY MASS INDEX (BMI) OF 34.0 TO 34.9 IN ADULT, UNSPECIFIED OBESITY TYPE, UNSPECIFIED WHETHER SERIOUS COMORBIDITY PRESENT: ICD-10-CM

## 2022-06-20 DIAGNOSIS — R73.01 IFG (IMPAIRED FASTING GLUCOSE): ICD-10-CM

## 2022-06-20 PROCEDURE — 3074F SYST BP LT 130 MM HG: CPT | Performed by: PHYSICIAN ASSISTANT

## 2022-06-20 PROCEDURE — 3078F DIAST BP <80 MM HG: CPT | Performed by: PHYSICIAN ASSISTANT

## 2022-06-20 PROCEDURE — 3008F BODY MASS INDEX DOCD: CPT | Performed by: PHYSICIAN ASSISTANT

## 2022-06-20 PROCEDURE — 99214 OFFICE O/P EST MOD 30 MIN: CPT | Performed by: PHYSICIAN ASSISTANT

## 2022-06-20 RX ORDER — MOMETASONE FUROATE 1 MG/G
OINTMENT TOPICAL
COMMUNITY
Start: 2022-05-31

## 2022-06-20 RX ORDER — SEMAGLUTIDE 2.68 MG/ML
2 INJECTION, SOLUTION SUBCUTANEOUS WEEKLY
Qty: 9 ML | Refills: 0 | Status: SHIPPED | OUTPATIENT
Start: 2022-06-20

## 2022-06-21 ENCOUNTER — TELEPHONE (OUTPATIENT)
Dept: INTERNAL MEDICINE CLINIC | Facility: CLINIC | Age: 32
End: 2022-06-21

## 2022-06-21 NOTE — TELEPHONE ENCOUNTER
I called patient back. She thought A bryan said to take 1.5 mg of Ozempic  I explained there is no 1.5 mg dose. Her notes are to take 2 mg weekly of Ozempic. She will proceed with that and let us know if she has any issues. Routing to [place] as well.

## 2022-06-21 NOTE — TELEPHONE ENCOUNTER
Pt called in regards to her Ozempic that was newly prescibed for 1.5mg and pt usually has previously used 1mg and pt is asking for directions on how to take this medicication and if she should use her 1mg remaining before moves up to 1.5mg

## 2022-07-11 ENCOUNTER — OFFICE VISIT (OUTPATIENT)
Dept: RHEUMATOLOGY | Facility: CLINIC | Age: 32
End: 2022-07-11
Payer: COMMERCIAL

## 2022-07-11 VITALS
HEIGHT: 65 IN | RESPIRATION RATE: 16 BRPM | BODY MASS INDEX: 33.49 KG/M2 | HEART RATE: 100 BPM | DIASTOLIC BLOOD PRESSURE: 60 MMHG | WEIGHT: 201 LBS | OXYGEN SATURATION: 98 % | SYSTOLIC BLOOD PRESSURE: 128 MMHG

## 2022-07-11 DIAGNOSIS — G56.01 RIGHT CARPAL TUNNEL SYNDROME: ICD-10-CM

## 2022-07-11 DIAGNOSIS — M54.50 CHRONIC BILATERAL LOW BACK PAIN WITHOUT SCIATICA: ICD-10-CM

## 2022-07-11 DIAGNOSIS — G89.29 CHRONIC BILATERAL LOW BACK PAIN WITHOUT SCIATICA: ICD-10-CM

## 2022-07-11 DIAGNOSIS — M62.830 LUMBAR PARASPINAL MUSCLE SPASM: ICD-10-CM

## 2022-07-11 DIAGNOSIS — Z30.41 SURVEILLANCE FOR BIRTH CONTROL, ORAL CONTRACEPTIVES: ICD-10-CM

## 2022-07-11 DIAGNOSIS — M79.7 FIBROMYALGIA: Primary | ICD-10-CM

## 2022-07-11 DIAGNOSIS — F12.90 MARIJUANA USE: ICD-10-CM

## 2022-07-11 DIAGNOSIS — R53.82 CHRONIC FATIGUE: ICD-10-CM

## 2022-07-11 DIAGNOSIS — M25.50 POLYARTHRALGIA: ICD-10-CM

## 2022-07-11 DIAGNOSIS — M54.2 NECK PAIN: ICD-10-CM

## 2022-07-11 PROCEDURE — 3074F SYST BP LT 130 MM HG: CPT | Performed by: INTERNAL MEDICINE

## 2022-07-11 PROCEDURE — 99215 OFFICE O/P EST HI 40 MIN: CPT | Performed by: INTERNAL MEDICINE

## 2022-07-11 PROCEDURE — 3078F DIAST BP <80 MM HG: CPT | Performed by: INTERNAL MEDICINE

## 2022-07-11 PROCEDURE — 3008F BODY MASS INDEX DOCD: CPT | Performed by: INTERNAL MEDICINE

## 2022-07-11 RX ORDER — NORGESTIMATE AND ETHINYL ESTRADIOL 0.25-0.035
KIT ORAL
Qty: 84 TABLET | Refills: 2 | Status: SHIPPED | OUTPATIENT
Start: 2022-07-11

## 2022-07-13 RX ORDER — CYCLOBENZAPRINE HCL 5 MG
TABLET ORAL
Qty: 60 TABLET | Refills: 0 | Status: SHIPPED | OUTPATIENT
Start: 2022-07-13

## 2022-07-21 ENCOUNTER — APPOINTMENT (OUTPATIENT)
Dept: PHYSICAL THERAPY | Age: 32
End: 2022-07-21
Attending: NURSE PRACTITIONER
Payer: COMMERCIAL

## 2022-07-26 ENCOUNTER — APPOINTMENT (OUTPATIENT)
Dept: PHYSICAL THERAPY | Age: 32
End: 2022-07-26
Attending: NURSE PRACTITIONER
Payer: COMMERCIAL

## 2022-07-27 ENCOUNTER — TELEPHONE (OUTPATIENT)
Dept: PHYSICAL THERAPY | Facility: HOSPITAL | Age: 32
End: 2022-07-27

## 2022-07-29 ENCOUNTER — OFFICE VISIT (OUTPATIENT)
Dept: PHYSICAL THERAPY | Age: 32
End: 2022-07-29
Attending: NURSE PRACTITIONER
Payer: COMMERCIAL

## 2022-07-29 ENCOUNTER — TELEPHONE (OUTPATIENT)
Dept: PHYSICAL THERAPY | Facility: HOSPITAL | Age: 32
End: 2022-07-29

## 2022-07-29 DIAGNOSIS — M62.830 LUMBAR PARASPINAL MUSCLE SPASM: ICD-10-CM

## 2022-07-29 PROCEDURE — 97112 NEUROMUSCULAR REEDUCATION: CPT

## 2022-07-29 PROCEDURE — 97110 THERAPEUTIC EXERCISES: CPT

## 2022-07-29 PROCEDURE — 97163 PT EVAL HIGH COMPLEX 45 MIN: CPT

## 2022-07-31 RX ORDER — CYCLOBENZAPRINE HCL 5 MG
TABLET ORAL
Qty: 60 TABLET | Refills: 0 | Status: SHIPPED | OUTPATIENT
Start: 2022-07-31

## 2022-08-05 ENCOUNTER — OFFICE VISIT (OUTPATIENT)
Dept: PHYSICAL THERAPY | Age: 32
End: 2022-08-05
Attending: NURSE PRACTITIONER
Payer: COMMERCIAL

## 2022-08-05 ENCOUNTER — TELEPHONE (OUTPATIENT)
Dept: PHYSICAL THERAPY | Facility: HOSPITAL | Age: 32
End: 2022-08-05

## 2022-08-05 PROCEDURE — 97110 THERAPEUTIC EXERCISES: CPT

## 2022-08-05 PROCEDURE — 97112 NEUROMUSCULAR REEDUCATION: CPT

## 2022-08-08 ENCOUNTER — TELEPHONE (OUTPATIENT)
Dept: RHEUMATOLOGY | Facility: CLINIC | Age: 32
End: 2022-08-08

## 2022-08-08 NOTE — TELEPHONE ENCOUNTER
We offered pt a cancellation for her carpal tunnel inj, pt refused. We have left pt on 3 separate occasions messages for cxls for her inj on her wrist.  Pt never returned call.

## 2022-08-08 NOTE — TELEPHONE ENCOUNTER
Returned pt's call. Discussed cancellation list process. Pt not able to  due to her son's therapy sessions. I apologized but also reminded pt that my office has called multiple times to set up carpal tunnel injections and she has not been able to find a time that works. Offered again for her to see ortho hand specialist which she declined. Will have her scheduled for appt in Nov/Dec to have injection done and if sooner appt available, she will be called. Patient verbalized understanding of above instructions. No further questions at this time.     Ronna Wyman,   [facility]  8/8/2022

## 2022-08-12 ENCOUNTER — OFFICE VISIT (OUTPATIENT)
Dept: PHYSICAL THERAPY | Age: 32
End: 2022-08-12
Attending: NURSE PRACTITIONER
Payer: COMMERCIAL

## 2022-08-12 PROCEDURE — 97112 NEUROMUSCULAR REEDUCATION: CPT

## 2022-08-12 PROCEDURE — 97140 MANUAL THERAPY 1/> REGIONS: CPT

## 2022-08-16 ENCOUNTER — OFFICE VISIT (OUTPATIENT)
Dept: PHYSICAL THERAPY | Age: 32
End: 2022-08-16
Attending: NURSE PRACTITIONER
Payer: COMMERCIAL

## 2022-08-16 PROCEDURE — 97112 NEUROMUSCULAR REEDUCATION: CPT

## 2022-08-16 NOTE — PATIENT INSTRUCTIONS
[employer] Cal[employer]: apps for management of chronic pain, mindfulness    Dilators: Intimate Patricia Arms ($30 most places/ [employer] - low level vibration); feel free to check [employer] & look at different price points, materials etc. The Intimate Ariane ones you saw today are silicone    Slippery Stuff water based lubricant (check [employer])

## 2022-08-17 ENCOUNTER — OFFICE VISIT (OUTPATIENT)
Dept: NEUROLOGY | Facility: CLINIC | Age: 32
End: 2022-08-17
Payer: COMMERCIAL

## 2022-08-17 VITALS
HEART RATE: 87 BPM | BODY MASS INDEX: 33.15 KG/M2 | HEIGHT: 65 IN | RESPIRATION RATE: 16 BRPM | WEIGHT: 199 LBS | SYSTOLIC BLOOD PRESSURE: 136 MMHG | DIASTOLIC BLOOD PRESSURE: 73 MMHG

## 2022-08-17 DIAGNOSIS — M79.10 MYALGIA: Primary | ICD-10-CM

## 2022-08-17 DIAGNOSIS — G56.03 CARPAL TUNNEL SYNDROME ON BOTH SIDES: ICD-10-CM

## 2022-08-17 DIAGNOSIS — R90.82 WHITE MATTER ABNORMALITY ON MRI OF BRAIN: ICD-10-CM

## 2022-08-17 DIAGNOSIS — R20.2 PARESTHESIA OF BOTH HANDS: ICD-10-CM

## 2022-08-17 PROCEDURE — 3078F DIAST BP <80 MM HG: CPT | Performed by: PHYSICIAN ASSISTANT

## 2022-08-17 PROCEDURE — 3008F BODY MASS INDEX DOCD: CPT | Performed by: PHYSICIAN ASSISTANT

## 2022-08-17 PROCEDURE — 3075F SYST BP GE 130 - 139MM HG: CPT | Performed by: PHYSICIAN ASSISTANT

## 2022-08-17 PROCEDURE — 99213 OFFICE O/P EST LOW 20 MIN: CPT | Performed by: PHYSICIAN ASSISTANT

## 2022-08-17 RX ORDER — SEMAGLUTIDE 1.34 MG/ML
INJECTION, SOLUTION SUBCUTANEOUS
COMMUNITY

## 2022-08-19 ENCOUNTER — TELEPHONE (OUTPATIENT)
Dept: FAMILY MEDICINE CLINIC | Facility: CLINIC | Age: 32
End: 2022-08-19

## 2022-08-19 DIAGNOSIS — Z62.810 PERSONAL HISTORY OF SEXUAL ABUSE IN CHILDHOOD: Primary | ICD-10-CM
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
## 2022-08-23 ENCOUNTER — TELEMEDICINE (OUTPATIENT)
Dept: INTERNAL MEDICINE CLINIC | Facility: CLINIC | Age: 32
End: 2022-08-23

## 2022-08-23 DIAGNOSIS — F41.9 ANXIETY AND DEPRESSION: ICD-10-CM

## 2022-08-23 DIAGNOSIS — F50.81 BINGE EATING DISORDER: ICD-10-CM

## 2022-08-23 DIAGNOSIS — F32.A ANXIETY AND DEPRESSION: ICD-10-CM

## 2022-08-23 DIAGNOSIS — Z51.81 THERAPEUTIC DRUG MONITORING: Primary | ICD-10-CM

## 2022-08-23 DIAGNOSIS — E78.2 MIXED HYPERLIPIDEMIA: ICD-10-CM

## 2022-08-23 DIAGNOSIS — E66.9 CLASS 1 OBESITY WITH BODY MASS INDEX (BMI) OF 34.0 TO 34.9 IN ADULT, UNSPECIFIED OBESITY TYPE, UNSPECIFIED WHETHER SERIOUS COMORBIDITY PRESENT: ICD-10-CM

## 2022-08-23 DIAGNOSIS — K76.0 FATTY LIVER: ICD-10-CM

## 2022-08-23 DIAGNOSIS — R73.01 IFG (IMPAIRED FASTING GLUCOSE): ICD-10-CM

## 2022-08-23 PROCEDURE — 99213 OFFICE O/P EST LOW 20 MIN: CPT | Performed by: PHYSICIAN ASSISTANT

## 2022-09-02 ENCOUNTER — APPOINTMENT (OUTPATIENT)
Dept: PHYSICAL THERAPY | Age: 32
End: 2022-09-02
Attending: NURSE PRACTITIONER
Payer: COMMERCIAL

## 2022-09-09 ENCOUNTER — TELEPHONE (OUTPATIENT)
Dept: PHYSICAL THERAPY | Facility: HOSPITAL | Age: 32
End: 2022-09-09

## 2022-09-09 ENCOUNTER — APPOINTMENT (OUTPATIENT)
Dept: PHYSICAL THERAPY | Age: 32
End: 2022-09-09
Attending: NURSE PRACTITIONER
Payer: COMMERCIAL

## 2022-09-09 ENCOUNTER — PATIENT MESSAGE (OUTPATIENT)
Dept: PHYSICAL THERAPY | Facility: HOSPITAL | Age: 32
End: 2022-09-09

## 2022-09-14 RX ORDER — LISDEXAMFETAMINE DIMESYLATE CAPSULES 50 MG/1
50 CAPSULE ORAL EVERY MORNING
Qty: 30 CAPSULE | Refills: 0 | Status: CANCELLED | OUTPATIENT
Start: 2022-09-14

## 2022-09-14 NOTE — TELEPHONE ENCOUNTER
Requesting vyvanse 50 mg  LOV: 8/23/22  RTC: not noted  Last Relevant Labs: na  Filled: 8/23, 9/23 and 10/24 sent to pharmacy    My chart sent to patient.

## 2022-09-16 ENCOUNTER — OFFICE VISIT (OUTPATIENT)
Dept: PHYSICAL THERAPY | Age: 32
End: 2022-09-16
Attending: NURSE PRACTITIONER
Payer: COMMERCIAL

## 2022-09-16 PROCEDURE — 97110 THERAPEUTIC EXERCISES: CPT

## 2022-09-16 PROCEDURE — 97140 MANUAL THERAPY 1/> REGIONS: CPT

## 2022-09-16 PROCEDURE — 97112 NEUROMUSCULAR REEDUCATION: CPT

## 2022-09-23 ENCOUNTER — OFFICE VISIT (OUTPATIENT)
Dept: PHYSICAL THERAPY | Age: 32
End: 2022-09-23
Attending: NURSE PRACTITIONER
Payer: COMMERCIAL

## 2022-09-23 PROCEDURE — 97140 MANUAL THERAPY 1/> REGIONS: CPT

## 2022-09-23 PROCEDURE — 97110 THERAPEUTIC EXERCISES: CPT

## 2022-09-27 ENCOUNTER — OFFICE VISIT (OUTPATIENT)
Dept: PHYSICAL THERAPY | Age: 32
End: 2022-09-27
Attending: NURSE PRACTITIONER
Payer: COMMERCIAL

## 2022-09-27 PROCEDURE — 97110 THERAPEUTIC EXERCISES: CPT

## 2022-09-27 PROCEDURE — 97140 MANUAL THERAPY 1/> REGIONS: CPT

## 2022-10-03 ENCOUNTER — TELEPHONE (OUTPATIENT)
Dept: INTERNAL MEDICINE CLINIC | Facility: CLINIC | Age: 32
End: 2022-10-03

## 2022-10-03 RX ORDER — SEMAGLUTIDE 2.68 MG/ML
2 INJECTION, SOLUTION SUBCUTANEOUS WEEKLY
Qty: 9 ML | Refills: 0 | Status: SHIPPED | OUTPATIENT
Start: 2022-10-03

## 2022-10-03 NOTE — TELEPHONE ENCOUNTER
Requesting Ozempic 2mg  LOV: 8/23/22  RTC: not noted  Last Relevant Labs: 5/26/22  Filled: 6/23/22 #9ml with 0  refills    10/18/2022 10:20 AM JEN Daniel [facility] [address]      I called patient to verify - we had not gotten any request for this previously. She was calling now. Due on Saturday. Has been using 2 mg. Per Adams okay to refill - done.

## 2022-10-06 ENCOUNTER — PATIENT MESSAGE (OUTPATIENT)
Dept: INTERNAL MEDICINE CLINIC | Facility: CLINIC | Age: 32
End: 2022-10-06

## 2022-10-07 ENCOUNTER — OFFICE VISIT (OUTPATIENT)
Dept: PHYSICAL THERAPY | Age: 32
End: 2022-10-07
Attending: NURSE PRACTITIONER
Payer: COMMERCIAL

## 2022-10-07 PROCEDURE — 97112 NEUROMUSCULAR REEDUCATION: CPT

## 2022-10-07 PROCEDURE — 97110 THERAPEUTIC EXERCISES: CPT

## 2022-10-07 RX ORDER — SEMAGLUTIDE 2.68 MG/ML
2 INJECTION, SOLUTION SUBCUTANEOUS WEEKLY
Qty: 1 EACH | Refills: 12 | COMMUNITY
Start: 2022-10-07

## 2022-10-14 ENCOUNTER — OFFICE VISIT (OUTPATIENT)
Dept: PHYSICAL THERAPY | Age: 32
End: 2022-10-14
Attending: FAMILY MEDICINE
Payer: COMMERCIAL

## 2022-10-14 PROCEDURE — 97140 MANUAL THERAPY 1/> REGIONS: CPT

## 2022-10-14 PROCEDURE — 97110 THERAPEUTIC EXERCISES: CPT

## 2022-10-14 PROCEDURE — 97112 NEUROMUSCULAR REEDUCATION: CPT

## 2022-10-18 ENCOUNTER — TELEMEDICINE (OUTPATIENT)
Dept: INTERNAL MEDICINE CLINIC | Facility: CLINIC | Age: 32
End: 2022-10-18

## 2022-10-18 DIAGNOSIS — Z51.81 THERAPEUTIC DRUG MONITORING: Primary | ICD-10-CM

## 2022-10-18 DIAGNOSIS — E78.2 MIXED HYPERLIPIDEMIA: ICD-10-CM

## 2022-10-18 DIAGNOSIS — E66.9 CLASS 1 OBESITY WITH BODY MASS INDEX (BMI) OF 34.0 TO 34.9 IN ADULT, UNSPECIFIED OBESITY TYPE, UNSPECIFIED WHETHER SERIOUS COMORBIDITY PRESENT: ICD-10-CM

## 2022-10-18 DIAGNOSIS — K76.0 FATTY LIVER: ICD-10-CM

## 2022-10-18 DIAGNOSIS — F41.9 ANXIETY AND DEPRESSION: ICD-10-CM

## 2022-10-18 DIAGNOSIS — F50.81 BINGE EATING DISORDER: ICD-10-CM

## 2022-10-18 DIAGNOSIS — R73.01 IFG (IMPAIRED FASTING GLUCOSE): ICD-10-CM

## 2022-10-18 DIAGNOSIS — F32.A ANXIETY AND DEPRESSION: ICD-10-CM

## 2022-10-18 PROCEDURE — 99213 OFFICE O/P EST LOW 20 MIN: CPT | Performed by: PHYSICIAN ASSISTANT

## 2022-10-18 RX ORDER — SEMAGLUTIDE 2.68 MG/ML
2 INJECTION, SOLUTION SUBCUTANEOUS WEEKLY
Qty: 9 ML | Refills: 1 | Status: SHIPPED | OUTPATIENT
Start: 2022-10-18

## 2022-10-20 ENCOUNTER — TELEPHONE (OUTPATIENT)
Dept: INTERNAL MEDICINE CLINIC | Facility: CLINIC | Age: 32
End: 2022-10-20

## 2022-10-21 ENCOUNTER — OFFICE VISIT (OUTPATIENT)
Dept: PHYSICAL THERAPY | Age: 32
End: 2022-10-21
Attending: FAMILY MEDICINE
Payer: COMMERCIAL

## 2022-10-21 PROCEDURE — 97110 THERAPEUTIC EXERCISES: CPT

## 2022-10-21 PROCEDURE — 97112 NEUROMUSCULAR REEDUCATION: CPT

## 2022-10-21 NOTE — PROGRESS NOTES
Diagnosis:   SI (sacroiliac) joint dysfunction (M53.3)  Vaginismus (N94.2)      Referring Provider: Alejandra Melendez Date of Evaluation:    7/29/2022    Precautions:  None Fibromyalgia Next MD visit:   none scheduled  Date of Surgery: n/a   Insurance Primary/Secondary: [employer] P[place] / N/A     # Auth Visits: 60v limit, no auth              ProgressSummary  Pt has attended 10 visits in Physical Therapy. Subjective: Pt reports fatigue the last few days. Superficial pelvic muscles are less aggravated, but expresses pulling/ vacuum sensation (intermittent pressure) to the superficial pelvic muscles. Cont w/ Nocturia 3x max, normally 1-2x, but feels this has improved. Feels the Fibro may be related to her symptoms. Continues with painful intercourse. Has not attempted since most recent painful attempt. Feels her mobility is affected, feels limited with basic things, squats, cleaning playroom. Feels the adductors, HS, back of calf mm, even the foot. Feels stuck in \"fight or flight\" a lot, heightened nervous symptoms. Working on calming & regulating her nervous system, using weighted blanket 15#. Everything feels weak & tense, even her arms. Improvements: breathing has been very helpful      Pain: 5/10 vulvar region, introitus, L pelvis/ abdomen (deeper)      Objective:    10/21/2022  Transverse Abdominis: 4-/5  Hip ABD: R 4/5, L 4-/5    Pt took PFDI-20 written survey home with her to complete before next visit. Unable to complete in clinic today due to pt's schedule constraints.       10/7/2022  Informed verbal consent for internal pelvic evaluation given: Yes    Internal Palpation: Pertinent Findings include Superficial Transverse Perineal L>R severe restriction and pain  Bulbocavernosus L>R moderate restriction and pain  Ischiocavernosus L>R moderate restriction and pain  Deep Transverse Perineal L>R severe restriction and pain  Compress Urethra/Sphincter L>R moderate restriction and pain  Urethrovaginalis L>R moderate restriction and pain  Pubococcygeus/Pubovaginalis L>Rmoderate restriction and pain  Obturator Internus L>R moderate restriction and pain      Assessment: Pt has completed 10/10 visits on initial plan of care. Treatment has emphasized stretching, pelvic downtraining, manual treatment both external & internal, extensive education. Continued to recommend that pt consider EMDR certified therapist to address trauma & effects on her body in relation to pelvic floor symptoms. Pt also recommended again that Dilator Training likely would be beneficial for pt to aid w/ vaginismus & dyspareunia. Pt would benefit from continued skilled PT addressing functional strength progressions, & static & dynamic stretches. Symptoms likely coming from multimodal reasons & thus requiring extended plan of care to comprehensively address multiple causes of pt's symptoms to allow for appropriate discharge to independent [facility]. Goals:   (to be met in 10 visits)    Patient exhibits an increase in Transverse Abdominis strength from 3/5 to 4/5 to allow for pelvic brace with ADLs for decreased c/o pain w/ functional activities. IMPROVED    Patient exhibits an increased in B Hip Abductor strength to 4+/5 to allow for deep squatting w/ proper form & lifting w/ improved mechanics. IMPROVED    Patient will improve Lumbar Flexion AROM to [facility] for bending forward which includes giving children a bath. IMPROVED    Patient reports a change in Marinoff scale from 1-2 to 1-0 to allow for intercourse and pelvic exam with minimal pain. Patient will report the ability to utilize mindfulness/ relaxation techniques provided by PT to aid w/ improved nervous system regulation contributing to reduction of urinary symptoms, such as more complete emptying w/ voiding, decreased hesitancy, decreased post void dribble. MET    Patient will report nocturia 0-1x for improved restful sleep, decreased daytime fatigue, & improved ability to function at work.  IMPROVED    Patient understands importance of performing HEP to prevent reoccurrence of symptoms. IN PROGRESS    Plan: Continue skilled Physical Therapy 1 x/week or a total of 10 visits over a 90 day period. Treatment will/ may include: Manual Therapy, Neuromuscular Re-education, Self-Care Home Management, Therapeutic Activities, Therapeutic Exercise, Home Exercise Program instruction and Modalities to include: Electrical stimulation (unattended), Ultrasound and hot pack/ cold pack    Patient/Family/Caregiver was advised of these findings, precautions, and treatment options and has agreed to actively participate in planning and for this course of care. Thank you for your referral. If you have any questions, please contact me at Dept: [phone]. Sincerely,  Electronically signed by therapist: Pepper Perez PT     Physician's certification required:  Yes  Please co-sign or sign and return this letter via fax as soon as possible to [phone]. I certify the need for these services furnished under this plan of treatment and while under my care. X___________________________________________________ Date____________________    Certification From: 87/12/1865  To:1/19/2023   Date: 9/16/2022  TX#: 5/10 Date: 9/23/2022  Tx#: 6/10 Date: 9/27/2022  Tx#: 7/10 Date: 10/7/2022  Tx#: 8/10 Date: 10/14/2022  Tx#: 9/10 Date: 10/21/2022  Tx#: 10/10   Therex  Modified Irvin hip flexor/ quad stretch w/ manual OP 6x30\" ea  Education - massaging; TENS unit. Strengthening (avoid  currently) for decreasing tightness  Manual Therapy  Lumbar joint mobs CPA L1-L5 gr I-IV; manual myofascial lumbar stretch/ distraction x 16' total  Neuro Re-ed  Education - counselor when pt is ready.  Mind/ body connection, nervous system regulation - decreasing \"fight or flight\"  Possible use of vaginal valium, vaginal lidocaine - need to discuss w/ medical provider for appropriateness Neuro Re-ed  Education- intensity of self PF STM (lighter) - manual touch to upper arm for intensity  Education- pelvic floor tension in relationship to previous negative experiences/ trauma  Therex  Flat Back Stretch x 10 DB  Hip ADD Stretch x 10 DB  Happy Baby Stretch (modified) 2x30\"  Piriformis Figure-4 Stretch 2x30\" w/ instruction for HEP  Seated Piriformis Stretch 3x30\" ea w/ instruction for HEP  Manual Therapy (24')  Pelvic Diaphragm External Release/ Stretch w/ instruction for HEP  Scar mob / suprapubic w/ superior fascial glide Manual Therapy  CPA prone L1-L5, T6-T12 gr I-IV x 30' total  Therex  Foam Roll Stretch x 8'  FR Claps w/ Wrist Extension x 10  Child's Pose/ Prayer Stretch 3x30\" Therex  Flat Back stretch x 10 DB; 2x reps  Hip ADD Stretch x 10 DB; 2x reps  Education: ice vulvar region. Neuro Re-ed  Possible benefit of Dilators. Bring in Pelvic Wand for PT to teach pt proper use. External Vulvar support garments to alleviate pressure. Avoid Vaginal Penetrative [place] this weekend. Possible benefit Vaginal Valium, Vaginal Lidocaine  Diaphragmatic Breathing with: Manual Introital Release (L, Periurethral Release (L), Levator Ani Release (L) w/ sweeping; Contract-Relax w/ manual hip IR Obturator Internus (L)  Manual cues for Lengthening PF mm w/ Internal cues: w/ Diaphragmatic Breathing: use of trampoline analogy, elevator analogy; Pt to practice this weekend with PF relaxation  Sex Therapist Therex  Education - Sciatic nerve modification in sidelying vs supine per tolerance  Education - Modified butterfly stretch w/ support  Neuro Re-ed  Dilator review - education. Review - relationship between mental health & physical health. Discussed counselor/ therapist. Possible Integrative Medicine.   Manual Therapy  Lumbar Central PA gr I-III x 10'  STM R/L HS, glutes w/ Tiger Tail Rollerstick x 10' prone     Therex  Re-assessment (verbal & MMT) - verbal portion concurrent w/ stretching (hip ER/ Piriformis)  Clams x 15 ea  Prone Hip IR stretch 10\" x 10 (bilateral)  Prone Hip IR Celine 5\" x 10 Red TheraBand  Flat Back Stretch @ II bars  Neuro Re-ed  Restorative yoga. Regular yoga - free on YouTube  Review Dilator recommendations, pt instructed to bring in Pelvic Wand for PT to instruct in proper use for self-performed trigger point/ manual treatment. EMDR Certified Therapist - recommend for processing trauma's effect on the body/ pelvic floor mm     HEP:   7/29/2022 Diaphragmatic Breathing, Sciatic Nerve Glides (Supine), Butterfly Pelvic Floor Stretch (Supine)  8/5/2022: Mindfulness; LTR, Piriformis Stretch crossover, Daily Voiding Log  8/16/2022: Use of pt's finger for introduction to dilator training: use w/ Slippery stuff water based lubricant ([address] [place] [address]). Practice several minutes. Hip ABD Celine w/ Blue TheraBand w/ DB & TA, Hip ADD Celine w/ Ball Squeeze w/ DB & TA  9/23/2022: Self performed Pelvic Diaphragm Release/ Stretch - goal is gentle stretch superiorly not pushing down on abdomen (no compression), Piriformis stretches - figure 4/ seated    Charges:  Therex x 2, Neuro x 1 Total Timed Treatment: 44 min  Total Treatment Time: 44 min

## 2022-10-28 ENCOUNTER — APPOINTMENT (OUTPATIENT)
Dept: PHYSICAL THERAPY | Age: 32
End: 2022-10-28
Attending: FAMILY MEDICINE
Payer: COMMERCIAL

## 2022-11-03 NOTE — TELEPHONE ENCOUNTER
Ozempic 2 mg not found  Patient is not diabetic  I have spoken with Flip Myers who is okay to switch patient that are not diabetic to 1mg dose.   Sent now

## 2022-11-04 ENCOUNTER — OFFICE VISIT (OUTPATIENT)
Dept: PHYSICAL THERAPY | Age: 32
End: 2022-11-04
Attending: FAMILY MEDICINE
Payer: COMMERCIAL

## 2022-11-04 PROCEDURE — 97110 THERAPEUTIC EXERCISES: CPT

## 2022-11-04 PROCEDURE — 97140 MANUAL THERAPY 1/> REGIONS: CPT

## 2022-11-04 PROCEDURE — 97112 NEUROMUSCULAR REEDUCATION: CPT

## 2022-11-04 NOTE — PROGRESS NOTES
Diagnosis:   SI (sacroiliac) joint dysfunction (M53.3)  Vaginismus (N94.2)      Referring Provider: Jose G Ramirez Date of Evaluation:    2022    Precautions:  None Fibromyalgia Next MD visit:   none scheduled  Date of Surgery: n/a   Insurance Primary/Secondary: [employer] P[place] / N/A     # Auth Visits: 60v limit, no auth    Subjective: Pt reports crossing the legs pinches the hips, feels tension to posterior & medial thighs (thinks adductors)    Pain: 6/10      Objective:    2022  Decreased superior fascial glide  suprapubic scar; STRs/ scar restrictions at scar      10/21/2022  Transverse Abdominis: 4-/5  Hip ABD: R 4/5, L 4-/5    Pt took PFDI-20 written survey home with her to complete before next visit. Unable to complete in clinic today due to pt's schedule constraints. 10/7/2022  Informed verbal consent for internal pelvic evaluation given: Yes    Internal Palpation: Pertinent Findings include Superficial Transverse Perineal L>R severe restriction and pain  Bulbocavernosus L>R moderate restriction and pain  Ischiocavernosus L>R moderate restriction and pain  Deep Transverse Perineal L>R severe restriction and pain  Compress Urethra/Sphincter L>R moderate restriction and pain  Urethrovaginalis L>R moderate restriction and pain  Pubococcygeus/Pubovaginalis L>Rmoderate restriction and pain  Obturator Internus L>R moderate restriction and pain      Assessment: Initial tendency to \"suck in\" vs \"draw up & in\" for TA: compensation w/ rectus noted (upper ab gripping). This was improved after manual & verbal cues/ imagery. Limited by HS pain/ tension mid session. Trial of standing lumbar extension repeated movements - better tolerance to flat back stretch after performing. Educated in importance of core activation/ strengthening for decreasing chronic HS tension & pain. Pt instructed in ability to perform self  scar mobs/ superior fascial stretch/ skin rolling.  Discussed relationship between muscle tension/ tightness & weakness. Goals:   (to be met in 10 visits)    Patient exhibits an increase in Transverse Abdominis strength from 3/5 to 4/5 to allow for pelvic brace with ADLs for decreased c/o pain w/ functional activities. IMPROVED    Patient exhibits an increased in B Hip Abductor strength to 4+/5 to allow for deep squatting w/ proper form & lifting w/ improved mechanics. IMPROVED    Patient will improve Lumbar Flexion AROM to [facility] for bending forward which includes giving children a bath. IMPROVED    Patient reports a change in Marinoff scale from 1-2 to 1-0 to allow for intercourse and pelvic exam with minimal pain. Patient will report the ability to utilize mindfulness/ relaxation techniques provided by PT to aid w/ improved nervous system regulation contributing to reduction of urinary symptoms, such as more complete emptying w/ voiding, decreased hesitancy, decreased post void dribble. MET    Patient will report nocturia 0-1x for improved restful sleep, decreased daytime fatigue, & improved ability to function at work. IMPROVED    Patient understands importance of performing HEP to prevent reoccurrence of symptoms. IN PROGRESS    Plan: Plan for review of proper use of Pelvic Wand that pt had purchased on her own from [employer] prior to starting pelvic PT. Date: 9/16/2022  TX#: 5/10 Date: 9/23/2022  Tx#: 6/10 Date: 9/27/2022  Tx#: 7/10 Date: 10/7/2022  Tx#: 8/10 Date: 10/14/2022  Tx#: 9/10 Date: 10/21/2022  Tx#: 10/10 Date: 11/4/2022  Tx#: 11/20   Therex  Modified Irvin hip flexor/ quad stretch w/ manual OP 6x30\" ea  Education - massaging; TENS unit. Strengthening (avoid  currently) for decreasing tightness  Manual Therapy  Lumbar joint mobs CPA L1-L5 gr I-IV; manual myofascial lumbar stretch/ distraction x 16' total  Neuro Re-ed  Education - counselor when pt is ready.  Mind/ body connection, nervous system regulation - decreasing \"fight or flight\"  Possible use of vaginal valium, vaginal lidocaine - need to discuss w/ medical provider for appropriateness Neuro Re-ed  Education- intensity of self PF STM (lighter) - manual touch to upper arm for intensity  Education- pelvic floor tension in relationship to previous negative experiences/ trauma  Therex  Flat Back Stretch x 10 DB  Hip ADD Stretch x 10 DB  Happy Baby Stretch (modified) 2x30\"  Piriformis Figure-4 Stretch 2x30\" w/ instruction for HEP  Seated Piriformis Stretch 3x30\" ea w/ instruction for HEP  Manual Therapy (24')  Pelvic Diaphragm External Release/ Stretch w/ instruction for HEP  Scar mob / suprapubic w/ superior fascial glide Manual Therapy  CPA prone L1-L5, T6-T12 gr I-IV x 30' total  Therex  Foam Roll Stretch x 8'  FR Claps w/ Wrist Extension x 10  Child's Pose/ Prayer Stretch 3x30\" Therex  Flat Back stretch x 10 DB; 2x reps  Hip ADD Stretch x 10 DB; 2x reps  Education: ice vulvar region. Neuro Re-ed  Possible benefit of Dilators. Bring in Pelvic Wand for PT to teach pt proper use. External Vulvar support garments to alleviate pressure. Avoid Vaginal Penetrative [place] this weekend. Possible benefit Vaginal Valium, Vaginal Lidocaine  Diaphragmatic Breathing with: Manual Introital Release (L, Periurethral Release (L), Levator Ani Release (L) w/ sweeping; Contract-Relax w/ manual hip IR Obturator Internus (L)  Manual cues for Lengthening PF mm w/ Internal cues: w/ Diaphragmatic Breathing: use of trampoline analogy, elevator analogy; Pt to practice this weekend with PF relaxation  Sex Therapist Therex  Education - Sciatic nerve modification in sidelying vs supine per tolerance  Education - Modified butterfly stretch w/ support  Neuro Re-ed  Dilator review - education. Review - relationship between mental health & physical health. Discussed counselor/ therapist. Possible Integrative Medicine.   Manual Therapy  Lumbar Central PA gr I-III x 10'  STM R/L HS, glutes w/ Tiger Tail Rollerstick x 10' prone     Therex  Re-assessment (verbal & MMT) - verbal portion concurrent w/ stretching (hip ER/ Piriformis)  Clams x 15 ea  Prone Hip IR stretch 10\" x 10 (bilateral)  Prone Hip IR Celine 5\" x 10 Red TheraBand  Flat Back Stretch @ II bars  Neuro Re-ed  Restorative yoga. Regular yoga - free on YouTube  Review Dilator recommendations, pt instructed to bring in Pelvic Wand for PT to instruct in proper use for self-performed trigger point/ manual treatment. EMDR Certified Therapist - recommend for processing trauma's effect on the body/ pelvic floor mm Neuro Re-ed  Hip ADD Celine 5\" hold w/ Pilates Ring (purple) w/ DB & TA x 5' hooklying  TA Bracing: review proper intensity to ensure isolation vs substitution/ compensation  Hip ABD Celine 5\" hold w/ Pilates Ring (purple) w/ DB & TA x 5' hooklying  Therex  Flat Back stretch 3x30\" - repeat again after standing lumbar extension (retest)  Standing lumbar extension x 10  Manual Therapy (20')  Scar mob / suprapubic w/ superior fascial glide, skin/ connective tissue rolling. Instruction for HEP: hand over hand for pt to complete indep w/ proper form. HEP:   2022 Diaphragmatic Breathing, Sciatic Nerve Glides (Supine), Butterfly Pelvic Floor Stretch (Supine)  2022: Mindfulness; LTR, Piriformis Stretch crossover, Daily Voiding Log  2022: Use of pt's finger for introduction to dilator training: use w/ Slippery stuff water based lubricant ([address]). Practice several minutes. Hip ABD Celine w/ Blue TheraBand w/ DB & TA, Hip ADD Celine w/ Ball Squeeze w/ DB & TA  2022: Self performed Pelvic Diaphragm Release/ Stretch - goal is gentle stretch superiorly not pushing down on abdomen (no compression), Piriformis stretches - figure 4/ seated  2022: Blue TheraBand given for Hip ABD Celine, w/ instruction to add [place] for Hip ADD Celine. Standing lumbar extension.  Self scar mobs/ stretch    Charges: Neuro x 1, Therex x 1, Manual x 1 Total Timed Treatment: 46 min  Total Treatment Time: 46 min

## 2022-11-11 ENCOUNTER — OFFICE VISIT (OUTPATIENT)
Dept: PHYSICAL THERAPY | Age: 32
End: 2022-11-11
Attending: FAMILY MEDICINE
Payer: COMMERCIAL

## 2022-11-11 PROCEDURE — 97140 MANUAL THERAPY 1/> REGIONS: CPT

## 2022-11-11 PROCEDURE — 97110 THERAPEUTIC EXERCISES: CPT

## 2022-11-11 PROCEDURE — 97112 NEUROMUSCULAR REEDUCATION: CPT

## 2022-11-11 NOTE — PROGRESS NOTES
Diagnosis:   SI (sacroiliac) joint dysfunction (M53.3)  Vaginismus (N94.2)      Referring Provider: Sha Allen Date of Evaluation:    2022    Precautions:  None Fibromyalgia Next MD visit:   none scheduled  Date of Surgery: n/a   Insurance Primary/Secondary: [employer] PPO / N/A     # Auth Visits: 60v limit, no auth    Subjective: Pt reports increased LBP; menstrual cycle started   Squatting is challenging  Trying to wind down earlier at night  Difficulty with sitting on floor with her children with legs spread straight  Coccyx pain, inner thigh pain    Pain: 6/10      Objective:    2022  + pain R & L Obturator Internus (OI) assessed externally in sidelying: pt provided consent for PT to palpate here over clothing      2022  Decreased superior fascial glide  suprapubic scar; STRs/ scar restrictions at scar      10/21/2022  Transverse Abdominis: 4-/5  Hip ABD: R 4/5, L 4-/5    Pt took PFDI-20 written survey home with her to complete before next visit. Unable to complete in clinic today due to pt's schedule constraints. 10/7/2022  Informed verbal consent for internal pelvic evaluation given: Yes    Internal Palpation: Pertinent Findings include Superficial Transverse Perineal L>R severe restriction and pain  Bulbocavernosus L>R moderate restriction and pain  Ischiocavernosus L>R moderate restriction and pain  Deep Transverse Perineal L>R severe restriction and pain  Compress Urethra/Sphincter L>R moderate restriction and pain  Urethrovaginalis L>R moderate restriction and pain  Pubococcygeus/Pubovaginalis L>Rmoderate restriction and pain  Obturator Internus L>R moderate restriction and pain      Assessment: Recommended sitting in hip IR/ ER position for increased stability (& can use as a stretch) vs LEs extended in modified long sitting with hip abduction. Pt able to progress core & hip abductor strengthening, with engagement of TA in the WB position.  Discussed importance of avoiding \"sucking in\" her abdominals frequently as this increases pelvic floor muscle tension & thus also pain. Goals:   (to be met in 10 visits)    Patient exhibits an increase in Transverse Abdominis strength from 3/5 to 4/5 to allow for pelvic brace with ADLs for decreased c/o pain w/ functional activities. IMPROVED    Patient exhibits an increased in B Hip Abductor strength to 4+/5 to allow for deep squatting w/ proper form & lifting w/ improved mechanics. IMPROVED    Patient will improve Lumbar Flexion AROM to [facility] for bending forward which includes giving children a bath. IMPROVED    Patient reports a change in Marinoff scale from 1-2 to 1-0 to allow for intercourse and pelvic exam with minimal pain. Patient will report the ability to utilize mindfulness/ relaxation techniques provided by PT to aid w/ improved nervous system regulation contributing to reduction of urinary symptoms, such as more complete emptying w/ voiding, decreased hesitancy, decreased post void dribble. MET    Patient will report nocturia 0-1x for improved restful sleep, decreased daytime fatigue, & improved ability to function at work. IMPROVED    Patient understands importance of performing HEP to prevent reoccurrence of symptoms. IN PROGRESS    Plan: Plan for review of proper use of Pelvic Wand that pt had purchased on her own from [employer] prior to starting pelvic PT.   Date: 9/23/2022  Tx#: 6/10 Date: 9/27/2022  Tx#: 7/10 Date: 10/7/2022  Tx#: 8/10 Date: 10/14/2022  Tx#: 9/10 Date: 10/21/2022  Tx#: 10/10 Date: 11/4/2022  Tx#: 11/20 Date: 11/11/2022  Tx#: 12/20   Neuro Re-ed  Education- intensity of self PF STM (lighter) - manual touch to upper arm for intensity  Education- pelvic floor tension in relationship to previous negative experiences/ trauma  Therex  Flat Back Stretch x 10 DB  Hip ADD Stretch x 10 DB  Happy Baby Stretch (modified) 2x30\"  Piriformis Figure-4 Stretch 2x30\" w/ instruction for HEP  Seated Piriformis Stretch 3x30\" ea w/ instruction for HEP  Manual Therapy (24')  Pelvic Diaphragm External Release/ Stretch w/ instruction for HEP  Scar mob / suprapubic w/ superior fascial glide Manual Therapy  CPA prone L1-L5, T6-T12 gr I-IV x 30' total  Therex  Foam Roll Stretch x 8'  FR Claps w/ Wrist Extension x 10  Child's Pose/ Prayer Stretch 3x30\" Therex  Flat Back stretch x 10 DB; 2x reps  Hip ADD Stretch x 10 DB; 2x reps  Education: ice vulvar region. Neuro Re-ed  Possible benefit of Dilators. Bring in Pelvic Wand for PT to teach pt proper use. External Vulvar support garments to alleviate pressure. Avoid Vaginal Penetrative [place] this weekend. Possible benefit Vaginal Valium, Vaginal Lidocaine  Diaphragmatic Breathing with: Manual Introital Release (L, Periurethral Release (L), Levator Ani Release (L) w/ sweeping; Contract-Relax w/ manual hip IR Obturator Internus (L)  Manual cues for Lengthening PF mm w/ Internal cues: w/ Diaphragmatic Breathing: use of trampoline analogy, elevator analogy; Pt to practice this weekend with PF relaxation  Sex Therapist Therex  Education - Sciatic nerve modification in sidelying vs supine per tolerance  Education - Modified butterfly stretch w/ support  Neuro Re-ed  Dilator review - education. Review - relationship between mental health & physical health. Discussed counselor/ therapist. Possible Integrative Medicine. Manual Therapy  Lumbar Central PA gr I-III x 10'  STM R/L HS, glutes w/ Tiger Tail Rollerstick x 10' prone     Therex  Re-assessment (verbal & MMT) - verbal portion concurrent w/ stretching (hip ER/ Piriformis)  Clams x 15 ea  Prone Hip IR stretch 10\" x 10 (bilateral)  Prone Hip IR Celine 5\" x 10 Red TheraBand  Flat Back Stretch @ II bars  Neuro Re-ed  Restorative yoga. Regular yoga - free on YouTube  Review Dilator recommendations, pt instructed to bring in Pelvic Wand for PT to instruct in proper use for self-performed trigger point/ manual treatment.   EMDR Certified Therapist - recommend for processing trauma's effect on the body/ pelvic floor mm Neuro Re-ed  Hip ADD Celine 5\" hold w/ Pilates Ring (purple) w/ DB & TA x 5' hooklying  TA Bracing: review proper intensity to ensure isolation vs substitution/ compensation  Hip ABD Celine 5\" hold w/ Pilates Ring (purple) w/ DB & TA x 5' hooklying  Therex  Flat Back stretch 3x30\" - repeat again after standing lumbar extension (retest)  Standing lumbar extension x 10  Manual Therapy (20')  Scar mob / suprapubic w/ superior fascial glide, skin/ connective tissue rolling. Instruction for HEP: hand over hand for pt to complete indep w/ proper form. Therex  Deep squat PF stretch 3x30\"  Seated positions: hip IR/ER vs long sitting with hip ABD  Quadruped multifidi raises x 15 ea  Prone hip IR stretch actively 10\" x 10  Neuro Re-ed  Sit->Stand: DB w/ TA & Hip ABD Celine w/ Pilates Ring x 10  Standing Pilates Ring TA presses x 20  Manual Therapy  External release R/L OI x 10' sidelying over clothing/ gloved     HEP:   2022 Diaphragmatic Breathing, Sciatic Nerve Glides (Supine), Butterfly Pelvic Floor Stretch (Supine)  2022: Mindfulness; LTR, Piriformis Stretch crossover, Daily Voiding Log  2022: Use of pt's finger for introduction to dilator training: use w/ Slippery stuff water based lubricant ([address] [facility] [address]). Practice several minutes. Hip ABD Celine w/ Blue TheraBand w/ DB & TA, Hip ADD Celine w/ Ball Squeeze w/ DB & TA  2022: Self performed Pelvic Diaphragm Release/ Stretch - goal is gentle stretch superiorly not pushing down on abdomen (no compression), Piriformis stretches - figure 4/ seated  2022: Blue TheraBand given for Hip ABD Celine, w/ instruction to add Sruthi & Rich for Hip ADD Celine. Standing lumbar extension.  Self scar mobs/ stretch  2022: deep squat PF stretch, prone hip IR stretch    Charges: Neuro x 1, Therex x 1, Manual x 1 Total Timed Treatment: 47 min  Total Treatment Time: 47 min

## 2022-11-18 ENCOUNTER — APPOINTMENT (OUTPATIENT)
Dept: PHYSICAL THERAPY | Age: 32
End: 2022-11-18
Attending: FAMILY MEDICINE
Payer: COMMERCIAL

## 2022-11-21 ENCOUNTER — OFFICE VISIT (OUTPATIENT)
Dept: RHEUMATOLOGY | Facility: CLINIC | Age: 32
End: 2022-11-21
Payer: COMMERCIAL

## 2022-11-21 VITALS
BODY MASS INDEX: 32.49 KG/M2 | OXYGEN SATURATION: 99 % | SYSTOLIC BLOOD PRESSURE: 130 MMHG | HEART RATE: 83 BPM | HEIGHT: 65 IN | RESPIRATION RATE: 16 BRPM | DIASTOLIC BLOOD PRESSURE: 75 MMHG | WEIGHT: 195 LBS

## 2022-11-21 DIAGNOSIS — R53.82 CHRONIC FATIGUE: ICD-10-CM

## 2022-11-21 DIAGNOSIS — R76.8 POSITIVE ANA (ANTINUCLEAR ANTIBODY): ICD-10-CM

## 2022-11-21 DIAGNOSIS — R74.8 ELEVATED CK: ICD-10-CM

## 2022-11-21 DIAGNOSIS — G62.9 NEUROPATHY: ICD-10-CM

## 2022-11-21 DIAGNOSIS — G56.01 RIGHT CARPAL TUNNEL SYNDROME: ICD-10-CM

## 2022-11-21 DIAGNOSIS — M79.10 MYALGIA: ICD-10-CM

## 2022-11-21 DIAGNOSIS — M25.50 POLYARTHRALGIA: ICD-10-CM

## 2022-11-21 DIAGNOSIS — M79.7 FIBROMYALGIA: Primary | ICD-10-CM

## 2022-11-25 ENCOUNTER — APPOINTMENT (OUTPATIENT)
Dept: PHYSICAL THERAPY | Age: 32
End: 2022-11-25
Attending: FAMILY MEDICINE
Payer: COMMERCIAL

## 2022-12-02 ENCOUNTER — OFFICE VISIT (OUTPATIENT)
Dept: PHYSICAL THERAPY | Age: 32
End: 2022-12-02
Attending: FAMILY MEDICINE
Payer: COMMERCIAL

## 2022-12-02 PROCEDURE — 97110 THERAPEUTIC EXERCISES: CPT

## 2022-12-02 PROCEDURE — 97112 NEUROMUSCULAR REEDUCATION: CPT

## 2022-12-02 NOTE — PROGRESS NOTES
Diagnosis:   SI (sacroiliac) joint dysfunction (M53.3)  Vaginismus (N94.2)      Referring Provider: Sha Allen Date of Evaluation:    2022    Precautions:  None Fibromyalgia Next MD visit:   none scheduled  Date of Surgery: n/a   Insurance Primary/Secondary: [employer] PPO / N/A     # Auth Visits: 60v limit, no auth    Subjective: Pt reports inner thigh muscles have had the jitters, needs to stretch  Been doing lots of housekeeping  [place] leaning fwd - muscles shaking  \"feels like tmj in my pelvis\" - clenching      Pain: 6/10      Objective:    2022  + pain R & L Obturator Internus (OI) assessed externally in sidelying: pt provided consent for PT to palpate here over clothing      2022  Decreased superior fascial glide  suprapubic scar; STRs/ scar restrictions at scar      10/21/2022  Transverse Abdominis: 4-/5  Hip ABD: R 4/5, L 4-/5    Pt took PFDI-20 written survey home with her to complete before next visit. Unable to complete in clinic today due to pt's schedule constraints. 10/7/2022  Informed verbal consent for internal pelvic evaluation given: Yes    Internal Palpation: Pertinent Findings include Superficial Transverse Perineal L>R severe restriction and pain  Bulbocavernosus L>R moderate restriction and pain  Ischiocavernosus L>R moderate restriction and pain  Deep Transverse Perineal L>R severe restriction and pain  Compress Urethra/Sphincter L>R moderate restriction and pain  Urethrovaginalis L>R moderate restriction and pain  Pubococcygeus/Pubovaginalis L>Rmoderate restriction and pain  Obturator Internus L>R moderate restriction and pain      Assessment: Emphasized strength progressions today. Education re: avoiding pushing through severe pain, though may be necessary for pt to exercise w/ low level/ mild pain as pt's pain may likely be connected to weakness creating tightness as a form of stability - goal is to strengthen to provide pt w/ functional stability.  Challenged w/ engaging PPT for hip flexor stretch. Goals:   (to be met in 10 visits)    Patient exhibits an increase in Transverse Abdominis strength from 3/5 to 4/5 to allow for pelvic brace with ADLs for decreased c/o pain w/ functional activities. IMPROVED    Patient exhibits an increased in B Hip Abductor strength to 4+/5 to allow for deep squatting w/ proper form & lifting w/ improved mechanics. IMPROVED    Patient will improve Lumbar Flexion AROM to [facility] for bending forward which includes giving children a bath. IMPROVED    Patient reports a change in Marinoff scale from 1-2 to 1-0 to allow for intercourse and pelvic exam with minimal pain. Patient will report the ability to utilize mindfulness/ relaxation techniques provided by PT to aid w/ improved nervous system regulation contributing to reduction of urinary symptoms, such as more complete emptying w/ voiding, decreased hesitancy, decreased post void dribble. MET    Patient will report nocturia 0-1x for improved restful sleep, decreased daytime fatigue, & improved ability to function at work. IMPROVED    Patient understands importance of performing HEP to prevent reoccurrence of symptoms. IN PROGRESS    Plan: Plan for review of proper use of Pelvic Wand that pt had purchased on her own from [employer] prior to starting pelvic PT - pt recommended to bring to next session. Date: 9/27/2022  Tx#: 7/10 Date: 10/7/2022  Tx#: 8/10 Date: 10/14/2022  Tx#: 9/10 Date: 10/21/2022  Tx#: 10/10 Date: 11/4/2022  Tx#: 11/20 Date: 11/11/2022  Tx#: 12/20 Date: 12/2/2022  Tx#: 13/20   Manual Therapy  CPA prone L1-L5, T6-T12 gr I-IV x 30' total  Therex  Foam Roll Stretch x 8'  FR Claps w/ Wrist Extension x 10  Child's Pose/ Prayer Stretch 3x30\" Therex  Flat Back stretch x 10 DB; 2x reps  Hip ADD Stretch x 10 DB; 2x reps  Education: ice vulvar region. Neuro Re-ed  Possible benefit of Dilators. Bring in Pelvic Wand for PT to teach pt proper use.  External Vulvar support garments to alleviate pressure. Avoid Vaginal Penetrative [place] this weekend. Possible benefit Vaginal Valium, Vaginal Lidocaine  Diaphragmatic Breathing with: Manual Introital Release (L, Periurethral Release (L), Levator Ani Release (L) w/ sweeping; Contract-Relax w/ manual hip IR Obturator Internus (L)  Manual cues for Lengthening PF mm w/ Internal cues: w/ Diaphragmatic Breathing: use of trampoline analogy, elevator analogy; Pt to practice this weekend with PF relaxation  Sex Therapist Therex  Education - Sciatic nerve modification in sidelying vs supine per tolerance  Education - Modified butterfly stretch w/ support  Neuro Re-ed  Dilator review - education. Review - relationship between mental health & physical health. Discussed counselor/ therapist. Possible Integrative Medicine. Manual Therapy  Lumbar Central PA gr I-III x 10'  STM R/L HS, glutes w/ Tiger Tail Rollerstick x 10' prone     Therex  Re-assessment (verbal & MMT) - verbal portion concurrent w/ stretching (hip ER/ Piriformis)  Clams x 15 ea  Prone Hip IR stretch 10\" x 10 (bilateral)  Prone Hip IR Celine 5\" x 10 Red TheraBand  Flat Back Stretch @ II bars  Neuro Re-ed  Restorative yoga. Regular yoga - free on YouTube  Review Dilator recommendations, pt instructed to bring in Pelvic Wand for PT to instruct in proper use for self-performed trigger point/ manual treatment. EMDR Certified Therapist - recommend for processing trauma's effect on the body/ pelvic floor mm Neuro Re-ed  Hip ADD Celine 5\" hold w/ Pilates Ring (purple) w/ DB & TA x 5' hooklying  TA Bracing: review proper intensity to ensure isolation vs substitution/ compensation  Hip ABD Celine 5\" hold w/ Pilates Ring (purple) w/ DB & TA x 5' hooklying  Therex  Flat Back stretch 3x30\" - repeat again after standing lumbar extension (retest)  Standing lumbar extension x 10  Manual Therapy (20')  Scar mob / suprapubic w/ superior fascial glide, skin/ connective tissue rolling. Instruction for HEP: hand over hand for pt to complete indep w/ proper form. Therex  Deep squat PF stretch 3x30\"  Seated positions: hip IR/ER vs long sitting with hip ABD  Quadruped multifidi raises x 15 ea  Prone hip IR stretch actively 10\" x 10  Neuro Re-ed  Sit->Stand: DB w/ TA & Hip ABD Celine w/ Pilates Ring x 10  Standing Pilates Ring TA presses x 20  Manual Therapy  External release R/L OI x 10' sidelying over clothing/ gloved Therex  Flat back stretch 3x30\"  Hip ADD stretch: 2x30\"  1/2 kneel hip flexor stretch on Airex foam: 2x30\" ea w/ instruction for HEP  Aquatic/ pool exercise - squats  Squats: recommend pt avoid daily. 3x/ week  Clams x 15 ea  Reverse clams x 15 ea  Neuro Re-ed  Pelvic Brace w/ Crabwalk lateral walking w/ Green TB 20 ft x 4  Shuttle Leg Press 50# w/ DB & Pelvic Brace coordination w/ Hip ABD Celine w/ Pilates Ring (black) x 5'  Possible EMDR/ Somatic Experiencing: mind-body connection. Pelvic Floor connection - sympathetic nervous system being activated  Education - avoiding jaw clenching - linked to PF clenching     HEP:   7/29/2022 Diaphragmatic Breathing, Sciatic Nerve Glides (Supine), Butterfly Pelvic Floor Stretch (Supine)  8/5/2022: Mindfulness; LTR, Piriformis Stretch crossover, Daily Voiding Log  8/16/2022: Use of pt's finger for introduction to dilator training: use w/ Slippery stuff water based lubricant ([address]). Practice several minutes. Hip ABD Celine w/ Blue TheraBand w/ DB & TA, Hip ADD Celine w/ Ball Squeeze w/ DB & TA  9/23/2022: Self performed Pelvic Diaphragm Release/ Stretch - goal is gentle stretch superiorly not pushing down on abdomen (no compression), Piriformis stretches - figure 4/ seated  11/4/2022: Blue TheraBand given for Hip ABD Celine, w/ instruction to add [place] & Rich for Hip ADD Celine. Standing lumbar extension. Self scar mobs/ stretch  11/11/2022: deep squat PF stretch, prone hip IR stretch  12/2/2022: 1/2 kneel hip flexor stretch    Charges:  Therex x 2, Neuro x 1 Total Timed Treatment: 41 min  Total Treatment Time: 41 min

## 2022-12-09 ENCOUNTER — OFFICE VISIT (OUTPATIENT)
Dept: PHYSICAL THERAPY | Age: 32
End: 2022-12-09
Attending: FAMILY MEDICINE
Payer: COMMERCIAL

## 2022-12-09 PROCEDURE — 97140 MANUAL THERAPY 1/> REGIONS: CPT

## 2022-12-09 NOTE — PROGRESS NOTES
Diagnosis:   SI (sacroiliac) joint dysfunction (M53.3)  Vaginismus (N94.2)      Referring Provider: David Vargas Date of Evaluation:    2022    Precautions:  None Fibromyalgia Next MD visit:   none scheduled  Date of Surgery: n/a   Insurance Primary/Secondary: [employer] [place] PPO / N/A     # Auth Visits: 60v limit, no auth    Subjective: Pt reports not enough time in the day. Knees been taking the brunt, starting to compensate? Pelvic floor - status quo; trying to stop clenching abdomen - needs to tell herself to relax. Doing a lot of happy baby, butterfly; stretch on the wall - numbness, but helps the discomfort. On menstrual cycle      Pain: 6/10. Adductors the most. vibrating      Objective:    2022  + pain R & L Obturator Internus (OI) assessed externally in sidelying: pt provided consent for PT to palpate here over clothing      2022  Decreased superior fascial glide  suprapubic scar; STRs/ scar restrictions at scar      10/21/2022  Transverse Abdominis: 4-/5  Hip ABD: R 4/5, L 4-/5    Pt took PFDI-20 written survey home with her to complete before next visit. Unable to complete in clinic today due to pt's schedule constraints. 10/7/2022  Informed verbal consent for internal pelvic evaluation given: Yes    Internal Palpation: Pertinent Findings include Superficial Transverse Perineal L>R severe restriction and pain  Bulbocavernosus L>R moderate restriction and pain  Ischiocavernosus L>R moderate restriction and pain  Deep Transverse Perineal L>R severe restriction and pain  Compress Urethra/Sphincter L>R moderate restriction and pain  Urethrovaginalis L>R moderate restriction and pain  Pubococcygeus/Pubovaginalis L>Rmoderate restriction and pain  Obturator Internus L>R moderate restriction and pain      Assessment: Emphasized manual treatment today. Increased tension to L>R thoracolumbar PSM. Educated that improving pelvic/ core stability will aid in reduction of paraspinal muscle tension.  Pt expressed relief with pelvic diaphragm external release. Concurrent education during manual treatment: nervous system regulation & mind-body connection. Goals:   (to be met in 10 visits)    Patient exhibits an increase in Transverse Abdominis strength from 3/5 to 4/5 to allow for pelvic brace with ADLs for decreased c/o pain w/ functional activities. IMPROVED    Patient exhibits an increased in B Hip Abductor strength to 4+/5 to allow for deep squatting w/ proper form & lifting w/ improved mechanics. IMPROVED    Patient will improve Lumbar Flexion AROM to [facility] for bending forward which includes giving children a bath. IMPROVED    Patient reports a change in Marinoff scale from 1-2 to 1-0 to allow for intercourse and pelvic exam with minimal pain. Patient will report the ability to utilize mindfulness/ relaxation techniques provided by PT to aid w/ improved nervous system regulation contributing to reduction of urinary symptoms, such as more complete emptying w/ voiding, decreased hesitancy, decreased post void dribble. MET    Patient will report nocturia 0-1x for improved restful sleep, decreased daytime fatigue, & improved ability to function at work. IMPROVED    Patient understands importance of performing HEP to prevent reoccurrence of symptoms. IN PROGRESS    Plan: Plan for review of proper use of Pelvic Wand that pt had purchased on her own from [facility] prior to starting pelvic PT - pt recommended to bring to next session. Date: 10/7/2022  Tx#: 8/10 Date: 10/14/2022  Tx#: 9/10 Date: 10/21/2022  Tx#: 10/10 Date: 11/4/2022  Tx#: 11/20 Date: 11/11/2022  Tx#: 12/20 Date: 12/2/2022  Tx#: 13/20 Date: 12/9/2022  Tx#: 14/20   Therex  Flat Back stretch x 10 DB; 2x reps  Hip ADD Stretch x 10 DB; 2x reps  Education: ice vulvar region. Neuro Re-ed  Possible benefit of Dilators. Bring in Pelvic Wand for PT to teach pt proper use. External Vulvar support garments to alleviate pressure.  Avoid Vaginal Penetrative [place] this weekend. Possible benefit Vaginal Valium, Vaginal Lidocaine  Diaphragmatic Breathing with: Manual Introital Release (L, Periurethral Release (L), Levator Ani Release (L) w/ sweeping; Contract-Relax w/ manual hip IR Obturator Internus (L)  Manual cues for Lengthening PF mm w/ Internal cues: w/ Diaphragmatic Breathing: use of trampoline analogy, elevator analogy; Pt to practice this weekend with PF relaxation  Sex Therapist Therex  Education - Sciatic nerve modification in sidelying vs supine per tolerance  Education - Modified butterfly stretch w/ support  Neuro Re-ed  Dilator review - education. Review - relationship between mental health & physical health. Discussed counselor/ therapist. Possible Integrative Medicine. Manual Therapy  Lumbar Central PA gr I-III x 10'  STM R/L HS, glutes w/ Tiger Tail Rollerstick x 10' prone     Therex  Re-assessment (verbal & MMT) - verbal portion concurrent w/ stretching (hip ER/ Piriformis)  Clams x 15 ea  Prone Hip IR stretch 10\" x 10 (bilateral)  Prone Hip IR Celine 5\" x 10 Red TheraBand  Flat Back Stretch @ II bars  Neuro Re-ed  Restorative yoga. Regular yoga - free on YouTube  Review Dilator recommendations, pt instructed to bring in Pelvic Wand for PT to instruct in proper use for self-performed trigger point/ manual treatment. EMDR Certified Therapist - recommend for processing trauma's effect on the body/ pelvic floor mm Neuro Re-ed  Hip ADD Celine 5\" hold w/ Pilates Ring (purple) w/ DB & TA x 5' hooklying  TA Bracing: review proper intensity to ensure isolation vs substitution/ compensation  Hip ABD Celine 5\" hold w/ Pilates Ring (purple) w/ DB & TA x 5' hooklying  Therex  Flat Back stretch 3x30\" - repeat again after standing lumbar extension (retest)  Standing lumbar extension x 10  Manual Therapy (20')  Scar mob / suprapubic w/ superior fascial glide, skin/ connective tissue rolling.  Instruction for HEP: hand over hand for pt to complete indep w/ proper form. Therex  Deep squat PF stretch 3x30\"  Seated positions: hip IR/ER vs long sitting with hip ABD  Quadruped multifidi raises x 15 ea  Prone hip IR stretch actively 10\" x 10  Neuro Re-ed  Sit->Stand: DB w/ TA & Hip ABD Celine w/ Pilates Ring x 10  Standing Pilates Ring TA presses x 20  Manual Therapy  External release R/L OI x 10' sidelying over clothing/ gloved Therex  Flat back stretch 3x30\"  Hip ADD stretch: 2x30\"  1/2 kneel hip flexor stretch on Airex foam: 2x30\" ea w/ instruction for HEP  Aquatic/ pool exercise - squats  Squats: recommend pt avoid daily. 3x/ week  Clams x 15 ea  Reverse clams x 15 ea  Neuro Re-ed  Pelvic Brace w/ Crabwalk lateral walking w/ Green TB 20 ft x 4  Shuttle Leg Press 50# w/ DB & Pelvic Brace coordination w/ Hip ABD Celine w/ Pilates Ring (black) x 5'  Possible EMDR/ Somatic Experiencing: mind-body connection. Pelvic Floor connection - sympathetic nervous system being activated  Education - avoiding jaw clenching - linked to PF clenching Manual Therapy  Thoracolumbar central PA gr II-IV  STM R/L Thoracolumbar PSM (prone)  Pelvic Diaphragm External Release       Concurrent education while performing manual therapy: reiterated potential benefits of counseling/ therapy such as EMDR, SE - reducing affect of stress/ trauma on the body (PFD); yoga especially restorative yoga     HEP:   7/29/2022 Diaphragmatic Breathing, Sciatic Nerve Glides (Supine), Butterfly Pelvic Floor Stretch (Supine)  8/5/2022: Mindfulness; LTR, Piriformis Stretch crossover, Daily Voiding Log  8/16/2022: Use of pt's finger for introduction to dilator training: use w/ Slippery stuff water based lubricant ([address]). Practice several minutes.  Hip ABD Celine w/ Blue TheraBand w/ DB & TA, Hip ADD Celine w/ Ball Squeeze w/ DB & TA  9/23/2022: Self performed Pelvic Diaphragm Release/ Stretch - goal is gentle stretch superiorly not pushing down on abdomen (no compression), Piriformis stretches - figure 4/ seated  11/4/2022: Blue TheraBand given for Hip ABD Celine, w/ instruction to add Sruthi & Rich for Hip ADD Celine. Standing lumbar extension.  Self scar mobs/ stretch  11/11/2022: deep squat PF stretch, prone hip IR stretch  12/2/2022: 1/2 kneel hip flexor stretch    Charges: Manual x 3 Total Timed Treatment: 40 min  Total Treatment Time: 40 min

## 2022-12-13 ENCOUNTER — OFFICE VISIT (OUTPATIENT)
Dept: RHEUMATOLOGY | Facility: CLINIC | Age: 32
End: 2022-12-13
Payer: COMMERCIAL

## 2022-12-13 VITALS — DIASTOLIC BLOOD PRESSURE: 60 MMHG | SYSTOLIC BLOOD PRESSURE: 128 MMHG

## 2022-12-13 DIAGNOSIS — G56.01 RIGHT CARPAL TUNNEL SYNDROME: Primary | ICD-10-CM

## 2022-12-13 DIAGNOSIS — M62.830 LUMBAR PARASPINAL MUSCLE SPASM: ICD-10-CM

## 2022-12-13 PROCEDURE — 76942 ECHO GUIDE FOR BIOPSY: CPT | Performed by: INTERNAL MEDICINE

## 2022-12-13 PROCEDURE — 3078F DIAST BP <80 MM HG: CPT | Performed by: INTERNAL MEDICINE

## 2022-12-13 PROCEDURE — 3074F SYST BP LT 130 MM HG: CPT | Performed by: INTERNAL MEDICINE

## 2022-12-13 PROCEDURE — 20526 THER INJECTION CARP TUNNEL: CPT | Performed by: INTERNAL MEDICINE

## 2022-12-13 RX ORDER — LIDOCAINE HYDROCHLORIDE 10 MG/ML
2 INJECTION, SOLUTION INFILTRATION; PERINEURAL ONCE
Status: COMPLETED | OUTPATIENT
Start: 2022-12-13 | End: 2022-12-13

## 2022-12-13 RX ORDER — CYCLOBENZAPRINE HCL 5 MG
5 TABLET ORAL 3 TIMES DAILY PRN
Qty: 60 TABLET | Refills: 0 | Status: SHIPPED | OUTPATIENT
Start: 2022-12-13

## 2022-12-13 RX ORDER — METHYLPREDNISOLONE ACETATE 40 MG/ML
40 INJECTION, SUSPENSION INTRA-ARTICULAR; INTRALESIONAL; INTRAMUSCULAR; SOFT TISSUE ONCE
Status: COMPLETED | OUTPATIENT
Start: 2022-12-13 | End: 2022-12-13

## 2022-12-13 RX ADMIN — LIDOCAINE HYDROCHLORIDE 2 ML: 10 INJECTION, SOLUTION INFILTRATION; PERINEURAL at 16:35:00

## 2022-12-13 RX ADMIN — METHYLPREDNISOLONE ACETATE 40 MG: 40 INJECTION, SUSPENSION INTRA-ARTICULAR; INTRALESIONAL; INTRAMUSCULAR; SOFT TISSUE at 16:36:00

## 2022-12-13 NOTE — PROCEDURES
Carpal Tunnel Procedure Note    Ultrasound examination of the right  carpal tunnel  Equipment: [employer]  Indication: Pain/paresthesias in fingers    Findings: Ultrasound examination is performed according to standard EU LAR recommendations(1). . Using the[15-6 MHz] transducer, as well as color power Doppler settings, real-time imaging of the [right] wrist was performed to evaluate the bones, subcutaneous tissues, muscles, tendons and the joint spaces. Longitudinal and transverse images were obtained. This was at the distal crease tendon proximally from the carpal tunnel. The median nerve was localized as well as measured. It was found to be just ulnar and the superior to the flexor pollicis longus. Numerous longus was noted. The nerve was followed approximately in between the superficial digitorum and profundus. There was no evidence of this significant synovitis causing compression of the median nerve or any masses. Color Doppler over the median nerve revealed no evidence of vascularity. All findings were normal with the exception of the following: nerves circumference 2.53cm    Impression: Median nerve compression    Clinical correlation and other imaging studies may be indicated. Reference:  Guidelines for musculoskeletal ultrasound in rheumatology  Filipe Carson., Ivan Junior., Arslan Haywood., [place], [address] working group for musculoskeletal ultrasound in the EULAR standing committee on international clinical studies including therapeutic trials. Annals rheumatic disease 2001 July; 60(7): 641-9    Procedure Right carpal tunnel injection and median nerve release by hydrolysis    After obtaining consent and discussing alternative treatments. The median nerve was evaluated beginning in the forearm and down onto the distal crease. The nerve was measured at the proximal and distal carpal tunnel. A13 - 6 MHz transducer was used.  After localizing the median nerve in both transverse and longitudinal views as well as identifying neurovascular structures the area was cleansed with hibiclens and alcohol. Then using sterile gel and a 15-6 MHz transducer the area around the median nerve was injected with a total solution of 8 mL of which included 5mL of sterile saline 2 mL of 1% lidocaine and 40 mg of Depomedrol . A 26-gauge needle was used with an in-line approach from the ulnar side under real time imaging. Injections were careful to avoid damage to the median nerve and other neurovascular structures. is seen to surround the nerve. All injections were done under real-time time imaging. There were no complications. Post procedure instructions included to call for any questions or concerns, to wear a static wrist splint 24 hours a day except when washing for one week and then at nighttime for least 2 weeks. As a side:   Pt having worsened lower back pain and buttock pain. Previously given cyclobenzaprine by PCP and requesting refill. Reminded pt to take daily as needed and avoid driving/heavy machinery while taking medication since can make drowsy. Also complaining of worsened joint pain and stiffness in general with increased swelling in the fingers. Reminded that labs were ordered at last OV in November and encouraged pt to get those testing done. Patient verbalized understanding of above instructions. No further questions at this time.       Malik Mix, DO  [facility]  12/13/2022

## 2022-12-16 ENCOUNTER — TELEPHONE (OUTPATIENT)
Dept: PHYSICAL THERAPY | Facility: HOSPITAL | Age: 32
End: 2022-12-16

## 2022-12-21 ENCOUNTER — APPOINTMENT (OUTPATIENT)
Dept: PHYSICAL THERAPY | Age: 32
End: 2022-12-21
Attending: FAMILY MEDICINE
Payer: COMMERCIAL

## 2022-12-30 ENCOUNTER — TELEPHONE (OUTPATIENT)
Dept: PHYSICAL THERAPY | Age: 32
End: 2022-12-30

## 2022-12-30 ENCOUNTER — APPOINTMENT (OUTPATIENT)
Dept: PHYSICAL THERAPY | Age: 32
End: 2022-12-30
Attending: FAMILY MEDICINE
Payer: COMMERCIAL

## 2023-01-04 ENCOUNTER — APPOINTMENT (OUTPATIENT)
Dept: PHYSICAL THERAPY | Age: 33
End: 2023-01-04
Attending: FAMILY MEDICINE
Payer: COMMERCIAL

## 2023-01-06 ENCOUNTER — TELEPHONE (OUTPATIENT)
Dept: PHYSICAL THERAPY | Age: 33
End: 2023-01-06

## 2023-01-09 ENCOUNTER — OFFICE VISIT (OUTPATIENT)
Dept: PHYSICAL THERAPY | Age: 33
End: 2023-01-09
Attending: FAMILY MEDICINE
Payer: COMMERCIAL

## 2023-01-09 PROCEDURE — 97110 THERAPEUTIC EXERCISES: CPT

## 2023-01-09 PROCEDURE — 97140 MANUAL THERAPY 1/> REGIONS: CPT

## 2023-01-09 NOTE — PROGRESS NOTES
Diagnosis:   SI (sacroiliac) joint dysfunction (M53.3)  Vaginismus (N94.2)      Referring Provider: Roxi Pino Date of Evaluation:    2022    Precautions:  None Fibromyalgia Next MD visit:   none scheduled  Date of Surgery: n/a   Insurance Primary/Secondary: [employer] [place] P[place] / N/A     # Auth Visits: 60v limit, no auth      ProgressSummary  Pt has attended 15 visits in Pelvic Physical Therapy. Subjective: Pt reports she is sleep deprived due to her child not sleeping for the past month. \"I'm a shell of a human\". Fibro flared up. Inside of ankle bones feel broken, shoulders feel tender; feels bruised; she does feel stronger but feels the inner vibration; pelvic-wise feels a weakness at  but vaginally feels there is a trigger point that isn't going away; it is extremely tender & triggering; using wand on her own but not sure if she is going too hard    Pain: 6/10      Objective:    2023  Informed verbal consent for internal pelvic evaluation given: Yes    External Observation:   Voluntary contraction: present   Voluntary relaxation: present  Involuntary contraction: NT  Involuntary relaxation: present with cues, though limited    Mons pubis: WNL  Labia majora: WNL  Labia minora: WNL  Urethral meatus: WNL  Introitus: WNL  Perineal body: WNL    Sensory/Reflex:  Vestibule: normal bilaterally    Internal Examination  Pelvic Floor Muscle strength: (PERF= Power/Endurance/Reps/Fast) MMT: 4/NT/NT/NT  Accessory Muscle Use: none    Internal Palpation: WNL except Superficial Transverse Perineal B moderate restriction and pain  Bulbocavernosus B moderate restriction and pain  Ischiocavernosus B moderate restriction and pain  Deep Transverse Perineal B moderate restriction and pain  Obturator Internus B moderate restriction and pain      Assessment: Pt has been unable to attend PT in the last month primarily due to child/ personal illness.  Pt consented to PT performing re-assessment for internal pelvic evaluation & treatment. Pt presents with good intensity of pelvic floor contraction, however Internal palpation findings include continued restriction & pain primarily to layer 1 musculature (findings listed above); improvements are noted overall with internal palpation findings. Education in importance of pt bringing in her Pelvic Wand so PT is able to assess if she is using it correctly, as pt reported that PT assessment/ treatment of pelvic musculature felt less intense than when she has been palpating on her own. Pt was told she may be overdoing it & PT needs to assess & if needed will correct form. Discussed that pt should follow up with referring provider and/ or other specialists if symptoms are unable to be adequately improved despite PT intervention and/ or self-measures. Discussed emphasis & importance on managing symptoms with presence of comorbidities to allow for increase in function, with goal of transitioning pt from in-clinic PT sessions to home management of symptoms via HEP. Will plan to connect with pt in 2 weeks to see how she is doing & determine best plan for moving forward, with goal of discharge planning for best ability to self-treat independently. Goals:   (to be met in 10 visits)    Patient exhibits an increase in Transverse Abdominis strength from 3/5 to 4/5 to allow for pelvic brace with ADLs for decreased c/o pain w/ functional activities. IMPROVED    Patient exhibits an increased in B Hip Abductor strength to 4+/5 to allow for deep squatting w/ proper form & lifting w/ improved mechanics. IMPROVED    Patient will improve Lumbar Flexion AROM to [facility] for bending forward which includes giving children a bath. IMPROVED    Patient reports a change in Marinoff scale from 1-2 to 1-0 to allow for intercourse and pelvic exam with minimal pain.     Patient will report the ability to utilize mindfulness/ relaxation techniques provided by PT to aid w/ improved nervous system regulation contributing to reduction of urinary symptoms, such as more complete emptying w/ voiding, decreased hesitancy, decreased post void dribble. MET    Patient will report nocturia 0-1x for improved restful sleep, decreased daytime fatigue, & improved ability to function at work. IMPROVED    Patient understands importance of performing HEP to prevent reoccurrence of symptoms. IN PROGRESS    Plan: Continue skilled Pelvic Physical Therapy 1 x/week or a total of 4 visits over a 90 day period. Treatment will/ may include: Manual Therapy, Neuromuscular Re-education, Self-Care Home Management, Therapeutic Activities, Therapeutic Exercise, Home Exercise Program instruction and Modalities to include: Electrical stimulation (unattended), Ultrasound and hot pack/ cold pack       Patient/Family/Caregiver was advised of these findings, precautions, and treatment options and has agreed to actively participate in planning and for this course of care. Thank you for your referral. If you have any questions, please contact me at Dept: [phone]. Sincerely,  Electronically signed by therapist: Gutierrez Moore PT     Physician's certification required:  Yes  Please co-sign or sign and return this letter via fax as soon as possible to [phone]. I certify the need for these services furnished under this plan of treatment and while under my care. X___________________________________________________ Date____________________    Certification From: 5/4/4883  To:4/9/2023   Date: 10/7/2022  Tx#: 8/10 Date: 10/14/2022  Tx#: 9/10 Date: 10/21/2022  Tx#: 10/10 Date: 11/4/2022  Tx#: 11/20 Date: 11/11/2022  Tx#: 12/20 Date: 12/2/2022  Tx#: 13/20 Date: 12/9/2022  Tx#: 14/20 Date: 1/9/2023  Tx#: 15/20   Therex  Flat Back stretch x 10 DB; 2x reps  Hip ADD Stretch x 10 DB; 2x reps  Education: ice vulvar region. Neuro Re-ed  Possible benefit of Dilators. Bring in Pelvic Wand for PT to teach pt proper use.  External Vulvar support garments to alleviate pressure. Avoid Vaginal Penetrative Formoso this weekend. Possible benefit Vaginal Valium, Vaginal Lidocaine  Diaphragmatic Breathing with: Manual Introital Release (L, Periurethral Release (L), Levator Ani Release (L) w/ sweeping; Contract-Relax w/ manual hip IR Obturator Internus (L)  Manual cues for Lengthening PF mm w/ Internal cues: w/ Diaphragmatic Breathing: use of trampoline analogy, elevator analogy; Pt to practice this weekend with PF relaxation  Sex Therapist Therex  Education - Sciatic nerve modification in sidelying vs supine per tolerance  Education - Modified butterfly stretch w/ support  Neuro Re-ed  Dilator review - education. Review - relationship between mental health & physical health. Discussed counselor/ therapist. Possible Integrative Medicine. Manual Therapy  Lumbar Central PA gr I-III x 10'  STM R/L HS, glutes w/ Tiger Tail Rollerstick x 10' prone     Therex  Re-assessment (verbal & MMT) - verbal portion concurrent w/ stretching (hip ER/ Piriformis)  Clams x 15 ea  Prone Hip IR stretch 10\" x 10 (bilateral)  Prone Hip IR Celine 5\" x 10 Red TheraBand  Flat Back Stretch @ II bars  Neuro Re-ed  Restorative yoga. Regular yoga - free on YouTube  Review Dilator recommendations, pt instructed to bring in Pelvic Wand for PT to instruct in proper use for self-performed trigger point/ manual treatment. EMDR Certified Therapist - recommend for processing trauma's effect on the body/ pelvic floor mm Neuro Re-ed  Hip ADD Celine 5\" hold w/ Pilates Ring (purple) w/ DB & TA x 5' hooklying  TA Bracing: review proper intensity to ensure isolation vs substitution/ compensation  Hip ABD Celine 5\" hold w/ Pilates Ring (purple) w/ DB & TA x 5' hooklying  Therex  Flat Back stretch 3x30\" - repeat again after standing lumbar extension (retest)  Standing lumbar extension x 10  Manual Therapy (20')  Scar mob / suprapubic w/ superior fascial glide, skin/ connective tissue rolling.  Instruction for HEP: hand over hand for pt to complete indep w/ proper form. Therex  Deep squat PF stretch 3x30\"  Seated positions: hip IR/ER vs long sitting with hip ABD  Quadruped multifidi raises x 15 ea  Prone hip IR stretch actively 10\" x 10  Neuro Re-ed  Sit->Stand: DB w/ TA & Hip ABD Celine w/ Pilates Ring x 10  Standing Pilates Ring TA presses x 20  Manual Therapy  External release R/L OI x 10' sidelying over clothing/ gloved Therex  Flat back stretch 3x30\"  Hip ADD stretch: 2x30\"  1/2 kneel hip flexor stretch on Airex foam: 2x30\" ea w/ instruction for HEP  Aquatic/ pool exercise - squats  Squats: recommend pt avoid daily. 3x/ week  Clams x 15 ea  Reverse clams x 15 ea  Neuro Re-ed  Pelvic Brace w/ Crabwalk lateral walking w/ Green TB 20 ft x 4  Shuttle Leg Press 50# w/ DB & Pelvic Brace coordination w/ Hip ABD Celine w/ Pilates Ring (black) x 5'  Possible EMDR/ Somatic Experiencing: mind-body connection. Pelvic Floor connection - sympathetic nervous system being activated  Education - avoiding jaw clenching - linked to PF clenching Manual Therapy  Thoracolumbar central PA gr II-IV  STM R/L Thoracolumbar PSM (prone)  Pelvic Diaphragm External Release       Concurrent education while performing manual therapy: reiterated potential benefits of counseling/ therapy such as EMDR, SE - reducing affect of stress/ trauma on the body (PFD); yoga especially restorative yoga Manual Therapy (30')  Internal Pelvic Assessment & Treatment: see above for details. Manual sustained hold/ trigger point release to: Superficial Transverse Perineal B, Bulbocavernosus B,   Ischiocavernosus B, Deep Transverse Perineal B  Therex  Education: Mary Jo jimenez (pt had asked for PT's opinion & was told it is not appropriate for pt & why)  Education: cold pack to vulva externally & lengthwise (over-clothing) if pt has any soreness to pelvic musculature post-internal exam & treatment (normal)  Education: importance of performing stretches regularly to reduce symptoms. HEP:   7/29/2022 Diaphragmatic Breathing, Sciatic Nerve Glides (Supine), Butterfly Pelvic Floor Stretch (Supine)  8/5/2022: Mindfulness; LTR, Piriformis Stretch crossover, Daily Voiding Log  8/16/2022: Use of pt's finger for introduction to dilator training: use w/ Slippery stuff water based lubricant ([address]). Practice several minutes. Hip ABD Celine w/ Blue TheraBand w/ DB & TA, Hip ADD Celine w/ Ball Squeeze w/ DB & TA  9/23/2022: Self performed Pelvic Diaphragm Release/ Stretch - goal is gentle stretch superiorly not pushing down on abdomen (no compression), Piriformis stretches - figure 4/ seated  11/4/2022: Blue TheraBand given for Hip ABD Celine, w/ instruction to add [place] for Hip ADD Celine. Standing lumbar extension.  Self scar mobs/ stretch  11/11/2022: deep squat PF stretch, prone hip IR stretch  12/2/2022: 1/2 kneel hip flexor stretch    Charges: Manual x 2, Therex x 1 Total Timed Treatment: 42 min  Total Treatment Time: 42 min

## 2023-01-27 ENCOUNTER — TELEMEDICINE (OUTPATIENT)
Dept: INTERNAL MEDICINE CLINIC | Facility: CLINIC | Age: 33
End: 2023-01-27
Payer: COMMERCIAL

## 2023-01-27 ENCOUNTER — APPOINTMENT (OUTPATIENT)
Dept: PHYSICAL THERAPY | Age: 33
End: 2023-01-27
Attending: FAMILY MEDICINE
Payer: COMMERCIAL

## 2023-01-27 DIAGNOSIS — F41.9 ANXIETY AND DEPRESSION: ICD-10-CM

## 2023-01-27 DIAGNOSIS — E66.9 CLASS 1 OBESITY WITH BODY MASS INDEX (BMI) OF 34.0 TO 34.9 IN ADULT, UNSPECIFIED OBESITY TYPE, UNSPECIFIED WHETHER SERIOUS COMORBIDITY PRESENT: ICD-10-CM

## 2023-01-27 DIAGNOSIS — R73.01 IFG (IMPAIRED FASTING GLUCOSE): ICD-10-CM

## 2023-01-27 DIAGNOSIS — E78.2 MIXED HYPERLIPIDEMIA: ICD-10-CM

## 2023-01-27 DIAGNOSIS — Z51.81 THERAPEUTIC DRUG MONITORING: Primary | ICD-10-CM

## 2023-01-27 DIAGNOSIS — F50.81 BINGE EATING DISORDER: ICD-10-CM

## 2023-01-27 DIAGNOSIS — F32.A ANXIETY AND DEPRESSION: ICD-10-CM

## 2023-01-27 PROCEDURE — 99213 OFFICE O/P EST LOW 20 MIN: CPT | Performed by: PHYSICIAN ASSISTANT

## 2023-02-03 ENCOUNTER — APPOINTMENT (OUTPATIENT)
Dept: PHYSICAL THERAPY | Age: 33
End: 2023-02-03
Attending: NURSE PRACTITIONER
Payer: COMMERCIAL

## 2023-02-07 ENCOUNTER — TELEPHONE (OUTPATIENT)
Dept: PHYSICAL THERAPY | Age: 33
End: 2023-02-07

## 2023-02-17 ENCOUNTER — APPOINTMENT (OUTPATIENT)
Dept: PHYSICAL THERAPY | Age: 33
End: 2023-02-17
Attending: NURSE PRACTITIONER
Payer: COMMERCIAL

## 2023-02-28 ENCOUNTER — OFFICE VISIT (OUTPATIENT)
Dept: RHEUMATOLOGY | Facility: CLINIC | Age: 33
End: 2023-02-28
Payer: COMMERCIAL

## 2023-02-28 VITALS
HEART RATE: 109 BPM | BODY MASS INDEX: 31.49 KG/M2 | HEIGHT: 65 IN | WEIGHT: 189 LBS | OXYGEN SATURATION: 99 % | DIASTOLIC BLOOD PRESSURE: 64 MMHG | SYSTOLIC BLOOD PRESSURE: 124 MMHG | RESPIRATION RATE: 16 BRPM

## 2023-02-28 DIAGNOSIS — E55.9 VITAMIN D DEFICIENCY: ICD-10-CM

## 2023-02-28 DIAGNOSIS — M79.7 FIBROMYALGIA: ICD-10-CM

## 2023-02-28 DIAGNOSIS — G56.02 LEFT CARPAL TUNNEL SYNDROME: Primary | ICD-10-CM

## 2023-02-28 DIAGNOSIS — M25.532 LEFT WRIST PAIN: ICD-10-CM

## 2023-02-28 DIAGNOSIS — R53.82 CHRONIC FATIGUE: ICD-10-CM

## 2023-02-28 PROCEDURE — 3008F BODY MASS INDEX DOCD: CPT | Performed by: INTERNAL MEDICINE

## 2023-02-28 PROCEDURE — 20526 THER INJECTION CARP TUNNEL: CPT | Performed by: INTERNAL MEDICINE

## 2023-02-28 PROCEDURE — 3074F SYST BP LT 130 MM HG: CPT | Performed by: INTERNAL MEDICINE

## 2023-02-28 PROCEDURE — 76942 ECHO GUIDE FOR BIOPSY: CPT | Performed by: INTERNAL MEDICINE

## 2023-02-28 PROCEDURE — 3078F DIAST BP <80 MM HG: CPT | Performed by: INTERNAL MEDICINE

## 2023-02-28 RX ORDER — LIDOCAINE HYDROCHLORIDE 10 MG/ML
2 INJECTION, SOLUTION INFILTRATION; PERINEURAL ONCE
Status: COMPLETED | OUTPATIENT
Start: 2023-02-28 | End: 2023-02-28

## 2023-02-28 RX ORDER — METHYLPREDNISOLONE ACETATE 40 MG/ML
40 INJECTION, SUSPENSION INTRA-ARTICULAR; INTRALESIONAL; INTRAMUSCULAR; SOFT TISSUE ONCE
Status: COMPLETED | OUTPATIENT
Start: 2023-02-28 | End: 2023-02-28

## 2023-02-28 RX ADMIN — METHYLPREDNISOLONE ACETATE 40 MG: 40 INJECTION, SUSPENSION INTRA-ARTICULAR; INTRALESIONAL; INTRAMUSCULAR; SOFT TISSUE at 12:02:00

## 2023-02-28 RX ADMIN — LIDOCAINE HYDROCHLORIDE 2 ML: 10 INJECTION, SOLUTION INFILTRATION; PERINEURAL at 12:01:00

## 2023-02-28 NOTE — PROCEDURES
Carpal Tunnel Procedure Note    Ultrasound examination of the left carpal tunnel  Equipment: [employer] II  Indication: Pain/paresthesias in fingers    Findings: Ultrasound examination is performed according to standard EU LAR recommendations(1). . Using the 13-6 MHz transducer, as well as color power Doppler settings, real-time imaging of the [right] wrist was performed to evaluate the bones, subcutaneous tissues, muscles, tendons and the joint spaces. Longitudinal and transverse images were obtained. This was at the distal crease tendon proximally from the carpal tunnel. The median nerve was localized as well as measured. It was found to be just ulnar and the superior to the flexor pollicis longus. Numerous longus was noted. The nerve was followed approximately in between the superficial digitorum and profundus. There was no evidence of this significant synovitis causing compression of the median nerve or any masses. Color Doppler over the median nerve revealed no evidence of vascularity. All findings were normal with the exception of the following: nerves circumference 1.83cm    Impression: Median nerve compression    Clinical correlation and other imaging studies may be indicated. Reference:  Guidelines for musculoskeletal ultrasound in rheumatology  Eugenia Barrientos., Ivan Barber., Martin Pryor., [place], [address] working group for musculoskeletal ultrasound in the EULAR standing committee on international clinical studies including therapeutic trials. Annals rheumatic disease 2001 July; 60(7): 641-9    Procedure Left carpal tunnel injection and median nerve release by hydrolysis    After obtaining verbal consent and discussing alternative treatments. The median nerve was evaluated beginning in the forearm and down onto the distal crease. The nerve was measured at the proximal and distal carpal tunnel. A13 - 6 MHz transducer was used.  After localizing the median nerve in both transverse and longitudinal views as well as identifying neurovascular structures the area was cleansed with hibiclens and alcohol. Then using sterile gel and a 13-6 MHz transducer the area around the median nerve was injected with a total solution of 8 mL of which included 5mL of sterile saline 2 mL of 1% lidocaine and 40 mg of Depomedrol . A 25-gauge needle was used with an in-line approach from the ulnar side under real time imaging. Injections were careful to avoid damage to the median nerve and other neurovascular structures. is seen to surround the nerve. All injections were done under real-time time imaging. There were no complications. Post procedure instructions included to call for any questions or concerns, to wear a static wrist splint 24 hours a day except when washing for one week and then at nighttime for least 2 weeks. As a side:   Pt asking for additional labs and having worsened pain diffusely and fatigue. Encouraged pt to get labs done and keep her upcoming April appt to discuss further. Patient verbalized understanding of above instructions. No further questions at this time.       Bartlett Ganser, DO  [facility]  02/28/2023

## 2023-03-13 DIAGNOSIS — Z30.41 SURVEILLANCE FOR BIRTH CONTROL, ORAL CONTRACEPTIVES: ICD-10-CM

## 2023-03-13 NOTE — TELEPHONE ENCOUNTER
Last OV: 05/25/22 - Problem  05/20/21 - Annual  Last refill date: 07/11/2022  Follow-up: Annual 05/2022  Next appt.: None     Patient is due for annual. Please contact her to schedule appt and then return to RN pool for refill.  Thank you

## 2023-03-14 RX ORDER — NORGESTIMATE AND ETHINYL ESTRADIOL 0.25-0.035
KIT ORAL
Qty: 84 TABLET | Refills: 0 | Status: SHIPPED | OUTPATIENT
Start: 2023-03-14

## 2023-03-14 NOTE — TELEPHONE ENCOUNTER
Pt scheduled    Future Appointments   Date Time Provider Ayala Adams   4/24/2023  3:00 PM Areli Mcdonald DO [id number] [address]   5/25/2023  3:30 PM JODIE Villar [facility] [address]

## 2023-04-11 ENCOUNTER — TELEPHONE (OUTPATIENT)
Dept: PHYSICAL THERAPY | Age: 33
End: 2023-04-11

## 2023-04-27 NOTE — TELEPHONE ENCOUNTER
Patient's appt was cancelled for 4/12/23 and she cannot get back in until August. She is asking about getting med refills?     8/23/2023  3:00 PM Estefany Alexandra PA-C [address] [facility] [address]

## 2023-04-28 RX ORDER — SEMAGLUTIDE 2.68 MG/ML
2 INJECTION, SOLUTION SUBCUTANEOUS WEEKLY
Qty: 3 ML | Refills: 2 | Status: SHIPPED | OUTPATIENT
Start: 2023-04-28

## 2023-04-28 NOTE — TELEPHONE ENCOUNTER
Requesting vyvanse and Ozempic  LOV: 1/27/23  RTC: not noted  Last Relevant Labs: 5/26/22  Filled: 1/25/23 #3ml with 2 refills Ozempic  Filled: 3/23/23 #30 with 0 refiills vyvanase 50 mg last [place]27[place]    Future Appointments   Date Time Provider hospitals   5/25/2023  3:30 PM JODIE Soto [facility] [address]   8/23/2023  3:00 PM Alpa Turcios PA-C [address] [facility] [address]

## 2023-05-01 ENCOUNTER — OFFICE VISIT (OUTPATIENT)
Dept: FAMILY MEDICINE CLINIC | Facility: CLINIC | Age: 33
End: 2023-05-01
Payer: COMMERCIAL

## 2023-05-01 VITALS
DIASTOLIC BLOOD PRESSURE: 64 MMHG | RESPIRATION RATE: 16 BRPM | TEMPERATURE: 98 F | WEIGHT: 188 LBS | HEIGHT: 65 IN | BODY MASS INDEX: 31.32 KG/M2 | OXYGEN SATURATION: 99 % | HEART RATE: 97 BPM | SYSTOLIC BLOOD PRESSURE: 120 MMHG

## 2023-05-01 DIAGNOSIS — K21.9 GASTROESOPHAGEAL REFLUX DISEASE WITHOUT ESOPHAGITIS: ICD-10-CM

## 2023-05-01 DIAGNOSIS — R10.13 EPIGASTRIC PAIN: Primary | ICD-10-CM

## 2023-05-01 PROCEDURE — 3074F SYST BP LT 130 MM HG: CPT | Performed by: FAMILY MEDICINE

## 2023-05-01 PROCEDURE — 3008F BODY MASS INDEX DOCD: CPT | Performed by: FAMILY MEDICINE

## 2023-05-01 PROCEDURE — 3078F DIAST BP <80 MM HG: CPT | Performed by: FAMILY MEDICINE

## 2023-05-01 PROCEDURE — 99213 OFFICE O/P EST LOW 20 MIN: CPT | Performed by: FAMILY MEDICINE

## 2023-05-01 RX ORDER — PANTOPRAZOLE SODIUM 20 MG/1
20 TABLET, DELAYED RELEASE ORAL
Qty: 60 TABLET | Refills: 2 | Status: SHIPPED | OUTPATIENT
Start: 2023-05-01 | End: 2023-05-31

## 2023-05-01 RX ORDER — PANTOPRAZOLE SODIUM 20 MG/1
20 TABLET, DELAYED RELEASE ORAL
Qty: 60 TABLET | Refills: 2 | Status: SHIPPED | OUTPATIENT
Start: 2023-05-01 | End: 2023-05-01

## 2023-05-03 NOTE — CM/SW NOTE
followed up with patient, Black Lebron, and her  Willy Cruz. Infant is in NICU for low Blood Sugar levels at this time. Infant will be added to the [facility] plan that she delivered under. PCP will be Dr. Dalton Alarcon.  Patient plans to breast feed
 noted order for SW to follow up with patient for OUD screening.   reviewed patient history, prenatal care stated at 17 weeks and there were no issues noted with last pregnancy back on 04/12/2019, infant did go to NICU related to pre
No

## 2023-06-15 DIAGNOSIS — Z30.41 SURVEILLANCE FOR BIRTH CONTROL, ORAL CONTRACEPTIVES: ICD-10-CM

## 2023-06-15 RX ORDER — NORGESTIMATE AND ETHINYL ESTRADIOL 0.25-0.035
KIT ORAL
Qty: 84 TABLET | Refills: 0 | Status: SHIPPED | OUTPATIENT
Start: 2023-06-15

## 2023-06-15 NOTE — TELEPHONE ENCOUNTER
Last OV: 5/25/22 with Lorna Eisenmenger for gyn problem; last annual: 5/2021  Last refill date: 3/14/23  Follow-up: 1 year  Next appt.: 8/3/23    Refill sent

## 2023-07-03 RX ORDER — SEMAGLUTIDE 2.68 MG/ML
2 INJECTION, SOLUTION SUBCUTANEOUS WEEKLY
Qty: 9 ML | Refills: 1 | Status: SHIPPED | OUTPATIENT
Start: 2023-07-03

## 2023-07-28 NOTE — TELEPHONE ENCOUNTER
Pt is calling in regards to having difficulty finding Ozempic, and pt would like to know if we have samples. Pt states pharmacy instructed her to contact [facility].   Pt req an order for VYVANSE prior to her appt on 8/23/23

## 2023-07-30 NOTE — TELEPHONE ENCOUNTER
Requesting vyvanse  50 mg and ozempic 2 mg sample?   LOV: 1/27/23  RTC: not noted  Last Relevant Labs: 5/26/22  Filled: 6/29/23 #30 with 0 refills last [place] 6[place]30/23 [place]  Filled: 7/3/23 #9ml with 1 refills  Ozempic (cannot find at pharmacy)    Future Appointments   Date Time Provider Ayala Adams   8/3/2023 10:00 AM JODIE Caldwell [facility] [address]   8/23/2023  3:00 PM Liliana Francis PA-C [address] [facility] [address]     Provider cancelled her appt in April and she was rescheduled in April to the above date

## 2023-08-03 ENCOUNTER — OFFICE VISIT (OUTPATIENT)
Dept: OBGYN CLINIC | Facility: CLINIC | Age: 33
End: 2023-08-03
Payer: COMMERCIAL

## 2023-08-03 VITALS
DIASTOLIC BLOOD PRESSURE: 72 MMHG | WEIGHT: 191 LBS | HEIGHT: 65 IN | RESPIRATION RATE: 17 BRPM | HEART RATE: 90 BPM | SYSTOLIC BLOOD PRESSURE: 120 MMHG | BODY MASS INDEX: 31.82 KG/M2

## 2023-08-03 DIAGNOSIS — Z30.41 SURVEILLANCE FOR BIRTH CONTROL, ORAL CONTRACEPTIVES: ICD-10-CM

## 2023-08-03 DIAGNOSIS — Z01.419 WELL WOMAN EXAM WITH ROUTINE GYNECOLOGICAL EXAM: Primary | ICD-10-CM

## 2023-08-03 DIAGNOSIS — Z12.4 CERVICAL CANCER SCREENING: ICD-10-CM

## 2023-08-03 PROCEDURE — 3008F BODY MASS INDEX DOCD: CPT | Performed by: NURSE PRACTITIONER

## 2023-08-03 PROCEDURE — 3078F DIAST BP <80 MM HG: CPT | Performed by: NURSE PRACTITIONER

## 2023-08-03 PROCEDURE — 3074F SYST BP LT 130 MM HG: CPT | Performed by: NURSE PRACTITIONER

## 2023-08-03 PROCEDURE — 99395 PREV VISIT EST AGE 18-39: CPT | Performed by: NURSE PRACTITIONER

## 2023-08-03 RX ORDER — PANTOPRAZOLE SODIUM 20 MG/1
20 TABLET, DELAYED RELEASE ORAL
COMMUNITY
Start: 2023-07-08

## 2023-08-03 RX ORDER — NORGESTIMATE AND ETHINYL ESTRADIOL 0.25-0.035
1 KIT ORAL DAILY
Qty: 84 TABLET | Refills: 4 | Status: SHIPPED | OUTPATIENT
Start: 2023-08-03

## 2023-08-03 NOTE — PROGRESS NOTES
Here for Routine Annual Exam  No concerns or questions. Menses are regular, no concerns. Contraception- OCP. They are contemplating a third child. ROS: No Cardiac, Respiratory, GI,  or Neurological symptoms. PE:  GENERAL: well developed, well nourished, in no apparent distress  SKIN: no rashes, no suspicious lesions  HEENT: normal  NECK: supple; no thyroidmegaly, no adenopathy  LUNGS: clear to auscultation  CARDIOVASCULAR: normal S1, S2, RRR  BREASTS: firm, nontendder, no palpable masses or nodes, no nipple discharge, no skin changes, no axillary adenopathy,    ABDOMEN: Soft, non distended; non tender, no masses  GYNE/: External Genitalia: Normal without lesions or erythema                      Vagina: normal without lesions, scant discharge                      Uterus: mid, mobile, non tender, normal size                     Cervix: no lesions or CMT                     Adnexa: non tender, no masses, normal size  EXTREMITIES:  non tender without edema    A/P:   1. Well woman exam with routine gynecological exam  Regular self breast exams recommended    2. Cervical cancer screening  - THINPREP PAP WITH HPV REFLEX REQUEST B; Future    3. Surveillance for birth control, oral contraceptives  Advised on risks and danger signs for VTE  Start a daily prenatal vitamin with DHA when ready to stop the pill  Discuss medications with other providers prior to stopping OCP  - Norgestimate-Eth Estradiol (VONDA) 0.25-35 MG-MCG Oral Tab; Take 1 tablet by mouth daily. Dispense: 84 tablet;  Refill: 4     Return to clinic 1 year for routine exam, or as needed with any concerns or question

## 2023-08-04 NOTE — TELEPHONE ENCOUNTER
I spoke to [place] and she sent orders for vyvanse - patient needs to call pharmacy. Can give sample  of med if available. Ozempic 2 mg set aside at [facility].

## 2023-08-09 ENCOUNTER — NURSE ONLY (OUTPATIENT)
Dept: INTERNAL MEDICINE CLINIC | Facility: CLINIC | Age: 33
End: 2023-08-09
Payer: COMMERCIAL

## 2023-08-09 NOTE — TELEPHONE ENCOUNTER
Ozempic 2mg sample picked up by . Encounter closed   Sample was given by PSR, no documentation noted.

## 2023-08-18 LAB — HPV I/H RISK 1 DNA SPEC QL NAA+PROBE: NEGATIVE

## 2023-09-02 ENCOUNTER — LAB ENCOUNTER (OUTPATIENT)
Dept: LAB | Age: 33
End: 2023-09-02
Attending: INTERNAL MEDICINE
Payer: COMMERCIAL

## 2023-09-02 DIAGNOSIS — M25.50 POLYARTHRALGIA: ICD-10-CM

## 2023-09-02 DIAGNOSIS — E55.9 VITAMIN D DEFICIENCY: ICD-10-CM

## 2023-09-02 DIAGNOSIS — M79.10 MYALGIA: ICD-10-CM

## 2023-09-02 DIAGNOSIS — R53.82 CHRONIC FATIGUE: ICD-10-CM

## 2023-09-02 DIAGNOSIS — M79.7 FIBROMYALGIA: ICD-10-CM

## 2023-09-02 LAB
CK SERPL-CCNC: 163 U/L
CRP SERPL-MCNC: 0.32 MG/DL (ref ?–0.3)
DEPRECATED HBV CORE AB SER IA-ACNC: 4.1 NG/ML
ERYTHROCYTE [SEDIMENTATION RATE] IN BLOOD: 22 MM/HR
IRON SATN MFR SERPL: 7 %
IRON SERPL-MCNC: 33 UG/DL
LDH SERPL L TO P-CCNC: 169 U/L
TIBC SERPL-MCNC: 493 UG/DL (ref 240–450)
TRANSFERRIN SERPL-MCNC: 331 MG/DL (ref 200–360)
VIT B12 SERPL-MCNC: 371 PG/ML (ref 193–986)
VIT D+METAB SERPL-MCNC: 47.3 NG/ML (ref 30–100)

## 2023-09-02 PROCEDURE — 83615 LACTATE (LD) (LDH) ENZYME: CPT

## 2023-09-02 PROCEDURE — 86140 C-REACTIVE PROTEIN: CPT

## 2023-09-02 PROCEDURE — 82306 VITAMIN D 25 HYDROXY: CPT

## 2023-09-02 PROCEDURE — 83540 ASSAY OF IRON: CPT

## 2023-09-02 PROCEDURE — 36415 COLL VENOUS BLD VENIPUNCTURE: CPT

## 2023-09-02 PROCEDURE — 83550 IRON BINDING TEST: CPT

## 2023-09-02 PROCEDURE — 82607 VITAMIN B-12: CPT

## 2023-09-02 PROCEDURE — 82728 ASSAY OF FERRITIN: CPT

## 2023-09-02 PROCEDURE — 82550 ASSAY OF CK (CPK): CPT

## 2023-09-02 PROCEDURE — 82085 ASSAY OF ALDOLASE: CPT

## 2023-09-02 PROCEDURE — 85652 RBC SED RATE AUTOMATED: CPT

## 2023-09-06 LAB — ALDOLASE: 2.8 U/L

## 2023-09-20 ENCOUNTER — OFFICE VISIT (OUTPATIENT)
Dept: FAMILY MEDICINE CLINIC | Facility: CLINIC | Age: 33
End: 2023-09-20
Payer: COMMERCIAL

## 2023-09-20 VITALS
WEIGHT: 192 LBS | BODY MASS INDEX: 31.99 KG/M2 | DIASTOLIC BLOOD PRESSURE: 72 MMHG | RESPIRATION RATE: 16 BRPM | HEART RATE: 87 BPM | TEMPERATURE: 98 F | SYSTOLIC BLOOD PRESSURE: 122 MMHG | OXYGEN SATURATION: 99 % | HEIGHT: 65 IN

## 2023-09-20 DIAGNOSIS — Z00.00 LABORATORY EXAM ORDERED AS PART OF ROUTINE GENERAL MEDICAL EXAMINATION: ICD-10-CM

## 2023-09-20 DIAGNOSIS — E78.2 MIXED HYPERLIPIDEMIA: ICD-10-CM

## 2023-09-20 DIAGNOSIS — Z23 NEED FOR INFLUENZA VACCINATION: ICD-10-CM

## 2023-09-20 DIAGNOSIS — R73.01 IMPAIRED FASTING GLUCOSE: ICD-10-CM

## 2023-09-20 DIAGNOSIS — Z00.00 ROUTINE MEDICAL EXAM: Primary | ICD-10-CM

## 2023-09-20 DIAGNOSIS — K21.9 GASTROESOPHAGEAL REFLUX DISEASE WITHOUT ESOPHAGITIS: ICD-10-CM

## 2023-09-20 PROBLEM — Z87.59 HISTORY OF GESTATIONAL HYPERTENSION: Status: RESOLVED | Noted: 2020-05-28 | Resolved: 2023-09-20

## 2023-09-20 PROBLEM — Z98.891 HISTORY OF CESAREAN SECTION: Status: RESOLVED | Noted: 2020-05-29 | Resolved: 2023-09-20

## 2023-09-20 PROCEDURE — 90686 IIV4 VACC NO PRSV 0.5 ML IM: CPT | Performed by: FAMILY MEDICINE

## 2023-09-20 PROCEDURE — 3008F BODY MASS INDEX DOCD: CPT | Performed by: FAMILY MEDICINE

## 2023-09-20 PROCEDURE — 99395 PREV VISIT EST AGE 18-39: CPT | Performed by: FAMILY MEDICINE

## 2023-09-20 PROCEDURE — 90471 IMMUNIZATION ADMIN: CPT | Performed by: FAMILY MEDICINE

## 2023-09-20 PROCEDURE — 3074F SYST BP LT 130 MM HG: CPT | Performed by: FAMILY MEDICINE

## 2023-09-20 PROCEDURE — 3078F DIAST BP <80 MM HG: CPT | Performed by: FAMILY MEDICINE

## 2023-09-20 RX ORDER — OMEPRAZOLE 40 MG/1
40 CAPSULE, DELAYED RELEASE ORAL DAILY
Qty: 90 CAPSULE | Refills: 3 | Status: SHIPPED | OUTPATIENT
Start: 2023-09-20

## 2023-09-23 ENCOUNTER — LAB ENCOUNTER (OUTPATIENT)
Dept: LAB | Age: 33
End: 2023-09-23
Attending: FAMILY MEDICINE
Payer: COMMERCIAL

## 2023-09-23 DIAGNOSIS — Z00.00 LABORATORY EXAM ORDERED AS PART OF ROUTINE GENERAL MEDICAL EXAMINATION: ICD-10-CM

## 2023-09-23 DIAGNOSIS — E78.2 MIXED HYPERLIPIDEMIA: ICD-10-CM

## 2023-09-23 DIAGNOSIS — R73.01 IMPAIRED FASTING GLUCOSE: ICD-10-CM

## 2023-09-23 LAB
ALBUMIN SERPL-MCNC: 3.8 G/DL (ref 3.4–5)
ALBUMIN/GLOB SERPL: 1 {RATIO} (ref 1–2)
ALP LIVER SERPL-CCNC: 53 U/L
ALT SERPL-CCNC: 38 U/L
ANION GAP SERPL CALC-SCNC: 7 MMOL/L (ref 0–18)
AST SERPL-CCNC: 19 U/L (ref 15–37)
BASOPHILS # BLD AUTO: 0.03 X10(3) UL (ref 0–0.2)
BASOPHILS NFR BLD AUTO: 0.4 %
BILIRUB SERPL-MCNC: 0.2 MG/DL (ref 0.1–2)
BUN BLD-MCNC: 14 MG/DL (ref 7–18)
CALCIUM BLD-MCNC: 9.1 MG/DL (ref 8.5–10.1)
CHLORIDE SERPL-SCNC: 108 MMOL/L (ref 98–112)
CHOLEST SERPL-MCNC: 190 MG/DL (ref ?–200)
CO2 SERPL-SCNC: 23 MMOL/L (ref 21–32)
CREAT BLD-MCNC: 0.6 MG/DL
EGFRCR SERPLBLD CKD-EPI 2021: 121 ML/MIN/1.73M2 (ref 60–?)
EOSINOPHIL # BLD AUTO: 0.2 X10(3) UL (ref 0–0.7)
EOSINOPHIL NFR BLD AUTO: 2.8 %
ERYTHROCYTE [DISTWIDTH] IN BLOOD BY AUTOMATED COUNT: 14.1 %
EST. AVERAGE GLUCOSE BLD GHB EST-MCNC: 111 MG/DL (ref 68–126)
FASTING PATIENT LIPID ANSWER: YES
FASTING STATUS PATIENT QL REPORTED: YES
GLOBULIN PLAS-MCNC: 3.7 G/DL (ref 2.8–4.4)
GLUCOSE BLD-MCNC: 80 MG/DL (ref 70–99)
HBA1C MFR BLD: 5.5 % (ref ?–5.7)
HCT VFR BLD AUTO: 39.2 %
HDLC SERPL-MCNC: 64 MG/DL (ref 40–59)
HGB BLD-MCNC: 12.5 G/DL
IMM GRANULOCYTES # BLD AUTO: 0.01 X10(3) UL (ref 0–1)
IMM GRANULOCYTES NFR BLD: 0.1 %
LDLC SERPL CALC-MCNC: 114 MG/DL (ref ?–100)
LYMPHOCYTES # BLD AUTO: 2.14 X10(3) UL (ref 1–4)
LYMPHOCYTES NFR BLD AUTO: 29.5 %
MCH RBC QN AUTO: 29.8 PG (ref 26–34)
MCHC RBC AUTO-ENTMCNC: 31.9 G/DL (ref 31–37)
MCV RBC AUTO: 93.3 FL
MONOCYTES # BLD AUTO: 0.76 X10(3) UL (ref 0.1–1)
MONOCYTES NFR BLD AUTO: 10.5 %
NEUTROPHILS # BLD AUTO: 4.11 X10 (3) UL (ref 1.5–7.7)
NEUTROPHILS # BLD AUTO: 4.11 X10(3) UL (ref 1.5–7.7)
NEUTROPHILS NFR BLD AUTO: 56.7 %
NONHDLC SERPL-MCNC: 126 MG/DL (ref ?–130)
OSMOLALITY SERPL CALC.SUM OF ELEC: 285 MOSM/KG (ref 275–295)
PLATELET # BLD AUTO: 361 10(3)UL (ref 150–450)
POTASSIUM SERPL-SCNC: 4.1 MMOL/L (ref 3.5–5.1)
PROT SERPL-MCNC: 7.5 G/DL (ref 6.4–8.2)
RBC # BLD AUTO: 4.2 X10(6)UL
SODIUM SERPL-SCNC: 138 MMOL/L (ref 136–145)
TRIGL SERPL-MCNC: 65 MG/DL (ref 30–149)
VLDLC SERPL CALC-MCNC: 11 MG/DL (ref 0–30)
WBC # BLD AUTO: 7.3 X10(3) UL (ref 4–11)

## 2023-09-23 PROCEDURE — 36415 COLL VENOUS BLD VENIPUNCTURE: CPT

## 2023-09-23 PROCEDURE — 83036 HEMOGLOBIN GLYCOSYLATED A1C: CPT

## 2023-09-23 PROCEDURE — 80053 COMPREHEN METABOLIC PANEL: CPT

## 2023-09-23 PROCEDURE — 85025 COMPLETE CBC W/AUTO DIFF WBC: CPT

## 2023-09-23 PROCEDURE — 80061 LIPID PANEL: CPT

## 2023-09-25 ENCOUNTER — PATIENT MESSAGE (OUTPATIENT)
Dept: FAMILY MEDICINE CLINIC | Facility: CLINIC | Age: 33
End: 2023-09-25

## 2023-09-25 DIAGNOSIS — E61.1 IRON DEFICIENCY: Primary | ICD-10-CM

## 2023-09-27 ENCOUNTER — OFFICE VISIT (OUTPATIENT)
Dept: INTERNAL MEDICINE CLINIC | Facility: CLINIC | Age: 33
End: 2023-09-27
Payer: COMMERCIAL

## 2023-09-27 VITALS
DIASTOLIC BLOOD PRESSURE: 70 MMHG | HEIGHT: 65 IN | RESPIRATION RATE: 18 BRPM | OXYGEN SATURATION: 96 % | WEIGHT: 195 LBS | BODY MASS INDEX: 32.49 KG/M2 | SYSTOLIC BLOOD PRESSURE: 118 MMHG | HEART RATE: 96 BPM

## 2023-09-27 DIAGNOSIS — F32.A ANXIETY AND DEPRESSION: ICD-10-CM

## 2023-09-27 DIAGNOSIS — D64.9 ANEMIA, UNSPECIFIED TYPE: ICD-10-CM

## 2023-09-27 DIAGNOSIS — F41.9 ANXIETY AND DEPRESSION: ICD-10-CM

## 2023-09-27 DIAGNOSIS — E78.2 MIXED HYPERLIPIDEMIA: ICD-10-CM

## 2023-09-27 DIAGNOSIS — R73.01 IFG (IMPAIRED FASTING GLUCOSE): ICD-10-CM

## 2023-09-27 DIAGNOSIS — E66.9 CLASS 1 OBESITY WITH SERIOUS COMORBIDITY AND BODY MASS INDEX (BMI) OF 32.0 TO 32.9 IN ADULT, UNSPECIFIED OBESITY TYPE: ICD-10-CM

## 2023-09-27 DIAGNOSIS — F50.81 BINGE EATING DISORDER: ICD-10-CM

## 2023-09-27 DIAGNOSIS — Z51.81 THERAPEUTIC DRUG MONITORING: Primary | ICD-10-CM

## 2023-09-27 DIAGNOSIS — K76.0 FATTY LIVER: ICD-10-CM

## 2023-09-27 DIAGNOSIS — E53.8 VITAMIN B12 DEFICIENCY: ICD-10-CM

## 2023-09-27 PROCEDURE — 96372 THER/PROPH/DIAG INJ SC/IM: CPT | Performed by: PHYSICIAN ASSISTANT

## 2023-09-27 PROCEDURE — 3078F DIAST BP <80 MM HG: CPT | Performed by: PHYSICIAN ASSISTANT

## 2023-09-27 PROCEDURE — 3074F SYST BP LT 130 MM HG: CPT | Performed by: PHYSICIAN ASSISTANT

## 2023-09-27 PROCEDURE — 3008F BODY MASS INDEX DOCD: CPT | Performed by: PHYSICIAN ASSISTANT

## 2023-09-27 PROCEDURE — 99214 OFFICE O/P EST MOD 30 MIN: CPT | Performed by: PHYSICIAN ASSISTANT

## 2023-09-27 RX ORDER — CYANOCOBALAMIN 1000 UG/ML
1000 INJECTION, SOLUTION INTRAMUSCULAR; SUBCUTANEOUS ONCE
Status: COMPLETED | OUTPATIENT
Start: 2023-09-27 | End: 2023-09-27

## 2023-09-27 RX ADMIN — CYANOCOBALAMIN 1000 MCG: 1000 INJECTION, SOLUTION INTRAMUSCULAR; SUBCUTANEOUS at 15:16:00

## 2023-10-11 NOTE — TELEPHONE ENCOUNTER
Requesting   Requested Prescriptions     Pending Prescriptions Disp Refills    OZEMPIC, 1 MG/DOSE, 4 MG/3ML Subcutaneous Solution Pen-injector [Pharmacy Med Name: OZEMPIC 1 MG/DOSE (4 MG/3 ML)]  0     Sig: INJECT 1MG INTO THE SKIN ONCE A WEEK     LOV: 9/27/23  RTC: not noted    No future appointments.

## 2023-10-30 ENCOUNTER — TELEPHONE (OUTPATIENT)
Dept: INTERNAL MEDICINE CLINIC | Facility: CLINIC | Age: 33
End: 2023-10-30

## 2023-11-15 RX ORDER — SEMAGLUTIDE 1.34 MG/ML
1 INJECTION, SOLUTION SUBCUTANEOUS
Refills: 0 | OUTPATIENT
Start: 2023-11-15

## 2023-11-24 RX ORDER — SEMAGLUTIDE 2.68 MG/ML
2 INJECTION, SOLUTION SUBCUTANEOUS WEEKLY
Refills: 0 | OUTPATIENT
Start: 2023-11-24

## 2023-11-24 NOTE — TELEPHONE ENCOUNTER
Requesting   Requested Prescriptions     Pending Prescriptions Disp Refills    OZEMPIC, 2 MG/DOSE, 8 MG/3ML Subcutaneous Solution Pen-injector [Pharmacy Med Name: OZEMPIC 8 MG/3 ML (2 MG/DOSE)]  0     Sig: INJECT 2 MG INTO THE SKIN ONCE A WEEK. LOV: 7/3/23  RTC: not noted  Filled: 7/3/23 #9 with 1 refills    No future appointments.   Refill too soon

## 2023-12-20 ENCOUNTER — PATIENT MESSAGE (OUTPATIENT)
Dept: FAMILY MEDICINE CLINIC | Facility: CLINIC | Age: 33
End: 2023-12-20

## 2023-12-23 ENCOUNTER — LAB ENCOUNTER (OUTPATIENT)
Dept: LAB | Age: 33
End: 2023-12-23
Attending: FAMILY MEDICINE
Payer: COMMERCIAL

## 2023-12-23 DIAGNOSIS — E61.1 IRON DEFICIENCY: ICD-10-CM

## 2023-12-23 LAB
BASOPHILS # BLD AUTO: 0.05 X10(3) UL (ref 0–0.2)
BASOPHILS NFR BLD AUTO: 0.6 %
DEPRECATED HBV CORE AB SER IA-ACNC: 5 NG/ML
EOSINOPHIL # BLD AUTO: 0.2 X10(3) UL (ref 0–0.7)
EOSINOPHIL NFR BLD AUTO: 2.5 %
ERYTHROCYTE [DISTWIDTH] IN BLOOD BY AUTOMATED COUNT: 13.7 %
HCT VFR BLD AUTO: 37.8 %
HGB BLD-MCNC: 11.9 G/DL
IMM GRANULOCYTES # BLD AUTO: 0.02 X10(3) UL (ref 0–1)
IMM GRANULOCYTES NFR BLD: 0.2 %
IRON SATN MFR SERPL: 8 %
IRON SERPL-MCNC: 42 UG/DL
LYMPHOCYTES # BLD AUTO: 2.66 X10(3) UL (ref 1–4)
LYMPHOCYTES NFR BLD AUTO: 33 %
MCH RBC QN AUTO: 28.5 PG (ref 26–34)
MCHC RBC AUTO-ENTMCNC: 31.5 G/DL (ref 31–37)
MCV RBC AUTO: 90.6 FL
MONOCYTES # BLD AUTO: 0.7 X10(3) UL (ref 0.1–1)
MONOCYTES NFR BLD AUTO: 8.7 %
NEUTROPHILS # BLD AUTO: 4.44 X10 (3) UL (ref 1.5–7.7)
NEUTROPHILS # BLD AUTO: 4.44 X10(3) UL (ref 1.5–7.7)
NEUTROPHILS NFR BLD AUTO: 55 %
PLATELET # BLD AUTO: 418 10(3)UL (ref 150–450)
RBC # BLD AUTO: 4.17 X10(6)UL
TIBC SERPL-MCNC: 499 UG/DL (ref 240–450)
TRANSFERRIN SERPL-MCNC: 335 MG/DL (ref 200–360)
WBC # BLD AUTO: 8.1 X10(3) UL (ref 4–11)

## 2023-12-23 PROCEDURE — 82728 ASSAY OF FERRITIN: CPT

## 2023-12-23 PROCEDURE — 85025 COMPLETE CBC W/AUTO DIFF WBC: CPT

## 2023-12-23 PROCEDURE — 36415 COLL VENOUS BLD VENIPUNCTURE: CPT

## 2023-12-23 PROCEDURE — 83550 IRON BINDING TEST: CPT

## 2023-12-23 PROCEDURE — 83540 ASSAY OF IRON: CPT

## 2023-12-27 ENCOUNTER — VIRTUAL PHONE E/M (OUTPATIENT)
Dept: FAMILY MEDICINE CLINIC | Facility: CLINIC | Age: 33
End: 2023-12-27
Payer: COMMERCIAL

## 2023-12-27 DIAGNOSIS — R53.83 FATIGUE, UNSPECIFIED TYPE: ICD-10-CM

## 2023-12-27 DIAGNOSIS — D50.9 IRON DEFICIENCY ANEMIA, UNSPECIFIED IRON DEFICIENCY ANEMIA TYPE: Primary | ICD-10-CM

## 2023-12-27 PROCEDURE — 99442 PHONE E/M BY PHYS 11-20 MIN: CPT | Performed by: FAMILY MEDICINE
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
## 2024-01-08 ENCOUNTER — TELEPHONE (OUTPATIENT)
Dept: HEMATOLOGY/ONCOLOGY | Facility: HOSPITAL | Age: 34
End: 2024-01-08

## 2024-01-09 ENCOUNTER — TELEMEDICINE (OUTPATIENT)
Dept: INTERNAL MEDICINE CLINIC | Facility: CLINIC | Age: 34
End: 2024-01-09
Payer: COMMERCIAL

## 2024-01-09 DIAGNOSIS — K76.0 FATTY LIVER: ICD-10-CM

## 2024-01-09 DIAGNOSIS — E78.2 MIXED HYPERLIPIDEMIA: ICD-10-CM

## 2024-01-09 DIAGNOSIS — F32.A ANXIETY AND DEPRESSION: ICD-10-CM

## 2024-01-09 DIAGNOSIS — F41.9 ANXIETY AND DEPRESSION: ICD-10-CM

## 2024-01-09 DIAGNOSIS — E66.9 CLASS 1 OBESITY WITH SERIOUS COMORBIDITY AND BODY MASS INDEX (BMI) OF 32.0 TO 32.9 IN ADULT, UNSPECIFIED OBESITY TYPE: ICD-10-CM

## 2024-01-09 DIAGNOSIS — Z51.81 THERAPEUTIC DRUG MONITORING: Primary | ICD-10-CM

## 2024-01-09 DIAGNOSIS — F50.81 BINGE EATING DISORDER: ICD-10-CM

## 2024-01-09 PROCEDURE — 99213 OFFICE O/P EST LOW 20 MIN: CPT | Performed by: PHYSICIAN ASSISTANT

## 2024-01-09 RX ORDER — TIRZEPATIDE 5 MG/.5ML
5 INJECTION, SOLUTION SUBCUTANEOUS WEEKLY
Qty: 2 ML | Refills: 0 | Status: SHIPPED | OUTPATIENT
Start: 2024-01-09 | End: 2024-01-09

## 2024-01-09 RX ORDER — DEXTROAMPHETAMINE SACCHARATE, AMPHETAMINE ASPARTATE MONOHYDRATE, DEXTROAMPHETAMINE SULFATE AND AMPHETAMINE SULFATE 5; 5; 5; 5 MG/1; MG/1; MG/1; MG/1
20 CAPSULE, EXTENDED RELEASE ORAL DAILY
Qty: 30 CAPSULE | Refills: 0 | Status: SHIPPED | OUTPATIENT
Start: 2024-01-09 | End: 2024-01-09

## 2024-01-09 RX ORDER — DEXTROAMPHETAMINE SACCHARATE, AMPHETAMINE ASPARTATE MONOHYDRATE, DEXTROAMPHETAMINE SULFATE AND AMPHETAMINE SULFATE 5; 5; 5; 5 MG/1; MG/1; MG/1; MG/1
20 CAPSULE, EXTENDED RELEASE ORAL DAILY
Qty: 30 CAPSULE | Refills: 0 | Status: SHIPPED | OUTPATIENT
Start: 2024-03-11 | End: 2024-01-09

## 2024-01-09 RX ORDER — DEXTROAMPHETAMINE SACCHARATE, AMPHETAMINE ASPARTATE MONOHYDRATE, DEXTROAMPHETAMINE SULFATE AND AMPHETAMINE SULFATE 5; 5; 5; 5 MG/1; MG/1; MG/1; MG/1
20 CAPSULE, EXTENDED RELEASE ORAL DAILY
Qty: 30 CAPSULE | Refills: 0 | Status: SHIPPED | OUTPATIENT
Start: 2024-02-09 | End: 2024-01-09

## 2024-01-09 NOTE — PROGRESS NOTES
This visit is conducted using Telemedicine with live, interactive video and audio.    Patient has been referred to the [facility] website at [email or web address] to review the yearly Consent to Treat document.    Patient understands and accepts financial responsibility for any deductible, co-insurance and/or co-pays associated with this service.      HISTORY OF PRESENT ILLNESS  Chief Complaint   Patient presents with    Weight Check       Aspen Patel is a 33 year old female here for follow up in medical weight loss program.     Pt is compliant on ozempic, has been off vyvanse for 2 months  Denies chest pain, shortness of breath, dizziness, blurred vision, headache, paresthesia, nausea/vomiting.   Fatigue - iron deficiency  Has had weight gain  Nutrition: 24 hour food log reviewed, eating regular meals, +protein  Stress: high, managing the best she can  Sleep: no new issues        Wt Readings from Last 6 Encounters:   09/27/23 195 lb (88.5 kg)   09/20/23 192 lb (87.1 kg)   08/03/23 191 lb (86.6 kg)   05/01/23 188 lb (85.3 kg)   02/28/23 189 lb (85.7 kg)   11/21/22 195 lb (88.5 kg)            Breakfast Lunch Dinner Snacks Fluids              REVIEW OF SYSTEMS  GENERAL HEALTH: feels well otherwise, denied any fevers chills or night sweats   RESPIRATORY: denies shortness of breath   CARDIOVASCULAR: denies chest pain  GI: denies abdominal pain    EXAM  LMP 09/13/2023   GENERAL: well developed, well nourished,in no apparent distress, A/O x3  SKIN: no rashes,no suspicious lesions on visible skin  HEENT: atraumatic, normocephalic  LUNGS: no increased effort or work with breathing   NEURO: speaking fluently and in clear sentences    Lab Results   Component Value Date    WBC 8.1 12/23/2023    RBC 4.17 12/23/2023    HGB 11.9 (L) 12/23/2023    HCT 37.8 12/23/2023    MCV 90.6 12/23/2023    MCH 28.5 12/23/2023    MCHC 31.5 12/23/2023    RDW 13.7 12/23/2023    .0 12/23/2023     Lab Results   Component Value Date    GLU  80 09/23/2023    BUN 14 09/23/2023    BUNCREA 20.3 (H) 12/17/2020    CREATSERUM 0.60 09/23/2023    ANIONGAP 7 09/23/2023    GFRNAA 117 09/11/2021    GFRAA 135 09/11/2021    CA 9.1 09/23/2023    OSMOCALC 285 09/23/2023    ALKPHO 53 09/23/2023    AST 19 09/23/2023    ALT 38 09/23/2023    ALKPHOS 46 11/27/2013    BILT 0.2 09/23/2023    TP 7.5 09/23/2023    ALB 3.8 09/23/2023    GLOBULIN 3.7 09/23/2023     09/23/2023    K 4.1 09/23/2023     09/23/2023    CO2 23.0 09/23/2023     Lab Results   Component Value Date     09/23/2023    A1C 5.5 09/23/2023     Lab Results   Component Value Date    CHOLEST 190 09/23/2023    TRIG 65 09/23/2023    HDL 64 (H) 09/23/2023     (H) 09/23/2023    VLDL 11 09/23/2023    NONHDLC 126 09/23/2023     Lab Results   Component Value Date    TSH 1.310 05/26/2022     Lab Results   Component Value Date    B12 371 09/02/2023     Lab Results   Component Value Date    VITD 47.3 09/02/2023       Current Outpatient Medications on File Prior to Visit   Medication Sig Dispense Refill    lisdexamfetamine (VYVANSE) 50 MG Oral Cap Take 1 capsule (50 mg total) by mouth daily. 30 capsule 0    [START ON 1/20/2024] lisdexamfetamine (VYVANSE) 50 MG Oral Cap Take 1 capsule (50 mg total) by mouth daily. 30 capsule 0    semaglutide 8 MG/3ML Subcutaneous Solution Pen-injector Inject 2 mg into the skin once a week. 1 each 12    Omeprazole 40 MG Oral Capsule Delayed Release Take 1 capsule (40 mg total) by mouth daily. If not controlling sxs, please see GI 90 capsule 3    Norgestimate-Eth Estradiol (VONDA) 0.25-35 MG-MCG Oral Tab Take 1 tablet by mouth daily. (Patient not taking: Reported on 9/20/2023) 84 tablet 4    semaglutide (OZEMPIC, 2 MG/DOSE,) 8 MG/3ML Subcutaneous Solution Pen-injector Inject 2 mg into the skin once a week. 9 mL 1    Vitamin D3 25 MCG (1000 UT) Oral Tab Take 1 tablet (1,000 Units total) by mouth daily.       No current facility-administered medications on file prior to  visit.       ASSESSMENT  Analyzed weight data:       Diagnoses and all orders for this visit:    Therapeutic drug monitoring  -     Amphetamine-Dextroamphet ER (ADDERALL XR) 20 MG Oral Capsule SR 24 Hr; Take 1 capsule (20 mg total) by mouth daily.  -     Amphetamine-Dextroamphet ER (ADDERALL XR) 20 MG Oral Capsule SR 24 Hr; Take 1 capsule (20 mg total) by mouth daily.  -     Amphetamine-Dextroamphet ER (ADDERALL XR) 20 MG Oral Capsule SR 24 Hr; Take 1 capsule (20 mg total) by mouth daily.    Class 1 obesity with serious comorbidity and body mass index (BMI) of 32.0 to 32.9 in adult, unspecified obesity type  -     Amphetamine-Dextroamphet ER (ADDERALL XR) 20 MG Oral Capsule SR 24 Hr; Take 1 capsule (20 mg total) by mouth daily.  -     Amphetamine-Dextroamphet ER (ADDERALL XR) 20 MG Oral Capsule SR 24 Hr; Take 1 capsule (20 mg total) by mouth daily.  -     Amphetamine-Dextroamphet ER (ADDERALL XR) 20 MG Oral Capsule SR 24 Hr; Take 1 capsule (20 mg total) by mouth daily.    Binge eating disorder  -     Amphetamine-Dextroamphet ER (ADDERALL XR) 20 MG Oral Capsule SR 24 Hr; Take 1 capsule (20 mg total) by mouth daily.  -     Amphetamine-Dextroamphet ER (ADDERALL XR) 20 MG Oral Capsule SR 24 Hr; Take 1 capsule (20 mg total) by mouth daily.  -     Amphetamine-Dextroamphet ER (ADDERALL XR) 20 MG Oral Capsule SR 24 Hr; Take 1 capsule (20 mg total) by mouth daily.    Mixed hyperlipidemia    Fatty liver    Anxiety and depression    Other orders  -     Tirzepatide-Weight Management (ZEPBOUND) 5 MG/0.5ML Subcutaneous Solution Auto-injector; Inject 5 mg into the skin once a week.        PLAN  Initial Weight: 269lbs  Initial Weight Date: 6/15/21  Today's Weight: 195lbs  5% Goal: 13.45lbs  10% Goal: 26.9lbs  Total Weight Loss: Up 5lbs/Net loss 74lbs    Discontinue ozempic  Will begin Zepbound 5mg weekly - denies any personal or family history of pancreatitis, pancreatic cancer, thyroid cancer, MEN2 - discussed MOA, advised side  effects and adverse effects of medication.  Will begin adderall, shortage of vyvanse- discussed side effects including but not limited to:( elevated BP, tremor, anxiety, headache, constipation and serious side effects including arrhythmia and cad ) patient verbalized understanding agrees with the plan  Fatty liver - low carb, low fat  HLD - lifestyle changes  Mindful eating  Consistency with logging foods - protein and produce  Nutrition: low carb diet/ recommended to eat breakfast daily/ regular protein intake  Medication use and side effects reviewed with patient.  Medication contraindications: n/a  Follow up with dietitian and psychologist as recommended.  Discussed the role of sleep and stress in weight management.  Counseled on comprehensive weight loss plan including attention to nutrition, exercise and behavior/stress management for success. See patient instruction below for more details.  Discussed strategies to overcome barriers to successful weight loss and weight maintenance  FITTE: ACSM recommendations (150-300 minutes/ week in active weight loss)   Weight Loss consent to treat reviewed and signed     There are no Patient Instructions on file for this visit.    No follow-ups on file.    Patient verbalizes understanding.    Daniela Aguillon PA-C  1/9/2024    Please note that the following visit was completed using two-way, real-time interactive audio and video communication.  This has been done in good ace to provide continuity of care in the best interest of the provider-patient relationship, due to the ongoing public health crisis/national emergency and because of restrictions of visitation.  There are limitations of this visit as no physical exam could be performed.  Every conscious effort was taken to allow for sufficient and adequate time.  This billing was spent on reviewing labs, medications, radiology tests and decision making.  Appropriate medical decision-making and tests are ordered as  detailed in the plan of care above    Daniela Aguillon PA-C

## 2024-01-11 ENCOUNTER — LAB ENCOUNTER (OUTPATIENT)
Dept: LAB | Facility: HOSPITAL | Age: 34
End: 2024-01-11
Attending: FAMILY MEDICINE
Payer: COMMERCIAL

## 2024-01-11 DIAGNOSIS — D50.9 IRON DEFICIENCY ANEMIA, UNSPECIFIED IRON DEFICIENCY ANEMIA TYPE: ICD-10-CM

## 2024-01-11 DIAGNOSIS — R53.83 FATIGUE, UNSPECIFIED TYPE: ICD-10-CM

## 2024-01-11 LAB — HEMOCCULT STL QL: NEGATIVE

## 2024-01-11 PROCEDURE — 82274 ASSAY TEST FOR BLOOD FECAL: CPT

## 2024-01-12 ENCOUNTER — TELEPHONE (OUTPATIENT)
Dept: INTERNAL MEDICINE CLINIC | Facility: CLINIC | Age: 34
End: 2024-01-12

## 2024-01-12 NOTE — TELEPHONE ENCOUNTER
Patient is calling to check on status of PA for Zepbound?   We did not get notice.  Will try to enter in epic

## 2024-01-15 ENCOUNTER — TELEPHONE (OUTPATIENT)
Dept: OBGYN CLINIC | Facility: CLINIC | Age: 34
End: 2024-01-15

## 2024-01-15 DIAGNOSIS — N91.2 AMENORRHEA: Primary | ICD-10-CM

## 2024-01-15 NOTE — TELEPHONE ENCOUNTER
Patient calling to initiate prenatal care  LMP 12/22/23  Patient is 7-8 weeks on 2/16/24  Confirmation Ultrasound and Appointment scheduled on 2/13/24    Any history of ectopic pregnancy? no  Any history of miscarriage? no  Any medications that you are taking on a regular basis other than prenatal vitamins? no (if not taking prenatal vitamins, encourage patient to start taking.)  Any bleeding since the first day of last LMP and your positive pregnancy test? no    Insurance [employer] ppo

## 2024-01-16 NOTE — TELEPHONE ENCOUNTER
Pt. has  appointment. with Dr. Daly 2/13/2024, sent order for US for her to sign..Also sent MCM to pt. with prenatal information
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
## 2024-01-29 ENCOUNTER — OFFICE VISIT (OUTPATIENT)
Dept: HEMATOLOGY/ONCOLOGY | Age: 34
End: 2024-01-29
Attending: INTERNAL MEDICINE
Payer: COMMERCIAL

## 2024-01-29 VITALS
TEMPERATURE: 98 F | BODY MASS INDEX: 37.4 KG/M2 | DIASTOLIC BLOOD PRESSURE: 88 MMHG | WEIGHT: 224.5 LBS | OXYGEN SATURATION: 98 % | RESPIRATION RATE: 18 BRPM | SYSTOLIC BLOOD PRESSURE: 143 MMHG | HEIGHT: 65 IN | HEART RATE: 105 BPM

## 2024-01-29 DIAGNOSIS — R53.83 FATIGUE, UNSPECIFIED TYPE: ICD-10-CM

## 2024-01-29 DIAGNOSIS — D50.9 IRON DEFICIENCY ANEMIA, UNSPECIFIED IRON DEFICIENCY ANEMIA TYPE: Primary | ICD-10-CM

## 2024-01-29 PROCEDURE — 99205 OFFICE O/P NEW HI 60 MIN: CPT | Performed by: INTERNAL MEDICINE

## 2024-01-29 RX ORDER — CHOLECALCIFEROL (VITAMIN D3) 25 MCG
1 TABLET,CHEWABLE ORAL DAILY
COMMUNITY

## 2024-01-29 RX ORDER — FERROUS SULFATE 325(65) MG
325 TABLET ORAL 2 TIMES DAILY
Qty: 90 TABLET | Refills: 3 | Status: SHIPPED | OUTPATIENT
Start: 2024-01-29 | End: 2024-02-28

## 2024-01-29 RX ORDER — ASCORBIC ACID 500 MG
500 TABLET ORAL 2 TIMES DAILY
Qty: 60 TABLET | Refills: 3 | Status: SHIPPED | OUTPATIENT
Start: 2024-01-29 | End: 2024-02-28

## 2024-02-13 ENCOUNTER — OFFICE VISIT (OUTPATIENT)
Dept: OBGYN CLINIC | Facility: CLINIC | Age: 34
End: 2024-02-13
Payer: COMMERCIAL

## 2024-02-13 ENCOUNTER — ULTRASOUND ENCOUNTER (OUTPATIENT)
Dept: OBGYN CLINIC | Facility: CLINIC | Age: 34
End: 2024-02-13
Payer: COMMERCIAL

## 2024-02-13 VITALS
SYSTOLIC BLOOD PRESSURE: 122 MMHG | WEIGHT: 234 LBS | DIASTOLIC BLOOD PRESSURE: 64 MMHG | HEART RATE: 84 BPM | BODY MASS INDEX: 38.99 KG/M2 | HEIGHT: 65 IN

## 2024-02-13 DIAGNOSIS — N91.2 AMENORRHEA: ICD-10-CM

## 2024-02-13 DIAGNOSIS — Z32.01 PREGNANCY EXAMINATION OR TEST, POSITIVE RESULT (HCC): Primary | ICD-10-CM

## 2024-02-13 PROBLEM — O30.049: Status: ACTIVE | Noted: 2024-02-13

## 2024-02-13 PROBLEM — O09.529 ANTEPARTUM MULTIGRAVIDA OF ADVANCED MATERNAL AGE (HCC): Status: ACTIVE | Noted: 2024-02-13

## 2024-02-13 PROBLEM — O30.049 TWIN PREGNANCY, DICHORIONIC/DIAMNIOTIC, UNSPECIFIED TRIMESTER: Status: ACTIVE | Noted: 2024-02-13

## 2024-02-13 PROBLEM — O09.529 ANTEPARTUM MULTIGRAVIDA OF ADVANCED MATERNAL AGE: Status: ACTIVE | Noted: 2024-02-13

## 2024-02-13 PROBLEM — Z98.891 HISTORY OF CESAREAN DELIVERY: Status: ACTIVE | Noted: 2020-05-29

## 2024-02-13 PROCEDURE — 99214 OFFICE O/P EST MOD 30 MIN: CPT | Performed by: OBSTETRICS & GYNECOLOGY

## 2024-02-13 PROCEDURE — 76856 US EXAM PELVIC COMPLETE: CPT | Performed by: OBSTETRICS & GYNECOLOGY

## 2024-02-13 NOTE — PATIENT INSTRUCTIONS
The ADA and ACOG define patients at increased risk of type 2 diabetes based on: body mass index (BMI) ?25 kg/m2 (?23 kg/m2 in  Americans) plus one or more of the following [2,28]:    GDM in a previous pregnancy.    Glycated hemoglobin ?5.7 percent (39 mmol/mol), impaired glucose tolerance, or impaired fasting glucose on previous testing.    First-degree relative with diabetes.    High-risk race/ethnicity (eg, , , ,  American, ).    History of cardiovascular disease.    Hypertension (?140/90 mmHg) or on therapy for hypertension.    High-density lipoprotein cholesterol level <35 mg/dL (0.90 mmol/L) and/or a triglyceride level >250 mg/dL (2.82 mmol/L).    Polycystic ovary syndrome (PCOS).    Physical inactivity.    Other clinical condition associated with insulin resistance (eg, severe obesity, acanthosis nigricans).        Candidates    Low-dose aspirin 81 mg: Take 2 tablets by mouth daily starting at 12 weeks until 36 weeks      Selecting high risk women for prophylaxis -- The greatest benefit appears to be in women at moderate to high risk of developing the disease [10,13-20], in whom a 62 percent reduction of  preeclampsia occurred in the ASPRE trial [21]. We recommend low-dose aspirin prophylaxis for women at high risk for preeclampsia. There is no consensus on the exact criteria that confer high risk. It is reasonable to use the USPSTF high-risk criteria, which are also endorsed by the American College of Obstetricians and Gynecologists (ACOG) [22,23]. The incidence of preeclampsia is estimated to be at least 8 percent for pregnant women with any one of these high risk factors:    ?Previous pregnancy with preeclampsia, especially early onset and with an adverse outcome.    ?Type 1 or 2 diabetes mellitus.    ?Chronic hypertension.    ?Multifetal gestation.    ?Kidney disease.    ?Autoimmune disease with potential vascular complications  (antiphospholipid syndrome, systemic lupus erythematosus).      We generally follow the USPSTF criteria and recommend low-dose aspirin for preeclampsia prevention to patients with two or more of the following moderate risk factors [22]:    ?Nulliparity.    ?Obesity (body mass index >30 kg/m2).    ?Family history of preeclampsia in mother or sister.    ?Age ?35 years.    ?Sociodemographic characteristics (Black persons, lower income level [recognizing that these are not biological factors]).    ?Personal risk factors (eg, previous pregnancy with low birth weight or small for gestational age infant, previous adverse pregnancy outcome [eg, stillbirth], interval >10 years between pregnancies).                [facility]- Prenatal Testing    The following information is designed to help you understand some of the optional tests that are available to you to screen for problems in your pregnancy.  Before considering any of these tests, you will need to consider the following questions:    [1] What would you do if an abnormality were detected?  If the answer is nothing, then you may decide to decline all testing.  [2] Would you be willing to undergo testing which might increase your risk of miscarriage, such as an amniocentesis or CVS (see below)?  [3] If you have the initial testing, and it indicates that there might be a problem, and you subsequently decide not to do invasive testing such as an amniocentesis, would you worry excessively through the remainder of the pregnancy?    The following tests are available to screen for problems in your pregnancy:    [1]  First Trimester Screening (also called Nuchal Thickness, Nuchal Translucency or NT)- This is an abdominal ultrasound done between 10 and 14 weeks by a perinatology specialist (Maternal Fetal Medicine, MFM) which measures the thickness of the skin behind your baby's neck.  Increased thickness of the skin in this area has been linked to an increased risk of  genetic abnormalities like Down Syndrome.  A second visit may be required if the baby is not in the correct position.  The ultrasound results are combined with a finger stick blood test to increase the sensitivity of the test.  You will need to schedule an appointment with the Maternal Fetal Medicine office.  Address and phone number below:  Please verify insurance coverage:  CPT code: 93715 (baum) or 79889 (twins)  Diagnosis: AMA (advanced maternal age) - O09.529  Maternal Fetal Medicine  Dr. Miranda, Dr. Manzano, Dr. Alva, and Dr. Heredia  [address]  [place] [address]  Phone [phone]  Fax [phone]    [2] Cell-Free DNA testing (NIPT)- This is a blood test done any time after 10-11 weeks which screens for genetic abnormalities like Down Syndrome.  It is greater than 98% sensitive but requires an amniocentesis for confirmation if abnormal.  It can also tell you the sex of the baby.  CPT code: 49086  Diagnosis code: AMA (advanced maternal age) - O09.529  Testing options:   [facility]- preferred for [facility] patients.  Please call [phone] to discuss billing, prior authorizations or referrals  [facility]- Preferred for patients who use [facility].  Please call [phone] to discuss billing, prior authorizations or referrals  [id number]- Optional for all insurance plans except [facility].  Please call [facility] at [phone] to discuss billing, prior authorizations, or referrals    [3]  Alpha Fetoprotein (AFP, MSAFP)- This is a simple blood test that screens for neural tube defects such as spina bifida (an abnormal opening in the spine).  It is usually performed around 16-20 weeks.  Additional testing such as a Level II ultrasound may be recommended for an abnormal test result.  Please verify insurance coverage:  CPT code: 52148 Diagnosis code: Genetic Screening- Z36.8A    [4] Cystic Fibrosis/SMA Carrier Screening- Cystic Fibrosis is a genetic disease which causes lung problems in the infant.  SMA  (spinal muscular atrophy) is a genetic disease that affects the nerve cells of the spinal cord.  These genetic defects would need to be passed from both parents to the child.  A blood test is performed on the mother, and if her test is positive, then the father should also be tested. These tests can be done at any time in the pregnancy, usually in the first trimester with your initial OB labs.  All babies are screened for cystic fibrosis after birth.  Please verify insurance coverage:  Cystic Fibrosis CPT Code: 34628 Diagnosis code: Cystic Fibrosis screening- Z13.228  SMA CPT Code: 68431 Diagnosis code: Genetic Screening- Z36.8A or Testing for Genetic Disease Carrier- Z13.71    [5]  Level II Ultrasound-  This is an abdominal ultrasound done at approximately 20-21 weeks by a perinatology specialist ([facility]) at [facility] which looks at many of the baby's internal structures.  Abnormalities in certain structures have been associated with an increased risk of genetic abnormalities.  It also screens for NTD's.  This ultrasound would replace the Level I Ultrasound that we normally perform at our office around the same time.    [6]  Amniocentesis-  During this procedure, a needle is passed through your abdominal wall to withdrawal some amniotic fluid from around the baby.  It screens for both genetic abnormalities and NTD's and is usually performed around 15-16 weeks.  This test has the highest accuracy for detecting genetic abnormalities, but there may be a small risk of miscarriage or other complications.  A similar procedure called Chorionic Villus Sampling (CVS) is performed by passing a catheter through the vagina to remove a small sample of tissue from the placenta (afterbirth).  CVS may have a higher risk of miscarriage and other rare complications compared to amniocentesis but can be performed earlier in pregnancy.  Amniocentesis is often recommended/offered if any of the previously mentioned tests come back  abnormal.  A pamphlet is available if you would like more information about this option.  If you decide to have an amniocentesis, the other tests would be unnecessary.      The above information covers some of the basics.  Pamphlets on the Cell-Free DNA test, Quad Screen and Cystic Fibrosis test should be in your prenatal packet.  Your doctor will discuss this information in more detail, and feel free to ask additional questions.  Also, remember that all of these tests are optional, and will only be performed at your request.  Testing that needs to be done through the perinatologist's office may require 2-3 weeks lead time to schedule.              Foods to Avoid During Pregnancy  Deli Meats- You may eat deli meats from the deli.  If you eat deli meats in the package, it may be contaminated with Listeria. Heat all deli meats in a package to 165 degrees Fahrenheit before eating, even if the label states the meat is “pre-cooked”.    Soft Cheeses and Unpasteurized Products - You may eat soft cheeses that are pasteurized.  Please avoid all soft cheeses, milk or juice that is unpasteurized.  Fish- avoid fish that contains a high level of mercury. These include but are not limited to; [place], Orange Roughy, Tile Fish, Shark, Swordfish, and Hakeem Mackerel. Please see chart below for good fish choices in pregnancy. Also avoid any raw, uncooked shellfish such as oysters, clams, or sushi.    Make sure to cook all meats; including ground beef, pork, and poultry to their desired temperatures before consuming. This reduces the chance of a food borne illness.  Caffeine- limit to 200 mg or an 8oz cup daily or less.   Alcohol- no amount of alcohol is determined to be safe in pregnancy. Do not drink any alcoholic beverages while pregnant.        Medications Safe in Pregnancy  The following over-the-counter medications may be taken safely after 12 weeks gestation by any patient who is pregnant.  Please follow the instructions on the  package for adult dosage.  If you experience any symptoms prior to 12 weeks, please call the office at [phone].      Colds/Congestion: Flonase, Saline Nasal Spray, Neti Pot, Plain Robitussin, Robitussin DM, Mucinex DM, Vicks 44 E, Vicks Vapor rub, Cough drops without Zinc or Sudafed.   Contact your doctor if you have a persistent fever over 100.4, shortness of breath, coughing up green mucus, or a sore throat that persists from more than 3 days    Diarrhea: Imodium, Maalox Anti-Diarrheal or Kaopectate  Contact the office if you have diarrhea for more than 3 days.    Allergies: Benadryl, Claritin or Zyrtec    Hemorrhoids: Tucks pads, Preparation H, Annusol, Sitz bath or Witch hazel  Eat a high fiber diet and drink plenty of fluids.    Yeast: Monistat 3 or 7  Call if your symptoms do not improve, or if you experience vaginal bleeding, or a watery discharge.    Constipation: Metamucil, Colace, fiber supplement, Milk of Magnesia or Dulcolax  Drink plenty of fluids, eat high-fiber foods and take the above laxatives sporadically.     Headache or Mild aches and pain: Extra Strength Tylenol     Gas: Gas X, Gelusil, Papaya Tablets, Maalox, Mylicon or Mylanta Gas    Heartburn: Tums, Mylanta, Pepcid AC or Maalox    Sore throat: Alcohol free Chloraseptic spray or Lozenges     Nausea and Vomiting: ½ tablet Unisom plus 100mg of Vitamin B6 at bedtime (may increase B6 up to 200mg per day), Nora root tea or raspberry leaf tea    Insomnia: Tylenol PM, Vitamin B6 50mg, warm bath or milk, Unisom, Nytol or Sominex.     We have listed a few medications for common symptoms seen in pregnancy.  Please contact the office if you are unsure about any over the counter medications that are not listed above.
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
## 2024-03-12 ENCOUNTER — ULTRASOUND ENCOUNTER (OUTPATIENT)
Dept: OBGYN CLINIC | Facility: CLINIC | Age: 34
End: 2024-03-12
Payer: COMMERCIAL

## 2024-03-12 DIAGNOSIS — O30.041 DICHORIONIC DIAMNIOTIC TWIN PREGNANCY IN FIRST TRIMESTER (HCC): Primary | ICD-10-CM

## 2024-03-12 PROCEDURE — 76802 OB US < 14 WKS ADDL FETUS: CPT | Performed by: OBSTETRICS & GYNECOLOGY

## 2024-03-12 PROCEDURE — 76801 OB US < 14 WKS SINGLE FETUS: CPT | Performed by: OBSTETRICS & GYNECOLOGY

## 2024-03-13 ENCOUNTER — TELEPHONE (OUTPATIENT)
Dept: OBGYN CLINIC | Facility: CLINIC | Age: 34
End: 2024-03-13

## 2024-03-13 ENCOUNTER — INITIAL PRENATAL (OUTPATIENT)
Dept: OBGYN CLINIC | Facility: CLINIC | Age: 34
End: 2024-03-13
Payer: COMMERCIAL

## 2024-03-13 VITALS
WEIGHT: 246 LBS | BODY MASS INDEX: 40.98 KG/M2 | DIASTOLIC BLOOD PRESSURE: 60 MMHG | SYSTOLIC BLOOD PRESSURE: 132 MMHG | HEIGHT: 65 IN | HEART RATE: 92 BPM

## 2024-03-13 DIAGNOSIS — Z34.80 SUPERVISION OF OTHER NORMAL PREGNANCY, ANTEPARTUM (HCC): ICD-10-CM

## 2024-03-13 DIAGNOSIS — O99.210 OBESITY AFFECTING PREGNANCY, ANTEPARTUM, UNSPECIFIED OBESITY TYPE (HCC): ICD-10-CM

## 2024-03-13 DIAGNOSIS — O30.049: ICD-10-CM

## 2024-03-13 DIAGNOSIS — Z98.891 HISTORY OF CESAREAN DELIVERY: ICD-10-CM

## 2024-03-13 DIAGNOSIS — O09.529 ANTEPARTUM MULTIGRAVIDA OF ADVANCED MATERNAL AGE (HCC): Primary | ICD-10-CM

## 2024-03-13 DIAGNOSIS — D50.9 IRON DEFICIENCY ANEMIA, UNSPECIFIED IRON DEFICIENCY ANEMIA TYPE: ICD-10-CM

## 2024-03-13 DIAGNOSIS — M54.30 SCIATICA, UNSPECIFIED LATERALITY: ICD-10-CM

## 2024-03-13 PROCEDURE — 87086 URINE CULTURE/COLONY COUNT: CPT | Performed by: NURSE PRACTITIONER

## 2024-03-13 NOTE — TELEPHONE ENCOUNTER
RUBEN 9.27.2024- currently 11w5d, [facility] Twins    Patient desires NIPT testing- would like gender reported.  Order placed for [employer].  Patient aware that Herman will send her a link by text or email to schedule mobile phlebotomy and ship a kit to her home.     Order Number: [id number]

## 2024-03-13 NOTE — PROGRESS NOTES
Here for initial prenatal visit with our group.  33 year old  at 11w5d by LMP     Patient's last menstrual period was 2023..       Last pap smear was in  and it was normal.    This pregnancy is complicated by obesity, advanced maternal age, anemia, GERD, Di/DI twin gestation, history of C/S x 2, anxiety and depression.    She has a history of bulging lumbar discs and is experiencing sciatica.     OB History    Para Term  AB Living   4 2 1 1 1 2   SAB IAB Ectopic Multiple Live Births   0 1 0   2      # Outcome Date GA Lbr Luke/2nd Weight Sex Delivery Anes PTL Lv   4 Current            3 Term 20 37w3d  8 lb 7.1 oz (3.83 kg) M Caesarean Spinal N TRAY   2  19 35w5d  6 lb 6.8 oz (2.915 kg) M Caesarean Spinal N TRAY      Complications: PIH   1 IAB                ROS:  Reviewed, all wnl    Please see prenatal physical exam tab for initial OB physical exam  US: please see US tab      Prenatal course and care discussed with patient: visits, labs, HIV, AFP, sonograms, GL, GBS, restrictions.  Pamphlets given. Genetic counseling done at prior visit regarding Cystic fibrosis and SMA carrier screen, First trimester screen/NT screen, CVS, QS + midtrimester sonogram for markers, amniocentesis. Pt would like the NIPT.    A/P:  1. Antepartum multigravida of advanced maternal age (HCC)  - Glucose 1 HR OB; Future  - Maternal Fetal Medicine Referral - [facility]    2. Twin pregnancy, dichorionic/diamniotic, unspecified trimester (HCC)  - Maternal Fetal Medicine Referral - [facility]    3. History of  delivery    4. Supervision of other normal pregnancy, antepartum ([facility])  - Type and screen; Future  - CBC W Differential W Platelet; Future  - Hepatitis B Surface Antigen; Future  - T Pallidum Screening [place]; Future  - Rubella, IGG; Future  - HIV AG AB COMBO; Future  - Urine Culture, Routine; Future  - HCV Antibody; Future  - Urine Culture, Routine  - Physical Therapy  Referral - [facility]    5. Obesity affecting pregnancy, antepartum, unspecified obesity type (HCC)  - Glucose 1 HR OB; Future  - Maternal Fetal Medicine Referral - [facility]    6. Iron deficiency anemia, unspecified iron deficiency anemia type  - Iron And Tibc; Future  - Ferritin; Future    7. Sciatica, unspecified laterality  - Physical Therapy Referral - [facility]    LUIS ALBERTO 4 weeks/ prn

## 2024-03-21 NOTE — PROGRESS NOTES
Hospital Medicine History & Physical      PCP: Arnoldo Frank MD    Date of Admission: 3/21/2024    Date of Service: Pt seen/examined on 3/21/2024     Chief Complaint:    Chief Complaint   Patient presents with    Cerebrovascular Accident     Pt via squad from home for AMS.  Unknown history.  Aphagia and right sided deficits, unknown if new or old.  Per squad \"family thought he had a stroke last week, but refused to be seen\"  Pt disoirented         History Of Present Illness:      The patient is a 89 y.o. male with pmhx of TIA, HTN, HLD who presented to [facility] ED with concern for AMS. The patient is severely aphasic at this time and also hard of hearing. He is awake and alert; however, he is only able to respond by saying \"what\", \"I don't know,\" \"yes\", \"no\", or \"Oh\" to any questions. He is unable to provide any meaningful history at this time.  The patient's daughters and son are present at bedside. Per his daughter, the patient lives with his grandson. Family noticed starting about 1 week ago, he had difficulty with speech and difficulty walking. He was able to walk on his own, but this progressively worsened until he was not longer able to walk on his own today. Starting about 1 week ago, he had some speech changes, described by family as his \"thoughts and speech not connecting,\" which also progressively worsened to the point where he was not able to speak in coherent, full sentences since yesterday. Normally speech is clear and coherent   His daughter states that he has had a headache but had recent upper respiratory symptoms, so they weren't sure if the headache was related to his recent illness.   His daughter does mention that he had slid out of his chair today. He has a skin tear to his right arm. He reportedly did not hit his head at that time.  Of note, the patient is hard-of-hearing at baseline per family. He was prescribed hearing aids at one point put reportedly never uses them.    Past  Pt presents as  w/ EDC of 19 c/o elevated BPs at home. Pt admitted to triage room 5 via wc accompanied by . EFM tested and applied, FHTs tracing and audible in 160s.  Pt states she had 3 separate elevated BP readings at home with the highest    COVID-19, Rapid [[id number]] Collected: 03/21/24 1051    Order Status: Completed Specimen: Nasopharyngeal Swab Updated: 03/21/24 1128     SARS-CoV-2, NAAT Not Detected     Comment: Rapid NAAT:   Negative results should be treated as presumptive and,  if inconsistent with clinical signs and symptoms or necessary for  patient management, should be tested with an alternative molecular  assay. Negative results do not preclude SARS-CoV-2 infection and  should not be used as the sole basis for patient management decisions.  This test has been authorized by the FDA under an Emergency Use  Authorization (EUA) for use by authorized laboratories.    Fact sheet for Healthcare Providers:  https://www.fda.gov/media/254555/download  Fact sheet for Patients: https://www.fda.gov/media/296120/download    METHODOLOGY: Isothermal Nucleic Acid Amplification         Rapid influenza A/B antigens [[id number]] Collected: 03/21/24 1051    Order Status: Completed Specimen: Nasopharyngeal Updated: 03/21/24 1127     Rapid Influenza A Ag Negative     Rapid Influenza B Ag Negative              EKG:    Encounter Date: 03/21/24   EKG 12 Lead   Result Value    Ventricular Rate 72    Atrial Rate 72    P-R Interval 140    QRS Duration 88    Q-T Interval 434    QTc Calculation (Bazett) 475    P Axis 85    R Axis 2    T Axis 15    Diagnosis      Normal sinus rhythmNonspecific ST abnormalityAbnormal ECGNo previous ECGs availableConfirmed by EVANGELISTA BROWN, SHANNAN ([id number]) on 3/21/2024 1:21:44 PM         RADIOLOGY  CTA HEAD NECK W CONTRAST   Final Result   1. Short segment (3 mm) occlusion of the M1 segment of the left middle   cerebral artery.  There is reconstitution and filling of the distal M1, M2   and M3 branches of the left middle cerebral artery.   2. Moderate stenoses of the P2 segments of the posterior cerebral arteries.   3. 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   4. No significant

## 2024-04-01 ENCOUNTER — TELEPHONE (OUTPATIENT)
Dept: OBGYN CLINIC | Facility: CLINIC | Age: 34
End: 2024-04-01

## 2024-04-01 NOTE — TELEPHONE ENCOUNTER
Per [facility] portal:  [facility] results for this patient are enclosed. We were unable to provide a risk assessment for this patient due to low fetal fraction or a laboratory processing issue. A repeat specimen may be submitted.  This patient is enrolled in the [facility] Automatic Redraw Program. Per your request, we will contact the patient directly to schedule a phlebotomy appointment.  We will make three attempts to contact the patient. If we are unable to reach the patient to schedule a redraw, a member of the [facility] Customer Care team will notify you by fax/email.    Patient notified by detailed message left on voicemail.  Phone number provided for additional questions or concerns.

## 2024-04-03 ENCOUNTER — TELEPHONE (OUTPATIENT)
Dept: OBGYN CLINIC | Facility: CLINIC | Age: 34
End: 2024-04-03

## 2024-04-03 LAB
AMB EXT MYRIAD TRISOMY 13: NEGATIVE
AMB EXT MYRIAD TRISOMY 18: NEGATIVE
AMB EXT MYRIAD TRISOMY 21: NEGATIVE

## 2024-04-03 NOTE — TELEPHONE ENCOUNTER
Pt is currently at the facility trying to re-do labs. Pt does not have the order. Please advise as she is waiting at the office.

## 2024-04-04 ENCOUNTER — PATIENT MESSAGE (OUTPATIENT)
Dept: OBGYN CLINIC | Facility: CLINIC | Age: 34
End: 2024-04-04

## 2024-04-04 ENCOUNTER — LAB ENCOUNTER (OUTPATIENT)
Dept: LAB | Age: 34
End: 2024-04-04
Attending: NURSE PRACTITIONER
Payer: COMMERCIAL

## 2024-04-04 DIAGNOSIS — O99.210 OBESITY AFFECTING PREGNANCY, ANTEPARTUM, UNSPECIFIED OBESITY TYPE (HCC): ICD-10-CM

## 2024-04-04 DIAGNOSIS — Z34.80 SUPERVISION OF OTHER NORMAL PREGNANCY, ANTEPARTUM (HCC): ICD-10-CM

## 2024-04-04 DIAGNOSIS — O09.529 ANTEPARTUM MULTIGRAVIDA OF ADVANCED MATERNAL AGE (HCC): ICD-10-CM

## 2024-04-04 DIAGNOSIS — D50.9 IRON DEFICIENCY ANEMIA, UNSPECIFIED IRON DEFICIENCY ANEMIA TYPE: ICD-10-CM

## 2024-04-04 LAB
ANTIBODY SCREEN: NEGATIVE
BASOPHILS # BLD AUTO: 0.03 X10(3) UL (ref 0–0.2)
BASOPHILS NFR BLD AUTO: 0.3 %
DEPRECATED HBV CORE AB SER IA-ACNC: 30.4 NG/ML
EOSINOPHIL # BLD AUTO: 0.14 X10(3) UL (ref 0–0.7)
EOSINOPHIL NFR BLD AUTO: 1.2 %
ERYTHROCYTE [DISTWIDTH] IN BLOOD BY AUTOMATED COUNT: 16 %
GLUCOSE 1H P GLC SERPL-MCNC: 95 MG/DL
HBV SURFACE AG SER-ACNC: 0.38 [IU]/L
HBV SURFACE AG SERPL QL IA: NONREACTIVE
HCT VFR BLD AUTO: 33.8 %
HCV AB SERPL QL IA: NONREACTIVE
HGB BLD-MCNC: 11.4 G/DL
IMM GRANULOCYTES # BLD AUTO: 0.08 X10(3) UL (ref 0–1)
IMM GRANULOCYTES NFR BLD: 0.7 %
IRON SATN MFR SERPL: 37 %
IRON SERPL-MCNC: 159 UG/DL
LYMPHOCYTES # BLD AUTO: 2.01 X10(3) UL (ref 1–4)
LYMPHOCYTES NFR BLD AUTO: 17.6 %
MCH RBC QN AUTO: 30.8 PG (ref 26–34)
MCHC RBC AUTO-ENTMCNC: 33.7 G/DL (ref 31–37)
MCV RBC AUTO: 91.4 FL
MONOCYTES # BLD AUTO: 0.86 X10(3) UL (ref 0.1–1)
MONOCYTES NFR BLD AUTO: 7.5 %
NEUTROPHILS # BLD AUTO: 8.29 X10 (3) UL (ref 1.5–7.7)
NEUTROPHILS # BLD AUTO: 8.29 X10(3) UL (ref 1.5–7.7)
NEUTROPHILS NFR BLD AUTO: 72.7 %
PLATELET # BLD AUTO: 320 10(3)UL (ref 150–450)
RBC # BLD AUTO: 3.7 X10(6)UL
RH BLOOD TYPE: POSITIVE
RUBV IGG SER QL: POSITIVE
RUBV IGG SER-ACNC: 39.8 IU/ML (ref 10–?)
T PALLIDUM AB SER QL IA: NONREACTIVE
TIBC SERPL-MCNC: 431 UG/DL (ref 240–450)
TRANSFERRIN SERPL-MCNC: 289 MG/DL (ref 200–360)
WBC # BLD AUTO: 11.4 X10(3) UL (ref 4–11)

## 2024-04-04 PROCEDURE — 36415 COLL VENOUS BLD VENIPUNCTURE: CPT

## 2024-04-04 PROCEDURE — 87389 HIV-1 AG W/HIV-1&-2 AB AG IA: CPT

## 2024-04-04 PROCEDURE — 86762 RUBELLA ANTIBODY: CPT

## 2024-04-04 PROCEDURE — 86780 TREPONEMA PALLIDUM: CPT

## 2024-04-04 PROCEDURE — 86850 RBC ANTIBODY SCREEN: CPT

## 2024-04-04 PROCEDURE — 87340 HEPATITIS B SURFACE AG IA: CPT

## 2024-04-04 PROCEDURE — 82950 GLUCOSE TEST: CPT

## 2024-04-04 PROCEDURE — 86901 BLOOD TYPING SEROLOGIC RH(D): CPT

## 2024-04-04 PROCEDURE — 86900 BLOOD TYPING SEROLOGIC ABO: CPT

## 2024-04-04 PROCEDURE — 85025 COMPLETE CBC W/AUTO DIFF WBC: CPT

## 2024-04-04 PROCEDURE — 86803 HEPATITIS C AB TEST: CPT

## 2024-04-04 PROCEDURE — 82728 ASSAY OF FERRITIN: CPT

## 2024-04-04 PROCEDURE — 83540 ASSAY OF IRON: CPT

## 2024-04-04 PROCEDURE — 83550 IRON BINDING TEST: CPT

## 2024-04-05 NOTE — TELEPHONE ENCOUNTER
From: Aspen Patel  To: Mckayla Wynne  Sent: 4/4/2024 8:26 PM CDT  Subject: Positive Rubella result    Hi Maite,   I’m super worried for the babies with this positive Rubella test. What does this mean? What do we do now? Please reply ASAP!!!    Thank you,   Aspen

## 2024-04-09 ENCOUNTER — ROUTINE PRENATAL (OUTPATIENT)
Dept: OBGYN CLINIC | Facility: CLINIC | Age: 34
End: 2024-04-09
Payer: COMMERCIAL

## 2024-04-09 ENCOUNTER — LAB ENCOUNTER (OUTPATIENT)
Dept: LAB | Age: 34
End: 2024-04-09
Attending: OBSTETRICS & GYNECOLOGY
Payer: COMMERCIAL

## 2024-04-09 VITALS
BODY MASS INDEX: 44 KG/M2 | WEIGHT: 261.38 LBS | HEART RATE: 109 BPM | SYSTOLIC BLOOD PRESSURE: 122 MMHG | DIASTOLIC BLOOD PRESSURE: 82 MMHG

## 2024-04-09 DIAGNOSIS — Z36.8A ENCOUNTER FOR ANTENATAL SCREENING FOR OTHER GENETIC DEFECT (HCC): ICD-10-CM

## 2024-04-09 DIAGNOSIS — Z34.90 PRENATAL CARE, ANTEPARTUM (HCC): Primary | ICD-10-CM

## 2024-04-09 PROCEDURE — 36415 COLL VENOUS BLD VENIPUNCTURE: CPT

## 2024-04-09 PROCEDURE — 82105 ALPHA-FETOPROTEIN SERUM: CPT

## 2024-04-09 NOTE — PROGRESS NOTES
Chief Complaint   Patient presents with    Prenatal Care     LUIS ALBERTO     Routine prenatal visit without complaints.  Patient denies any bleeding, leaking fluid, cramping, or contractions.  Assessment/Plan:  15w4d doing well   AMA/Obesity/Twins- has appt for Level 2.  Had [employer].  Nl early glucola.  ERICA daily recommended  Reviewed signs and symptoms of  labor  Diagnoses and all orders for this visit:    Prenatal care, antepartum (HCC)    Encounter for  screening for other genetic defect (HCC)  -     AFP, Maternal Serum; Future      Return in about 4 weeks (around 2024) for Routine Prenatal Visit.

## 2024-04-10 ENCOUNTER — TELEPHONE (OUTPATIENT)
Dept: OBGYN CLINIC | Facility: CLINIC | Age: 34
End: 2024-04-10

## 2024-04-10 NOTE — TELEPHONE ENCOUNTER
Patient says that she received test results from [facility] through e-mail and would like to talk to a nurse regarding those.

## 2024-04-10 NOTE — TELEPHONE ENCOUNTER
Patient states she already received a call from Mckayla Wynne about the test results. No further questions.

## 2024-04-11 LAB
AFP MOM: 1.38
AFP VALUE: 31.8 NG/ML
GA ON COLL DATE: 15.6 WEEKS
INSULIN DEP AFP: NO
MAT AGE AT EDD: 35 YR
OSBR RISK 1 IN AFP: 7883
WEIGHT AFP: 261 LBS

## 2024-05-07 ENCOUNTER — ROUTINE PRENATAL (OUTPATIENT)
Dept: OBGYN CLINIC | Facility: CLINIC | Age: 34
End: 2024-05-07
Payer: COMMERCIAL

## 2024-05-07 VITALS
HEART RATE: 108 BPM | WEIGHT: 278 LBS | HEIGHT: 65 IN | DIASTOLIC BLOOD PRESSURE: 64 MMHG | SYSTOLIC BLOOD PRESSURE: 152 MMHG | BODY MASS INDEX: 46.32 KG/M2

## 2024-05-07 DIAGNOSIS — O30.049: Primary | ICD-10-CM
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
## 2024-05-10 ENCOUNTER — OFFICE VISIT (OUTPATIENT)
Dept: PERINATAL CARE | Facility: HOSPITAL | Age: 34
End: 2024-05-10
Attending: NURSE PRACTITIONER
Payer: COMMERCIAL

## 2024-05-10 ENCOUNTER — ULTRASOUND ENCOUNTER (OUTPATIENT)
Dept: PERINATAL CARE | Facility: HOSPITAL | Age: 34
End: 2024-05-10
Attending: OBSTETRICS & GYNECOLOGY
Payer: COMMERCIAL

## 2024-05-10 VITALS
DIASTOLIC BLOOD PRESSURE: 76 MMHG | HEART RATE: 93 BPM | SYSTOLIC BLOOD PRESSURE: 138 MMHG | HEIGHT: 65 IN | WEIGHT: 280 LBS | BODY MASS INDEX: 46.65 KG/M2

## 2024-05-10 DIAGNOSIS — O99.210 OBESITY AFFECTING PREGNANCY, ANTEPARTUM, UNSPECIFIED OBESITY TYPE (HCC): Primary | ICD-10-CM

## 2024-05-10 DIAGNOSIS — O30.049: ICD-10-CM

## 2024-05-10 DIAGNOSIS — O99.210 OBESITY AFFECTING PREGNANCY, ANTEPARTUM, UNSPECIFIED OBESITY TYPE (HCC): ICD-10-CM

## 2024-05-10 DIAGNOSIS — O99.212 MATERNAL MORBID OBESITY IN SECOND TRIMESTER, ANTEPARTUM (HCC): ICD-10-CM

## 2024-05-10 DIAGNOSIS — O34.219 PREVIOUS CESAREAN DELIVERY AFFECTING PREGNANCY (HCC): ICD-10-CM

## 2024-05-10 DIAGNOSIS — E66.01 MATERNAL MORBID OBESITY IN SECOND TRIMESTER, ANTEPARTUM (HCC): ICD-10-CM

## 2024-05-10 PROCEDURE — 76817 TRANSVAGINAL US OBSTETRIC: CPT

## 2024-05-10 PROCEDURE — 76811 OB US DETAILED SNGL FETUS: CPT | Performed by: OBSTETRICS & GYNECOLOGY

## 2024-05-10 PROCEDURE — 76812 OB US DETAILED ADDL FETUS: CPT

## 2024-05-10 RX ORDER — ASPIRIN 81 MG/1
81 TABLET ORAL DAILY
COMMUNITY

## 2024-05-22 ENCOUNTER — PATIENT MESSAGE (OUTPATIENT)
Dept: OBGYN CLINIC | Facility: CLINIC | Age: 34
End: 2024-05-22

## 2024-05-23 NOTE — TELEPHONE ENCOUNTER
From: Aspen Patel  To: Elizabeth Fisher  Sent: 5/22/2024 12:11 PM CDT  Subject: Letter needed    Helmónica Fisher,  I’ve reached out to my rheumatologist, Dr. Soriano, after our last appt. You encouraged me to ask for carpal tunnel relief injections, which I did. I have a follow up with her next week but she says I need a letter from you stating it would be ok to get injections done during this pregnancy. I’m choosing injections because I’d like to wait to have surgery until after giving birth. Please advise if this is something you can do through [facility], to Dr. Soriano, or should I  a letter at your office? Thank you for your time and support!    Aspen

## 2024-05-29 ENCOUNTER — OFFICE VISIT (OUTPATIENT)
Dept: RHEUMATOLOGY | Facility: CLINIC | Age: 34
End: 2024-05-29

## 2024-05-29 ENCOUNTER — HOSPITAL ENCOUNTER (EMERGENCY)
Age: 34
Discharge: STILL ADMITTED OR ADMINISTRATIVE | End: 2024-05-30
Attending: EMERGENCY MEDICINE
Payer: COMMERCIAL

## 2024-05-29 ENCOUNTER — TELEPHONE (OUTPATIENT)
Dept: OBGYN CLINIC | Facility: CLINIC | Age: 34
End: 2024-05-29

## 2024-05-29 VITALS
TEMPERATURE: 98 F | SYSTOLIC BLOOD PRESSURE: 158 MMHG | DIASTOLIC BLOOD PRESSURE: 70 MMHG | WEIGHT: 290.19 LBS | OXYGEN SATURATION: 97 % | HEIGHT: 65 IN | BODY MASS INDEX: 48.35 KG/M2 | RESPIRATION RATE: 16 BRPM | HEART RATE: 121 BPM

## 2024-05-29 DIAGNOSIS — M79.7 FIBROMYALGIA: Primary | ICD-10-CM

## 2024-05-29 DIAGNOSIS — L29.9 ITCHING: ICD-10-CM

## 2024-05-29 DIAGNOSIS — I10 HYPERTENSION, UNSPECIFIED TYPE: Primary | ICD-10-CM

## 2024-05-29 DIAGNOSIS — G56.01 RIGHT CARPAL TUNNEL SYNDROME: ICD-10-CM

## 2024-05-29 DIAGNOSIS — Z3A.22 22 WEEKS GESTATION OF PREGNANCY (HCC): ICD-10-CM

## 2024-05-29 DIAGNOSIS — R53.82 CHRONIC FATIGUE: ICD-10-CM

## 2024-05-29 DIAGNOSIS — G56.02 LEFT CARPAL TUNNEL SYNDROME: ICD-10-CM

## 2024-05-29 DIAGNOSIS — Z34.92 SECOND TRIMESTER PREGNANCY (HCC): ICD-10-CM

## 2024-05-29 DIAGNOSIS — R76.8 ANA POSITIVE: ICD-10-CM

## 2024-05-29 LAB
ALBUMIN SERPL-MCNC: 2.7 G/DL (ref 3.4–5)
ALBUMIN/GLOB SERPL: 0.6 {RATIO} (ref 1–2)
ALP LIVER SERPL-CCNC: 60 U/L
ALT SERPL-CCNC: 19 U/L
ANION GAP SERPL CALC-SCNC: 8 MMOL/L (ref 0–18)
APTT PPP: 22.6 SECONDS (ref 23–36)
AST SERPL-CCNC: 9 U/L (ref 15–37)
BASOPHILS # BLD AUTO: 0.04 X10(3) UL (ref 0–0.2)
BASOPHILS NFR BLD AUTO: 0.3 %
BILIRUB SERPL-MCNC: 0.2 MG/DL (ref 0.1–2)
BILIRUB UR QL STRIP.AUTO: NEGATIVE
BUN BLD-MCNC: 9 MG/DL (ref 9–23)
CALCIUM BLD-MCNC: 9.3 MG/DL (ref 8.5–10.1)
CHLORIDE SERPL-SCNC: 108 MMOL/L (ref 98–112)
CLARITY UR REFRACT.AUTO: CLEAR
CO2 SERPL-SCNC: 21 MMOL/L (ref 21–32)
COLOR UR AUTO: YELLOW
CREAT BLD-MCNC: 0.57 MG/DL
EGFRCR SERPLBLD CKD-EPI 2021: 122 ML/MIN/1.73M2 (ref 60–?)
EOSINOPHIL # BLD AUTO: 0.16 X10(3) UL (ref 0–0.7)
EOSINOPHIL NFR BLD AUTO: 1.3 %
ERYTHROCYTE [DISTWIDTH] IN BLOOD BY AUTOMATED COUNT: 13.1 %
GLOBULIN PLAS-MCNC: 4.4 G/DL (ref 2.8–4.4)
GLUCOSE BLD-MCNC: 137 MG/DL (ref 70–99)
GLUCOSE UR STRIP.AUTO-MCNC: NEGATIVE MG/DL
HCT VFR BLD AUTO: 32.4 %
HGB BLD-MCNC: 11.2 G/DL
IMM GRANULOCYTES # BLD AUTO: 0.14 X10(3) UL (ref 0–1)
IMM GRANULOCYTES NFR BLD: 1.1 %
INR BLD: 0.92 (ref 0.8–1.2)
KETONES UR STRIP.AUTO-MCNC: 15 MG/DL
LEUKOCYTE ESTERASE UR QL STRIP.AUTO: NEGATIVE
LYMPHOCYTES # BLD AUTO: 2.04 X10(3) UL (ref 1–4)
LYMPHOCYTES NFR BLD AUTO: 16.5 %
MCH RBC QN AUTO: 31.4 PG (ref 26–34)
MCHC RBC AUTO-ENTMCNC: 34.6 G/DL (ref 31–37)
MCV RBC AUTO: 90.8 FL
MONOCYTES # BLD AUTO: 0.99 X10(3) UL (ref 0.1–1)
MONOCYTES NFR BLD AUTO: 8 %
NEUTROPHILS # BLD AUTO: 8.98 X10 (3) UL (ref 1.5–7.7)
NEUTROPHILS # BLD AUTO: 8.98 X10(3) UL (ref 1.5–7.7)
NEUTROPHILS NFR BLD AUTO: 72.8 %
NITRITE UR QL STRIP.AUTO: NEGATIVE
OSMOLALITY SERPL CALC.SUM OF ELEC: 285 MOSM/KG (ref 275–295)
PH UR STRIP.AUTO: 6 [PH] (ref 5–8)
PLATELET # BLD AUTO: 300 10(3)UL (ref 150–450)
POTASSIUM SERPL-SCNC: 3.5 MMOL/L (ref 3.5–5.1)
PROT SERPL-MCNC: 7.1 G/DL (ref 6.4–8.2)
PROT UR STRIP.AUTO-MCNC: NEGATIVE MG/DL
PROTHROMBIN TIME: 12.4 SECONDS (ref 11.6–14.8)
RBC # BLD AUTO: 3.57 X10(6)UL
RBC UR QL AUTO: NEGATIVE
SODIUM SERPL-SCNC: 137 MMOL/L (ref 136–145)
SP GR UR STRIP.AUTO: >=1.03 (ref 1–1.03)
UROBILINOGEN UR STRIP.AUTO-MCNC: 0.2 MG/DL
WBC # BLD AUTO: 12.4 X10(3) UL (ref 4–11)

## 2024-05-29 PROCEDURE — 85610 PROTHROMBIN TIME: CPT | Performed by: EMERGENCY MEDICINE

## 2024-05-29 PROCEDURE — 85025 COMPLETE CBC W/AUTO DIFF WBC: CPT | Performed by: EMERGENCY MEDICINE

## 2024-05-29 PROCEDURE — 99285 EMERGENCY DEPT VISIT HI MDM: CPT

## 2024-05-29 PROCEDURE — 80053 COMPREHEN METABOLIC PANEL: CPT | Performed by: EMERGENCY MEDICINE

## 2024-05-29 PROCEDURE — 84550 ASSAY OF BLOOD/URIC ACID: CPT | Performed by: EMERGENCY MEDICINE

## 2024-05-29 PROCEDURE — 99214 OFFICE O/P EST MOD 30 MIN: CPT | Performed by: INTERNAL MEDICINE

## 2024-05-29 PROCEDURE — 36415 COLL VENOUS BLD VENIPUNCTURE: CPT

## 2024-05-29 PROCEDURE — 85384 FIBRINOGEN ACTIVITY: CPT | Performed by: EMERGENCY MEDICINE

## 2024-05-29 PROCEDURE — 85730 THROMBOPLASTIN TIME PARTIAL: CPT | Performed by: EMERGENCY MEDICINE

## 2024-05-29 PROCEDURE — 81003 URINALYSIS AUTO W/O SCOPE: CPT | Performed by: EMERGENCY MEDICINE

## 2024-05-29 RX ORDER — ACETAMINOPHEN 500 MG
1000 TABLET ORAL ONCE
Status: COMPLETED | OUTPATIENT
Start: 2024-05-29 | End: 2024-05-29

## 2024-05-29 NOTE — TELEPHONE ENCOUNTER
Pt called and was seen by her dr today and her blood pressure was 158/70 and was told to call our office and follow up

## 2024-05-29 NOTE — TELEPHONE ENCOUNTER
Hx:  [id number] - Twin Pregnancy    Patient calling that she was seen by her rheumatologist and that her BP was 158/70. Patient denies SOB, headaches, vision changes (spots, blurred vision), nausea, vomiting, stomach pain/heartburn, abnormal swelling in feet or face. Does report some hand swelling.    Per provider patient to check her BP at home with an arm BP cuff. Patient to keep a log and bring her BP cuff with her during her next [facility]. Discussed precautions and reviewed the correct way to take a BP. Patient agreeable with plan. No further questions.
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
## 2024-05-30 ENCOUNTER — HOSPITAL ENCOUNTER (OUTPATIENT)
Facility: HOSPITAL | Age: 34
Setting detail: OBSERVATION
Discharge: HOME | End: 2024-06-01
Attending: OBSTETRICS & GYNECOLOGY | Admitting: OBSTETRICS & GYNECOLOGY
Payer: COMMERCIAL

## 2024-05-30 VITALS
WEIGHT: 290 LBS | RESPIRATION RATE: 22 BRPM | HEIGHT: 65 IN | DIASTOLIC BLOOD PRESSURE: 71 MMHG | BODY MASS INDEX: 48.32 KG/M2 | TEMPERATURE: 98 F | SYSTOLIC BLOOD PRESSURE: 140 MMHG | OXYGEN SATURATION: 96 % | HEART RATE: 99 BPM

## 2024-05-30 PROBLEM — I10 HYPERTENSION, UNSPECIFIED TYPE: Status: ACTIVE | Noted: 2024-05-30

## 2024-05-30 PROBLEM — R51.9 PREGNANCY HEADACHE, ANTEPARTUM (HCC): Status: ACTIVE | Noted: 2024-05-30

## 2024-05-30 PROBLEM — Z34.90 PREGNANCY (HCC): Status: ACTIVE | Noted: 2024-05-30

## 2024-05-30 PROBLEM — O26.899 PREGNANCY HEADACHE, ANTEPARTUM (HCC): Status: ACTIVE | Noted: 2024-05-30

## 2024-05-30 PROBLEM — Z87.59 HISTORY OF PRE-ECLAMPSIA: Status: ACTIVE | Noted: 2024-05-30

## 2024-05-30 LAB
CREAT UR-SCNC: 279 MG/DL
FIBRINOGEN PPP-MCNC: 506 MG/DL (ref 200–480)
PROT UR-MCNC: 32.8 MG/DL
PROT/CREAT UR-RTO: 0.12
URATE SERPL-MCNC: 3.1 MG/DL

## 2024-05-30 PROCEDURE — 99221 1ST HOSP IP/OBS SF/LOW 40: CPT | Performed by: OBSTETRICS & GYNECOLOGY

## 2024-05-30 PROCEDURE — 99233 SBSQ HOSP IP/OBS HIGH 50: CPT | Performed by: OBSTETRICS & GYNECOLOGY

## 2024-05-30 RX ORDER — DIPHENHYDRAMINE HYDROCHLORIDE 50 MG/ML
50 INJECTION INTRAMUSCULAR; INTRAVENOUS ONCE
Status: COMPLETED | OUTPATIENT
Start: 2024-05-30 | End: 2024-05-30

## 2024-05-30 RX ORDER — ZOLPIDEM TARTRATE 5 MG/1
5 TABLET ORAL NIGHTLY PRN
Status: DISCONTINUED | OUTPATIENT
Start: 2024-05-30 | End: 2024-06-01

## 2024-05-30 RX ORDER — CALCIUM CARBONATE 500 MG/1
1000 TABLET, CHEWABLE ORAL
Status: DISCONTINUED | OUTPATIENT
Start: 2024-05-30 | End: 2024-06-01

## 2024-05-30 RX ORDER — MAGNESIUM OXIDE 400 MG/1
200 TABLET ORAL DAILY
Status: DISCONTINUED | OUTPATIENT
Start: 2024-05-30 | End: 2024-05-30

## 2024-05-30 RX ORDER — DOCUSATE SODIUM 100 MG/1
100 CAPSULE, LIQUID FILLED ORAL 2 TIMES DAILY
Status: DISCONTINUED | OUTPATIENT
Start: 2024-05-30 | End: 2024-06-01

## 2024-05-30 RX ORDER — DIPHENHYDRAMINE HYDROCHLORIDE 50 MG/ML
50 INJECTION INTRAMUSCULAR; INTRAVENOUS ONCE
Status: DISCONTINUED | OUTPATIENT
Start: 2024-05-30 | End: 2024-06-01

## 2024-05-30 RX ORDER — SODIUM CHLORIDE, SODIUM LACTATE, POTASSIUM CHLORIDE, CALCIUM CHLORIDE 600; 310; 30; 20 MG/100ML; MG/100ML; MG/100ML; MG/100ML
INJECTION, SOLUTION INTRAVENOUS CONTINUOUS
Status: DISCONTINUED | OUTPATIENT
Start: 2024-05-30 | End: 2024-06-01

## 2024-05-30 RX ORDER — CHOLECALCIFEROL (VITAMIN D3) 25 MCG
1 TABLET,CHEWABLE ORAL DAILY
Status: DISCONTINUED | OUTPATIENT
Start: 2024-05-30 | End: 2024-06-01

## 2024-05-30 RX ORDER — MAGNESIUM OXIDE 400 MG/1
400 TABLET ORAL DAILY
Status: DISCONTINUED | OUTPATIENT
Start: 2024-05-31 | End: 2024-06-01

## 2024-05-30 RX ORDER — ACETAMINOPHEN 500 MG
1000 TABLET ORAL EVERY 6 HOURS PRN
Status: DISCONTINUED | OUTPATIENT
Start: 2024-05-30 | End: 2024-06-01

## 2024-05-30 RX ORDER — MAGNESIUM OXIDE 400 MG/1
200 TABLET ORAL ONCE
Status: COMPLETED | OUTPATIENT
Start: 2024-05-30 | End: 2024-05-30

## 2024-05-30 RX ORDER — ACETAMINOPHEN 500 MG
1000 TABLET ORAL EVERY 6 HOURS PRN
Status: DISCONTINUED | OUTPATIENT
Start: 2024-05-30 | End: 2024-05-30

## 2024-05-30 RX ORDER — ACETAMINOPHEN 500 MG
500 TABLET ORAL EVERY 6 HOURS PRN
Status: DISCONTINUED | OUTPATIENT
Start: 2024-05-30 | End: 2024-06-01

## 2024-05-30 RX ORDER — BUTALBITAL, ACETAMINOPHEN AND CAFFEINE 50; 325; 40 MG/1; MG/1; MG/1
1 TABLET ORAL EVERY 4 HOURS PRN
Status: DISCONTINUED | OUTPATIENT
Start: 2024-05-30 | End: 2024-06-01

## 2024-05-30 RX ORDER — ACETAMINOPHEN 500 MG
TABLET ORAL
Status: COMPLETED
Start: 2024-05-30 | End: 2024-05-30

## 2024-05-30 RX ORDER — METOCLOPRAMIDE HYDROCHLORIDE 5 MG/ML
10 INJECTION INTRAMUSCULAR; INTRAVENOUS EVERY 6 HOURS PRN
Status: DISCONTINUED | OUTPATIENT
Start: 2024-05-30 | End: 2024-06-01

## 2024-05-30 NOTE — PROGRESS NOTES
Pt is a 33 year old female admitted to TRG5/TRG5-A.     Chief Complaint   Patient presents with    Elevated BP     Patient seen in the Rheumatologist's office yesterday and was noted to have elevated blood pressures, it was suggested to her to notify her OB. She went to the [place] immediate care after speaking with her OB's office d/t a headache she was experiencing and a BP of 197/101 taken at a local pharmacy. She arrives via self from the [place] ED with an IV in her right forearm, saline locked. She denies LOF or vaginal bleeding, denies regular painful contractions. +FM X 2      Pt is  22w6d intra-uterine pregnancy.  History obtained. Oriented to room, staff, and plan of care.

## 2024-05-30 NOTE — ED QUICK NOTES
Spoke to L&D charge RN, Sara. PT to present to L&D triage for further monitoring per Dr. Ascencio. Updated PT and spouse on transfer plan of care. Dr. Ascencio and Dr. Soo boucher with PT being transported via private car for further assessment.
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
## 2024-05-30 NOTE — PROGRESS NOTES
Pt transferred to room 118 in stable condition.  Pt oriented to room, bathroom and call light.  Pt denies headache, epigastric pain or visual changes.  Plan of care discussed.

## 2024-05-30 NOTE — ED QUICK NOTES
Called L&D and informed Sara that PT is en route. Per L&D, PT will be discharged from ED and readmitted once she arrives at [place].
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
## 2024-05-30 NOTE — ED QUICK NOTES
Orders for admission, patient is aware of plan and ready to go upstairs. Any questions, please call ED RN Toni at extension [phone].     Patient Covid vaccination status: Fully vaccinated     COVID Test Ordered in ED: Rapid SARS-CoV-2 by PCR    COVID Suspicion at Admission: N/A    Running Infusions:  None    Mental Status/LOC at time of transport: awake, alert, and oritented    Other pertinent information:   CIWA score: N/A   NIH score:  N/A

## 2024-05-30 NOTE — ED INITIAL ASSESSMENT (HPI)
Pt 22 5/7 weeks pregnant, history of pre-eclampsia in the past, presents with c/o headache and was found to be mildly hypertensive at rheumatology appt. Pt checked her b/p at [facility] and it was found to be elevated 197/101.

## 2024-05-31 ENCOUNTER — APPOINTMENT (OUTPATIENT)
Dept: MRI IMAGING | Facility: HOSPITAL | Age: 34
End: 2024-05-31
Attending: Other
Payer: COMMERCIAL

## 2024-05-31 LAB
M PROTEIN 24H UR ELPH-MRATE: 237.6 MG/24 HR (ref ?–149.1)
SPECIMEN VOL UR: 1200 ML

## 2024-05-31 PROCEDURE — 70551 MRI BRAIN STEM W/O DYE: CPT | Performed by: OTHER

## 2024-05-31 PROCEDURE — 70544 MR ANGIOGRAPHY HEAD W/O DYE: CPT | Performed by: OTHER

## 2024-05-31 PROCEDURE — 99222 1ST HOSP IP/OBS MODERATE 55: CPT | Performed by: OBSTETRICS & GYNECOLOGY

## 2024-05-31 PROCEDURE — 99223 1ST HOSP IP/OBS HIGH 75: CPT | Performed by: OTHER

## 2024-05-31 RX ORDER — DIAZEPAM 2 MG/1
5 TABLET ORAL ONCE
Status: COMPLETED | OUTPATIENT
Start: 2024-05-31 | End: 2024-05-31

## 2024-05-31 RX ORDER — POLYETHYLENE GLYCOL 3350 17 G/17G
17 POWDER, FOR SOLUTION ORAL DAILY
Status: DISCONTINUED | OUTPATIENT
Start: 2024-05-31 | End: 2024-06-01

## 2024-05-31 RX ORDER — CETIRIZINE HYDROCHLORIDE 10 MG/1
10 TABLET ORAL DAILY
Status: DISCONTINUED | OUTPATIENT
Start: 2024-05-31 | End: 2024-06-01

## 2024-05-31 NOTE — PLAN OF CARE
Problem: NEUROLOGICAL - ADULT  Goal: Achieves stable or improved neurological status  Description: INTERVENTIONS  - Assess for and report changes in neurological status  - Initiate measures to prevent increased intracranial pressure  - Maintain blood pressure and fluid volume within ordered parameters to optimize cerebral perfusion and minimize risk of hemorrhage  - Monitor temperature, glucose, and sodium. Initiate appropriate interventions as ordered  Outcome: Progressing  Goal: Absence of seizures  Description: INTERVENTIONS  - Monitor for seizure activity  - Administer anti-seizure medications as ordered  - Monitor neurological status  Outcome: Progressing  Goal: Remains free of injury related to seizure activity  Description: INTERVENTIONS:  - Maintain airway, patient safety  and administer oxygen as ordered  - Monitor patient for seizure activity, document and report duration and description of seizure to MD/LIP  - If seizure occurs, turn patient to side and suction secretions as needed  - Reorient patient post seizure  - Seizure pads on all 4 side rails  - Instruct patient/family to notify RN of any seizure activity  - Instruct patient/family to call for assistance with activity based on assessment  Outcome: Progressing  Goal: Achieves maximal functionality and self care  Description: INTERVENTIONS  - Monitor swallowing and airway patency with patient fatigue and changes in neurological status  - Encourage and assist patient to increase activity and self care with guidance from PT/OT  - Encourage visually impaired, hearing impaired and aphasic patients to use assistive/communication devices  Outcome: Progressing
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
## 2024-05-31 NOTE — PROGRESS NOTES
HD#1  Patient reports lingering headache, moving from left temporal to right and back of the head, some relieve with pain medications since 5/29/24. Patient denies any blurred vision or epogastric pain, unable to get good sleep while at the hospital , took ambien last night and slept for 4 hours. She also reports nasal congestion. No elevated BPs past 2 days. Patient has h/o GHTN and preeclampsia in two previous pregnancies.     /60 (BP Location: Left arm)   Pulse 98   Temp 98.2 °F (36.8 °C) (Oral)   Resp 16   Ht 65\"   Wt 290 lb (131.5 kg)   LMP 12/22/2023   BMI 48.26 kg/m²     Recent Labs   Lab 05/29/24  2226   RBC 3.57*   HGB 11.2*   HCT 32.4*   MCV 90.8   MCH 31.4   MCHC 34.6   RDW 13.1   NEPRELIM 8.98*   WBC 12.4*   .0       Recent Labs   Lab 05/29/24  2226   *   BUN 9   CREATSERUM 0.57   EGFRCR 122   CA 9.3   ALB 2.7*      K 3.5      CO2 21.0   ALKPHO 60   AST 9*   ALT 19   BILT 0.2   TP 7.1     24 hour urine pending  FHT: appropriate for gestational age    A/P: IUP 23 weeks, intractable headache   - continue Fioricet prn  - 24 urine pending  - MFM consult pending  - neurology consult  - Zyrtec
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
## 2024-05-31 NOTE — CM/SW NOTE
SW met with patient due to antepartum admission and to offer support.   Patient reports having a headache. Patient reports she is  and lives with her 2 children. Patient currently pregnant with twins.   SW provided support and normalization.   All questions answered.   Patient thanked LUCÍA for visit, no further immediate needs reported at this time.       Leila Patel LCSW  /Discharge Planner

## 2024-06-01 VITALS
WEIGHT: 290 LBS | HEIGHT: 65 IN | BODY MASS INDEX: 48.32 KG/M2 | HEART RATE: 101 BPM | DIASTOLIC BLOOD PRESSURE: 69 MMHG | TEMPERATURE: 98 F | RESPIRATION RATE: 16 BRPM | SYSTOLIC BLOOD PRESSURE: 133 MMHG

## 2024-06-01 PROCEDURE — 59025 FETAL NON-STRESS TEST: CPT | Performed by: STUDENT IN AN ORGANIZED HEALTH CARE EDUCATION/TRAINING PROGRAM

## 2024-06-01 PROCEDURE — 99222 1ST HOSP IP/OBS MODERATE 55: CPT | Performed by: STUDENT IN AN ORGANIZED HEALTH CARE EDUCATION/TRAINING PROGRAM

## 2024-06-01 RX ORDER — HYDROXYZINE PAMOATE 25 MG/1
25 CAPSULE ORAL EVERY EVENING
Qty: 30 CAPSULE | Refills: 0 | Status: SHIPPED | OUTPATIENT
Start: 2024-06-01

## 2024-06-01 RX ORDER — FOLIC ACID 0.8 MG
500 TABLET ORAL DAILY
Qty: 30 CAPSULE | Refills: 1 | Status: SHIPPED | OUTPATIENT
Start: 2024-06-01 | End: 2024-07-01

## 2024-06-01 RX ORDER — AMOXICILLIN AND CLAVULANATE POTASSIUM 875; 125 MG/1; MG/1
1 TABLET, FILM COATED ORAL 2 TIMES DAILY
Qty: 14 TABLET | Refills: 0 | Status: SHIPPED | OUTPATIENT
Start: 2024-06-01 | End: 2024-06-08

## 2024-06-01 NOTE — PLAN OF CARE
Problem: NEUROLOGICAL - ADULT  Goal: Achieves stable or improved neurological status  Description: INTERVENTIONS  - Assess for and report changes in neurological status  - Initiate measures to prevent increased intracranial pressure  - Maintain blood pressure and fluid volume within ordered parameters to optimize cerebral perfusion and minimize risk of hemorrhage  - Monitor temperature, glucose, and sodium. Initiate appropriate interventions as ordered  Outcome: Progressing  Goal: Absence of seizures  Description: INTERVENTIONS  - Monitor for seizure activity  - Administer anti-seizure medications as ordered  - Monitor neurological status  Outcome: Progressing  Goal: Remains free of injury related to seizure activity  Description: INTERVENTIONS:  - Maintain airway, patient safety  and administer oxygen as ordered  - Monitor patient for seizure activity, document and report duration and description of seizure to MD/LIP  - If seizure occurs, turn patient to side and suction secretions as needed  - Reorient patient post seizure  - Seizure pads on all 4 side rails  - Instruct patient/family to notify RN of any seizure activity  - Instruct patient/family to call for assistance with activity based on assessment  Outcome: Progressing  Goal: Achieves maximal functionality and self care  Description: INTERVENTIONS  - Monitor swallowing and airway patency with patient fatigue and changes in neurological status  - Encourage and assist patient to increase activity and self care with guidance from PT/OT  - Encourage visually impaired, hearing impaired and aphasic patients to use assistive/communication devices  Outcome: Progressing     Problem: ANTEPARTUM/LABOR and DELIVERY  Goal: Maintain pregnancy as long as maternal and/or fetal condition is stable  Description: INTERVENTIONS:  - Maternal surveillance  - Fetal surveillance  - Monitor uterine activity  - Medications as ordered  - Bedrest  Outcome: Progressing  Goal: Anxiety  is at manageable level  Description: INTERVENTIONS:  - [place] patient to unit/surroundings  - Establish a trusting relationship with patient  - Discuss possible complications or alterations in birth plan  - Explain treatment plan  - Explain testing/procedures prior to initiation  - Encourage participation in care  - Encourage verbalization of concerns/fears  - Identify coping mechanisms  - Administer/offer alternative therapies (Aroma therapy, distraction, guided imagery, massage, music therapy, therapeutic touch)  - Manage patient's environment (Avoid overstimulation of patient)  Outcome: Progressing  Goal: Demonstrates ability to cope with hospitalization/illness  Description: INTERVENTIONS:  - Encourage verbalization of feelings/concerns/expectations  - Provide quiet environment  - Assist patient to identify own strengths and abilities  - Encourage patient to set small goals for self  - Encourage participation in diversional activity  - Reinforce positive adaptation of new coping behaviors  - Include patient/family/caregiver in decisions  Outcome: Progressing     Problem: Patient/Family Goals  Goal: Patient/Family Long Term Goal  Description: Patient's Long Term Goal: antepartum discharge    Interventions:    - See additional Care Plan goals for specific interventions  Outcome: Progressing  Goal: Patient/Family Short Term Goal  Description: Patient's Short Term Goal: resolution of headache    Interventions:     - See additional Care Plan goals for specific interventions  Outcome: Progressing

## 2024-06-01 NOTE — PROGRESS NOTES
Discharged to home per ambulatory in stable condition, not in active labor with written and verbal instructions. Patient verbalizes understanding of information given.

## 2024-06-01 NOTE — PROGRESS NOTES
HD#2  Again reporting a dull headache that is in the front of her head.  Reports having a blood nose with mucous recently.  Nasal congestion.  Denies vision changes, SOB, CP, RUQ pain.      /69   Pulse 101   Temp 98 °F (36.7 °C) (Oral)   Resp 16   Ht 65\"   Wt 290 lb (131.5 kg)   LMP 12/22/2023   BMI 48.26 kg/m²     Recent Labs   Lab 05/29/24  2226   RBC 3.57*   HGB 11.2*   HCT 32.4*   MCV 90.8   MCH 31.4   MCHC 34.6   RDW 13.1   NEPRELIM 8.98*   WBC 12.4*   .0       Recent Labs   Lab 05/29/24  2226   *   BUN 9   CREATSERUM 0.57   EGFRCR 122   CA 9.3   ALB 2.7*      K 3.5      CO2 21.0   ALKPHO 60   AST 9*   ALT 19   BILT 0.2   TP 7.1     24 hour urine 237  FHT: appropriate for gestational age    A/P: IUP 23 weeks, headache likely 2/2 sinusitis in the setting of baseline headaches  - no diagnosis of preeclampsia  - augmentin for sinusitis sent to pharmacy  - prophylactic magnesium sent to pharmacy  - will use tylenol as needed    Insomnia  - vistaril sent to pharmacy

## 2024-06-01 NOTE — DISCHARGE INSTRUCTIONS
Please call office if you having worsening headached unrelieved with medications, vision changes, shortness of breath without exertion, chest pain or worsening swelling.      Please take your blood pressure daily and call blood pressures in to office on 6/4/2024.  Please follow up with your OB GYN on 6/7/2024.

## 2024-06-01 NOTE — PLAN OF CARE
Problem: NEUROLOGICAL - ADULT  Goal: Achieves stable or improved neurological status  Description: INTERVENTIONS  - Assess for and report changes in neurological status  - Initiate measures to prevent increased intracranial pressure  - Maintain blood pressure and fluid volume within ordered parameters to optimize cerebral perfusion and minimize risk of hemorrhage  - Monitor temperature, glucose, and sodium. Initiate appropriate interventions as ordered  6/1/2024 1205 by Zuri Maldonado RN  Outcome: Completed  6/1/2024 0907 by Zuri Maldonado RN  Outcome: Progressing  Goal: Absence of seizures  Description: INTERVENTIONS  - Monitor for seizure activity  - Administer anti-seizure medications as ordered  - Monitor neurological status  6/1/2024 1205 by Zuri Maldonado, RN  Outcome: Completed  6/1/2024 0907 by Zuri Madlonado RN  Outcome: Progressing  Goal: Remains free of injury related to seizure activity  Description: INTERVENTIONS:  - Maintain airway, patient safety  and administer oxygen as ordered  - Monitor patient for seizure activity, document and report duration and description of seizure to MD/LIP  - If seizure occurs, turn patient to side and suction secretions as needed  - Reorient patient post seizure  - Seizure pads on all 4 side rails  - Instruct patient/family to notify RN of any seizure activity  - Instruct patient/family to call for assistance with activity based on assessment  6/1/2024 1205 by Zuri Maldonado RN  Outcome: Completed  6/1/2024 0907 by Zuri Maldonado, RN  Outcome: Progressing  Goal: Achieves maximal functionality and self care  Description: INTERVENTIONS  - Monitor swallowing and airway patency with patient fatigue and changes in neurological status  - Encourage and assist patient to increase activity and self care with guidance from PT/OT  - Encourage visually impaired, hearing impaired and aphasic patients to use assistive/communication devices  6/1/2024 1205 by Zuri Maldonado, CHRIS  Outcome:  Completed  6/1/2024 0907 by Zuri Maldonado RN  Outcome: Progressing     Problem: ANTEPARTUM/LABOR and DELIVERY  Goal: Maintain pregnancy as long as maternal and/or fetal condition is stable  Description: INTERVENTIONS:  - Maternal surveillance  - Fetal surveillance  - Monitor uterine activity  - Medications as ordered  - Bedrest  6/1/2024 1205 by Zuri Maldonado RN  Outcome: Completed  6/1/2024 0907 by Zuri Maldonado RN  Outcome: Progressing  Goal: Anxiety is at manageable level  Description: INTERVENTIONS:  - [place] patient to unit/surroundings  - Establish a trusting relationship with patient  - Discuss possible complications or alterations in birth plan  - Explain treatment plan  - Explain testing/procedures prior to initiation  - Encourage participation in care  - Encourage verbalization of concerns/fears  - Identify coping mechanisms  - Administer/offer alternative therapies (Aroma therapy, distraction, guided imagery, massage, music therapy, therapeutic touch)  - Manage patient's environment (Avoid overstimulation of patient)  6/1/2024 1205 by Zuri Maldonaod RN  Outcome: Completed  6/1/2024 0907 by Zuri Maldonado RN  Outcome: Progressing  Goal: Demonstrates ability to cope with hospitalization/illness  Description: INTERVENTIONS:  - Encourage verbalization of feelings/concerns/expectations  - Provide quiet environment  - Assist patient to identify own strengths and abilities  - Encourage patient to set small goals for self  - Encourage participation in diversional activity  - Reinforce positive adaptation of new coping behaviors  - Include patient/family/caregiver in decisions  6/1/2024 1205 by Zuri Maldonado RN  Outcome: Completed  6/1/2024 0907 by Zuri Maldonado RN  Outcome: Progressing     Problem: Patient/Family Goals  Goal: Patient/Family Long Term Goal  Description: Patient's Long Term Goal: antepartum discharge    Interventions:    - See additional Care Plan goals for specific interventions  6/1/2024 1205 by  Zuri Maldonado, RN  Outcome: Completed  6/1/2024 0907 by Zuri Maldonado RN  Outcome: Progressing  Goal: Patient/Family Short Term Goal  Description: Patient's Short Term Goal: resolution of headache    Interventions:     - See additional Care Plan goals for specific interventions  6/1/2024 1205 by Zuri Maldonado, RN  Outcome: Completed  6/1/2024 0907 by Zuri Maldonado, RN  Outcome: Progressing

## 2024-06-01 NOTE — PLAN OF CARE
Problem: NEUROLOGICAL - ADULT  Goal: Achieves stable or improved neurological status  Description: INTERVENTIONS  - Assess for and report changes in neurological status  - Initiate measures to prevent increased intracranial pressure  - Maintain blood pressure and fluid volume within ordered parameters to optimize cerebral perfusion and minimize risk of hemorrhage  - Monitor temperature, glucose, and sodium. Initiate appropriate interventions as ordered  6/1/2024 0118 by Reina Rosado, RN  Outcome: Progressing  6/1/2024 0115 by Reina Rosado, RN  Outcome: Progressing  Goal: Absence of seizures  Description: INTERVENTIONS  - Monitor for seizure activity  - Administer anti-seizure medications as ordered  - Monitor neurological status  6/1/2024 0118 by Reina Rosado, RN  Outcome: Progressing  6/1/2024 0115 by Reina Rosado, RN  Outcome: Progressing  Goal: Remains free of injury related to seizure activity  Description: INTERVENTIONS:  - Maintain airway, patient safety  and administer oxygen as ordered  - Monitor patient for seizure activity, document and report duration and description of seizure to MD/LIP  - If seizure occurs, turn patient to side and suction secretions as needed  - Reorient patient post seizure  - Seizure pads on all 4 side rails  - Instruct patient/family to notify RN of any seizure activity  - Instruct patient/family to call for assistance with activity based on assessment  6/1/2024 0118 by Reina Rosado, RN  Outcome: Progressing  6/1/2024 0115 by Reina Rosado, RN  Outcome: Progressing  Goal: Achieves maximal functionality and self care  Description: INTERVENTIONS  - Monitor swallowing and airway patency with patient fatigue and changes in neurological status  - Encourage and assist patient to increase activity and self care with guidance from PT/OT  - Encourage visually impaired, hearing impaired and aphasic patients to use assistive/communication devices  6/1/2024 0118 by  Reina Rosado, RN  Outcome: Progressing  6/1/2024 0115 by Reina Rosado, RN  Outcome: Progressing     Problem: ANTEPARTUM/LABOR and DELIVERY  Goal: Maintain pregnancy as long as maternal and/or fetal condition is stable  Description: INTERVENTIONS:  - Maternal surveillance  - Fetal surveillance  - Monitor uterine activity  - Medications as ordered  - Bedrest  Outcome: Progressing  Goal: Anxiety is at manageable level  Description: INTERVENTIONS:  - [place] patient to unit/surroundings  - Establish a trusting relationship with patient  - Discuss possible complications or alterations in birth plan  - Explain treatment plan  - Explain testing/procedures prior to initiation  - Encourage participation in care  - Encourage verbalization of concerns/fears  - Identify coping mechanisms  - Administer/offer alternative therapies (Aroma therapy, distraction, guided imagery, massage, music therapy, therapeutic touch)  - Manage patient's environment (Avoid overstimulation of patient)  Outcome: Progressing  Goal: Demonstrates ability to cope with hospitalization/illness  Description: INTERVENTIONS:  - Encourage verbalization of feelings/concerns/expectations  - Provide quiet environment  - Assist patient to identify own strengths and abilities  - Encourage patient to set small goals for self  - Encourage participation in diversional activity  - Reinforce positive adaptation of new coping behaviors  - Include patient/family/caregiver in decisions  Outcome: Progressing     Problem: Patient/Family Goals  Goal: Patient/Family Long Term Goal  Description: Patient's Long Term Goal: antepartum discharge    Interventions:    - See additional Care Plan goals for specific interventions  Outcome: Progressing  Goal: Patient/Family Short Term Goal  Description: Patient's Short Term Goal: resolution of headache    Interventions:     - See additional Care Plan goals for specific interventions  Outcome: Progressing

## 2024-06-01 NOTE — PROGRESS NOTES
Pt status post MRI; headache was exacerbated during the procedure & immediately following; pt feels it was d/t the noise & bright lights & did experience a brief episode of blurred vision which has resolved since returning to the room. Headache pain increased to 6/10 during procedure, pt reports it as 4/10 right now. Pt requesting to take ambien for sleep which she feels will improve her headache further; declines pain medication at this time.

## 2024-06-05 ENCOUNTER — OFFICE VISIT (OUTPATIENT)
Dept: RHEUMATOLOGY | Facility: CLINIC | Age: 34
End: 2024-06-05
Payer: COMMERCIAL

## 2024-06-05 VITALS — SYSTOLIC BLOOD PRESSURE: 142 MMHG | DIASTOLIC BLOOD PRESSURE: 60 MMHG

## 2024-06-05 DIAGNOSIS — G56.01 RIGHT CARPAL TUNNEL SYNDROME: Primary | ICD-10-CM

## 2024-06-05 PROCEDURE — 20526 THER INJECTION CARP TUNNEL: CPT | Performed by: INTERNAL MEDICINE

## 2024-06-05 PROCEDURE — 76942 ECHO GUIDE FOR BIOPSY: CPT | Performed by: INTERNAL MEDICINE

## 2024-06-05 RX ORDER — LIDOCAINE HYDROCHLORIDE 10 MG/ML
3 INJECTION, SOLUTION INFILTRATION; PERINEURAL ONCE
Status: COMPLETED | OUTPATIENT
Start: 2024-06-05 | End: 2024-06-05

## 2024-06-05 RX ORDER — METHYLPREDNISOLONE ACETATE 40 MG/ML
40 INJECTION, SUSPENSION INTRA-ARTICULAR; INTRALESIONAL; INTRAMUSCULAR; SOFT TISSUE ONCE
Status: COMPLETED | OUTPATIENT
Start: 2024-06-05 | End: 2024-06-05

## 2024-06-05 RX ADMIN — LIDOCAINE HYDROCHLORIDE 3 ML: 10 INJECTION, SOLUTION INFILTRATION; PERINEURAL at 09:37:00

## 2024-06-05 RX ADMIN — METHYLPREDNISOLONE ACETATE 40 MG: 40 INJECTION, SUSPENSION INTRA-ARTICULAR; INTRALESIONAL; INTRAMUSCULAR; SOFT TISSUE at 09:38:00

## 2024-06-05 NOTE — PROCEDURES
Carpal Tunnel Procedure Note    Ultrasound examination of the right  carpal tunnel  Equipment: Your [employer]  Indication: Pain/paresthesias in fingers    Findings: Ultrasound examination is performed according to standard EU LAR recommendations(1). . Using the[15-6 MHz] transducer, as well as color power Doppler settings, real-time imaging of the [right] wrist was performed to evaluate the bones, subcutaneous tissues, muscles, tendons and the joint spaces. Longitudinal and transverse images were obtained. This was at the distal crease tendon proximally from the carpal tunnel. The median nerve was localized as well as measured. It was found to be just ulnar and the superior to the flexor pollicis longus. Numerous longus was noted. The nerve was followed approximately in between the superficial digitorum and profundus. There was no evidence of this significant synovitis causing compression of the median nerve or any masses. Color Doppler over the median nerve revealed no evidence of vascularity. All findings were normal with the exception of the following: nerves circumference 2.36cm    Impression: Median nerve compression    Clinical correlation and other imaging studies may be indicated.    Reference:  Guidelines for musculoskeletal ultrasound in rheumatology  Diana ROSALES., Ivan GR, Leandro RODRIGUEZ., Daya BARRETT., Savita KP, Kannan WA, Shahla RJ, Rubi B.; working group for musculoskeletal ultrasound in the EULAR standing committee on international clinical studies including therapeutic trials.  Annals rheumatic disease 2001 July; 60(7): 641-9    Procedure Right carpal tunnel injection and median nerve release by hydrolysis    After obtaining consent and discussing alternative treatments. The median nerve was evaluated beginning in the forearm and down onto the distal crease. The nerve was measured at the proximal and distal carpal tunnel. A15-6 MHz transducer was used. After localizing the median nerve in both  transverse and longitudinal views as well as identifying neurovascular structures the area was cleansed with hibiclens and alcohol. Then using sterile gel and a 15-6 MHz transducer the area around the median nerve was injected with a total solution of 8 mL of which included 4mL of sterile saline 3 mL of 1% lidocaine and 40 mg of Depomedrol . A 26-gauge needle was used with an in-line approach from the ulnar side under real time imaging. Injections were careful to avoid damage to the median nerve and other neurovascular structures.is seen to surround the nerve. All injections were done under real-time time imaging. There were no complications. Post procedure instructions included to call for any questions or concerns, to wear a static wrist splint 24 hours a day except when washing for one week and then at nighttime for least 2 weeks.   Discussed that it was very difficult to get under nerve but was able to float superiorly from the flexor retinaculum. Strongly recommended surgical intervention post-partum which pt is considering.       As a side:   Pt currently pregnant. BP was fine today.  Did get dx with sinus infection and started abx. Denies symptoms. Denies fevers. Discussed risk of worsened infection with injection/procedure but pt willing to move forward with injection today.     Patient verbalized understanding of above instructions. No further questions at this time.      Lory Soriano,   [facility]  06/05/2024

## 2024-06-07 ENCOUNTER — ROUTINE PRENATAL (OUTPATIENT)
Dept: OBGYN CLINIC | Facility: CLINIC | Age: 34
End: 2024-06-07
Payer: COMMERCIAL

## 2024-06-07 VITALS
WEIGHT: 293 LBS | SYSTOLIC BLOOD PRESSURE: 140 MMHG | BODY MASS INDEX: 48.82 KG/M2 | DIASTOLIC BLOOD PRESSURE: 70 MMHG | HEIGHT: 65 IN

## 2024-06-07 DIAGNOSIS — Z36.9 ANTENATAL SCREENING ENCOUNTER (HCC): ICD-10-CM

## 2024-06-07 DIAGNOSIS — E66.01 SEVERE OBESITY DUE TO EXCESS CALORIES AFFECTING PREGNANCY, ANTEPARTUM (HCC): ICD-10-CM

## 2024-06-07 DIAGNOSIS — Z3A.24 24 WEEKS GESTATION OF PREGNANCY (HCC): Primary | ICD-10-CM

## 2024-06-07 DIAGNOSIS — O09.529 ANTEPARTUM MULTIGRAVIDA OF ADVANCED MATERNAL AGE (HCC): ICD-10-CM

## 2024-06-07 DIAGNOSIS — O99.210 SEVERE OBESITY DUE TO EXCESS CALORIES AFFECTING PREGNANCY, ANTEPARTUM (HCC): ICD-10-CM

## 2024-06-07 DIAGNOSIS — Z87.59 HISTORY OF GESTATIONAL HYPERTENSION: ICD-10-CM

## 2024-06-07 DIAGNOSIS — O30.049: ICD-10-CM

## 2024-06-07 DIAGNOSIS — Z98.891 HISTORY OF CESAREAN DELIVERY: ICD-10-CM

## 2024-06-07 NOTE — PROGRESS NOTES
LUIS ALBERTO--24w0d    Doing better. Is taking Augmentin for sinus infection.  Has been monitoring BP. Denies vision changes, RUQ pain, n/v and swelling Denies Vb, LOF, contractions. + fetal movement.         A/P:   FHT-P  PNL: CBC and 1 hour GTT discussed and ordered  RH positive  Hx gestational hypertension: continue BASA, aware s/s  AMA/Obesity/twin gestation: monthly growth with MFM, weekly NST 32 weeks, delivery 38 weels  Reviewed  labor precautions      Return in 4 weeks

## 2024-06-10 ENCOUNTER — ULTRASOUND ENCOUNTER (OUTPATIENT)
Dept: PERINATAL CARE | Facility: HOSPITAL | Age: 34
End: 2024-06-10
Attending: OBSTETRICS & GYNECOLOGY
Payer: COMMERCIAL

## 2024-06-10 VITALS
HEART RATE: 96 BPM | DIASTOLIC BLOOD PRESSURE: 77 MMHG | WEIGHT: 293 LBS | BODY MASS INDEX: 48.82 KG/M2 | HEIGHT: 65 IN | SYSTOLIC BLOOD PRESSURE: 131 MMHG

## 2024-06-10 DIAGNOSIS — O30.049: Primary | ICD-10-CM

## 2024-06-10 DIAGNOSIS — E66.01 MATERNAL MORBID OBESITY IN SECOND TRIMESTER, ANTEPARTUM (HCC): ICD-10-CM

## 2024-06-10 DIAGNOSIS — O99.212 MATERNAL MORBID OBESITY IN SECOND TRIMESTER, ANTEPARTUM (HCC): ICD-10-CM

## 2024-06-10 DIAGNOSIS — O99.210 OBESITY AFFECTING PREGNANCY, ANTEPARTUM, UNSPECIFIED OBESITY TYPE (HCC): ICD-10-CM

## 2024-06-10 DIAGNOSIS — O30.049: ICD-10-CM

## 2024-06-10 PROCEDURE — 76816 OB US FOLLOW-UP PER FETUS: CPT | Performed by: OBSTETRICS & GYNECOLOGY

## 2024-06-10 NOTE — PROGRESS NOTES
Pt here for Growth Ultrasound  +fm noted both babies per patient  Pt denies complaints.    Performing Laboratory: 0 Bill For Surgical Tray: no Expected Date Of Service: 05/06/2022 Billing Type: Third-Party Bill
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
## 2024-06-30 ENCOUNTER — TELEPHONE (OUTPATIENT)
Facility: CLINIC | Age: 34
End: 2024-06-30

## 2024-06-30 ENCOUNTER — HOSPITAL ENCOUNTER (OUTPATIENT)
Facility: HOSPITAL | Age: 34
Setting detail: OBSERVATION
Discharge: HOME | End: 2024-07-02
Attending: OBSTETRICS & GYNECOLOGY | Admitting: OBSTETRICS & GYNECOLOGY
Payer: COMMERCIAL

## 2024-06-30 DIAGNOSIS — O09.522 AMA (ADVANCED MATERNAL AGE) MULTIGRAVIDA 35+, SECOND TRIMESTER (HCC): ICD-10-CM

## 2024-06-30 DIAGNOSIS — O09.292 HISTORY OF PRE-ECLAMPSIA IN PRIOR PREGNANCY, CURRENTLY PREGNANT IN SECOND TRIMESTER (HCC): ICD-10-CM

## 2024-06-30 DIAGNOSIS — O16.2 HYPERTENSION AFFECTING PREGNANCY IN SECOND TRIMESTER (HCC): Primary | ICD-10-CM

## 2024-06-30 DIAGNOSIS — O30.042 DICHORIONIC DIAMNIOTIC TWIN PREGNANCY IN SECOND TRIMESTER (HCC): Primary | ICD-10-CM

## 2024-06-30 DIAGNOSIS — I10 HYPERTENSION, UNSPECIFIED TYPE: ICD-10-CM

## 2024-06-30 DIAGNOSIS — K21.9 GASTROESOPHAGEAL REFLUX DISEASE WITHOUT ESOPHAGITIS: ICD-10-CM

## 2024-06-30 DIAGNOSIS — Z87.59 HISTORY OF PRE-ECLAMPSIA: ICD-10-CM

## 2024-06-30 DIAGNOSIS — O16.2 HYPERTENSION AFFECTING PREGNANCY IN SECOND TRIMESTER (HCC): ICD-10-CM

## 2024-06-30 DIAGNOSIS — O30.042 DICHORIONIC DIAMNIOTIC TWIN PREGNANCY IN SECOND TRIMESTER (HCC): ICD-10-CM

## 2024-06-30 DIAGNOSIS — O26.02 EXCESSIVE WEIGHT GAIN DURING PREGNANCY IN SECOND TRIMESTER (HCC): ICD-10-CM

## 2024-06-30 DIAGNOSIS — Z3A.27 27 WEEKS GESTATION OF PREGNANCY (HCC): ICD-10-CM

## 2024-06-30 PROBLEM — Z34.90 PREGNANCY (HCC): Status: RESOLVED | Noted: 2024-05-30 | Resolved: 2024-06-30

## 2024-06-30 PROBLEM — O99.012 MATERNAL IRON DEFICIENCY ANEMIA AFFECTING PREGNANCY IN SECOND TRIMESTER, ANTEPARTUM (HCC): Status: ACTIVE | Noted: 2024-06-30

## 2024-06-30 PROBLEM — E66.01 MATERNAL MORBID OBESITY IN SECOND TRIMESTER, ANTEPARTUM (HCC): Status: ACTIVE | Noted: 2018-10-31

## 2024-06-30 PROBLEM — O99.212 MATERNAL MORBID OBESITY IN SECOND TRIMESTER, ANTEPARTUM (HCC): Status: ACTIVE | Noted: 2018-10-31

## 2024-06-30 PROBLEM — O34.219 HISTORY OF CESAREAN DELIVERY AFFECTING PREGNANCY (HCC): Status: ACTIVE | Noted: 2024-06-30

## 2024-06-30 PROBLEM — D50.9 MATERNAL IRON DEFICIENCY ANEMIA AFFECTING PREGNANCY IN SECOND TRIMESTER, ANTEPARTUM (HCC): Status: ACTIVE | Noted: 2024-06-30

## 2024-06-30 PROBLEM — Z98.891 HISTORY OF 2 CESAREAN SECTIONS: Status: ACTIVE | Noted: 2020-05-29

## 2024-06-30 PROBLEM — R73.09 ELEVATED HEMOGLOBIN A1C: Status: ACTIVE | Noted: 2024-06-30

## 2024-06-30 LAB
ALBUMIN SERPL-MCNC: 2.7 G/DL (ref 3.4–5)
ALBUMIN/GLOB SERPL: 0.6 {RATIO} (ref 1–2)
ALP LIVER SERPL-CCNC: 80 U/L
ALT SERPL-CCNC: 19 U/L
ANION GAP SERPL CALC-SCNC: 11 MMOL/L (ref 0–18)
AST SERPL-CCNC: 16 U/L (ref 15–37)
BASOPHILS # BLD AUTO: 0.03 X10(3) UL (ref 0–0.2)
BASOPHILS NFR BLD AUTO: 0.2 %
BILIRUB SERPL-MCNC: 0.3 MG/DL (ref 0.1–2)
BUN BLD-MCNC: 10 MG/DL (ref 9–23)
CALCIUM BLD-MCNC: 9.4 MG/DL (ref 8.5–10.1)
CHLORIDE SERPL-SCNC: 104 MMOL/L (ref 98–112)
CO2 SERPL-SCNC: 20 MMOL/L (ref 21–32)
CREAT BLD-MCNC: 0.66 MG/DL
CREAT UR-SCNC: 185 MG/DL
DEPRECATED HBV CORE AB SER IA-ACNC: 7.4 NG/ML
EGFRCR SERPLBLD CKD-EPI 2021: 118 ML/MIN/1.73M2 (ref 60–?)
EOSINOPHIL # BLD AUTO: 0.17 X10(3) UL (ref 0–0.7)
EOSINOPHIL NFR BLD AUTO: 1.3 %
ERYTHROCYTE [DISTWIDTH] IN BLOOD BY AUTOMATED COUNT: 12.8 %
EST. AVERAGE GLUCOSE BLD GHB EST-MCNC: 123 MG/DL (ref 68–126)
GLOBULIN PLAS-MCNC: 4.2 G/DL (ref 2.8–4.4)
GLUCOSE BLD-MCNC: 124 MG/DL (ref 70–99)
HBA1C MFR BLD: 5.9 % (ref ?–5.7)
HCT VFR BLD AUTO: 31.4 %
HGB BLD-MCNC: 10.7 G/DL
IMM GRANULOCYTES # BLD AUTO: 0.13 X10(3) UL (ref 0–1)
IMM GRANULOCYTES NFR BLD: 1 %
LYMPHOCYTES # BLD AUTO: 1.88 X10(3) UL (ref 1–4)
LYMPHOCYTES NFR BLD AUTO: 14.9 %
MCH RBC QN AUTO: 30.1 PG (ref 26–34)
MCHC RBC AUTO-ENTMCNC: 34.1 G/DL (ref 31–37)
MCV RBC AUTO: 88.5 FL
MONOCYTES # BLD AUTO: 1.2 X10(3) UL (ref 0.1–1)
MONOCYTES NFR BLD AUTO: 9.5 %
NEUTROPHILS # BLD AUTO: 9.21 X10 (3) UL (ref 1.5–7.7)
NEUTROPHILS # BLD AUTO: 9.21 X10(3) UL (ref 1.5–7.7)
NEUTROPHILS NFR BLD AUTO: 73.1 %
OSMOLALITY SERPL CALC.SUM OF ELEC: 280 MOSM/KG (ref 275–295)
PLATELET # BLD AUTO: 273 10(3)UL (ref 150–450)
POTASSIUM SERPL-SCNC: 3.5 MMOL/L (ref 3.5–5.1)
PROT SERPL-MCNC: 6.9 G/DL (ref 6.4–8.2)
PROT UR-MCNC: 31.7 MG/DL
PROT/CREAT UR-RTO: 0.17
RBC # BLD AUTO: 3.55 X10(6)UL
SODIUM SERPL-SCNC: 135 MMOL/L (ref 136–145)
URATE SERPL-MCNC: 3.8 MG/DL
WBC # BLD AUTO: 12.6 X10(3) UL (ref 4–11)

## 2024-06-30 PROCEDURE — 99222 1ST HOSP IP/OBS MODERATE 55: CPT | Performed by: OBSTETRICS & GYNECOLOGY

## 2024-06-30 RX ORDER — CHOLECALCIFEROL (VITAMIN D3) 25 MCG
1 TABLET,CHEWABLE ORAL DAILY
Status: DISCONTINUED | OUTPATIENT
Start: 2024-07-01 | End: 2024-07-01

## 2024-06-30 RX ORDER — PANTOPRAZOLE SODIUM 40 MG/1
40 TABLET, DELAYED RELEASE ORAL
Status: DISCONTINUED | OUTPATIENT
Start: 2024-07-01 | End: 2024-06-30

## 2024-06-30 RX ORDER — ASPIRIN 81 MG/1
162 TABLET ORAL DAILY
Status: DISCONTINUED | OUTPATIENT
Start: 2024-06-30 | End: 2024-06-30

## 2024-06-30 RX ORDER — FLUTICASONE PROPIONATE 50 MCG
1 SPRAY, SUSPENSION (ML) NASAL DAILY
Status: DISCONTINUED | OUTPATIENT
Start: 2024-06-30 | End: 2024-07-02

## 2024-06-30 RX ORDER — MULTIVITAMIN/IRON/FOLIC ACID 18MG-0.4MG
500 TABLET ORAL NIGHTLY
Status: DISCONTINUED | OUTPATIENT
Start: 2024-06-30 | End: 2024-07-02

## 2024-06-30 RX ORDER — HYDROXYZINE HYDROCHLORIDE 25 MG/1
25 TABLET, FILM COATED ORAL 3 TIMES DAILY PRN
Status: DISCONTINUED | OUTPATIENT
Start: 2024-06-30 | End: 2024-07-02

## 2024-06-30 RX ORDER — OMEPRAZOLE 40 MG/1
40 CAPSULE, DELAYED RELEASE ORAL
Status: DISCONTINUED | OUTPATIENT
Start: 2024-07-01 | End: 2024-07-02

## 2024-06-30 RX ORDER — CHOLECALCIFEROL (VITAMIN D3) 25 MCG
1 TABLET,CHEWABLE ORAL DAILY
Status: DISCONTINUED | OUTPATIENT
Start: 2024-07-01 | End: 2024-07-02

## 2024-06-30 RX ORDER — ECHINACEA PURPUREA EXTRACT 125 MG
1 TABLET ORAL
Status: DISCONTINUED | OUTPATIENT
Start: 2024-06-30 | End: 2024-07-02

## 2024-06-30 RX ORDER — DOCUSATE SODIUM 100 MG/1
100 CAPSULE, LIQUID FILLED ORAL 2 TIMES DAILY
Status: DISCONTINUED | OUTPATIENT
Start: 2024-06-30 | End: 2024-07-02

## 2024-06-30 RX ORDER — CHOLINE CHLORIDE 100 %
2 POWDER (GRAM) MISCELLANEOUS DAILY
Status: DISCONTINUED | OUTPATIENT
Start: 2024-07-01 | End: 2024-07-02

## 2024-06-30 RX ORDER — BETAMETHASONE SODIUM PHOSPHATE AND BETAMETHASONE ACETATE 3; 3 MG/ML; MG/ML
12 INJECTION, SUSPENSION INTRA-ARTICULAR; INTRALESIONAL; INTRAMUSCULAR; SOFT TISSUE EVERY 24 HOURS
Status: COMPLETED | OUTPATIENT
Start: 2024-06-30 | End: 2024-07-01

## 2024-06-30 RX ORDER — LABETALOL 200 MG/1
200 TABLET, FILM COATED ORAL EVERY 12 HOURS SCHEDULED
Status: DISCONTINUED | OUTPATIENT
Start: 2024-06-30 | End: 2024-07-02

## 2024-06-30 RX ORDER — ASPIRIN 81 MG/1
162 TABLET ORAL DAILY
Status: DISCONTINUED | OUTPATIENT
Start: 2024-07-01 | End: 2024-07-02

## 2024-06-30 NOTE — TELEPHONE ENCOUNTER
On call OB GYN    33 year old  at 27w2d     Di-Di twins, morbid obesity (pre preg BMI 40), h/o pre-eclampsia delivered at 35 wk, h/o gestational HTN delivered at 37 wk, AMA (will be 34 at RUBEN), history of late  birth, h/o  x 2, excessive weight gain 65 lb as of 24 wk, herniated discs lumbar spine, fibromyalgia, probable chronic hypertension    Enc. Date GA Weight Height BP Pulse FHT Fundal Height Urine Ketones Glucose Protein Fetal Movement Dilation Effacement Station Presentation Edema RTC   24 11w5d 246 lb (111.6 kg) 65\" 132/60 92               24 15w4d 261 lb 6 oz (118.6 kg)  122/82 109 150     N         24 15w4d     150              24 19w4d 278 lb (126.1 kg) 65\" 152/64 108 152              24 19w4d     150              05/10/24 20w0d 280 lb (127 kg) 65\" 138/76 93               24 22w6d Admission Dept: [facility] L&D   24 24w0d 295 lb 3.2 oz (133.9 kg) 65\" 140/70  150              24 24w0d     140              06/10/24 24w3d 299 lb (135.6 kg)                    Says she has been taking BP daily at home.   Generally runs 130s-140s.  Has not been taking anithypertensives     Today her BP was 190/60 while sitting.   She then took BP lying down and was 154/54    Though she appears to probably have underlying chronic hypertension, we reviewed that /110 or greater are severe range and increased risk of stroke. Advised her to come to L&D at [facility] right away.     Needs labs, serial BP, possible magnesium sulfate, 24 hr urine protein collection, probable  steroids, MFM consult     Patient expressed understanding    Frances Jung MD

## 2024-07-01 ENCOUNTER — ULTRASOUND ENCOUNTER (OUTPATIENT)
Dept: PERINATAL CARE | Facility: HOSPITAL | Age: 34
End: 2024-07-01
Attending: OBSTETRICS & GYNECOLOGY
Payer: COMMERCIAL

## 2024-07-01 ENCOUNTER — APPOINTMENT (OUTPATIENT)
Dept: GENERAL RADIOLOGY | Facility: HOSPITAL | Age: 34
End: 2024-07-01
Attending: OBSTETRICS & GYNECOLOGY
Payer: COMMERCIAL

## 2024-07-01 PROBLEM — Z34.90 PREGNANCY (HCC): Status: ACTIVE | Noted: 2024-07-01

## 2024-07-01 LAB
ALBUMIN SERPL-MCNC: 2.6 G/DL (ref 3.4–5)
ALBUMIN/GLOB SERPL: 0.7 {RATIO} (ref 1–2)
ALP LIVER SERPL-CCNC: 76 U/L
ALT SERPL-CCNC: 16 U/L
ANION GAP SERPL CALC-SCNC: 12 MMOL/L (ref 0–18)
AST SERPL-CCNC: 10 U/L (ref 15–37)
BASOPHILS # BLD AUTO: 0.02 X10(3) UL (ref 0–0.2)
BASOPHILS NFR BLD AUTO: 0.1 %
BILIRUB SERPL-MCNC: 0.2 MG/DL (ref 0.1–2)
BUN BLD-MCNC: 10 MG/DL (ref 9–23)
CALCIUM BLD-MCNC: 9.4 MG/DL (ref 8.5–10.1)
CHLORIDE SERPL-SCNC: 106 MMOL/L (ref 98–112)
CO2 SERPL-SCNC: 19 MMOL/L (ref 21–32)
CREAT BLD-MCNC: 0.59 MG/DL
EGFRCR SERPLBLD CKD-EPI 2021: 121 ML/MIN/1.73M2 (ref 60–?)
EOSINOPHIL # BLD AUTO: 0 X10(3) UL (ref 0–0.7)
EOSINOPHIL NFR BLD AUTO: 0 %
ERYTHROCYTE [DISTWIDTH] IN BLOOD BY AUTOMATED COUNT: 12.8 %
GLOBULIN PLAS-MCNC: 4 G/DL (ref 2.8–4.4)
GLUCOSE BLD-MCNC: 128 MG/DL (ref 70–99)
GLUCOSE BLD-MCNC: 141 MG/DL (ref 70–99)
GLUCOSE BLD-MCNC: 154 MG/DL (ref 70–99)
GLUCOSE BLD-MCNC: 157 MG/DL (ref 70–99)
GLUCOSE BLD-MCNC: 181 MG/DL (ref 70–99)
HCT VFR BLD AUTO: 30.3 %
HGB BLD-MCNC: 10.2 G/DL
HIV 1+2 AB+HIV1 P24 AG SERPL QL IA: NONREACTIVE
IMM GRANULOCYTES # BLD AUTO: 0.14 X10(3) UL (ref 0–1)
IMM GRANULOCYTES NFR BLD: 1 %
LYMPHOCYTES # BLD AUTO: 1.01 X10(3) UL (ref 1–4)
LYMPHOCYTES NFR BLD AUTO: 7.5 %
M PROTEIN 24H UR ELPH-MRATE: 172.2 MG/24 HR (ref ?–149.1)
MCH RBC QN AUTO: 29.8 PG (ref 26–34)
MCHC RBC AUTO-ENTMCNC: 33.7 G/DL (ref 31–37)
MCV RBC AUTO: 88.6 FL
MONOCYTES # BLD AUTO: 0.38 X10(3) UL (ref 0.1–1)
MONOCYTES NFR BLD AUTO: 2.8 %
NEUTROPHILS # BLD AUTO: 11.94 X10 (3) UL (ref 1.5–7.7)
NEUTROPHILS # BLD AUTO: 11.94 X10(3) UL (ref 1.5–7.7)
NEUTROPHILS NFR BLD AUTO: 88.6 %
NT-PROBNP SERPL-MCNC: 28 PG/ML (ref ?–125)
OSMOLALITY SERPL CALC.SUM OF ELEC: 285 MOSM/KG (ref 275–295)
PLATELET # BLD AUTO: 260 10(3)UL (ref 150–450)
POTASSIUM SERPL-SCNC: 4.1 MMOL/L (ref 3.5–5.1)
PROT SERPL-MCNC: 6.6 G/DL (ref 6.4–8.2)
RBC # BLD AUTO: 3.42 X10(6)UL
SODIUM SERPL-SCNC: 137 MMOL/L (ref 136–145)
SPECIMEN VOL UR: 2100 ML
WBC # BLD AUTO: 13.5 X10(3) UL (ref 4–11)

## 2024-07-01 PROCEDURE — 76816 OB US FOLLOW-UP PER FETUS: CPT | Performed by: OBSTETRICS & GYNECOLOGY

## 2024-07-01 PROCEDURE — 71045 X-RAY EXAM CHEST 1 VIEW: CPT | Performed by: OBSTETRICS & GYNECOLOGY

## 2024-07-01 RX ORDER — ALBUTEROL SULFATE 90 UG/1
2 AEROSOL, METERED RESPIRATORY (INHALATION) EVERY 4 HOURS PRN
Status: DISCONTINUED | OUTPATIENT
Start: 2024-07-01 | End: 2024-07-02

## 2024-07-01 RX ORDER — DEXTROSE MONOHYDRATE 25 G/50ML
50 INJECTION, SOLUTION INTRAVENOUS
Status: DISCONTINUED | OUTPATIENT
Start: 2024-07-01 | End: 2024-07-02

## 2024-07-01 RX ORDER — CALCIUM CARBONATE 500 MG/1
1000 TABLET, CHEWABLE ORAL
Status: DISCONTINUED | OUTPATIENT
Start: 2024-07-01 | End: 2024-07-02

## 2024-07-01 RX ORDER — ACETAMINOPHEN 500 MG
500 TABLET ORAL EVERY 6 HOURS PRN
Status: DISCONTINUED | OUTPATIENT
Start: 2024-07-01 | End: 2024-07-02

## 2024-07-01 RX ORDER — NICOTINE POLACRILEX 4 MG
15 LOZENGE BUCCAL
Status: DISCONTINUED | OUTPATIENT
Start: 2024-07-01 | End: 2024-07-02

## 2024-07-01 RX ORDER — INSULIN DEGLUDEC 100 U/ML
10 INJECTION, SOLUTION SUBCUTANEOUS NIGHTLY
Status: DISCONTINUED | OUTPATIENT
Start: 2024-07-01 | End: 2024-07-02

## 2024-07-01 RX ORDER — NICOTINE POLACRILEX 4 MG
30 LOZENGE BUCCAL
Status: DISCONTINUED | OUTPATIENT
Start: 2024-07-01 | End: 2024-07-02

## 2024-07-01 RX ORDER — ACETAMINOPHEN 500 MG
1000 TABLET ORAL EVERY 6 HOURS PRN
Status: DISCONTINUED | OUTPATIENT
Start: 2024-07-01 | End: 2024-07-02
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
## 2024-07-01 NOTE — CM/SW NOTE
LUCÍA order acknowledged. Case discussed with RN.   Patient presented with appropriate affect. Patient reports she is feeling okay. Patient reports feeling overwhelmed at times, mainly due to having 2 boys at home (5 & 3) both with Autism. Patient reports her  is currently with the children, but he only have so much PTO. Patient reports her in-laws also help with taking care of the children, but they are elderly and can only handle so much.   SW provided support and normalization.   Patient thanked LUCÍA for visit, no further immediate needs reported at this time.     Leila Patel, [facility]  /Discharge Planner
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
## 2024-07-01 NOTE — PLAN OF CARE
Problem: ANTEPARTUM/LABOR and DELIVERY  Goal: Maintain pregnancy as long as maternal and/or fetal condition is stable  Description: INTERVENTIONS:  - Maternal surveillance  - Fetal surveillance  - Monitor uterine activity  - Medications as ordered  - Bedrest  Outcome: Progressing  Goal: Anxiety is at manageable level  Description: INTERVENTIONS:  - [place] patient to unit/surroundings  - Establish a trusting relationship with patient  - Discuss possible complications or alterations in birth plan  - Explain treatment plan  - Explain testing/procedures prior to initiation  - Encourage participation in care  - Encourage verbalization of concerns/fears  - Identify coping mechanisms  - Administer/offer alternative therapies (Aroma therapy, distraction, guided imagery, massage, music therapy, therapeutic touch)  - Manage patient's environment (Avoid overstimulation of patient)  Outcome: Progressing  Goal: Demonstrates ability to cope with hospitalization/illness  Description: INTERVENTIONS:  - Encourage verbalization of feelings/concerns/expectations  - Provide quiet environment  - Assist patient to identify own strengths and abilities  - Encourage patient to set small goals for self  - Encourage participation in diversional activity  - Reinforce positive adaptation of new coping behaviors  - Include patient/family/caregiver in decisions  Outcome: Progressing     Problem: Patient/Family Goals  Goal: Patient/Family Long Term Goal  Description: Patient's Long Term Goal: Maintain pregnancy as long as materna/fetal conditions remain stable     Interventions:  -   - See additional Care Plan goals for specific interventions  Outcome: Progressing  Goal: Patient/Family Short Term Goal  Description: Patient's Short Term Goal: Maintain BP within normal range    Interventions:   - Bedrest, PO meds   - See additional Care Plan goals for specific interventions  Outcome: Progressing

## 2024-07-01 NOTE — IMAGING NOTE
OB ULTRASOUND REPORT   See imaging tab for complete ultrasound report or in PACS      Twin A - IUP in cephalic left presentation.    Placenta is anterior.   Cardiac activity is present 155 bpm  EFW 1422 g ( 3 lb 2 oz); 83%.   MVP is 6.4 cm.     TWIN A: The fetal measurements are consistent with established EDC. No gross ultrasound evidence of structural abnormalities are seen today. The patient understands that ultrasound cannot rule out all structural and chromosomal abnormalities.    Twin B - IUP in breech presentation.   Placenta is posterior.   Cardiac activity is present 137 bpm  EFW 1397 g (3 lb 1 oz); 81%.   MVP is 6 cm.     TWIN B: The fetal measurements are consistent with established EDC. No gross ultrasound evidence of structural abnormalities are seen today. The patient understands that ultrasound cannot rule out all structural and chromosomal abnormalities.    Discordance - 1.8 %     Victorino Miranda D.O.  Maternal Fetal Medicine

## 2024-07-01 NOTE — PLAN OF CARE
Problem: ANTEPARTUM/LABOR and DELIVERY  Goal: Maintain pregnancy as long as maternal and/or fetal condition is stable  Description: INTERVENTIONS:  - Maternal surveillance  - Fetal surveillance  - Monitor uterine activity  - Medications as ordered  - Bedrest  Outcome: Progressing  Goal: Anxiety is at manageable level  Description: INTERVENTIONS:  - [place] patient to unit/surroundings  - Establish a trusting relationship with patient  - Discuss possible complications or alterations in birth plan  - Explain treatment plan  - Explain testing/procedures prior to initiation  - Encourage participation in care  - Encourage verbalization of concerns/fears  - Identify coping mechanisms  - Administer/offer alternative therapies (Aroma therapy, distraction, guided imagery, massage, music therapy, therapeutic touch)  - Manage patient's environment (Avoid overstimulation of patient)  Outcome: Progressing  Goal: Demonstrates ability to cope with hospitalization/illness  Description: INTERVENTIONS:  - Encourage verbalization of feelings/concerns/expectations  - Provide quiet environment  - Assist patient to identify own strengths and abilities  - Encourage patient to set small goals for self  - Encourage participation in diversional activity  - Reinforce positive adaptation of new coping behaviors  - Include patient/family/caregiver in decisions  Outcome: Progressing     Problem: Patient/Family Goals  Goal: Patient/Family Long Term Goal  Description: Patient's Long Term Goal: Maintain pregnancy as long as materna/fetal conditions remain stable     Interventions:  -   - See additional Care Plan goals for specific interventions  Outcome: Progressing  Goal: Patient/Family Short Term Goal  Description: Patient's Short Term Goal: Maintain BP within normal range    Interventions:   - Bedrest, PO meds   - See additional Care Plan goals for specific interventions  Outcome: Progressing

## 2024-07-01 NOTE — PROGRESS NOTES
admitted to room 117 via wheelchair with  present. Pt to bathroom, urine obtained, gown on, pt weighed, pt to bed. Efms and toco applied. Pt states had elevated bps at home, slight headache that comes and goes all day, denies epigastric pain, blurry vision, ctxs, lof, or bleeding. Initial assessment done.
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
## 2024-07-02 VITALS
WEIGHT: 293 LBS | HEART RATE: 108 BPM | DIASTOLIC BLOOD PRESSURE: 64 MMHG | SYSTOLIC BLOOD PRESSURE: 131 MMHG | RESPIRATION RATE: 18 BRPM | BODY MASS INDEX: 48.82 KG/M2 | OXYGEN SATURATION: 96 % | TEMPERATURE: 98 F | HEIGHT: 65 IN

## 2024-07-02 LAB
BILE ACIDS: 6.7 UMOL/L
GLUCOSE BLD-MCNC: 127 MG/DL (ref 70–99)
GLUCOSE BLD-MCNC: 129 MG/DL (ref 70–99)

## 2024-07-02 RX ORDER — OMEPRAZOLE 40 MG/1
40 CAPSULE, DELAYED RELEASE ORAL DAILY
Qty: 90 CAPSULE | Refills: 3 | Status: SHIPPED | OUTPATIENT
Start: 2024-07-02

## 2024-07-02 RX ORDER — MAGNESIUM OXIDE 400 MG/1
400 TABLET ORAL NIGHTLY
Qty: 30 TABLET | Refills: 5 | Status: SHIPPED | OUTPATIENT
Start: 2024-07-02 | End: 2024-07-05

## 2024-07-02 RX ORDER — LABETALOL 200 MG/1
200 TABLET, FILM COATED ORAL EVERY 8 HOURS SCHEDULED
Status: DISCONTINUED | OUTPATIENT
Start: 2024-07-02 | End: 2024-07-02

## 2024-07-02 RX ORDER — LABETALOL 200 MG/1
200 TABLET, FILM COATED ORAL EVERY 8 HOURS SCHEDULED
Qty: 270 TABLET | Refills: 5 | Status: SHIPPED | OUTPATIENT
Start: 2024-07-02

## 2024-07-02 RX ORDER — ASPIRIN 81 MG/1
162 TABLET ORAL DAILY
Qty: 60 TABLET | Refills: 5 | Status: SHIPPED | OUTPATIENT
Start: 2024-07-02

## 2024-07-02 NOTE — PLAN OF CARE
Problem: ANTEPARTUM/LABOR and DELIVERY  Goal: Maintain pregnancy as long as maternal and/or fetal condition is stable  Description: INTERVENTIONS:  - Maternal surveillance  - Fetal surveillance  - Monitor uterine activity  - Medications as ordered  - Bedrest  Outcome: Progressing  Goal: Anxiety is at manageable level  Description: INTERVENTIONS:  - [place] patient to unit/surroundings  - Establish a trusting relationship with patient  - Discuss possible complications or alterations in birth plan  - Explain treatment plan  - Explain testing/procedures prior to initiation  - Encourage participation in care  - Encourage verbalization of concerns/fears  - Identify coping mechanisms  - Administer/offer alternative therapies (Aroma therapy, distraction, guided imagery, massage, music therapy, therapeutic touch)  - Manage patient's environment (Avoid overstimulation of patient)  Outcome: Progressing  Goal: Demonstrates ability to cope with hospitalization/illness  Description: INTERVENTIONS:  - Encourage verbalization of feelings/concerns/expectations  - Provide quiet environment  - Assist patient to identify own strengths and abilities  - Encourage patient to set small goals for self  - Encourage participation in diversional activity  - Reinforce positive adaptation of new coping behaviors  - Include patient/family/caregiver in decisions  Outcome: Progressing     Problem: Patient/Family Goals  Goal: Patient/Family Long Term Goal  Description: Patient's Long Term Goal: Maintain pregnancy as long as materna/fetal conditions remain stable     Interventions:  -   - See additional Care Plan goals for specific interventions  Outcome: Progressing  Goal: Patient/Family Short Term Goal  Description: Patient's Short Term Goal: Maintain BP within normal range    Interventions:   - Bedrest, PO meds   - See additional Care Plan goals for specific interventions  Outcome: Progressing

## 2024-07-02 NOTE — PROGRESS NOTES
Discharged home via wheelchair.  Accompanied by staff.  Belongings taken with patient.  Follow up appointments and instructions given oral and written.  Pt verbalized her understanding.

## 2024-07-02 NOTE — PROGRESS NOTES
OB progress note    Subjective: pt is doing well overall  She still has some air hunger (has been chronic since 2nd tri), hard to take deep breaths, no chest pain  She has had chronic headaches that are dull almost constantly, seem to be better with magnesium at night since admitted here, though  No visual changes, no RUQ apin    Vitals:    24 0015 24 0345 24 0650 24 0900   BP: 132/63 129/58 138/66 131/64   BP Location: Right arm Right arm  Right arm   Pulse: 106 104 105 108   Resp: 20 18     Temp: 98.3 °F (36.8 °C) 98.2 °F (36.8 °C)  98.2 °F (36.8 °C)   TempSrc: Oral Oral  Oral   SpO2:       Weight:       Height:           Exam:  Gen: NAD, appears well  Abd soft, nontender, gravid    FHT: twins 140s/145, moderate variability, both with accels, no decels  [place]: quiet  SVE: deferred    A/P: 33 year old  at 27w4d admitted for elevated Bps in setting of chronic HTN, monitoring/evaluation for superimposed preeclampsia    Pregnancy complicated by Di-Di twins, obesity (pre preg BMI 39), h/o pre-eclampsia with severe features delivered at 35 wk, h/o gestational HTN delivered at 37 wk, AMA (will be 35 at RUBEN), h/o  x 2      #Elevated blood pressures - presumed chronic HTN, workup NEGATIVE for superimposed preeclampsia  -probable chronic hypertension per chart review, multiple elevated BP prior to 20wks  -h/o pre-eclampsia with severe features delivered at 35 wk in prior pregnancy  -aspirin - continue 162 mg daily    -MFM consult - appreciate recommendations   -Serum labs - no evidence of HELLP syndrome  -repeat 24 hr urine protein collection - NORMAL (172)  -proBNP normal (added to labs due to gradual dyspnea since start of 2nd trimester)  -antihypertensives: Labetalol 200 mg BID, no IV meds have been indicated since admission    -will increase labetalol to 200mg q8h as had two slightly mild range Bps yesterday, otherwise have been normal     #Headaches  -Neuro consult . MRI/MRV  brain neg. 24 hr urine protein 237.6 mg in 1200 mL on 24.   -essentially daily since her  admission  -doing a bit better with Mg supplement qhs     #Di-Di twins   -MFM US  - concordant and appropriate growth, normal KARMA x2  -Betamethasone -  -monitoring BID    #Elevated HbA1c   -has not had 1 hr GTT yet - will wait until 1 week post steroids  - A1c 5.9% on admission at 27w2d   -Blood sugars 4x/day fasting & 2 hr postprandial - mildly elevated, reasonable to wait another week before 1h GTT     #H/o  x 2  -plan for RCS      #Iron deficiency   -24 ferritin 30.4  -24 Hb 10.7, ferritin 7.4  -has been taking extra oral iron daily for months  -IV iron daily x 3 doses ordered inpatient    Dispo: ok for discharge today as Bps better controlled, labs for preeclampsia are negative    Carmencita Acuna MD

## 2024-07-02 NOTE — DISCHARGE SUMMARY
[facility]   part of [facility]    Discharge Summary    Aspen Patel Patient Status:  Observation    1990 MRN [id number]   Location [facility] LABOR & DELIVERY Attending Denilson Ascencio MD   Hosp Day # 0 PCP Dalia Lambert MD     Admit Date: 2024    Discharge Date : 24      Attending Physician: Denilson Ascencio MD    Reason for Admission:  Elevated BPs    Procedures:  none    Hospital Course: 33 year old  admitted at 27w2d for elevated Bps at home. Pregnancy complicated by Di-Di twins, obesity (pre preg BMI 39), h/o pre-eclampsia with severe features delivered at 35 wk, h/o gestational HTN delivered at 37 wk, AMA (will be 35 at RUBEN), h/o  x 2.  Bps in hospital normal to mild range. Improved with initiation of labetalol PO. Did not need any IV antihypertensives. Preeclampsia labs unremarkable. 24h urine protein collection normal. Pt received betamethasone -. As Bps controlled, labs normal, and pt with no new concerning sx for preeclampsia was discharged to home. Continue labetalol 200mg q8h    Discharge Diagnoses: chronic hypertension affecting pregnancy    Discharged Condition: improved    Disposition: home    Patient Instructions:  Follow-up appointment with [facility] on  as scheduled    Carmencita Acuna MD  2024  10:57 AM

## 2024-07-02 NOTE — PROGRESS NOTES
Bedside report and patient received. Patient lying in bed, color pink/skin warm and dry,  at bedside. Denies c/o pain, discomfort, cramping or contractions.

## 2024-07-02 NOTE — PLAN OF CARE
Problem: ANTEPARTUM/LABOR and DELIVERY  Goal: Maintain pregnancy as long as maternal and/or fetal condition is stable  Description: INTERVENTIONS:  - Maternal surveillance  - Fetal surveillance  - Monitor uterine activity  - Medications as ordered  - Bedrest  Outcome: Progressing  Goal: Anxiety is at manageable level  Description: INTERVENTIONS:  - [place] patient to unit/surroundings  - Establish a trusting relationship with patient  - Discuss possible complications or alterations in birth plan  - Explain treatment plan  - Explain testing/procedures prior to initiation  - Encourage participation in care  - Encourage verbalization of concerns/fears  - Identify coping mechanisms  - Administer/offer alternative therapies (Aroma therapy, distraction, guided imagery, massage, music therapy, therapeutic touch)  - Manage patient's environment (Avoid overstimulation of patient)  Outcome: Progressing  Goal: Demonstrates ability to cope with hospitalization/illness  Description: INTERVENTIONS:  - Encourage verbalization of feelings/concerns/expectations  - Provide quiet environment  - Assist patient to identify own strengths and abilities  - Encourage patient to set small goals for self  - Encourage participation in diversional activity  - Reinforce positive adaptation of new coping behaviors  - Include patient/family/caregiver in decisions  Outcome: Progressing     Problem: Patient/Family Goals  Goal: Patient/Family Long Term Goal  Description: Patient's Long Term Goal: Maintain pregnancy as long as materna/fetal conditions remain stable     Interventions:  -   - See additional Care Plan goals for specific interventions  Outcome: Progressing  Goal: Patient/Family Short Term Goal  Description: Patient's Short Term Goal: Maintain BP within normal range    Interventions:   - Bedrest, PO meds   - See additional Care Plan goals for specific interventions  Outcome: Progressing

## 2024-07-02 NOTE — PLAN OF CARE
Problem: ANTEPARTUM/LABOR and DELIVERY  Goal: Maintain pregnancy as long as maternal and/or fetal condition is stable  Description: INTERVENTIONS:  - Maternal surveillance  - Fetal surveillance  - Monitor uterine activity  - Medications as ordered  - Bedrest  7/2/2024 1051 by Eli Yousif, RN  Outcome: Progressing  7/2/2024 0838 by Eli Yousif, RN  Outcome: Progressing  Goal: Anxiety is at manageable level  Description: INTERVENTIONS:  - [place] patient to unit/surroundings  - Establish a trusting relationship with patient  - Discuss possible complications or alterations in birth plan  - Explain treatment plan  - Explain testing/procedures prior to initiation  - Encourage participation in care  - Encourage verbalization of concerns/fears  - Identify coping mechanisms  - Administer/offer alternative therapies (Aroma therapy, distraction, guided imagery, massage, music therapy, therapeutic touch)  - Manage patient's environment (Avoid overstimulation of patient)  7/2/2024 1051 by Eli Yousif, RN  Outcome: Progressing  7/2/2024 0838 by Eli Yousif, RN  Outcome: Progressing  Goal: Demonstrates ability to cope with hospitalization/illness  Description: INTERVENTIONS:  - Encourage verbalization of feelings/concerns/expectations  - Provide quiet environment  - Assist patient to identify own strengths and abilities  - Encourage patient to set small goals for self  - Encourage participation in diversional activity  - Reinforce positive adaptation of new coping behaviors  - Include patient/family/caregiver in decisions  7/2/2024 1051 by Eli Yousif, RN  Outcome: Progressing  7/2/2024 0838 by Eli Yousif, RN  Outcome: Progressing     Problem: Patient/Family Goals  Goal: Patient/Family Long Term Goal  Description: Patient's Long Term Goal: Maintain pregnancy as long as materna/fetal conditions remain stable     Interventions:  -   - See additional Care Plan goals for specific  interventions  7/2/2024 1051 by Eli Yousif, RN  Outcome: Progressing  7/2/2024 0838 by Eli Yousif, RN  Outcome: Progressing  Goal: Patient/Family Short Term Goal  Description: Patient's Short Term Goal: Maintain BP within normal range    Interventions:   - Bedrest, PO meds   - See additional Care Plan goals for specific interventions  7/2/2024 1051 by Eli Yousif, RN  Outcome: Progressing  7/2/2024 0838 by Eli Yousif, RN  Outcome: Progressing

## 2024-07-04 PROBLEM — Z3A.27 27 WEEKS GESTATION OF PREGNANCY (HCC): Status: RESOLVED | Noted: 2024-06-30 | Resolved: 2024-07-04

## 2024-07-05 ENCOUNTER — ROUTINE PRENATAL (OUTPATIENT)
Dept: OBGYN CLINIC | Facility: CLINIC | Age: 34
End: 2024-07-05
Payer: COMMERCIAL

## 2024-07-05 VITALS
DIASTOLIC BLOOD PRESSURE: 70 MMHG | WEIGHT: 293 LBS | BODY MASS INDEX: 48.82 KG/M2 | SYSTOLIC BLOOD PRESSURE: 136 MMHG | HEART RATE: 101 BPM | HEIGHT: 65 IN

## 2024-07-05 DIAGNOSIS — Z34.90 PRENATAL CARE, ANTEPARTUM (HCC): Primary | ICD-10-CM

## 2024-07-05 DIAGNOSIS — Z36.9 ANTENATAL SCREENING ENCOUNTER (HCC): ICD-10-CM

## 2024-07-05 RX ORDER — MELATONIN
1 NIGHTLY
COMMUNITY
Start: 2024-07-02

## 2024-07-05 NOTE — PATIENT INSTRUCTIONS
LABOR & DELIVERY PRE-REGISTRATION    Thank you for choosing [facility] for you labor and delivery needs.  We look forward to caring for you and your family in this exciting time.  In order to better care for all of our patients, [facility] recommends that you complete your delivery registration as early in your pregnancy as possible.  Registering for your delivery in advance saves you the time of doing so on your day of delivery and allows your care team to be better prepared for your delivery date.  For more information about Labor and Delivery at [facility] and to pre-register, visit [email or web address]--> Search Our Services-->Pregnancy & Baby.    TWO WAYS TO REGISTER ONLINE    Epic MyChart allows access to multiple medical organizations, including [employer] [facility] and other medical organizations that use the Epic electronic health record system.  To add [employer] [facility] or any other medical organization, just tap My Organizations at the top left corner of the login page in the Pharmap[employer] eron, and then click Add Organization.  [place] in [employer]- Pre-register for your hospital stay through your Pharmapod account.  After logging into Pharmapod, click on Visits.  Under Visits, click on [place] for Delivery and follow the directions to register.  You may print the confirmation page.  If you do not already have a Pharmapod account, ask our office for an Access Code.  Go to [email or web address] and follow the prompts to set up your account.  [place] on [email or web address]- Pre-register for your hospital stay through the convenient online form on our website.  Go to [email or web address].  Scroll down the page and click on [facility].  Fill out all of the required information and click submit.  You will receive a confirmation of your submission.  Questions about registering for your hospital stay?  Contact the [facility]-Labor & Delivery at [phone].      PRENATAL CLASSES    Both in-person and  virtual classes are available.  Weeknight and weekend classes are available.  Go to [email or web address]   or   [email or web address]  Click on drop down menu on the right side  Scroll down to \"Find a Class or Support Group\"  Scroll down and click on \"[facility]\"  Type any of the following in the Search Bar  - Babycare  - Virtual Tour- for the [facility]  - Natural Childbirth (even if you are planning on having an epidural).  Topics include labor and birth, breathing techniques, epidurals, postpartum issues.  - Virtual Childbirth Express  - Breastfeeding       FETAL MOVEMENT CHART    Although not all women need to perform daily fetal movement counts, if you notice a decrease in fetal activity, you should contact our office immediately.      Begin counting fetal movements at 32 weeks of pregnancy.  You may find that your baby is more active after eating or drinking.       We want you to time how long it takes to feel 10 movements (kicks, flutters, swishes or rolls).  You should feel at least 10 movements in 2 hours.  You will likely feel 10 movements in less than 2 hours.    If you have concerns, you can use the attached table to record movements.  Record the time you feel the first movement and the tenth movement.  This will help you observe patterns and discover how long it normally takes your baby to move 10 times.       Please call us if any of the following occurs:  You have not felt the baby move for a couple of hours  It is taking longer each day to get the tenth movement    The main point is we want you to know the characteristics of your baby, so you can tell the doctor or nurse if something different is happening.    Again, if you notice a decrease in fetal movement, please give the office a call.    If our office is closed, the answering service will page the doctor on-call.    ------------------------------      Time M T W Th F S S                                                                                                                  Time M T W Th F S S                                                                                                           Time M T W Th F S S                                                                                                           Time M T W Th F S S                                                                                                         VACCINE RECOMMENDATIONS     Pertussis- [place] has significant outbreaks of pertussis (whooping cough) every year.  Therefore, the CDC recommends that all pregnant women receive a Tdap vaccine during pregnancy, regardless of whether you have received one previously.  The vaccine reduces your chances of dustin the illness, and also provides some natural immunity to your baby for possible exposures after birth.  The best time to get the vaccine is between 27 and 36 weeks.  Baby caregivers (grandparents, spouse) should also make sure they are up to date on the vaccine (within the last 10 years).     Influenza- Pregnant women who develop the flu are more prone to serious complications that can affect both mother and baby.  The CDC recommends that all women who will be pregnant during flu season (October to May) receive the flu vaccine (injection only, not nasal spray).  The vaccine can be given during any stage of pregnancy.     Both vaccines are offered in our office, as well as through many pharmacies and primary care offices.    Covid-19 Vaccine   If you are pregnant or were recently pregnant, you are more likely to get very sick from COVID-19 than people who are not pregnant. Additionally, if you have COVID-19 during pregnancy, you are more likely to have complications that can affect your pregnancy and developing baby.  Please check the CDC website for the most up-to-date recommendations on Covid vaccination at www.cdc.gov/coronavirus/vaccine    COVID-19  vaccination is recommended for everyone ages 6 months and older, including people who are pregnant, breastfeeding, trying to get pregnant now, or who might become pregnant in the future. Evidence shows that COVID-19 vaccination before and during pregnancy is safe and effective and suggests that the benefits of vaccination outweigh any known or potential risks. New data show that vaccination during pregnancy can help protect babies younger than 6 months old, when they are too young to be vaccinated themselves, from hospitalization due to COVID-19.    RSV Vaccine  RSV (Respiratory Syncytial Virus) is a common respiratory virus that causes cold like symptoms in adults and babies.  Infants and adults over 60 years old are more likely to develop severe RSV infection requiring hospitalization.  A new RSV vaccine was released in October 2023 that if given during the third trimester of pregnancy, reduces your baby's risk of being hospitalized with RSV after birth by up to 80%.  Therefore the CDC recommends that pregnant moms receive one dose of the RSV vaccine Pfizer Abrysvo sometime between 32 and 36 weeks of pregnancy, before or during RSV season (September through January).  If you decide not to get the vaccine, you can talk to your pediatrician about your baby receiving the RSV antibody injection Beyfortus after birth.     LABOR & DELIVERY PRE-REGISTRATION    Thank you for choosing [facility] for you labor and delivery needs.  We look forward to caring for you and your family in this exciting time.  In order to better care for all of our patients, [facility] recommends that you complete your delivery registration as early in your pregnancy as possible.  Registering for your delivery in advance saves you the time of doing so on your day of delivery and allows your care team to be better prepared for your delivery date.  For more information about Labor and Delivery at [facility] and to pre-register, visit  [email or web address]--> Search Our Services-->Pregnancy & Baby.    TWO WAYS TO REGISTER ONLINE    Epic MyChart allows access to multiple medical organizations, including [facility] and other medical organizations that use the Epic electronic health record system.  To add [facility] or any other medical organization, just tap My Organizations at the top left corner of the login page in the [employer] eron, and then click Add Organization.  [place] in [employer]- Pre-register for your hospital stay through your [employer] account.  After logging into [employer], click on Visits.  Under Visits, click on [place] for Delivery and follow the directions to register.  You may print the confirmation page.  If you do not already have a [employer] account, ask our office for an Access Code.  Go to [email or web address] and follow the prompts to set up your account.  [place] on [email or web address]- Pre-register for your hospital stay through the convenient online form on our website.  Go to [email or web address].  Scroll down the page and click on [facility].  Fill out all of the required information and click submit.  You will receive a confirmation of your submission.  Questions about registering for your hospital stay?  Contact the [facility]-Labor & Delivery at [phone].      PRENATAL CLASSES    Both in-person and virtual classes are available.  Weeknight and weekend classes are available.  Go to [email or web address]   or   [email or web address]  Click on drop down menu on the right side  Scroll down to \"Find a Class or Support Group\"  Scroll down and click on \"[facility]\"  Type any of the following in the Search Bar  - Babycare  - Virtual Tour- for the [facility]  - Natural Childbirth (even if you are planning on having an epidural).  Topics include labor and birth, breathing techniques, epidurals, postpartum issues.  - Virtual Childbirth Express  - Breastfeeding        FETAL MOVEMENT CHART    Although not all women need to perform daily fetal movement counts, if you notice a decrease in fetal activity, you should contact our office immediately.      Begin counting fetal movements at 32 weeks of pregnancy.  You may find that your baby is more active after eating or drinking.       We want you to time how long it takes to feel 10 movements (kicks, flutters, swishes or rolls).  You should feel at least 10 movements in 2 hours.  You will likely feel 10 movements in less than 2 hours.    If you have concerns, you can use the attached table to record movements.  Record the time you feel the first movement and the tenth movement.  This will help you observe patterns and discover how long it normally takes your baby to move 10 times.       Please call us if any of the following occurs:  You have not felt the baby move for a couple of hours  It is taking longer each day to get the tenth movement    The main point is we want you to know the characteristics of your baby, so you can tell the doctor or nurse if something different is happening.    Again, if you notice a decrease in fetal movement, please give the office a call.    If our office is closed, the answering service will page the doctor on-call.    ------------------------------      Time M T W Th F S S                                                                                                                 Time M T W Th F S S                                                                                                           Time M T W Th F S S                                                                                                           Time M T W Th F S S                                                                                                         VACCINE RECOMMENDATIONS     Pertussis- [place] has significant outbreaks of pertussis (whooping cough) every year.  Therefore, the CDC recommends that  all pregnant women receive a Tdap vaccine during pregnancy, regardless of whether you have received one previously.  The vaccine reduces your chances of dustin the illness, and also provides some natural immunity to your baby for possible exposures after birth.  The best time to get the vaccine is between 27 and 36 weeks.  Baby caregivers (grandparents, spouse) should also make sure they are up to date on the vaccine (within the last 10 years).     Influenza- Pregnant women who develop the flu are more prone to serious complications that can affect both mother and baby.  The CDC recommends that all women who will be pregnant during flu season (October to May) receive the flu vaccine (injection only, not nasal spray).  The vaccine can be given during any stage of pregnancy.     Both vaccines are offered in our office, as well as through many pharmacies and primary care offices.    Covid-19 Vaccine   If you are pregnant or were recently pregnant, you are more likely to get very sick from COVID-19 than people who are not pregnant. Additionally, if you have COVID-19 during pregnancy, you are more likely to have complications that can affect your pregnancy and developing baby.  Please check the CDC website for the most up-to-date recommendations on Covid vaccination at www.cdc.gov/coronavirus/vaccine    COVID-19 vaccination is recommended for everyone ages 6 months and older, including people who are pregnant, breastfeeding, trying to get pregnant now, or who might become pregnant in the future. Evidence shows that COVID-19 vaccination before and during pregnancy is safe and effective and suggests that the benefits of vaccination outweigh any known or potential risks. New data show that vaccination during pregnancy can help protect babies younger than 6 months old, when they are too young to be vaccinated themselves, from hospitalization due to COVID-19.    RSV Vaccine  RSV (Respiratory Syncytial Virus) is a common  respiratory virus that causes cold like symptoms in adults and babies.  Infants and adults over 60 years old are more likely to develop severe RSV infection requiring hospitalization.  A new RSV vaccine was released in October 2023 that if given during the third trimester of pregnancy, reduces your baby's risk of being hospitalized with RSV after birth by up to 80%.  Therefore the CDC recommends that pregnant moms receive one dose of the RSV vaccine Pfizer Abrysvo sometime between 32 and 36 weeks of pregnancy, before or during RSV season (September through January).  If you decide not to get the vaccine, you can talk to your pediatrician about your baby receiving the RSV antibody injection Beyfortus after birth.

## 2024-07-05 NOTE — PROGRESS NOTES
Chief Complaint   Patient presents with    Prenatal Care     LUIS ALBERTO     Routine prenatal visit and follow up recent hospital admission for elevated blood pressures.  Patient without complaints.  BP's running 130-140's at home.  Good fetal movement  Patient denies any bleeding, leaking fluid, cramping, or contractions.    Assessment/Plan:  28w0d doing well  1 hr GTT in two weeks (received betamethasone this week) with RPR.  HIV done in hospital  GHtn, AMA, Obesity- BP's stable on Labetalol  Twin pregnancy and prev. CS- request sent for 36 week RCS.  Need to confirm gestational age for delivery with MFM at next MFM visit.  Patient undecided regarding tubal sterilization.  Will let us know if desires.    Blood type A pos  Kick count information given.  Reviewed  labor signs and symptoms.    Diagnoses and all orders for this visit:    Prenatal care, antepartum (HCC)     screening encounter ([facility])  -     T Pallidum Screening [place]; Future  -     Glucose Tolerance, 50 gm (1 hr), Gestational (ADA); Future      Return in about 2 weeks (around 2024) for Routine Prenatal Visit, NST 32 wks.

## 2024-07-08 ENCOUNTER — OFFICE VISIT (OUTPATIENT)
Dept: PERINATAL CARE | Facility: HOSPITAL | Age: 34
End: 2024-07-08
Attending: OBSTETRICS & GYNECOLOGY
Payer: COMMERCIAL

## 2024-07-08 ENCOUNTER — TELEPHONE (OUTPATIENT)
Dept: OBGYN CLINIC | Facility: CLINIC | Age: 34
End: 2024-07-08

## 2024-07-08 VITALS
DIASTOLIC BLOOD PRESSURE: 79 MMHG | HEART RATE: 102 BPM | BODY MASS INDEX: 48.82 KG/M2 | SYSTOLIC BLOOD PRESSURE: 129 MMHG | HEIGHT: 65 IN | WEIGHT: 293 LBS

## 2024-07-08 DIAGNOSIS — E66.01 MATERNAL MORBID OBESITY, ANTEPARTUM (HCC): ICD-10-CM

## 2024-07-08 DIAGNOSIS — O99.210 MATERNAL MORBID OBESITY, ANTEPARTUM (HCC): ICD-10-CM

## 2024-07-08 DIAGNOSIS — O09.523 MULTIGRAVIDA OF ADVANCED MATERNAL AGE IN THIRD TRIMESTER (HCC): ICD-10-CM

## 2024-07-08 DIAGNOSIS — O30.043 DICHORIONIC DIAMNIOTIC TWIN PREGNANCY IN THIRD TRIMESTER (HCC): ICD-10-CM

## 2024-07-08 DIAGNOSIS — O09.529 AMA (ADVANCED MATERNAL AGE) MULTIGRAVIDA 35+ (HCC): ICD-10-CM

## 2024-07-08 DIAGNOSIS — O30.009 TWIN PREGNANCY (HCC): Primary | ICD-10-CM

## 2024-07-08 DIAGNOSIS — O30.009 TWIN PREGNANCY (HCC): ICD-10-CM

## 2024-07-08 PROCEDURE — 76819 FETAL BIOPHYS PROFIL W/O NST: CPT | Performed by: OBSTETRICS & GYNECOLOGY

## 2024-07-08 PROCEDURE — 76815 OB US LIMITED FETUS(S): CPT

## 2024-07-08 NOTE — PROGRESS NOTES
Pt here for BPP  Di-di twins  + FM x 2  Pt c/o irreg daily uterine cramping. Pt denies vag bleeding and leaking fluid. Pt also c/o daily headaches, denies visual disturbances and epigastric discomfort. Pt monitoring BP at home.

## 2024-07-08 NOTE — TELEPHONE ENCOUNTER
----- Message from Denilson Ascencio sent at 2024  3:01 PM CDT -----  Surgeon: Marlen [facility]    Date: 36 wks    Type of Admit: Surgery Admit Inpatient    Procedure Location: L&D    PreOp Dx: Twin gestation, gestational hypertension, previous  section, advanced maternal age, obesity    Procedure: Repeat  section    Anesthesia: Spinal    Antibiotics: Initiate antibiotics per Edward adult preoperative prophylactic antibiotic protocol     Pre Op Orders: Please use [facility]'s Standing Orders   Laparoscopic Cholecystectomy, Ventral Hernia Repair
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
## 2024-07-17 ENCOUNTER — HOSPITAL ENCOUNTER (OUTPATIENT)
Facility: HOSPITAL | Age: 34
Discharge: HOME | End: 2024-07-17
Attending: STUDENT IN AN ORGANIZED HEALTH CARE EDUCATION/TRAINING PROGRAM | Admitting: STUDENT IN AN ORGANIZED HEALTH CARE EDUCATION/TRAINING PROGRAM
Payer: COMMERCIAL

## 2024-07-17 ENCOUNTER — TELEPHONE (OUTPATIENT)
Dept: OBGYN CLINIC | Facility: CLINIC | Age: 34
End: 2024-07-17

## 2024-07-17 VITALS
DIASTOLIC BLOOD PRESSURE: 58 MMHG | HEART RATE: 90 BPM | WEIGHT: 293 LBS | BODY MASS INDEX: 48.82 KG/M2 | TEMPERATURE: 98 F | HEIGHT: 65 IN | SYSTOLIC BLOOD PRESSURE: 129 MMHG | RESPIRATION RATE: 20 BRPM

## 2024-07-17 LAB
ALBUMIN SERPL-MCNC: 4 G/DL (ref 3.2–4.8)
ALBUMIN/GLOB SERPL: 1.4 {RATIO} (ref 1–2)
ALP LIVER SERPL-CCNC: 90 U/L
ALT SERPL-CCNC: 15 U/L
ANION GAP SERPL CALC-SCNC: 6 MMOL/L (ref 0–18)
AST SERPL-CCNC: 15 U/L (ref ?–34)
BASOPHILS # BLD AUTO: 0.04 X10(3) UL (ref 0–0.2)
BASOPHILS NFR BLD AUTO: 0.3 %
BILIRUB SERPL-MCNC: 0.3 MG/DL (ref 0.3–1.2)
BUN BLD-MCNC: 10 MG/DL (ref 9–23)
CALCIUM BLD-MCNC: 9.5 MG/DL (ref 8.7–10.4)
CHLORIDE SERPL-SCNC: 105 MMOL/L (ref 98–112)
CO2 SERPL-SCNC: 23 MMOL/L (ref 21–32)
CREAT BLD-MCNC: 0.41 MG/DL
CREAT UR-SCNC: 124.2 MG/DL
EGFRCR SERPLBLD CKD-EPI 2021: 132 ML/MIN/1.73M2 (ref 60–?)
EOSINOPHIL # BLD AUTO: 0.16 X10(3) UL (ref 0–0.7)
EOSINOPHIL NFR BLD AUTO: 1.4 %
ERYTHROCYTE [DISTWIDTH] IN BLOOD BY AUTOMATED COUNT: 14.4 %
FASTING STATUS PATIENT QL REPORTED: NO
GLOBULIN PLAS-MCNC: 2.9 G/DL (ref 2.8–4.4)
GLUCOSE BLD-MCNC: 104 MG/DL (ref 70–99)
HCT VFR BLD AUTO: 32.3 %
HGB BLD-MCNC: 10.7 G/DL
IMM GRANULOCYTES # BLD AUTO: 0.14 X10(3) UL (ref 0–1)
IMM GRANULOCYTES NFR BLD: 1.2 %
LYMPHOCYTES # BLD AUTO: 1.58 X10(3) UL (ref 1–4)
LYMPHOCYTES NFR BLD AUTO: 13.3 %
MCH RBC QN AUTO: 30.2 PG (ref 26–34)
MCHC RBC AUTO-ENTMCNC: 33.1 G/DL (ref 31–37)
MCV RBC AUTO: 91.2 FL
MONOCYTES # BLD AUTO: 1.12 X10(3) UL (ref 0.1–1)
MONOCYTES NFR BLD AUTO: 9.5 %
NEUTROPHILS # BLD AUTO: 8.81 X10 (3) UL (ref 1.5–7.7)
NEUTROPHILS # BLD AUTO: 8.81 X10(3) UL (ref 1.5–7.7)
NEUTROPHILS NFR BLD AUTO: 74.3 %
OSMOLALITY SERPL CALC.SUM OF ELEC: 277 MOSM/KG (ref 275–295)
PLATELET # BLD AUTO: 250 10(3)UL (ref 150–450)
POTASSIUM SERPL-SCNC: 3.7 MMOL/L (ref 3.5–5.1)
PROT SERPL-MCNC: 6.9 G/DL (ref 5.7–8.2)
PROT UR-MCNC: 27.7 MG/DL (ref ?–14)
PROT/CREAT UR-RTO: 0.22
RBC # BLD AUTO: 3.54 X10(6)UL
SODIUM SERPL-SCNC: 134 MMOL/L (ref 136–145)
URATE SERPL-MCNC: 3.9 MG/DL
WBC # BLD AUTO: 11.9 X10(3) UL (ref 4–11)

## 2024-07-17 PROCEDURE — 59025 FETAL NON-STRESS TEST: CPT | Performed by: STUDENT IN AN ORGANIZED HEALTH CARE EDUCATION/TRAINING PROGRAM

## 2024-07-17 NOTE — TELEPHONE ENCOUNTER
Called to follow up with patient.   29w5d with Estimated Date of Delivery: 9/27/24     Patient states she is not sure she has felt baby boy moving at all, reports little to no movement, but hard to differentiate from baby girl's movements.     Has tried to stimulate with spicy foods and activity without improvement.     Advised patient to be seen in labor and delivery for further assessment.   Agreeable to plan.

## 2024-07-17 NOTE — TELEPHONE ENCOUNTER
Pt called because she is 29 wks pregnant with twins boy/girl and she has not felt the boy moving in the last few days. Pls advice

## 2024-07-17 NOTE — DISCHARGE INSTRUCTIONS
Discharge Instructions    Diet: Regular  Activity: Normal activity         General Instructions    Call your OB doctor if: Fluid leaking from your vagina;Decrease in fetal movement;Vaginal or rectal pressure;Uterine contractions increasing in intensity and frequency;Temperature greater than 100F;Vaginal bleeding

## 2024-07-17 NOTE — NST
Nonstress Test   Patient: Aspen Patel    Gestation: 29w5d    NST:       Variability: Moderate    Variability - Fetus B: Moderate      Accelerations: Yes    Accelerations -  Fetus B: Yes      Decelerations: None     Decelerations - Fetus B: None      Baseline: 140 BPM    Baseline - Fetus B: 140 BPM      Uterine Irritability: No    Uterine Irritability - Fetus B: No      Contractions: Irregular    Contractions - Fetus B: Irregular                               Acoustic Stimulator -  Fetus B: No   Acoustic Stimulator: No    Nonstress Test Interpretation - Fetus B: Appropriate for gestational age      Nonstress Test Interpretation: Appropriate for gestational age           Nonstress Test Second Interpretation: Appropriate for gestational age

## 2024-07-17 NOTE — PROGRESS NOTES
Discharged to home per ambulatory, not in active labor, in stable condition, with written and verbal instructions.

## 2024-07-17 NOTE — PROGRESS NOTES
Pt is a 33 year old female admitted to TRG2/TRG2-A.  Pt is  29w5d intra-uterine pregnancy.    Chief Complaint   Patient presents with    Decreased Fetal Movement     Pt c/o decreased fetal movement of baby boy (twin A) since last night, aware of many movements of baby girl (twin B).   Pt denies any uterine activity, vaginal bleeding, or leaking of fluid. EFM explained and applied, fetal movements palpated and auscultated from both fetuses.      History obtained, oriented to room, staff, and plan of care. Pt's  @ BS.

## 2024-07-18 NOTE — NST
NST Reactive x2  moderate variability x2, baseline 140 / 140, + accels x 2 (10 x10), no decels  No regular contractions      Valente Jernigan MD

## 2024-07-19 ENCOUNTER — ROUTINE PRENATAL (OUTPATIENT)
Dept: OBGYN CLINIC | Facility: CLINIC | Age: 34
End: 2024-07-19
Payer: COMMERCIAL

## 2024-07-19 VITALS
SYSTOLIC BLOOD PRESSURE: 132 MMHG | DIASTOLIC BLOOD PRESSURE: 72 MMHG | BODY MASS INDEX: 48.82 KG/M2 | HEART RATE: 101 BPM | HEIGHT: 65 IN | WEIGHT: 293 LBS

## 2024-07-19 DIAGNOSIS — Z3A.30 30 WEEKS GESTATION OF PREGNANCY (HCC): ICD-10-CM

## 2024-07-19 DIAGNOSIS — O30.042 DICHORIONIC DIAMNIOTIC TWIN PREGNANCY IN SECOND TRIMESTER (HCC): ICD-10-CM

## 2024-07-19 DIAGNOSIS — Z23 NEED FOR VACCINATION: Primary | ICD-10-CM

## 2024-07-19 DIAGNOSIS — O09.522 AMA (ADVANCED MATERNAL AGE) MULTIGRAVIDA 35+, SECOND TRIMESTER (HCC): ICD-10-CM

## 2024-07-19 PROCEDURE — 90715 TDAP VACCINE 7 YRS/> IM: CPT | Performed by: NURSE PRACTITIONER

## 2024-07-19 PROCEDURE — 90471 IMMUNIZATION ADMIN: CPT | Performed by: NURSE PRACTITIONER

## 2024-07-19 NOTE — PROGRESS NOTES
LUIS ALBERTO 30w0d    Doing well   +FM  Denies contractions  Denies LOF, VB      FHT-P x 2  PNL:  Reminded to complete previously ordered labs  Mode of delivery: RCS  Immunizations: Discussed and ordered TDAP today  Obesity/di-di twin: weekly NST 32 weeks, monthly growht US   labor precautions discussed  Fetal movement expectations reviewed    Return in 2 weeks

## 2024-07-22 ENCOUNTER — PATIENT MESSAGE (OUTPATIENT)
Dept: OBGYN CLINIC | Facility: CLINIC | Age: 34
End: 2024-07-22

## 2024-07-26 ENCOUNTER — LAB ENCOUNTER (OUTPATIENT)
Dept: LAB | Age: 34
End: 2024-07-26
Attending: OBSTETRICS & GYNECOLOGY
Payer: COMMERCIAL

## 2024-07-26 DIAGNOSIS — G56.01 RIGHT CARPAL TUNNEL SYNDROME: ICD-10-CM

## 2024-07-26 DIAGNOSIS — L29.9 ITCHING: ICD-10-CM

## 2024-07-26 DIAGNOSIS — R76.8 ANA POSITIVE: ICD-10-CM

## 2024-07-26 DIAGNOSIS — R53.82 CHRONIC FATIGUE: ICD-10-CM

## 2024-07-26 DIAGNOSIS — Z36.9 ANTENATAL SCREENING ENCOUNTER (HCC): ICD-10-CM

## 2024-07-26 DIAGNOSIS — Z3A.22 22 WEEKS GESTATION OF PREGNANCY (HCC): ICD-10-CM

## 2024-07-26 DIAGNOSIS — G56.02 LEFT CARPAL TUNNEL SYNDROME: ICD-10-CM

## 2024-07-26 LAB
ALBUMIN SERPL-MCNC: 4.1 G/DL (ref 3.2–4.8)
ALBUMIN/GLOB SERPL: 1.5 {RATIO} (ref 1–2)
ALP LIVER SERPL-CCNC: 96 U/L
ALT SERPL-CCNC: 17 U/L
ANION GAP SERPL CALC-SCNC: 8 MMOL/L (ref 0–18)
AST SERPL-CCNC: 17 U/L (ref ?–34)
BASOPHILS # BLD AUTO: 0.03 X10(3) UL (ref 0–0.2)
BASOPHILS NFR BLD AUTO: 0.3 %
BILIRUB SERPL-MCNC: 0.4 MG/DL (ref 0.3–1.2)
BUN BLD-MCNC: 12 MG/DL (ref 9–23)
CALCIUM BLD-MCNC: 9.9 MG/DL (ref 8.7–10.4)
CHLORIDE SERPL-SCNC: 104 MMOL/L (ref 98–112)
CO2 SERPL-SCNC: 21 MMOL/L (ref 21–32)
CREAT BLD-MCNC: 0.48 MG/DL
EGFRCR SERPLBLD CKD-EPI 2021: 127 ML/MIN/1.73M2 (ref 60–?)
EOSINOPHIL # BLD AUTO: 0.11 X10(3) UL (ref 0–0.7)
EOSINOPHIL NFR BLD AUTO: 1.1 %
ERYTHROCYTE [DISTWIDTH] IN BLOOD BY AUTOMATED COUNT: 14.3 %
FASTING STATUS PATIENT QL REPORTED: YES
GLOBULIN PLAS-MCNC: 2.7 G/DL (ref 2–3.5)
GLUCOSE 1H P GLC SERPL-MCNC: 105 MG/DL
GLUCOSE BLD-MCNC: 104 MG/DL (ref 70–99)
HCT VFR BLD AUTO: 32.1 %
HGB BLD-MCNC: 10.8 G/DL
IMM GRANULOCYTES # BLD AUTO: 0.09 X10(3) UL (ref 0–1)
IMM GRANULOCYTES NFR BLD: 0.9 %
LYMPHOCYTES # BLD AUTO: 1.29 X10(3) UL (ref 1–4)
LYMPHOCYTES NFR BLD AUTO: 12.9 %
MCH RBC QN AUTO: 30.2 PG (ref 26–34)
MCHC RBC AUTO-ENTMCNC: 33.6 G/DL (ref 31–37)
MCV RBC AUTO: 89.7 FL
MONOCYTES # BLD AUTO: 0.81 X10(3) UL (ref 0.1–1)
MONOCYTES NFR BLD AUTO: 8.1 %
NEUTROPHILS # BLD AUTO: 7.66 X10 (3) UL (ref 1.5–7.7)
NEUTROPHILS # BLD AUTO: 7.66 X10(3) UL (ref 1.5–7.7)
NEUTROPHILS NFR BLD AUTO: 76.7 %
OSMOLALITY SERPL CALC.SUM OF ELEC: 276 MOSM/KG (ref 275–295)
PLATELET # BLD AUTO: 244 10(3)UL (ref 150–450)
POTASSIUM SERPL-SCNC: 3.7 MMOL/L (ref 3.5–5.1)
PROT SERPL-MCNC: 6.8 G/DL (ref 5.7–8.2)
RBC # BLD AUTO: 3.58 X10(6)UL
SODIUM SERPL-SCNC: 133 MMOL/L (ref 136–145)
T PALLIDUM AB SER QL IA: NONREACTIVE
WBC # BLD AUTO: 10 X10(3) UL (ref 4–11)

## 2024-07-26 PROCEDURE — 86235 NUCLEAR ANTIGEN ANTIBODY: CPT

## 2024-07-26 PROCEDURE — 36415 COLL VENOUS BLD VENIPUNCTURE: CPT

## 2024-07-26 PROCEDURE — 86225 DNA ANTIBODY NATIVE: CPT

## 2024-07-26 PROCEDURE — 86039 ANTINUCLEAR ANTIBODIES (ANA): CPT

## 2024-07-26 PROCEDURE — 85025 COMPLETE CBC W/AUTO DIFF WBC: CPT

## 2024-07-26 PROCEDURE — 82950 GLUCOSE TEST: CPT

## 2024-07-26 PROCEDURE — 80053 COMPREHEN METABOLIC PANEL: CPT

## 2024-07-26 PROCEDURE — 86780 TREPONEMA PALLIDUM: CPT

## 2024-07-26 PROCEDURE — 86038 ANTINUCLEAR ANTIBODIES: CPT

## 2024-07-30 ENCOUNTER — OFFICE VISIT (OUTPATIENT)
Dept: PERINATAL CARE | Facility: HOSPITAL | Age: 34
End: 2024-07-30
Attending: OBSTETRICS & GYNECOLOGY
Payer: COMMERCIAL

## 2024-07-30 VITALS
HEART RATE: 94 BPM | BODY MASS INDEX: 48.82 KG/M2 | HEIGHT: 65 IN | WEIGHT: 293 LBS | DIASTOLIC BLOOD PRESSURE: 76 MMHG | SYSTOLIC BLOOD PRESSURE: 138 MMHG

## 2024-07-30 DIAGNOSIS — O09.529 AMA (ADVANCED MATERNAL AGE) MULTIGRAVIDA 35+ (HCC): ICD-10-CM

## 2024-07-30 DIAGNOSIS — O30.043 DICHORIONIC DIAMNIOTIC TWIN PREGNANCY IN THIRD TRIMESTER (HCC): ICD-10-CM

## 2024-07-30 DIAGNOSIS — O09.523 MULTIGRAVIDA OF ADVANCED MATERNAL AGE IN THIRD TRIMESTER (HCC): ICD-10-CM

## 2024-07-30 DIAGNOSIS — O99.210 MATERNAL MORBID OBESITY, ANTEPARTUM (HCC): ICD-10-CM

## 2024-07-30 DIAGNOSIS — E66.01 MATERNAL MORBID OBESITY, ANTEPARTUM (HCC): ICD-10-CM

## 2024-07-30 DIAGNOSIS — O30.043 DICHORIONIC DIAMNIOTIC TWIN PREGNANCY IN THIRD TRIMESTER (HCC): Primary | ICD-10-CM

## 2024-07-30 DIAGNOSIS — O13.3 GESTATIONAL HYPERTENSION, THIRD TRIMESTER (HCC): ICD-10-CM

## 2024-07-30 LAB
DSDNA AB TITR SER: <10 {TITER}
NUCLEAR IGG TITR SER IF: POSITIVE {TITER}

## 2024-07-30 PROCEDURE — 76816 OB US FOLLOW-UP PER FETUS: CPT | Performed by: OBSTETRICS & GYNECOLOGY

## 2024-07-30 PROCEDURE — 76819 FETAL BIOPHYS PROFIL W/O NST: CPT

## 2024-07-30 NOTE — PROGRESS NOTES
Outpatient Maternal-Fetal Medicine Follow-Up     Dear Dr. Daly,     Thank you for requesting ultrasound evaluation and maternal fetal medicine consultation on your patient Aspen Patel.  As you are aware she is a 33 year old female  with a TWINpregnancy and an Estimated Date of Delivery: 24.  She returned to maternal-fetal medicine today for a follow-up visit.  Her history was reviewed from her prior visit and there were no interval changes.     Antepartum Risk Factors  Diamniotic, dichorionic twins  Obesity complicating pregnancy  Gestational HTN  H/o preeclampsia in her 2 prior pregnancies  H/o  x 2    S: She does not have any headaches or symptoms of preeclampsia.  She reports just generally she knows she is retaining more water.  She reports a 9 pound weight gain in 11 days.  Her home blood pressures remain 1 30-1 40 systolic over 70-80 diastolic.    She is having office visits weekly with a nonstress test and her next visit is in 2 days.    We reviewed signs and symptoms of preeclampsia and blood pressure readings that would warrant a call to her OB provider.  PHYSICAL EXAMINATION:  /76 (BP Location: Right arm, Patient Position: Sitting, Cuff Size: large)   Pulse 94   Ht 5' 5\" (1.651 m)   Wt (!) 331 lb (150.1 kg)   LMP 2023   BMI 55.08 kg/m²   General: alert and oriented, no acute distress  Abdomen: obese, gravid, soft, non-tender  Extremities: non-tender, 1+ edema     OBSTETRIC ULTRASOUND  The patient had a follow-up twin ultrasound today which I interpreted the results and reviewed them with the patient.    Ultrasound Findings:  Twin A - IUP in cephalic presentation. (Maternal right)   Placenta is anterior.   Cardiac activity is present, 142 bpm  EFW 2162 g ( 4 lb 12 oz); 72%.   MVP is 6.8 cm. BPP is 8/8.    Twin B - IUP in cephalic presentation. (Maternal left)  Placenta is posterior.  Cardiac activity is present, 141 bpm  EFW 2254 g (4 lb 15 oz); 77%.   MVP is  6.5 cm. BPP is 8/8.    Discordance - 4.1 %      The fetal measurements are consistent with established EDC. No gross ultrasound evidence of structural abnormalities are seen today. The patient understands that ultrasound cannot rule out all structural and chromosomal abnormalities.     See imaging tab for the complete US report.     DISCUSSION  During her visit we discussed and reviewed the following issues:  PRIOR PREECLAMPSIA  History:  The patient developed preeclampsia at 35 weeks and was delivered  due to her condition.  Child has no sequelae of prematurity.     In her second pregnancy she was taking low-dose aspirin.  She did develop mild gestational hypertension near term and was delivered in the 37th week.     Preeclampsia is recurrence risk was discussed previously and reviewed.  The role of low-dose aspirin was discussed.  See M note from 5/10/2024 for detailed review.  Today she confirms that she is still taking 2 tablets 162 mg once daily        OBESITY:  Her BMI prior to pregnancy was 40  Obesity during pregnancy is associated with numerous maternal and  risks which were discussed previously and reviewed.  See M note from 5/10/2024 for detailed review.     The [place] of Medicine maternal weight gain and recommendations for twin gestation:  BMI 18.5-24.9:  37-54 lb  BMI 25-30:  31-50 lb  BMI 30.1 or more: 25-42 lb     DIAMNIOTIC DICHORIONIC TWIN GESTATION  The increased  morbidity mortality associated with twin gestation was discussed previously and reviewed.  See MFM note from 5/10/2024 for detailed review.     We discussed the recommended plan of care based on her  risk factors.  Aspen and her significant other, Pablo, had their questions answered to their satisfaction.     IMPRESSION:  Twin IUP at 31w 4d  Dichorionic, diamniotic placentation  Twin A - biometry consistent with dates & BPP is 8/8.  Cephalic right.    Twin B - biometry consistent with dates  &  BPP is 8/8.  Cephalic left  Maternal morbid obesity  Previous  x 2  History of late  delivery due to maternal hypertensive complications  Excessive gestational weight gain  Gestational HTN     RECOMMENDATIONS:  Continue care with Dr. Daly  Total weight gain for twin gestation should be between 25 and 42 pounds.  She was advised to limit additional weight gain  Weekly NST   Monthly growth & BPP ultrasounds  Daily low-dose aspirin, 2 tablets once daily or 162 mg daily  Delivery 36-37 weeks for dichorionic twins and gestational hypertension        Total time spent 40 minutes this calendar day which includes preparing to see the patient including chart review, obtaining and/or reviewing additional medical history, performing a physical exam and evaluation, documenting clinical information in the electronic medical record, independently interpreting results, counseling the patient, communicating results to the patient/family/caregiver and coordinating care.     Case discussed with patient who demonstrated understanding and agreement with plan.     Thank you for allowing me to participate in the care of this patient.  Please feel free to contact me with any questions.    Nilam Heredia MD  Maternal-Fetal Medicine       Note to patient and family:  The  [employer] Cures Act makes medical notes available to patients in the interest of transparency.  However, please be advised that this is a medical document.  It is intended as a peer to peer communication.  It is written in medical language and may contain abbreviations or verbiage that are technical and unfamiliar.  It may appear blunt or direct.  Medical documents are intended to carry relevant information, facts as evident, and the clinical opinion of the practitioner.

## 2024-07-31 ENCOUNTER — OFFICE VISIT (OUTPATIENT)
Dept: RHEUMATOLOGY | Facility: CLINIC | Age: 34
End: 2024-07-31
Payer: COMMERCIAL

## 2024-07-31 VITALS — DIASTOLIC BLOOD PRESSURE: 60 MMHG | TEMPERATURE: 97 F | SYSTOLIC BLOOD PRESSURE: 130 MMHG

## 2024-07-31 DIAGNOSIS — G56.02 LEFT CARPAL TUNNEL SYNDROME: Primary | ICD-10-CM

## 2024-07-31 LAB
ANA NUCLEOLAR TITR SER IF: 320 {TITER}
CENTROMERE IGG SER-ACNC: <0.4 U/ML
ENA JO1 AB SER IA-ACNC: <0.3 U/ML
ENA RNP IGG SER IA-ACNC: 2.4 U/ML
ENA SCL70 IGG SER IA-ACNC: <0.6 U/ML
ENA SM IGG SER IA-ACNC: <0.7 U/ML
ENA SS-A IGG SER IA-ACNC: <0.4 U/ML
ENA SS-B IGG SER IA-ACNC: <0.4 U/ML
U1 SNRNP IGG SER IA-ACNC: 1.3 U/ML

## 2024-07-31 NOTE — PROGRESS NOTES
Cancelled today's appt.  No sterile saline in office unfortunately.   Pt was added onto schedule without checking supplies.  Will cancel appt today and add on hopefully next week or week after.  Has plans for scheduled csection first week of September.     Patient verbalized understanding of above instructions. No further questions at this time.    Lory Soriano, DO  [facility]  7/31/2024

## 2024-08-01 ENCOUNTER — APPOINTMENT (OUTPATIENT)
Dept: OBGYN CLINIC | Facility: CLINIC | Age: 34
End: 2024-08-01
Payer: COMMERCIAL

## 2024-08-01 ENCOUNTER — ROUTINE PRENATAL (OUTPATIENT)
Dept: OBGYN CLINIC | Facility: CLINIC | Age: 34
End: 2024-08-01
Payer: COMMERCIAL

## 2024-08-01 VITALS
WEIGHT: 293 LBS | DIASTOLIC BLOOD PRESSURE: 78 MMHG | BODY MASS INDEX: 48.82 KG/M2 | HEIGHT: 65 IN | SYSTOLIC BLOOD PRESSURE: 134 MMHG | HEART RATE: 99 BPM

## 2024-08-01 DIAGNOSIS — O30.043 DICHORIONIC DIAMNIOTIC TWIN PREGNANCY IN THIRD TRIMESTER (HCC): Primary | ICD-10-CM

## 2024-08-01 NOTE — PROGRESS NOTES
LUIS ALBERTO  Doing well, difficulty getting around now  GOOD FM x 2  Discussed normal fetal movement/ kick counts  Denies VB/LOF/uctx  BPP 8/8 x 2 on 7/30/2024  /150  Home BP has been in range with rare 150/90 in the evening. Once she rechecks it it has normalized after night time medication dose  Discussed pre- eclampsia symptoms  She is to call with any visual changes, epigastric pain, or headaches  LUIS ALBERTO with NST next week

## 2024-08-05 ENCOUNTER — TELEPHONE (OUTPATIENT)
Dept: RHEUMATOLOGY | Facility: CLINIC | Age: 34
End: 2024-08-05

## 2024-08-05 NOTE — TELEPHONE ENCOUNTER
Called pt and left a voicemail to see if she could come in tomorrow for a left carpal tunnel injection at either 9 am or 230 (8/6)   LM 11/30

## 2024-08-06 ENCOUNTER — OFFICE VISIT (OUTPATIENT)
Dept: RHEUMATOLOGY | Facility: CLINIC | Age: 34
End: 2024-08-06
Payer: COMMERCIAL

## 2024-08-06 VITALS — DIASTOLIC BLOOD PRESSURE: 58 MMHG | SYSTOLIC BLOOD PRESSURE: 128 MMHG

## 2024-08-06 DIAGNOSIS — G56.02 LEFT CARPAL TUNNEL SYNDROME: Primary | ICD-10-CM

## 2024-08-06 PROCEDURE — 76942 ECHO GUIDE FOR BIOPSY: CPT | Performed by: INTERNAL MEDICINE

## 2024-08-06 PROCEDURE — 20526 THER INJECTION CARP TUNNEL: CPT | Performed by: INTERNAL MEDICINE

## 2024-08-06 RX ORDER — LIDOCAINE HYDROCHLORIDE 10 MG/ML
2 INJECTION, SOLUTION INFILTRATION; PERINEURAL ONCE
Status: COMPLETED | OUTPATIENT
Start: 2024-08-06 | End: 2024-08-06

## 2024-08-06 RX ORDER — METHYLPREDNISOLONE ACETATE 40 MG/ML
40 INJECTION, SUSPENSION INTRA-ARTICULAR; INTRALESIONAL; INTRAMUSCULAR; SOFT TISSUE ONCE
Status: COMPLETED | OUTPATIENT
Start: 2024-08-06 | End: 2024-08-06

## 2024-08-06 RX ADMIN — LIDOCAINE HYDROCHLORIDE 2 ML: 10 INJECTION, SOLUTION INFILTRATION; PERINEURAL at 09:54:00

## 2024-08-06 RX ADMIN — METHYLPREDNISOLONE ACETATE 40 MG: 40 INJECTION, SUSPENSION INTRA-ARTICULAR; INTRALESIONAL; INTRAMUSCULAR; SOFT TISSUE at 09:54:00

## 2024-08-06 NOTE — PROCEDURES
Carpal Tunnel Procedure Note    Ultrasound examination of the left  carpal tunnel  Equipment: thesweetlink II  Indication: Pain/paresthesias in fingers    Findings: Ultrasound examination is performed according to standard EU LAR recommendations(1). . Using the[15-6 MHz] transducer, as well as color power Doppler settings, real-time imaging of the [right] wrist was performed to evaluate the bones, subcutaneous tissues, muscles, tendons and the joint spaces. Longitudinal and transverse images were obtained. This was at the distal crease tendon proximally from the carpal tunnel. The median nerve was localized as well as measured. It was found to be just ulnar and the superior to the flexor pollicis longus. Numerous longus was noted. The nerve was followed approximately in between the superficial digitorum and profundus. There was no evidence of this significant synovitis causing compression of the median nerve or any masses. Color Doppler over the median nerve revealed no evidence of vascularity.     All findings were normal with the exception of the following: nerves circumference 2.34cm    Impression: Median nerve compression    Clinical correlation and other imaging studies may be indicated.    Reference:  Guidelines for musculoskeletal ultrasound in rheumatology  Diana ROSALES., Ivan GR, Leandro RODRIGUEZ., Daya BARRETT., Savita KP, Kannan WA, Shahla RJ, Rubi B.; working group for musculoskeletal ultrasound in the EULAR standing committee on international clinical studies including therapeutic trials.  Annals rheumatic disease 2001 July; 60(7): 641-9    Procedure Left carpal tunnel injection and median nerve release by hydrolysis    After obtaining consent and discussing alternative treatments. The median nerve was evaluated beginning in the forearm and down onto the distal crease. The nerve was measured at the proximal and distal carpal tunnel. A15-6 MHz transducer was used. After localizing the median nerve in both  transverse and longitudinal views as well as identifying neurovascular structures the area was cleansed with hibiclens and alcohol. Then using sterile gel and a 15-6 MHz transducer the area around the median nerve was injected with a total solution of 8 mL of which included 5mL of sterile saline 2 mL of 1% lidocaine and 40 mg of Depomedrol . A 26-gauge needle was used with an in-line approach from the ulnar side under real time imaging. Injections were careful to avoid damage to the median nerve and other neurovascular structures.is seen to surround the nerve. All injections were done under real-time time imaging. There were no complications. Post procedure instructions included to call for any questions or concerns, to wear a static wrist splint 24 hours a day except when washing for one week and then at nighttime for least 2 weeks.     Pt may still be interested in one more round of injections b/l before considering surgical intervention since pt due with twins in a few weeks. May have a hard time recuperating from surgery with the two newborns.   She will keep me updated.    Of note, she suffered a bug sting on the middle finger yesterday.  Area was cleaned and bandaid placed. Recommended she talk to OB vs PCP if not getting better.     Patient verbalized understanding of above instructions. No further questions at this time.      Lory Soriano, DO  [facility]  08/06/2024

## 2024-08-08 ENCOUNTER — ROUTINE PRENATAL (OUTPATIENT)
Dept: OBGYN CLINIC | Facility: CLINIC | Age: 34
End: 2024-08-08
Payer: COMMERCIAL

## 2024-08-08 ENCOUNTER — APPOINTMENT (OUTPATIENT)
Dept: OBGYN CLINIC | Facility: CLINIC | Age: 34
End: 2024-08-08
Payer: COMMERCIAL

## 2024-08-08 ENCOUNTER — LAB ENCOUNTER (OUTPATIENT)
Dept: LAB | Age: 34
End: 2024-08-08
Attending: STUDENT IN AN ORGANIZED HEALTH CARE EDUCATION/TRAINING PROGRAM
Payer: COMMERCIAL

## 2024-08-08 VITALS
BODY MASS INDEX: 48.82 KG/M2 | DIASTOLIC BLOOD PRESSURE: 74 MMHG | SYSTOLIC BLOOD PRESSURE: 134 MMHG | HEIGHT: 65 IN | HEART RATE: 102 BPM | WEIGHT: 293 LBS

## 2024-08-08 DIAGNOSIS — O13.9: ICD-10-CM

## 2024-08-08 DIAGNOSIS — O13.9: Primary | ICD-10-CM

## 2024-08-08 DIAGNOSIS — Z3A.33 33 WEEKS GESTATION OF PREGNANCY (HCC): ICD-10-CM

## 2024-08-08 LAB
ALBUMIN SERPL-MCNC: 4.1 G/DL (ref 3.2–4.8)
ALBUMIN/GLOB SERPL: 1.5 {RATIO} (ref 1–2)
ALP LIVER SERPL-CCNC: 109 U/L
ALT SERPL-CCNC: 15 U/L
ANION GAP SERPL CALC-SCNC: 5 MMOL/L (ref 0–18)
AST SERPL-CCNC: 19 U/L (ref ?–34)
BASOPHILS # BLD AUTO: 0.03 X10(3) UL (ref 0–0.2)
BASOPHILS NFR BLD AUTO: 0.3 %
BILIRUB SERPL-MCNC: 0.4 MG/DL (ref 0.3–1.2)
BUN BLD-MCNC: 10 MG/DL (ref 9–23)
CALCIUM BLD-MCNC: 9.9 MG/DL (ref 8.7–10.4)
CHLORIDE SERPL-SCNC: 105 MMOL/L (ref 98–112)
CO2 SERPL-SCNC: 22 MMOL/L (ref 21–32)
CREAT BLD-MCNC: 0.46 MG/DL
CREAT UR-SCNC: 82.8 MG/DL
EGFRCR SERPLBLD CKD-EPI 2021: 129 ML/MIN/1.73M2 (ref 60–?)
EOSINOPHIL # BLD AUTO: 0.1 X10(3) UL (ref 0–0.7)
EOSINOPHIL NFR BLD AUTO: 0.9 %
ERYTHROCYTE [DISTWIDTH] IN BLOOD BY AUTOMATED COUNT: 14.6 %
FASTING STATUS PATIENT QL REPORTED: NO
GLOBULIN PLAS-MCNC: 2.8 G/DL (ref 2–3.5)
GLUCOSE BLD-MCNC: 82 MG/DL (ref 70–99)
HCT VFR BLD AUTO: 31.9 %
HGB BLD-MCNC: 10.6 G/DL
IMM GRANULOCYTES # BLD AUTO: 0.08 X10(3) UL (ref 0–1)
IMM GRANULOCYTES NFR BLD: 0.7 %
LYMPHOCYTES # BLD AUTO: 1.39 X10(3) UL (ref 1–4)
LYMPHOCYTES NFR BLD AUTO: 12.2 %
MCH RBC QN AUTO: 29.7 PG (ref 26–34)
MCHC RBC AUTO-ENTMCNC: 33.2 G/DL (ref 31–37)
MCV RBC AUTO: 89.4 FL
MONOCYTES # BLD AUTO: 1.02 X10(3) UL (ref 0.1–1)
MONOCYTES NFR BLD AUTO: 9 %
NEUTROPHILS # BLD AUTO: 8.74 X10 (3) UL (ref 1.5–7.7)
NEUTROPHILS # BLD AUTO: 8.74 X10(3) UL (ref 1.5–7.7)
NEUTROPHILS NFR BLD AUTO: 76.9 %
OSMOLALITY SERPL CALC.SUM OF ELEC: 272 MOSM/KG (ref 275–295)
PLATELET # BLD AUTO: 241 10(3)UL (ref 150–450)
POTASSIUM SERPL-SCNC: 3.9 MMOL/L (ref 3.5–5.1)
PROT SERPL-MCNC: 6.9 G/DL (ref 5.7–8.2)
PROT UR-MCNC: 20.4 MG/DL (ref ?–14)
PROT/CREAT UR-RTO: 0.25
RBC # BLD AUTO: 3.57 X10(6)UL
SODIUM SERPL-SCNC: 132 MMOL/L (ref 136–145)
WBC # BLD AUTO: 11.4 X10(3) UL (ref 4–11)

## 2024-08-08 PROCEDURE — 80053 COMPREHEN METABOLIC PANEL: CPT

## 2024-08-08 PROCEDURE — 36415 COLL VENOUS BLD VENIPUNCTURE: CPT

## 2024-08-08 PROCEDURE — 84156 ASSAY OF PROTEIN URINE: CPT | Performed by: STUDENT IN AN ORGANIZED HEALTH CARE EDUCATION/TRAINING PROGRAM

## 2024-08-08 PROCEDURE — 82570 ASSAY OF URINE CREATININE: CPT | Performed by: STUDENT IN AN ORGANIZED HEALTH CARE EDUCATION/TRAINING PROGRAM

## 2024-08-08 PROCEDURE — 59025 FETAL NON-STRESS TEST: CPT | Performed by: STUDENT IN AN ORGANIZED HEALTH CARE EDUCATION/TRAINING PROGRAM

## 2024-08-08 PROCEDURE — 85025 COMPLETE CBC W/AUTO DIFF WBC: CPT
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
## 2024-08-09 PROBLEM — R73.01 IMPAIRED FASTING GLUCOSE: Status: RESOLVED | Noted: 2021-09-27 | Resolved: 2024-08-09

## 2024-08-09 PROBLEM — M51.369 BULGING LUMBAR DISC: Status: RESOLVED | Noted: 2018-04-19 | Resolved: 2024-08-09

## 2024-08-09 PROBLEM — Z87.59 HISTORY OF PRE-ECLAMPSIA: Status: RESOLVED | Noted: 2024-05-30 | Resolved: 2024-08-09

## 2024-08-09 PROBLEM — R73.09 ELEVATED HEMOGLOBIN A1C: Status: RESOLVED | Noted: 2024-06-30 | Resolved: 2024-08-09

## 2024-08-09 PROBLEM — O26.899 PREGNANCY HEADACHE, ANTEPARTUM (HCC): Status: RESOLVED | Noted: 2024-05-30 | Resolved: 2024-08-09

## 2024-08-09 PROBLEM — O34.219 HISTORY OF CESAREAN DELIVERY AFFECTING PREGNANCY (HCC): Status: RESOLVED | Noted: 2024-06-30 | Resolved: 2024-08-09

## 2024-08-09 PROBLEM — Z34.90 PREGNANCY (HCC): Status: RESOLVED | Noted: 2024-07-01 | Resolved: 2024-08-09

## 2024-08-09 PROBLEM — R51.9 PREGNANCY HEADACHE, ANTEPARTUM (HCC): Status: RESOLVED | Noted: 2024-05-30 | Resolved: 2024-08-09

## 2024-08-09 PROBLEM — O16.2 HYPERTENSION AFFECTING PREGNANCY IN SECOND TRIMESTER (HCC): Status: RESOLVED | Noted: 2024-06-30 | Resolved: 2024-08-09

## 2024-08-09 PROBLEM — M51.36 BULGING LUMBAR DISC: Status: RESOLVED | Noted: 2018-04-19 | Resolved: 2024-08-09

## 2024-08-09 PROBLEM — M47.22 OSTEOARTHRITIS OF SPINE WITH RADICULOPATHY, CERVICAL REGION: Status: RESOLVED | Noted: 2018-04-19 | Resolved: 2024-08-09

## 2024-08-09 NOTE — PATIENT INSTRUCTIONS
KICK COUNT INSTRUCTIONS    After 28 weeks of pregnancy, we would like for you to monitor your baby’s movement daily by doing kick counts, an easy way for you to assess your baby’s well-being.    How to count kicks:  Choose a time when the baby is active, such as after a meal.  Sit comfortably or lie on your side, and count the number of movements in an hour… you should feel 10 movements in 2 hours    The evening hours seem to be the most convenient time to do this test, although you can also do this test anytime you are concerned about the baby’s movement.    Call the office right away at [phone] if you notice any of the following:  Your baby moves less than 10 times in 2 hours while you are doing kick counts.  Your baby moves much less often than on the days before.  You have not felt your baby move all day.

## 2024-08-11 ENCOUNTER — HOSPITAL ENCOUNTER (INPATIENT)
Facility: HOSPITAL | Age: 34
LOS: 6 days | Discharge: HOME | End: 2024-08-24
Attending: OBSTETRICS & GYNECOLOGY | Admitting: OBSTETRICS & GYNECOLOGY
Payer: COMMERCIAL

## 2024-08-11 DIAGNOSIS — I82.890 SUPERFICIAL VEIN THROMBOSIS: ICD-10-CM

## 2024-08-11 DIAGNOSIS — I82.612 SUPERFICIAL VENOUS THROMBOSIS OF LEFT UPPER EXTREMITY: ICD-10-CM

## 2024-08-11 DIAGNOSIS — E66.01 MORBID OBESITY (HCC): Primary | ICD-10-CM

## 2024-08-11 DIAGNOSIS — O09.293 HX OF PREECLAMPSIA, PRIOR PREGNANCY, CURRENTLY PREGNANT, THIRD TRIMESTER (HCC): ICD-10-CM

## 2024-08-11 DIAGNOSIS — O26.02 EXCESSIVE WEIGHT GAIN DURING PREGNANCY IN SECOND TRIMESTER (HCC): ICD-10-CM

## 2024-08-11 DIAGNOSIS — O09.292 HISTORY OF PRE-ECLAMPSIA IN PRIOR PREGNANCY, CURRENTLY PREGNANT IN SECOND TRIMESTER (HCC): ICD-10-CM

## 2024-08-11 PROBLEM — Z34.90 PREGNANCY (HCC): Status: ACTIVE | Noted: 2024-08-11

## 2024-08-11 LAB
BILIRUBIN URINE: NEGATIVE
CONTROL RUN WITHIN 24 HOURS?: YES
CREAT UR-SCNC: 131.1 MG/DL
GLUCOSE URINE: NEGATIVE
KETONE URINE: NEGATIVE
LEUKOCYTE ESTERASE URINE: NEGATIVE
NITRITE URINE: NEGATIVE
PH URINE: 6.5 (ref 5–8)
PROT UR-MCNC: 240.2 MG/DL (ref ?–14)
PROT/CREAT UR-RTO: 1.83
PROTEIN URINE: 300
SPEC GRAVITY: >=1.03 (ref 1–1.03)
URINE CLARITY: CLEAR
URINE COLOR: YELLOW
UROBILINOGEN URINE: 1

## 2024-08-11 RX ORDER — BUTALBITAL, ACETAMINOPHEN AND CAFFEINE 50; 325; 40 MG/1; MG/1; MG/1
1 TABLET ORAL EVERY 4 HOURS PRN
Status: DISCONTINUED | OUTPATIENT
Start: 2024-08-11 | End: 2024-08-19

## 2024-08-11 RX ORDER — LABETALOL 200 MG/1
200 TABLET, FILM COATED ORAL 3 TIMES DAILY
Status: DISCONTINUED | OUTPATIENT
Start: 2024-08-11 | End: 2024-08-19

## 2024-08-11 RX ORDER — ZOLPIDEM TARTRATE 5 MG/1
5 TABLET ORAL NIGHTLY PRN
Status: DISCONTINUED | OUTPATIENT
Start: 2024-08-11 | End: 2024-08-19

## 2024-08-11 RX ORDER — BETAMETHASONE SODIUM PHOSPHATE AND BETAMETHASONE ACETATE 3; 3 MG/ML; MG/ML
12 INJECTION, SUSPENSION INTRA-ARTICULAR; INTRALESIONAL; INTRAMUSCULAR; SOFT TISSUE EVERY 24 HOURS
Status: COMPLETED | OUTPATIENT
Start: 2024-08-11 | End: 2024-08-12

## 2024-08-11 RX ORDER — DOCUSATE SODIUM 100 MG/1
100 CAPSULE, LIQUID FILLED ORAL 2 TIMES DAILY
Status: DISCONTINUED | OUTPATIENT
Start: 2024-08-11 | End: 2024-08-19

## 2024-08-11 RX ORDER — ACETAMINOPHEN 500 MG
1000 TABLET ORAL EVERY 6 HOURS PRN
Status: DISCONTINUED | OUTPATIENT
Start: 2024-08-11 | End: 2024-08-19

## 2024-08-11 RX ORDER — BETAMETHASONE SODIUM PHOSPHATE AND BETAMETHASONE ACETATE 3; 3 MG/ML; MG/ML
INJECTION, SUSPENSION INTRA-ARTICULAR; INTRALESIONAL; INTRAMUSCULAR; SOFT TISSUE
Status: DISCONTINUED
Start: 2024-08-11 | End: 2024-08-11 | Stop reason: WASHOUT

## 2024-08-11 RX ORDER — CALCIUM CARBONATE 500 MG/1
1000 TABLET, CHEWABLE ORAL
Status: DISCONTINUED | OUTPATIENT
Start: 2024-08-11 | End: 2024-08-19

## 2024-08-11 RX ORDER — LABETALOL 200 MG/1
200 TABLET, FILM COATED ORAL EVERY 12 HOURS SCHEDULED
Status: DISCONTINUED | OUTPATIENT
Start: 2024-08-11 | End: 2024-08-11

## 2024-08-11 RX ORDER — ACETAMINOPHEN 500 MG
500 TABLET ORAL EVERY 6 HOURS PRN
Status: DISCONTINUED | OUTPATIENT
Start: 2024-08-11 | End: 2024-08-19

## 2024-08-12 ENCOUNTER — ULTRASOUND ENCOUNTER (OUTPATIENT)
Dept: PERINATAL CARE | Facility: HOSPITAL | Age: 34
End: 2024-08-12
Attending: OBSTETRICS & GYNECOLOGY
Payer: COMMERCIAL

## 2024-08-12 DIAGNOSIS — E66.01 MORBID OBESITY (HCC): ICD-10-CM

## 2024-08-12 DIAGNOSIS — O09.293 HX OF PREECLAMPSIA, PRIOR PREGNANCY, CURRENTLY PREGNANT, THIRD TRIMESTER (HCC): Primary | ICD-10-CM

## 2024-08-12 LAB
ALBUMIN SERPL-MCNC: 3.9 G/DL (ref 3.2–4.8)
ALBUMIN/GLOB SERPL: 1.4 {RATIO} (ref 1–2)
ALP LIVER SERPL-CCNC: 109 U/L
ALT SERPL-CCNC: 15 U/L
ANION GAP SERPL CALC-SCNC: 8 MMOL/L (ref 0–18)
AST SERPL-CCNC: 20 U/L (ref ?–34)
BASOPHILS # BLD AUTO: 0.02 X10(3) UL (ref 0–0.2)
BASOPHILS NFR BLD AUTO: 0.2 %
BILIRUB SERPL-MCNC: 0.3 MG/DL (ref 0.3–1.2)
BUN BLD-MCNC: 11 MG/DL (ref 9–23)
CALCIUM BLD-MCNC: 9.8 MG/DL (ref 8.7–10.4)
CHLORIDE SERPL-SCNC: 106 MMOL/L (ref 98–112)
CO2 SERPL-SCNC: 22 MMOL/L (ref 21–32)
CREAT BLD-MCNC: 0.46 MG/DL
EGFRCR SERPLBLD CKD-EPI 2021: 129 ML/MIN/1.73M2 (ref 60–?)
EOSINOPHIL # BLD AUTO: 0.14 X10(3) UL (ref 0–0.7)
EOSINOPHIL NFR BLD AUTO: 1.2 %
ERYTHROCYTE [DISTWIDTH] IN BLOOD BY AUTOMATED COUNT: 14.6 %
GLOBULIN PLAS-MCNC: 2.7 G/DL (ref 2–3.5)
GLUCOSE BLD-MCNC: 86 MG/DL (ref 70–99)
HCT VFR BLD AUTO: 31.5 %
HGB BLD-MCNC: 10.4 G/DL
IMM GRANULOCYTES # BLD AUTO: 0.07 X10(3) UL (ref 0–1)
IMM GRANULOCYTES NFR BLD: 0.6 %
LYMPHOCYTES # BLD AUTO: 1.79 X10(3) UL (ref 1–4)
LYMPHOCYTES NFR BLD AUTO: 15.9 %
MCH RBC QN AUTO: 29.4 PG (ref 26–34)
MCHC RBC AUTO-ENTMCNC: 33 G/DL (ref 31–37)
MCV RBC AUTO: 89 FL
MONOCYTES # BLD AUTO: 1.18 X10(3) UL (ref 0.1–1)
MONOCYTES NFR BLD AUTO: 10.5 %
NEUTROPHILS # BLD AUTO: 8.06 X10 (3) UL (ref 1.5–7.7)
NEUTROPHILS # BLD AUTO: 8.06 X10(3) UL (ref 1.5–7.7)
NEUTROPHILS NFR BLD AUTO: 71.6 %
OSMOLALITY SERPL CALC.SUM OF ELEC: 281 MOSM/KG (ref 275–295)
PLATELET # BLD AUTO: 228 10(3)UL (ref 150–450)
POTASSIUM SERPL-SCNC: 3.8 MMOL/L (ref 3.5–5.1)
PROT SERPL-MCNC: 6.6 G/DL (ref 5.7–8.2)
RBC # BLD AUTO: 3.54 X10(6)UL
SODIUM SERPL-SCNC: 136 MMOL/L (ref 136–145)
URATE SERPL-MCNC: 4.6 MG/DL
WBC # BLD AUTO: 11.3 X10(3) UL (ref 4–11)

## 2024-08-12 PROCEDURE — 76819 FETAL BIOPHYS PROFIL W/O NST: CPT | Performed by: OBSTETRICS & GYNECOLOGY

## 2024-08-12 PROCEDURE — 76815 OB US LIMITED FETUS(S): CPT

## 2024-08-12 PROCEDURE — 76820 UMBILICAL ARTERY ECHO: CPT

## 2024-08-12 RX ORDER — CHOLECALCIFEROL (VITAMIN D3) 25 MCG
4 TABLET,CHEWABLE ORAL DAILY
Status: DISCONTINUED | OUTPATIENT
Start: 2024-08-12 | End: 2024-08-24

## 2024-08-12 RX ORDER — CHOLINE BITARTRATE 100 %
2 POWDER (GRAM) MISCELLANEOUS DAILY
Status: DISCONTINUED | OUTPATIENT
Start: 2024-08-12 | End: 2024-08-19

## 2024-08-12 RX ORDER — ASPIRIN 81 MG/1
162 TABLET, CHEWABLE ORAL DAILY
Status: DISCONTINUED | OUTPATIENT
Start: 2024-08-12 | End: 2024-08-19

## 2024-08-12 RX ORDER — CHOLECALCIFEROL (VITAMIN D3) 25 MCG
1 TABLET,CHEWABLE ORAL DAILY
Status: DISCONTINUED | OUTPATIENT
Start: 2024-08-12 | End: 2024-08-12 | Stop reason: ALTCHOICE

## 2024-08-12 RX ORDER — MAGNESIUM OXIDE 400 MG/1
400 TABLET ORAL NIGHTLY
Status: DISCONTINUED | OUTPATIENT
Start: 2024-08-12 | End: 2024-08-19

## 2024-08-12 RX ORDER — HYDROXYZINE HYDROCHLORIDE 25 MG/1
25 TABLET, FILM COATED ORAL 3 TIMES DAILY PRN
Status: DISCONTINUED | OUTPATIENT
Start: 2024-08-12 | End: 2024-08-19

## 2024-08-12 RX ORDER — OMEPRAZOLE 40 MG/1
40 CAPSULE, DELAYED RELEASE ORAL DAILY
Status: DISCONTINUED | OUTPATIENT
Start: 2024-08-12 | End: 2024-08-19
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
## 2024-08-12 NOTE — PROGRESS NOTES
admitted to triage 3 via wheelchair with family present. Pt to bathroom, urine obtained, pt to bed. Pt states since 1600 has had elevated bps 150's-160's. Pt states has a dull headache 5/10 and occasionally has flashing lights in her vision, denies epigastric pain, denies ctxs, lof or bleeding. Efms and toco applied. Initial assessment done.

## 2024-08-12 NOTE — PROGRESS NOTES
Patient transferred to room 1106. Oriented to room. Call light within reach. Patient has no complaints at this time.

## 2024-08-12 NOTE — PLAN OF CARE
Problem: Patient/Family Goals  Goal: Patient/Family Long Term Goal  Description: Patient's Long Term Goal: Stay pregnant until term    Interventions:    - See additional Care Plan goals for specific interventions  Outcome: Progressing  Goal: Patient/Family Short Term Goal  Description: Patient's Short Term Goal: Maintain blood pressures within normal range    Interventions:     - See additional Care Plan goals for specific interventions  Outcome: Progressing     Problem: ANTEPARTUM/LABOR and DELIVERY  Goal: Maintain pregnancy as long as maternal and/or fetal condition is stable  Description: INTERVENTIONS:  - Maternal surveillance  - Fetal surveillance  - Monitor uterine activity  - Medications as ordered  - Bedrest  Outcome: Progressing  Goal: Anxiety is at manageable level  Description: INTERVENTIONS:  - [place] patient to unit/surroundings  - Establish a trusting relationship with patient  - Discuss possible complications or alterations in birth plan  - Explain treatment plan  - Explain testing/procedures prior to initiation  - Encourage participation in care  - Encourage verbalization of concerns/fears  - Identify coping mechanisms  - Administer/offer alternative therapies (Aroma therapy, distraction, guided imagery, massage, music therapy, therapeutic touch)  - Manage patient's environment (Avoid overstimulation of patient)  Outcome: Progressing  Goal: Demonstrates ability to cope with hospitalization/illness  Description: INTERVENTIONS:  - Encourage verbalization of feelings/concerns/expectations  - Provide quiet environment  - Assist patient to identify own strengths and abilities  - Encourage patient to set small goals for self  - Encourage participation in diversional activity  - Reinforce positive adaptation of new coping behaviors  - Include patient/family/caregiver in decisions  Outcome: Progressing
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
## 2024-08-12 NOTE — IMAGING NOTE
OB ULTRASOUND REPORT   See imaging tab for complete ultrasound report or in PACS    Twin A -   IUP in cephalic presentation.    Placenta is anterior.   Cardiac activity is present, 129 bpm  MVP is 4 cm.     BPP is 8/8.  UA Doppler 4.64.        Twin B -   IUP in transverse head right presentation.   Placenta is posterior.   Cardiac activity is present, 139 bpm  MVP is 6.5 cm.     BPP is 8/8.  UA Doppler 2.81 .      Victorino Miranda D.O.  Maternal Fetal Medicine

## 2024-08-13 LAB
CREAT 24H UR-MCNC: 110 ML/MIN (ref 75–125)
CREAT BLD-MCNC: 0.61 MG/DL
EGFRCR SERPLBLD CKD-EPI 2021: 120 ML/MIN/1.73M2 (ref 60–?)
GLUCOSE BLD-MCNC: 148 MG/DL (ref 70–99)
M PROTEIN 24H UR ELPH-MRATE: 1027.9 MG/24 HR (ref ?–149.1)
SPECIMEN VOL UR: 1900 ML
SPECIMEN VOL UR: 1900 ML

## 2024-08-13 NOTE — PLAN OF CARE
Problem: Patient/Family Goals  Goal: Patient/Family Long Term Goal  Description: Patient's Long Term Goal: maintain independence while hospitalized as much as possible    Interventions:    - See additional Care Plan goals for specific interventions  Outcome: Progressing  Goal: Patient/Family Short Term Goal  Description: Patient's Short Term Goal: maximize rest    Interventions:     - See additional Care Plan goals for specific interventions  Outcome: Progressing     Problem: ANTEPARTUM/LABOR and DELIVERY  Goal: Maintain pregnancy as long as maternal and/or fetal condition is stable  Description: INTERVENTIONS:  - Maternal surveillance  - Fetal surveillance  - Monitor uterine activity  - Medications as ordered  - Bedrest  Outcome: Progressing  Goal: Anxiety is at manageable level  Description: INTERVENTIONS:  - [place] patient to unit/surroundings  - Establish a trusting relationship with patient  - Discuss possible complications or alterations in birth plan  - Explain treatment plan  - Explain testing/procedures prior to initiation  - Encourage participation in care  - Encourage verbalization of concerns/fears  - Identify coping mechanisms  - Administer/offer alternative therapies (Aroma therapy, distraction, guided imagery, massage, music therapy, therapeutic touch)  - Manage patient's environment (Avoid overstimulation of patient)  Outcome: Progressing  Goal: Demonstrates ability to cope with hospitalization/illness  Description: INTERVENTIONS:  - Encourage verbalization of feelings/concerns/expectations  - Provide quiet environment  - Assist patient to identify own strengths and abilities  - Encourage patient to set small goals for self  - Encourage participation in diversional activity  - Reinforce positive adaptation of new coping behaviors  - Include patient/family/caregiver in decisions  Outcome: Progressing

## 2024-08-13 NOTE — H&P
[facility]  Obstetrics and Gynecology  History & Physical    Aspen Patel Patient Status:  Observation    1990 MRN [id number]   Location [facility] 1SW-J Attending Beth Daly MD   Hospital Day 0 PCP Dalia Lambert MD     CC: Patient is here for chronic hypertension with superimposed preeclampsia, rule out severe features     SUBJECTIVE:    +vision changes with sparkles in her vision  Reports seeing silver for 1 min at a time up to 5 times per day the past week.  It starts and goes away spontaneously  No headache  No shortness of breath, no chest pain, no abdominal pain.       OBJECTIVE:    Temp:  [98.1 °F (36.7 °C)-98.6 °F (37 °C)] 98.1 °F (36.7 °C)  Pulse:  [] 93  Resp:  [16-18] 18  BP: (114-146)/(50-78) 146/77  Body mass index is 56.08 kg/m².    General: AAO. NAD.   Lungs: no tachypnea, retractions or cyanosis  CV: normal peripheral perfusion   Abdomen: gravid.  Nontender.  Extremities: +1 edema bilaterally    FHT A: moderate variability/130 BPM / Positive accelerations/Negative decelerations  FHT B: moderate variability/130 BPM / Positive accelerations/Negative decelerations    TOCO: irritability, irregular     SVE: Deferred    MFM US 24  Twin A -   IUP in cephalic presentation.    Placenta is anterior.   Cardiac activity is present, 129 bpm  MVP is 4 cm.   BPP is 8/8.  UA Doppler 4.64.       Twin B -   IUP in transverse head right presentation.   Placenta is posterior.   Cardiac activity is present, 139 bpm  MVP is 6.5 cm.   BPP is 8/8.  UA Doppler 2.81 .         Prenatal Labs Brief Review   Blood Type:   Lab Results   Component Value Date    ABO A 2024    RH Positive 2024     GBS: Unknown      Inpatient labs:  Lab Results   Component Value Date    CREATSERUM 0.61 2024       ASSESSMENT/ PLAN:    Aspen Patel is a 33 year old  female at 33w4d Estimated Date of Delivery: 24 who is being admitted for  chronic hypertension with  superimposed preeclampsia, rule out severe features .    chronic hypertension with superimposed preeclampsia  - rule out severe features   - vision changes?  4-5x per day x1w each lasting a minute and then spontaneously going away, no concurrent headache.  will monitor  - labs reviewed, repeat every 48 hours   - 24 hour urine pending, collection ends tonight until 24  - s/p BMZ -  - Continue to monitor for signs and symptoms of preeclampsia with severe features  - Continue blood pressure monitoring  - Continue labetalol 200 mg 3 times daily  - MFM consult  PLAN:  Monitor as inpatient for until at least   Deliver if we need to increase labetalol or has severe features  General Diet  Activity as tolerated  Fetal heart rate monitoring for 1 hour twice daily    History of  section x 2  -Will need to schedule repeat  section    Request for permanent sterilization  Consented w Dr. Daly on 2024    Di Di twin gestation  -MFM consult, appreciate further recommendations  -Fetal heart presentation of twin B  -Twin A noted for elevated umbilical artery Doppler  -Continue serial monitoring with MFM    Morbid obesity  -SCDs  -Plan for Lovenox postpartum    Note to patient and family   The 21st Century Cures Act makes medical notes available to patients in the interest of transparency.  However, please be advised that this is a medical document.  It is intended as [id number] communication.  It is written and medical language may contain abbreviations or verbiage that are technical and unfamiliar.  It may appear blunt or direct.  Medical documents are intended to carry relevant information, facts as evident, and the clinical opinion of the practitioner.

## 2024-08-13 NOTE — CM/SW NOTE
Team rounds done on patient. Team reviewed patient plan of care and possible discharge needs. Team members present: Valentina HENLEY RN, CM and RN caring for patient.

## 2024-08-13 NOTE — PLAN OF CARE
Problem: Patient/Family Goals  Goal: Patient/Family Long Term Goal  Description: Patient's Long Term Goal: maintain independence while hospitalized as much as possible    Interventions:    - See additional Care Plan goals for specific interventions  Outcome: Progressing  Goal: Patient/Family Short Term Goal  Description: Patient's Short Term Goal: maximize rest    Interventions:     - See additional Care Plan goals for specific interventions  Outcome: Progressing

## 2024-08-14 PROBLEM — O30.009 TWIN PREGNANCY, ANTEPARTUM (HCC): Status: ACTIVE | Noted: 2024-08-14

## 2024-08-14 PROBLEM — E66.01 SEVERE OBESITY DUE TO EXCESS CALORIES AFFECTING PREGNANCY, ANTEPARTUM (HCC): Status: ACTIVE | Noted: 2024-08-14

## 2024-08-14 PROBLEM — O99.210 SEVERE OBESITY DUE TO EXCESS CALORIES AFFECTING PREGNANCY, ANTEPARTUM (HCC): Status: ACTIVE | Noted: 2024-08-14

## 2024-08-14 LAB
ALBUMIN SERPL-MCNC: 4 G/DL (ref 3.2–4.8)
ALBUMIN/GLOB SERPL: 1.5 {RATIO} (ref 1–2)
ALP LIVER SERPL-CCNC: 92 U/L
ALT SERPL-CCNC: 17 U/L
ANION GAP SERPL CALC-SCNC: 7 MMOL/L (ref 0–18)
ANTIBODY SCREEN: NEGATIVE
AST SERPL-CCNC: 18 U/L (ref ?–34)
BILIRUB SERPL-MCNC: 0.3 MG/DL (ref 0.3–1.2)
BUN BLD-MCNC: 11 MG/DL (ref 9–23)
CALCIUM BLD-MCNC: 9.5 MG/DL (ref 8.7–10.4)
CHLORIDE SERPL-SCNC: 107 MMOL/L (ref 98–112)
CO2 SERPL-SCNC: 23 MMOL/L (ref 21–32)
CREAT BLD-MCNC: 0.55 MG/DL
EGFRCR SERPLBLD CKD-EPI 2021: 123 ML/MIN/1.73M2 (ref 60–?)
GLOBULIN PLAS-MCNC: 2.6 G/DL (ref 2–3.5)
GLUCOSE BLD-MCNC: 94 MG/DL (ref 70–99)
OSMOLALITY SERPL CALC.SUM OF ELEC: 283 MOSM/KG (ref 275–295)
POTASSIUM SERPL-SCNC: 3.8 MMOL/L (ref 3.5–5.1)
PROT SERPL-MCNC: 6.6 G/DL (ref 5.7–8.2)
RH BLOOD TYPE: POSITIVE
SODIUM SERPL-SCNC: 137 MMOL/L (ref 136–145)
URATE SERPL-MCNC: 5 MG/DL

## 2024-08-14 PROCEDURE — 99232 SBSQ HOSP IP/OBS MODERATE 35: CPT | Performed by: OBSTETRICS & GYNECOLOGY

## 2024-08-14 NOTE — PLAN OF CARE
Problem: Patient/Family Goals  Goal: Patient/Family Long Term Goal  Description: Patient's Long Term Goal: maintain independence while hospitalized as much as possible    Interventions:    - See additional Care Plan goals for specific interventions  Outcome: Progressing  Goal: Patient/Family Short Term Goal  Description: Patient's Short Term Goal: maximize rest    Interventions:     - See additional Care Plan goals for specific interventions  Outcome: Progressing     Problem: ANTEPARTUM/LABOR and DELIVERY  Goal: Maintain pregnancy as long as maternal and/or fetal condition is stable  Description: INTERVENTIONS:  - Maternal surveillance  - Fetal surveillance  - Monitor uterine activity  - Medications as ordered  - Bedrest  Outcome: Progressing  Goal: Anxiety is at manageable level  Description: INTERVENTIONS:  - [place] patient to unit/surroundings  - Establish a trusting relationship with patient  - Discuss possible complications or alterations in birth plan  - Explain treatment plan  - Explain testing/procedures prior to initiation  - Encourage participation in care  - Encourage verbalization of concerns/fears  - Identify coping mechanisms  - Administer/offer alternative therapies (Aroma therapy, distraction, guided imagery, massage, music therapy, therapeutic touch)  - Manage patient's environment (Avoid overstimulation of patient)  Outcome: Progressing  Goal: Demonstrates ability to cope with hospitalization/illness  Description: INTERVENTIONS:  - Encourage verbalization of feelings/concerns/expectations  - Provide quiet environment  - Assist patient to identify own strengths and abilities  - Encourage patient to set small goals for self  - Encourage participation in diversional activity  - Reinforce positive adaptation of new coping behaviors  - Include patient/family/caregiver in decisions  Outcome: Progressing

## 2024-08-15 PROCEDURE — 99231 SBSQ HOSP IP/OBS SF/LOW 25: CPT | Performed by: STUDENT IN AN ORGANIZED HEALTH CARE EDUCATION/TRAINING PROGRAM

## 2024-08-15 PROCEDURE — 59025 FETAL NON-STRESS TEST: CPT | Performed by: STUDENT IN AN ORGANIZED HEALTH CARE EDUCATION/TRAINING PROGRAM

## 2024-08-15 NOTE — PROGRESS NOTES
HD 1    Patient has no complains, BPs stable - discussed outpatient management, patient is concerned about her BPs going up at home due to stress and requesting to stay at [facility] until delivery.     /67   Pulse 82   Temp 98.4 °F (36.9 °C) (Oral)   Resp 18   Ht 65\"   Wt (!) 337 lb (152.9 kg)   LMP 2023   BMI 56.08 kg/m²     Recent Labs   Lab 24  1247 24  0000   RBC 3.57* 3.54*   HGB 10.6* 10.4*   HCT 31.9* 31.5*   MCV 89.4 89.0   MCH 29.7 29.4   MCHC 33.2 33.0   RDW 14.6 14.6   NEPRELIM 8.74* 8.06*   WBC 11.4* 11.3*   .0 228.0       Recent Labs   Lab 24  1247 24  0000 24  0040 24  1313   GLU 82 86  --  94   BUN 10 11  --  11   CREATSERUM 0.46* 0.46* 0.61 0.55   EGFRCR 129 129 120 123   CA 9.9 9.8  --  9.5   ALB 4.1 3.9  --  4.0   * 136  --  137   K 3.9 3.8  --  3.8    106  --  107   CO2 22.0 22.0  --  23.0   ALKPHO 109* 109*  --  92   AST 19 20  --  18   ALT 15 15  --  17   BILT 0.4 0.3  --  0.3   TP 6.9 6.6  --  6.6     General: AAO. NAD.   Lungs: no tachypnea, retractions or cyanosis  CV: normal peripheral perfusion   Abdomen: gravid.  Nontender.  Extremities: +1 edema bilaterally     FHT A: cat 1  FHT B:cat 1                   TOCO: irritability, irregular      SVE: Deferred     MFM US 24  Twin A -   IUP in cephalic presentation.    Placenta is anterior.   Cardiac activity is present, 129 bpm  MVP is 4 cm.   BPP is 8/8.  UA Doppler 4.64.       Twin B -   IUP in transverse head right presentation.   Placenta is posterior.   Cardiac activity is present, 139 bpm  MVP is 6.5 cm.   BPP is 8/8.  UA Doppler 2.81 .        24 urine protein 1,027.9    ASSESSMENT/ PLAN:     Aspen Patel is a 33 year old  female at 33w4d Estimated Date of Delivery: 24 who is being admitted for  chronic hypertension with superimposed preeclampsia, rule out severe features .     chronic hypertension with superimposed preeclampsia  - labs  reviewed, repeat every 48 hours   - s/p BMZ -  - Continue to monitor for signs and symptoms of preeclampsia with severe features  - Continue blood pressure monitoring  - Continue labetalol 200 mg 3 times daily  - MFM consult  PLAN:  Monitor as inpatient for until at least   Deliver if we need to increase labetalol or has severe features  General Diet  Activity as tolerated  Fetal heart rate monitoring for 1 hour twice daily     History of  section x 2  -Will need to schedule repeat  section     Request for permanent sterilization  Consented w Dr. Daly on 2024     Di Di twin gestation  -MFM consult, appreciate further recommendations  -Fetal heart presentation of twin B  -Twin A noted for elevated umbilical artery Doppler  -Continue serial monitoring with MFM     Morbid obesity  -SCDs  -Plan for Lovenox postpartum
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
## 2024-08-15 NOTE — PLAN OF CARE
Problem: Patient/Family Goals  Goal: Patient/Family Long Term Goal  Description: Patient's Long Term Goal: maintain independence while hospitalized as much as possible    Interventions:    - See additional Care Plan goals for specific interventions  Outcome: Progressing  Goal: Patient/Family Short Term Goal  Description: Patient's Short Term Goal: maximize rest    Interventions:     - See additional Care Plan goals for specific interventions  Outcome: Progressing     Problem: ANTEPARTUM/LABOR and DELIVERY  Goal: Maintain pregnancy as long as maternal and/or fetal condition is stable  Description: INTERVENTIONS:  - Maternal surveillance  - Fetal surveillance  - Monitor uterine activity  - Medications as ordered  - Bedrest  Outcome: Progressing  Goal: Anxiety is at manageable level  Description: INTERVENTIONS:  - [place] patient to unit/surroundings  - Establish a trusting relationship with patient  - Discuss possible complications or alterations in birth plan  - Explain treatment plan  - Explain testing/procedures prior to initiation  - Encourage participation in care  - Encourage verbalization of concerns/fears  - Identify coping mechanisms  - Administer/offer alternative therapies (Aroma therapy, distraction, guided imagery, massage, music therapy, therapeutic touch)  - Manage patient's environment (Avoid overstimulation of patient)  Outcome: Progressing  Goal: Demonstrates ability to cope with hospitalization/illness  Description: INTERVENTIONS:  - Encourage verbalization of feelings/concerns/expectations  - Provide quiet environment  - Assist patient to identify own strengths and abilities  - Encourage patient to set small goals for self  - Encourage participation in diversional activity  - Reinforce positive adaptation of new coping behaviors  - Include patient/family/caregiver in decisions  Outcome: Progressing

## 2024-08-15 NOTE — PROGRESS NOTES
Report received from CHRIS Khanna. Pt denies any complaints, +FM x2, denies lof,ctx, or bleeding. Denies current headache, visual changes but states sometimes she does get floaters. POC discussed with pt, pt denies questions. Call light within reach.
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
## 2024-08-16 PROBLEM — E66.01 SEVERE OBESITY DUE TO EXCESS CALORIES AFFECTING PREGNANCY, ANTEPARTUM (HCC): Status: RESOLVED | Noted: 2024-08-14 | Resolved: 2024-08-16

## 2024-08-16 PROBLEM — O99.013 MATERNAL IRON DEFICIENCY ANEMIA AFFECTING PREGNANCY IN THIRD TRIMESTER, ANTEPARTUM (HCC): Status: ACTIVE | Noted: 2024-06-30

## 2024-08-16 PROBLEM — O30.009 TWIN PREGNANCY, ANTEPARTUM (HCC): Status: RESOLVED | Noted: 2024-08-14 | Resolved: 2024-08-16

## 2024-08-16 PROBLEM — O30.043 DICHORIONIC DIAMNIOTIC TWIN PREGNANCY IN THIRD TRIMESTER (HCC): Status: ACTIVE | Noted: 2024-02-13

## 2024-08-16 PROBLEM — D50.9 MATERNAL IRON DEFICIENCY ANEMIA AFFECTING PREGNANCY IN THIRD TRIMESTER, ANTEPARTUM (HCC): Status: ACTIVE | Noted: 2024-06-30

## 2024-08-16 PROBLEM — E66.01 MORBID OBESITY (HCC): Status: ACTIVE | Noted: 2024-08-16

## 2024-08-16 PROBLEM — O99.210 SEVERE OBESITY DUE TO EXCESS CALORIES AFFECTING PREGNANCY, ANTEPARTUM (HCC): Status: RESOLVED | Noted: 2024-08-14 | Resolved: 2024-08-16

## 2024-08-16 LAB
ALBUMIN SERPL-MCNC: 3.8 G/DL (ref 3.2–4.8)
ALBUMIN/GLOB SERPL: 1.5 {RATIO} (ref 1–2)
ALP LIVER SERPL-CCNC: 106 U/L
ALT SERPL-CCNC: 23 U/L
ANION GAP SERPL CALC-SCNC: 7 MMOL/L (ref 0–18)
AST SERPL-CCNC: 18 U/L (ref ?–34)
BASOPHILS # BLD AUTO: 0.03 X10(3) UL (ref 0–0.2)
BASOPHILS NFR BLD AUTO: 0.2 %
BILIRUB SERPL-MCNC: 0.3 MG/DL (ref 0.3–1.2)
BUN BLD-MCNC: 11 MG/DL (ref 9–23)
CALCIUM BLD-MCNC: 9.7 MG/DL (ref 8.7–10.4)
CHLORIDE SERPL-SCNC: 104 MMOL/L (ref 98–112)
CO2 SERPL-SCNC: 24 MMOL/L (ref 21–32)
CREAT BLD-MCNC: 0.46 MG/DL
DEPRECATED HBV CORE AB SER IA-ACNC: 7.9 NG/ML
EGFRCR SERPLBLD CKD-EPI 2021: 129 ML/MIN/1.73M2 (ref 60–?)
EOSINOPHIL # BLD AUTO: 0.17 X10(3) UL (ref 0–0.7)
EOSINOPHIL NFR BLD AUTO: 1.3 %
ERYTHROCYTE [DISTWIDTH] IN BLOOD BY AUTOMATED COUNT: 14.4 %
GLOBULIN PLAS-MCNC: 2.5 G/DL (ref 2–3.5)
GLUCOSE BLD-MCNC: 86 MG/DL (ref 70–99)
HCT VFR BLD AUTO: 31.9 %
HGB BLD-MCNC: 10.3 G/DL
IMM GRANULOCYTES # BLD AUTO: 0.1 X10(3) UL (ref 0–1)
IMM GRANULOCYTES NFR BLD: 0.8 %
LYMPHOCYTES # BLD AUTO: 1.8 X10(3) UL (ref 1–4)
LYMPHOCYTES NFR BLD AUTO: 14 %
MCH RBC QN AUTO: 29.5 PG (ref 26–34)
MCHC RBC AUTO-ENTMCNC: 32.3 G/DL (ref 31–37)
MCV RBC AUTO: 91.4 FL
MONOCYTES # BLD AUTO: 1.36 X10(3) UL (ref 0.1–1)
MONOCYTES NFR BLD AUTO: 10.6 %
NEUTROPHILS # BLD AUTO: 9.39 X10 (3) UL (ref 1.5–7.7)
NEUTROPHILS # BLD AUTO: 9.39 X10(3) UL (ref 1.5–7.7)
NEUTROPHILS NFR BLD AUTO: 73.1 %
OSMOLALITY SERPL CALC.SUM OF ELEC: 279 MOSM/KG (ref 275–295)
PLATELET # BLD AUTO: 241 10(3)UL (ref 150–450)
POTASSIUM SERPL-SCNC: 4.2 MMOL/L (ref 3.5–5.1)
PROT SERPL-MCNC: 6.3 G/DL (ref 5.7–8.2)
RBC # BLD AUTO: 3.49 X10(6)UL
SODIUM SERPL-SCNC: 135 MMOL/L (ref 136–145)
WBC # BLD AUTO: 12.9 X10(3) UL (ref 4–11)

## 2024-08-16 PROCEDURE — 99231 SBSQ HOSP IP/OBS SF/LOW 25: CPT | Performed by: OBSTETRICS & GYNECOLOGY

## 2024-08-16 NOTE — CM/SW NOTE
08/16/24 1100   Financial Resource Strain   How hard is it for you to pay for things like household items or child/elder care? Hard   Access to Medications   Do you have trouble affording medicines, medical supplies, or paying for your care? Y  (Reports issues with [place] [employer] Plan)   Utilities   In the past 12 months has the electric, gas, oil, or water company threatened to shut off services in your home? No   Food Insecurity   Recently, have there been times that your food ran out and you didn't have money to get more? Sometimes  (Reports she is having to monitor her expenses closely)   Did patient receive WIC with this pregnancy? No   Transportation Needs   Currently, has lack of transportation kept you from getting where you want or need to go? (For ex: to medical appointments, picking up medications, groceries, or work)? yes   Do you have a car seat for your baby? Yes   Housing Stability   In the past 12 months, was there a time when you did not have a steady place to sleep or slept in a shelter? N   Do you have a crib or bassinette for your baby to sleep in? Yes   Domestic safety   At any time do you feel concerned for the safety/well-being of yourself and/or your children, in your home or elsewhere? N   Does healthcare provider observe any obvious signs or symptoms of abuse/neglect? No   Stress   Would you like help finding professional services to help with stress, depression, anxiety, or other mental health concerns? Y   Were you screened prenatally for any mood disorders during pregnancy? Yes   Did you receive care for any mood disorders during your pregnancy? No   OUD Screening Risk Assessment   Did any of your parents have problems with alcohol or drug use? Yes   Do any of your friends (peers) have problems with alcohol or drug use? No   Does your partner have a problem with alcohol or drug use? No   Before you were pregnant did you have problems with alcohol or drug use, including prescription  medications in the past? No   Did you drink beer, wine, or liquor in the past month, consume, smoke, or vape any marijuana products, use illegal drugs, and/or prescribed or non-prescribed pain medications? No   Have you taken any opioids, narcotics or pain medications in the last year?  Some common examples of opioids are Norco, Vicodin, OxyContin, Fentanyl, Tramadol, Dialudid, Subutex, Suboxone, Methadone, etc. In the last year? No   Have you used any drugs such as Cocaine, Marijuana, Heroin, synthetic drugs (ecstacy, Nina, etc) or have you used any non-prescribed prescription medications for the purpose of how they make you feel or to self-medicate? Yes   Which substance? THC   Have you used during the pregnancy? No     Case discussed with RN. SW met with pt to complete assessment and offer support, pt presented with a cheerful affect.    Pt reports she lives in [place] with her sps, and her two sons (5 & 3). Reports both of her sons have dx of Autism. Reports her eldest son just started , and her mother in law (69 y/o) is watching both of her kids while her sps works. Sps works Monday through Saturday, hours vary. Reports her sps works in IT at [employer].     Pt currently not working, reports only one car for the family. Pt reports financial strain. Pt reports minimal support from her family, briefly stated she is not in contact with them. Reports her biggest support is her MIL but she is already doing a lot for her two boys.     Pt reports increase in anxiety. Reports hx of PPA/PPD in both pregnancies. Pt scored a 6 on the [place]  Depression Scale completed yesterday. Pt acknowledges it is not ideal for her to remain hospitalized but states this is the safest place for her and her twin babies (B/G). Once again stating transportation will be an issue for her to manage OP.    Discussed [place] will screen for Medicaid. Discussed [facility] services. Pt agreeable to  screening, is hopeful she will qualify to take advantage of services. Post Partum Depression warning signs and support services discussed. SW offered support, and encouraged pt to reach out to OBGYN/ PCP with any further questions, or concerns for PPA/ PPD.    Addendum 1200:  LUCÍA/ROBIN reviewed [facility] transportation resources: [place] [employer], Ride Assist, Dial a Ride. Pt does not meet criteria for transportation services, there are also costs associated with these transportation services.    LUCÍA/ROBIN to remain available for dc planning, and/or additional need for support.    PRANAY Rouse  Discharge Planner  [id number]

## 2024-08-16 NOTE — PLAN OF CARE
Problem: Patient/Family Goals  Goal: Patient/Family Long Term Goal  Description: Patient's Long Term Goal: maintain independence while hospitalized as much as possible    Interventions:    - See additional Care Plan goals for specific interventions  Outcome: Progressing  Goal: Patient/Family Short Term Goal  Description: Patient's Short Term Goal: maximize rest    Interventions:     - See additional Care Plan goals for specific interventions  Outcome: Progressing     Problem: ANTEPARTUM/LABOR and DELIVERY  Goal: Maintain pregnancy as long as maternal and/or fetal condition is stable  Description: INTERVENTIONS:  - Maternal surveillance  - Fetal surveillance  - Monitor uterine activity  - Medications as ordered  - Bedrest  Outcome: Progressing  Goal: Anxiety is at manageable level  Description: INTERVENTIONS:  - [place] patient to unit/surroundings  - Establish a trusting relationship with patient  - Discuss possible complications or alterations in birth plan  - Explain treatment plan  - Explain testing/procedures prior to initiation  - Encourage participation in care  - Encourage verbalization of concerns/fears  - Identify coping mechanisms  - Administer/offer alternative therapies (Aroma therapy, distraction, guided imagery, massage, music therapy, therapeutic touch)  - Manage patient's environment (Avoid overstimulation of patient)  Outcome: Progressing  Goal: Demonstrates ability to cope with hospitalization/illness  Description: INTERVENTIONS:  - Encourage verbalization of feelings/concerns/expectations  - Provide quiet environment  - Assist patient to identify own strengths and abilities  - Encourage patient to set small goals for self  - Encourage participation in diversional activity  - Reinforce positive adaptation of new coping behaviors  - Include patient/family/caregiver in decisions  Outcome: Progressing

## 2024-08-16 NOTE — PLAN OF CARE
Problem: Patient/Family Goals  Goal: Patient/Family Long Term Goal  Description: Patient's Long Term Goal: maintain independence while hospitalized as much as possible    Interventions:    - See additional Care Plan goals for specific interventions  Outcome: Progressing  Goal: Patient/Family Short Term Goal  Description: Patient's Short Term Goal: maximize rest    Interventions:     - See additional Care Plan goals for specific interventions  Outcome: Progressing     Problem: ANTEPARTUM/LABOR and DELIVERY  Goal: Maintain pregnancy as long as maternal and/or fetal condition is stable  Description: INTERVENTIONS:  - Maternal surveillance  - Fetal surveillance  - Monitor uterine activity  - Medications as ordered  - Bedrest  Outcome: Progressing  Goal: Anxiety is at manageable level  Description: INTERVENTIONS:  - [place] patient to unit/surroundings  - Establish a trusting relationship with patient  - Discuss possible complications or alterations in birth plan  - Explain treatment plan  - Explain testing/procedures prior to initiation  - Encourage participation in care  - Encourage verbalization of concerns/fears  - Identify coping mechanisms  - Administer/offer alternative therapies (Aroma therapy, distraction, guided imagery, massage, music therapy, therapeutic touch)  - Manage patient's environment (Avoid overstimulation of patient)  Outcome: Progressing  Goal: Demonstrates ability to cope with hospitalization/illness  Description: INTERVENTIONS:  - Encourage verbalization of feelings/concerns/expectations  - Provide quiet environment  - Assist patient to identify own strengths and abilities  - Encourage patient to set small goals for self  - Encourage participation in diversional activity  - Reinforce positive adaptation of new coping behaviors  - Include patient/family/caregiver in decisions  Outcome: Progressing

## 2024-08-16 NOTE — CM/SW NOTE
received a call from [facility] and they were able to do a medicaid application . [place] Medicaid would be pt's secondary coverage.

## 2024-08-16 NOTE — CM/SW NOTE
called [facility] and asked that they follow up with patient to see if patient qualifies for [place] Medicaid as a secondary plan.
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
## 2024-08-17 LAB
ALBUMIN SERPL-MCNC: 3.8 G/DL (ref 3.2–4.8)
ALBUMIN/GLOB SERPL: 1.5 {RATIO} (ref 1–2)
ALP LIVER SERPL-CCNC: 115 U/L
ALT SERPL-CCNC: 20 U/L
ANION GAP SERPL CALC-SCNC: 7 MMOL/L (ref 0–18)
ANTIBODY SCREEN: NEGATIVE
AST SERPL-CCNC: 15 U/L (ref ?–34)
BASOPHILS # BLD AUTO: 0.02 X10(3) UL (ref 0–0.2)
BASOPHILS NFR BLD AUTO: 0.2 %
BILIRUB SERPL-MCNC: 0.2 MG/DL (ref 0.3–1.2)
BUN BLD-MCNC: 11 MG/DL (ref 9–23)
CALCIUM BLD-MCNC: 9.6 MG/DL (ref 8.7–10.4)
CHLORIDE SERPL-SCNC: 105 MMOL/L (ref 98–112)
CO2 SERPL-SCNC: 23 MMOL/L (ref 21–32)
CREAT BLD-MCNC: 0.49 MG/DL
EGFRCR SERPLBLD CKD-EPI 2021: 127 ML/MIN/1.73M2 (ref 60–?)
EOSINOPHIL # BLD AUTO: 0.14 X10(3) UL (ref 0–0.7)
EOSINOPHIL NFR BLD AUTO: 1.1 %
ERYTHROCYTE [DISTWIDTH] IN BLOOD BY AUTOMATED COUNT: 14.5 %
GLOBULIN PLAS-MCNC: 2.5 G/DL (ref 2–3.5)
GLUCOSE BLD-MCNC: 127 MG/DL (ref 70–99)
HCT VFR BLD AUTO: 31.5 %
HGB BLD-MCNC: 10.5 G/DL
IMM GRANULOCYTES # BLD AUTO: 0.13 X10(3) UL (ref 0–1)
IMM GRANULOCYTES NFR BLD: 1 %
LYMPHOCYTES # BLD AUTO: 1.66 X10(3) UL (ref 1–4)
LYMPHOCYTES NFR BLD AUTO: 13.2 %
MCH RBC QN AUTO: 29.3 PG (ref 26–34)
MCHC RBC AUTO-ENTMCNC: 33.3 G/DL (ref 31–37)
MCV RBC AUTO: 88 FL
MONOCYTES # BLD AUTO: 1.21 X10(3) UL (ref 0.1–1)
MONOCYTES NFR BLD AUTO: 9.6 %
NEUTROPHILS # BLD AUTO: 9.46 X10 (3) UL (ref 1.5–7.7)
NEUTROPHILS # BLD AUTO: 9.46 X10(3) UL (ref 1.5–7.7)
NEUTROPHILS NFR BLD AUTO: 74.9 %
OSMOLALITY SERPL CALC.SUM OF ELEC: 281 MOSM/KG (ref 275–295)
PLATELET # BLD AUTO: 239 10(3)UL (ref 150–450)
POTASSIUM SERPL-SCNC: 4.1 MMOL/L (ref 3.5–5.1)
PROT SERPL-MCNC: 6.3 G/DL (ref 5.7–8.2)
RBC # BLD AUTO: 3.58 X10(6)UL
RH BLOOD TYPE: POSITIVE
SODIUM SERPL-SCNC: 135 MMOL/L (ref 136–145)
WBC # BLD AUTO: 12.6 X10(3) UL (ref 4–11)

## 2024-08-17 PROCEDURE — 99232 SBSQ HOSP IP/OBS MODERATE 35: CPT | Performed by: OBSTETRICS & GYNECOLOGY
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
## 2024-08-17 NOTE — PLAN OF CARE
Problem: Patient/Family Goals  Goal: Patient/Family Long Term Goal  Description: Patient's Long Term Goal: maintain independence while hospitalized as much as possible    Interventions:    - See additional Care Plan goals for specific interventions  Outcome: Progressing  Goal: Patient/Family Short Term Goal  Description: Patient's Short Term Goal: maximize rest    Interventions:     - See additional Care Plan goals for specific interventions  Outcome: Progressing     Problem: ANTEPARTUM/LABOR and DELIVERY  Goal: Maintain pregnancy as long as maternal and/or fetal condition is stable  Description: INTERVENTIONS:  - Maternal surveillance  - Fetal surveillance  - Monitor uterine activity  - Medications as ordered  - Bedrest  Outcome: Progressing  Goal: Anxiety is at manageable level  Description: INTERVENTIONS:  - [place] patient to unit/surroundings  - Establish a trusting relationship with patient  - Discuss possible complications or alterations in birth plan  - Explain treatment plan  - Explain testing/procedures prior to initiation  - Encourage participation in care  - Encourage verbalization of concerns/fears  - Identify coping mechanisms  - Administer/offer alternative therapies (Aroma therapy, distraction, guided imagery, massage, music therapy, therapeutic touch)  - Manage patient's environment (Avoid overstimulation of patient)  Outcome: Progressing  Goal: Demonstrates ability to cope with hospitalization/illness  Description: INTERVENTIONS:  - Encourage verbalization of feelings/concerns/expectations  - Provide quiet environment  - Assist patient to identify own strengths and abilities  - Encourage patient to set small goals for self  - Encourage participation in diversional activity  - Reinforce positive adaptation of new coping behaviors  - Include patient/family/caregiver in decisions  Outcome: Progressing

## 2024-08-18 ENCOUNTER — ANESTHESIA (OUTPATIENT)
Dept: OBGYN UNIT | Facility: HOSPITAL | Age: 34
End: 2024-08-18
Payer: COMMERCIAL

## 2024-08-18 ENCOUNTER — ANESTHESIA EVENT (OUTPATIENT)
Dept: OBGYN UNIT | Facility: HOSPITAL | Age: 34
End: 2024-08-18
Payer: COMMERCIAL

## 2024-08-18 LAB
ALBUMIN SERPL-MCNC: 3.9 G/DL (ref 3.2–4.8)
ALBUMIN/GLOB SERPL: 1.4 {RATIO} (ref 1–2)
ALP LIVER SERPL-CCNC: 121 U/L
ALT SERPL-CCNC: 17 U/L
ANION GAP SERPL CALC-SCNC: 4 MMOL/L (ref 0–18)
AST SERPL-CCNC: 13 U/L (ref ?–34)
BASOPHILS # BLD AUTO: 0.03 X10(3) UL (ref 0–0.2)
BASOPHILS NFR BLD AUTO: 0.2 %
BILIRUB SERPL-MCNC: 0.2 MG/DL (ref 0.3–1.2)
BUN BLD-MCNC: 11 MG/DL (ref 9–23)
CALCIUM BLD-MCNC: 9.6 MG/DL (ref 8.7–10.4)
CHLORIDE SERPL-SCNC: 106 MMOL/L (ref 98–112)
CO2 SERPL-SCNC: 24 MMOL/L (ref 21–32)
CREAT BLD-MCNC: 0.48 MG/DL
EGFRCR SERPLBLD CKD-EPI 2021: 127 ML/MIN/1.73M2 (ref 60–?)
EOSINOPHIL # BLD AUTO: 0.15 X10(3) UL (ref 0–0.7)
EOSINOPHIL NFR BLD AUTO: 1.1 %
ERYTHROCYTE [DISTWIDTH] IN BLOOD BY AUTOMATED COUNT: 14.6 %
GLOBULIN PLAS-MCNC: 2.7 G/DL (ref 2–3.5)
GLUCOSE BLD-MCNC: 135 MG/DL (ref 70–99)
HCT VFR BLD AUTO: 31.8 %
HGB BLD-MCNC: 10.7 G/DL
IMM GRANULOCYTES # BLD AUTO: 0.2 X10(3) UL (ref 0–1)
IMM GRANULOCYTES NFR BLD: 1.4 %
LYMPHOCYTES # BLD AUTO: 1.62 X10(3) UL (ref 1–4)
LYMPHOCYTES NFR BLD AUTO: 11.5 %
MCH RBC QN AUTO: 29.6 PG (ref 26–34)
MCHC RBC AUTO-ENTMCNC: 33.6 G/DL (ref 31–37)
MCV RBC AUTO: 88.1 FL
MONOCYTES # BLD AUTO: 1.28 X10(3) UL (ref 0.1–1)
MONOCYTES NFR BLD AUTO: 9.1 %
NEUTROPHILS # BLD AUTO: 10.85 X10 (3) UL (ref 1.5–7.7)
NEUTROPHILS # BLD AUTO: 10.85 X10(3) UL (ref 1.5–7.7)
NEUTROPHILS NFR BLD AUTO: 76.7 %
OSMOLALITY SERPL CALC.SUM OF ELEC: 279 MOSM/KG (ref 275–295)
PLATELET # BLD AUTO: 246 10(3)UL (ref 150–450)
POTASSIUM SERPL-SCNC: 4.2 MMOL/L (ref 3.5–5.1)
PROT SERPL-MCNC: 6.6 G/DL (ref 5.7–8.2)
RBC # BLD AUTO: 3.61 X10(6)UL
SODIUM SERPL-SCNC: 134 MMOL/L (ref 136–145)
WBC # BLD AUTO: 14.1 X10(3) UL (ref 4–11)

## 2024-08-18 PROCEDURE — 0UB70ZZ EXCISION OF BILATERAL FALLOPIAN TUBES, OPEN APPROACH: ICD-10-PCS | Performed by: OBSTETRICS & GYNECOLOGY

## 2024-08-18 PROCEDURE — 99232 SBSQ HOSP IP/OBS MODERATE 35: CPT | Performed by: OBSTETRICS & GYNECOLOGY

## 2024-08-18 RX ORDER — ONDANSETRON 2 MG/ML
4 INJECTION INTRAMUSCULAR; INTRAVENOUS EVERY 6 HOURS PRN
Status: DISCONTINUED | OUTPATIENT
Start: 2024-08-18 | End: 2024-08-24

## 2024-08-18 RX ORDER — CARBOPROST TROMETHAMINE 250 UG/ML
250 INJECTION, SOLUTION INTRAMUSCULAR
Status: DISCONTINUED | OUTPATIENT
Start: 2024-08-18 | End: 2024-08-24

## 2024-08-18 RX ORDER — LABETALOL HYDROCHLORIDE 5 MG/ML
20 INJECTION, SOLUTION INTRAVENOUS ONCE AS NEEDED
Status: COMPLETED | OUTPATIENT
Start: 2024-08-18 | End: 2024-08-18

## 2024-08-18 RX ORDER — ONDANSETRON 2 MG/ML
INJECTION INTRAMUSCULAR; INTRAVENOUS AS NEEDED
Status: DISCONTINUED | OUTPATIENT
Start: 2024-08-18 | End: 2024-08-19 | Stop reason: SURG

## 2024-08-18 RX ORDER — MORPHINE SULFATE 2 MG/ML
INJECTION, SOLUTION INTRAMUSCULAR; INTRAVENOUS AS NEEDED
Status: DISCONTINUED | OUTPATIENT
Start: 2024-08-18 | End: 2024-08-19 | Stop reason: SURG

## 2024-08-18 RX ORDER — CALCIUM GLUCONATE 94 MG/ML
1 INJECTION, SOLUTION INTRAVENOUS ONCE AS NEEDED
Status: DISCONTINUED | OUTPATIENT
Start: 2024-08-18 | End: 2024-08-24

## 2024-08-18 RX ORDER — DIPHENHYDRAMINE HYDROCHLORIDE 50 MG/ML
12.5 INJECTION INTRAMUSCULAR; INTRAVENOUS EVERY 4 HOURS PRN
Status: DISCONTINUED | OUTPATIENT
Start: 2024-08-18 | End: 2024-08-24

## 2024-08-18 RX ORDER — BUPIVACAINE HYDROCHLORIDE 7.5 MG/ML
INJECTION, SOLUTION INTRASPINAL AS NEEDED
Status: DISCONTINUED | OUTPATIENT
Start: 2024-08-18 | End: 2024-08-19 | Stop reason: SURG

## 2024-08-18 RX ORDER — TRANEXAMIC ACID 10 MG/ML
1000 INJECTION, SOLUTION INTRAVENOUS EVERY 30 MIN PRN
Status: DISCONTINUED | OUTPATIENT
Start: 2024-08-18 | End: 2024-08-24

## 2024-08-18 RX ORDER — MISOPROSTOL 200 UG/1
1000 TABLET ORAL ONCE
Status: DISCONTINUED | OUTPATIENT
Start: 2024-08-18 | End: 2024-08-24

## 2024-08-18 RX ORDER — METOCLOPRAMIDE HYDROCHLORIDE 5 MG/ML
INJECTION INTRAMUSCULAR; INTRAVENOUS AS NEEDED
Status: DISCONTINUED | OUTPATIENT
Start: 2024-08-18 | End: 2024-08-19 | Stop reason: SURG

## 2024-08-18 RX ORDER — LABETALOL HYDROCHLORIDE 5 MG/ML
40 INJECTION, SOLUTION INTRAVENOUS ONCE AS NEEDED
Status: COMPLETED | OUTPATIENT
Start: 2024-08-18 | End: 2024-08-18

## 2024-08-18 RX ORDER — DEXAMETHASONE SODIUM PHOSPHATE 4 MG/ML
VIAL (ML) INJECTION AS NEEDED
Status: DISCONTINUED | OUTPATIENT
Start: 2024-08-18 | End: 2024-08-19 | Stop reason: SURG

## 2024-08-18 RX ORDER — DIPHENHYDRAMINE HCL 25 MG
25 CAPSULE ORAL EVERY 4 HOURS PRN
Status: DISCONTINUED | OUTPATIENT
Start: 2024-08-18 | End: 2024-08-24

## 2024-08-18 RX ORDER — HYDRALAZINE HYDROCHLORIDE 20 MG/ML
10 INJECTION INTRAMUSCULAR; INTRAVENOUS ONCE AS NEEDED
Status: DISCONTINUED | OUTPATIENT
Start: 2024-08-18 | End: 2024-08-24

## 2024-08-18 RX ORDER — NALBUPHINE HYDROCHLORIDE 10 MG/ML
2.5 INJECTION, SOLUTION INTRAMUSCULAR; INTRAVENOUS; SUBCUTANEOUS EVERY 4 HOURS PRN
Status: DISCONTINUED | OUTPATIENT
Start: 2024-08-18 | End: 2024-08-24

## 2024-08-18 RX ORDER — PHENYLEPHRINE HCL 10 MG/ML
VIAL (ML) INJECTION AS NEEDED
Status: DISCONTINUED | OUTPATIENT
Start: 2024-08-18 | End: 2024-08-19 | Stop reason: SURG

## 2024-08-18 RX ORDER — CEFAZOLIN SODIUM IN 0.9 % NACL 3 G/100 ML
3 INTRAVENOUS SOLUTION, PIGGYBACK (ML) INTRAVENOUS ONCE
Status: COMPLETED | OUTPATIENT
Start: 2024-08-18 | End: 2024-08-18

## 2024-08-18 RX ORDER — SODIUM CHLORIDE, SODIUM LACTATE, POTASSIUM CHLORIDE, CALCIUM CHLORIDE 600; 310; 30; 20 MG/100ML; MG/100ML; MG/100ML; MG/100ML
INJECTION, SOLUTION INTRAVENOUS CONTINUOUS
Status: DISCONTINUED | OUTPATIENT
Start: 2024-08-18 | End: 2024-08-19

## 2024-08-18 RX ORDER — LABETALOL HYDROCHLORIDE 5 MG/ML
80 INJECTION, SOLUTION INTRAVENOUS ONCE AS NEEDED
Status: COMPLETED | OUTPATIENT
Start: 2024-08-18 | End: 2024-08-18

## 2024-08-18 RX ORDER — NALOXONE HYDROCHLORIDE 0.4 MG/ML
0.08 INJECTION, SOLUTION INTRAMUSCULAR; INTRAVENOUS; SUBCUTANEOUS
Status: ACTIVE | OUTPATIENT
Start: 2024-08-18 | End: 2024-08-19

## 2024-08-18 RX ADMIN — TRANEXAMIC ACID 1000 MG: 10 INJECTION, SOLUTION INTRAVENOUS at 23:45:00

## 2024-08-18 RX ADMIN — PHENYLEPHRINE HCL 200 MCG: 10 MG/ML VIAL (ML) INJECTION at 23:20:00

## 2024-08-18 RX ADMIN — CEFAZOLIN SODIUM IN 0.9 % NACL 3 G: 3 G/100 ML INTRAVENOUS SOLUTION, PIGGYBACK (ML) INTRAVENOUS at 23:15:00

## 2024-08-18 RX ADMIN — MORPHINE SULFATE 0.2 MG: 2 INJECTION, SOLUTION INTRAMUSCULAR; INTRAVENOUS at 23:10:00

## 2024-08-18 RX ADMIN — BUPIVACAINE HYDROCHLORIDE 1.6 ML: 7.5 INJECTION, SOLUTION INTRASPINAL at 23:10:00

## 2024-08-18 RX ADMIN — METOCLOPRAMIDE HYDROCHLORIDE 10 MG: 5 INJECTION INTRAMUSCULAR; INTRAVENOUS at 23:05:00

## 2024-08-18 RX ADMIN — PHENYLEPHRINE HCL 100 MCG: 10 MG/ML VIAL (ML) INJECTION at 23:25:00

## 2024-08-18 RX ADMIN — PHENYLEPHRINE HCL 100 MCG: 10 MG/ML VIAL (ML) INJECTION at 23:15:00

## 2024-08-18 RX ADMIN — DEXAMETHASONE SODIUM PHOSPHATE 4 MG: 4 MG/ML VIAL (ML) INJECTION at 23:05:00

## 2024-08-18 RX ADMIN — ONDANSETRON 4 MG: 2 INJECTION INTRAMUSCULAR; INTRAVENOUS at 23:05:00

## 2024-08-18 RX ADMIN — SODIUM CHLORIDE, SODIUM LACTATE, POTASSIUM CHLORIDE, CALCIUM CHLORIDE: 600; 310; 30; 20 INJECTION, SOLUTION INTRAVENOUS at 23:02:00
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
## 2024-08-18 NOTE — PROGRESS NOTES
Patient resting in bed. Plan of care discussed and questions answered. Patient reports + fetal movement x 2. Denies cramping, dustin, bleeding or leaking fluid. Denies signs/symptoms of pre-eclampsia. Call light within reach.

## 2024-08-18 NOTE — PLAN OF CARE
Problem: Patient/Family Goals  Goal: Patient/Family Long Term Goal  Description: Patient's Long Term Goal: maintain independence while hospitalized as much as possible    Interventions:    - See additional Care Plan goals for specific interventions  Outcome: Progressing  Goal: Patient/Family Short Term Goal  Description: Patient's Short Term Goal: maximize rest    Interventions:     - See additional Care Plan goals for specific interventions  Outcome: Progressing     Problem: ANTEPARTUM/LABOR and DELIVERY  Goal: Maintain pregnancy as long as maternal and/or fetal condition is stable  Description: INTERVENTIONS:  - Maternal surveillance  - Fetal surveillance  - Monitor uterine activity  - Medications as ordered  - Bedrest  Outcome: Progressing  Goal: Anxiety is at manageable level  Description: INTERVENTIONS:  - [place] patient to unit/surroundings  - Establish a trusting relationship with patient  - Discuss possible complications or alterations in birth plan  - Explain treatment plan  - Explain testing/procedures prior to initiation  - Encourage participation in care  - Encourage verbalization of concerns/fears  - Identify coping mechanisms  - Administer/offer alternative therapies (Aroma therapy, distraction, guided imagery, massage, music therapy, therapeutic touch)  - Manage patient's environment (Avoid overstimulation of patient)  Outcome: Progressing  Goal: Demonstrates ability to cope with hospitalization/illness  Description: INTERVENTIONS:  - Encourage verbalization of feelings/concerns/expectations  - Provide quiet environment  - Assist patient to identify own strengths and abilities  - Encourage patient to set small goals for self  - Encourage participation in diversional activity  - Reinforce positive adaptation of new coping behaviors  - Include patient/family/caregiver in decisions  Outcome: Progressing

## 2024-08-19 LAB
MAGNESIUM SERPL-MCNC: 3.9 MG/DL (ref 1.6–2.6)
MAGNESIUM SERPL-MCNC: 3.9 MG/DL (ref 1.6–2.6)
MAGNESIUM SERPL-MCNC: 4.3 MG/DL (ref 1.6–2.6)
MAGNESIUM SERPL-MCNC: 4.6 MG/DL (ref 1.6–2.6)

## 2024-08-19 PROCEDURE — 59510 CESAREAN DELIVERY: CPT | Performed by: OBSTETRICS & GYNECOLOGY

## 2024-08-19 PROCEDURE — 58611 LIGATE OVIDUCT(S) ADD-ON: CPT | Performed by: OBSTETRICS & GYNECOLOGY

## 2024-08-19 RX ORDER — KETOROLAC TROMETHAMINE 30 MG/ML
30 INJECTION, SOLUTION INTRAMUSCULAR; INTRAVENOUS EVERY 6 HOURS
Status: DISCONTINUED | OUTPATIENT
Start: 2024-08-19 | End: 2024-08-19

## 2024-08-19 RX ORDER — ONDANSETRON 2 MG/ML
4 INJECTION INTRAMUSCULAR; INTRAVENOUS EVERY 6 HOURS PRN
Status: DISCONTINUED | OUTPATIENT
Start: 2024-08-19 | End: 2024-08-24

## 2024-08-19 RX ORDER — HYDROCHLOROTHIAZIDE 12.5 MG/1
12.5 TABLET ORAL 3 TIMES DAILY
Status: DISCONTINUED | OUTPATIENT
Start: 2024-08-19 | End: 2024-08-19

## 2024-08-19 RX ORDER — KETOROLAC TROMETHAMINE 30 MG/ML
INJECTION, SOLUTION INTRAMUSCULAR; INTRAVENOUS
Status: COMPLETED
Start: 2024-08-19 | End: 2024-08-19

## 2024-08-19 RX ORDER — ENOXAPARIN SODIUM 100 MG/ML
40 INJECTION SUBCUTANEOUS DAILY
Status: DISCONTINUED | OUTPATIENT
Start: 2024-08-19 | End: 2024-08-23

## 2024-08-19 RX ORDER — HYDROMORPHONE HYDROCHLORIDE 1 MG/ML
0.2 INJECTION, SOLUTION INTRAMUSCULAR; INTRAVENOUS; SUBCUTANEOUS EVERY 2 HOUR PRN
Status: DISCONTINUED | OUTPATIENT
Start: 2024-08-19 | End: 2024-08-24

## 2024-08-19 RX ORDER — HYDRALAZINE HYDROCHLORIDE 20 MG/ML
INJECTION INTRAMUSCULAR; INTRAVENOUS
Status: COMPLETED
Start: 2024-08-19 | End: 2024-08-19

## 2024-08-19 RX ORDER — HYDRALAZINE HYDROCHLORIDE 20 MG/ML
10 INJECTION INTRAMUSCULAR; INTRAVENOUS ONCE AS NEEDED
Status: COMPLETED | OUTPATIENT
Start: 2024-08-19 | End: 2024-08-19

## 2024-08-19 RX ORDER — SODIUM CHLORIDE, SODIUM LACTATE, POTASSIUM CHLORIDE, CALCIUM CHLORIDE 600; 310; 30; 20 MG/100ML; MG/100ML; MG/100ML; MG/100ML
INJECTION, SOLUTION INTRAVENOUS CONTINUOUS
Status: DISCONTINUED | OUTPATIENT
Start: 2024-08-19 | End: 2024-08-20

## 2024-08-19 RX ORDER — LABETALOL HYDROCHLORIDE 5 MG/ML
20 INJECTION, SOLUTION INTRAVENOUS ONCE AS NEEDED
Status: COMPLETED | OUTPATIENT
Start: 2024-08-19 | End: 2024-08-19

## 2024-08-19 RX ORDER — LABETALOL 200 MG/1
200 TABLET, FILM COATED ORAL EVERY 8 HOURS SCHEDULED
Status: DISCONTINUED | OUTPATIENT
Start: 2024-08-19 | End: 2024-08-21

## 2024-08-19 RX ORDER — KETOROLAC TROMETHAMINE 30 MG/ML
30 INJECTION, SOLUTION INTRAMUSCULAR; INTRAVENOUS ONCE
Status: COMPLETED | OUTPATIENT
Start: 2024-08-19 | End: 2024-08-19

## 2024-08-19 RX ORDER — DOCUSATE SODIUM 100 MG/1
100 CAPSULE, LIQUID FILLED ORAL
Status: DISCONTINUED | OUTPATIENT
Start: 2024-08-19 | End: 2024-08-24

## 2024-08-19 RX ORDER — ACETAMINOPHEN 500 MG
1000 TABLET ORAL EVERY 6 HOURS
Status: DISCONTINUED | OUTPATIENT
Start: 2024-08-19 | End: 2024-08-24

## 2024-08-19 RX ORDER — SIMETHICONE 80 MG
80 TABLET,CHEWABLE ORAL 3 TIMES DAILY PRN
Status: DISCONTINUED | OUTPATIENT
Start: 2024-08-19 | End: 2024-08-24

## 2024-08-19 RX ORDER — HYDRALAZINE HYDROCHLORIDE 20 MG/ML
5 INJECTION INTRAMUSCULAR; INTRAVENOUS ONCE AS NEEDED
Status: ACTIVE | OUTPATIENT
Start: 2024-08-19 | End: 2024-08-19

## 2024-08-19 RX ORDER — NALBUPHINE HYDROCHLORIDE 10 MG/ML
2.5 INJECTION, SOLUTION INTRAMUSCULAR; INTRAVENOUS; SUBCUTANEOUS
Status: DISCONTINUED | OUTPATIENT
Start: 2024-08-19 | End: 2024-08-19 | Stop reason: HOSPADM

## 2024-08-19 RX ORDER — KETOROLAC TROMETHAMINE 30 MG/ML
30 INJECTION, SOLUTION INTRAMUSCULAR; INTRAVENOUS EVERY 6 HOURS
Status: COMPLETED | OUTPATIENT
Start: 2024-08-19 | End: 2024-08-20

## 2024-08-19 RX ORDER — IBUPROFEN 600 MG/1
600 TABLET, FILM COATED ORAL EVERY 6 HOURS
Status: DISCONTINUED | OUTPATIENT
Start: 2024-08-20 | End: 2024-08-24

## 2024-08-19 RX ORDER — DEXTROSE, SODIUM CHLORIDE, SODIUM LACTATE, POTASSIUM CHLORIDE, AND CALCIUM CHLORIDE 5; .6; .31; .03; .02 G/100ML; G/100ML; G/100ML; G/100ML; G/100ML
INJECTION, SOLUTION INTRAVENOUS CONTINUOUS PRN
Status: DISCONTINUED | OUTPATIENT
Start: 2024-08-19 | End: 2024-08-24

## 2024-08-19 RX ORDER — DIPHENHYDRAMINE HYDROCHLORIDE 50 MG/ML
INJECTION INTRAMUSCULAR; INTRAVENOUS
Status: DISCONTINUED
Start: 2024-08-19 | End: 2024-08-19

## 2024-08-19 RX ORDER — ONDANSETRON 2 MG/ML
4 INJECTION INTRAMUSCULAR; INTRAVENOUS ONCE AS NEEDED
Status: DISCONTINUED | OUTPATIENT
Start: 2024-08-19 | End: 2024-08-19 | Stop reason: HOSPADM

## 2024-08-19 RX ORDER — GABAPENTIN 300 MG/1
300 CAPSULE ORAL EVERY 8 HOURS PRN
Status: DISCONTINUED | OUTPATIENT
Start: 2024-08-19 | End: 2024-08-24

## 2024-08-19 RX ORDER — BISACODYL 10 MG
10 SUPPOSITORY, RECTAL RECTAL ONCE AS NEEDED
Status: DISCONTINUED | OUTPATIENT
Start: 2024-08-19 | End: 2024-08-24

## 2024-08-19 RX ORDER — HYDROMORPHONE HYDROCHLORIDE 2 MG/1
2 TABLET ORAL EVERY 4 HOURS PRN
Status: DISCONTINUED | OUTPATIENT
Start: 2024-08-19 | End: 2024-08-24

## 2024-08-19 RX ORDER — LABETALOL HYDROCHLORIDE 5 MG/ML
40 INJECTION, SOLUTION INTRAVENOUS ONCE AS NEEDED
Status: ACTIVE | OUTPATIENT
Start: 2024-08-19 | End: 2024-08-19

## 2024-08-19 RX ORDER — NIFEDIPINE 30 MG/1
60 TABLET, EXTENDED RELEASE ORAL DAILY
Status: DISCONTINUED | OUTPATIENT
Start: 2024-08-19 | End: 2024-08-20

## 2024-08-19 NOTE — ANESTHESIA POSTPROCEDURE EVALUATION
[facility]    Aspen Patel Patient Status:  Inpatient   Age/Gender 33 year old female MRN [id number]   Location [facility] LABOR & DELIVERY Attending Beth Daly MD   Hosp Day # 1 PCP Dalia Lambert MD       Anesthesia Post-op Note     SECTION with bilateral salpingectomy    Procedure Summary       Date: 24 Room / Location:  L+D OR   L+D OR    Anesthesia Start:  Anesthesia Stop: 24    Procedure:  SECTION with bilateral salpingectomy Diagnosis: (same)    Surgeons: Beth Daly MD Anesthesiologist: Dallas Webb MD    Anesthesia Type: spinal ASA Status: 3            Anesthesia Type: spinal    Vitals Value Taken Time   /82 24 0100   Temp 97.9 24 0101   Pulse 78 24 0100   Resp 22 24 0100   SpO2 95 % 24 0100   Vitals shown include unfiled device data.    Patient Location: Labor and Delivery    Anesthesia Type: spinal    Airway Patency: patent    Postop Pain Control: adequate    Mental Status: preanesthetic baseline    Nausea/Vomiting: none    Cardiopulmonary/Hydration status: stable euvolemic    Complications: no apparent anesthesia related complications    Postop vital signs: stable    Dental Exam: Unchanged from Preop    Patient to be discharged from PACU when criteria met.

## 2024-08-19 NOTE — OPERATIVE REPORT
[facility]  Obstetrics and Gynecology   Section - Operative Note    Aspen Patel Patient Status:  Inpatient    1990 MRN [id number]   Location [facility] LABOR & DELIVERY Attending Beth Daly MD   Hosp Day # 1 PCP Dalia Lambert MD     Date of procedure: 24    Preoperative Diagnosis: IUP at 34w2d, di/di twin gestation history of  section x 2, request for permanent sterilization, morbid obesity and chronic hypertension with superimposed preeclampsia with severe features       Postoperative Diagnosis: Same - Delivered    Procedure: Repeat low Transverse  Section with bilateral salpingectomy    Primary surgeon: Beth Daly MD     Assistant: Dr. Crys Neff who was present throughout the entire case and was critical in ensuring adequate exposure for patient's safety and optimization of outcome.  The physician actively assisted in retraction, exposure, hemostasis, entry/closure as well as other essential operative technical functions.    Indications:  Patient is a 33 year old  at 34w3d who was admitted on 2024 2/2 chronic hypertension with superimposed preeclampsia.  Her pregnancy was complicated by di/di twin gestation, history of  section x 2, request for permanent sterilization, chronic anemia and morbid obesity. MFM consult was obtained.  She received a course of betamethasone.  Decision made for continue inpatient management as patient was not a good candidate for outpatient management.  Labetalol 200 mg 3 times daily was continued inpatient.  The patient subsequently developed severe range blood pressures requiring IV antihypertensive medication on 2024.  Therefore, the decision was made to proceed with delivery.  Delay of 6 hours for gastric emptying was recommended.  Magnesium sulfate for seizure prophylaxis was initiated.  The risks, benefits and alternatives were d/w patient including but not limited to risk of injury,  infection, bleeding and subsequent  section deliveries. All questions and concerns were addressed. Patient provided verbal and written consent.     Surgical Findings: normal tubes and ovaries   Baby A- male \"Murphy\", apgars 9/9, wt 2720 g  Baby B - female \"Fischer\", apgar 9/9, wt 2840 g  No Nuchal x 2    Anesthesia: Spinal    Procedure: The patient was prepped and draped in the usual sterile fashion. A time out was performed and scd’s were placed to decrease her risk for DVT and a dose of antibiotics was given preoperatively to decrease her risk for infection. After adequate level of anesthesia was ascertained, a Pfannenstiel incision was made and extended down to the level of the fascia. The fascia was incised and extended laterally with Wilson scissors.  The rectus muscles were  superiorly and inferiorly.  The peritoneal cavity was entered superiorly and extended inferiorly. An Santiago O retractor was placed and adequate visualization of lower uterine segment was noted. The vesicouterine peritoneal fold was identified and bladder flap was created using Metzenbaum scissors with pickups to allow adequate visualization of the lower uterine segment.. A low transverse incision was made with scalpel, a Sara clamp was used to penetrate through final layer and and the incision was extended using blunt finger dissection. The fetal head of baby A was noted to be cephalic and AROM performed with Patrizia clamp. The fetal head was brought towards the incision. Fundal pressure applied but difficulty with delivery of fetal head.  Therefore, decision was made to proceed with vacuum-assisted delivery.  An OmniCup Kiwi vacuum was placed on the fetal head about 2 cm posterior to the anterior fontanelle.  The vacuum was activated and noted to be within normal pressure range.  Fundal pressure applied and fetal head delivered.  The vacuum was removed.  No pop-off noted. The fetal head delivered without complications. The  remainder of the fetal body delivered without complication. The infant was vigorously crying.  The decision was made to proceed with delivery of fetus B.  Rupture of fetus B amniotic sac was noted.  Fundal pressure applied and delivery of fetal head was noted without complications. The remainder of the fetal body delivered without complication. The infant was vigorously crying. The cord was clamped and cut after 60 second delay for each fetus. Each infant was placed in the warmer to be evaluated by Neonatology who was present for delivery. Cord blood was obtained for each fetus. The placentas x 2 were delivered intact with a 3 vessel cord x 2. The pelvic organs were not exteriorized but palpated and appeared to be normal during the procedure. The uterine cavity was swept clean using a wet lap. The hysterotomy was closed using 0-Vicryl suture. A second imbricated layer of the same suture was placed. Good hemostasis noted. Re-inspection of the hysterotomy, peritomeum and rectus muscles was noted to be entirely hemostatic. The fascia was re-approximated with PDS suture in a running fashion. The subcutaneous tissue was copiously irrigated and any bleeding cauterized. The subcutaneous tissue was re-approximated using Plain gut suture in 2 layers. The skin was re-approximated with Insorb staples.  The sponge and instrument counts were reported to me as being correct.  The patient tolerated the procedure and was stable to the recovery room.  The infant was stable to the recovery room.      Specimen:  placenta     Drains: pink to dependant drainage    Condition:   stable    Complications: None; patient tolerated the procedure well.      Beth Daly MD   [facility] - OBGYN          Note to patient and family   The 21st Century Cures Act makes medical notes available to patients in the interest of transparency.  However, please be advised that this is a medical document.  It is intended as [id number] communication.  It is  written and medical language may contain abbreviations or verbiage that are technical and unfamiliar.  It may appear blunt or direct.  Medical documents are intended to carry relevant information, facts as evident, and the clinical opinion of the practitioner.

## 2024-08-19 NOTE — PROGRESS NOTES
08/19/24 0855   Provider Notification   Reason for Communication Review case  (elevated BP upon shift change)   Provider Name   (Dr Murcia)   Method of Communication Call   Response See orders  (orders received)   Notification Time 0800

## 2024-08-19 NOTE — CM/SW NOTE
08/16/24 1100    Financial Resource Strain   How hard is it for you to pay for things like household items or child/elder care? Hard   Access to Medications   Do you have trouble affording medicines, medical supplies, or paying for your care? Y  (Reports issues with [place] [employer] Plan)   Utilities   In the past 12 months has the electric, gas, oil, or water company threatened to shut off services in your home? No   Food Insecurity   Recently, have there been times that your food ran out and you didn't have money to get more? Sometimes  (Reports she is having to monitor her expenses closely)   Did patient receive WIC with this pregnancy? No   Transportation Needs   Currently, has lack of transportation kept you from getting where you want or need to go? (For ex: to medical appointments, picking up medications, groceries, or work)? yes   Do you have a car seat for your baby? Yes   Housing Stability   In the past 12 months, was there a time when you did not have a steady place to sleep or slept in a shelter? N   Do you have a crib or bassinette for your baby to sleep in? Yes   Domestic safety   At any time do you feel concerned for the safety/well-being of yourself and/or your children, in your home or elsewhere? N   Does healthcare provider observe any obvious signs or symptoms of abuse/neglect? No   Stress   Would you like help finding professional services to help with stress, depression, anxiety, or other mental health concerns? Y   Were you screened prenatally for any mood disorders during pregnancy? Yes   Did you receive care for any mood disorders during your pregnancy? No   OUD Screening Risk Assessment   Did any of your parents have problems with alcohol or drug use? Yes   Do any of your friends (peers) have problems with alcohol or drug use? No   Does your partner have a problem with alcohol or drug use? No   Before you were pregnant did you have problems with alcohol or drug use, including prescription  medications in the past? No   Did you drink beer, wine, or liquor in the past month, consume, smoke, or vape any marijuana products, use illegal drugs, and/or prescribed or non-prescribed pain medications? No   Have you taken any opioids, narcotics or pain medications in the last year?  Some common examples of opioids are Norco, Vicodin, OxyContin, Fentanyl, Tramadol, Dialudid, Subutex, Suboxone, Methadone, etc. In the last year? No   Have you used any drugs such as Cocaine, Marijuana, Heroin, synthetic drugs (ecstacy, Nina, etc) or have you used any non-prescribed prescription medications for the purpose of how they make you feel or to self-medicate? Yes   Which substance? THC   Have you used during the pregnancy? No      SW met with pt to follow up and offer support, as twin babies (B/G) admitted to NICU. Pt familiar to SW as she was admitted to antepartum unit.    Pt presented with a cheerful affect. Pt reports she lives in [place] with her sps, and her two sons (5 & 3). Reports both of her sons have dx of Autism. Reports her eldest son just started , and her mother in law (71 y/o) is watching both of her kids while her sps works. Sps works Monday through Saturday, hours vary. Reports her sps works in IT at [employer].      Pt currently not working, reports only one car for the family. Pt reports financial strain. Pt reports minimal support from her family, briefly stated she is not in contact with them. Reports her biggest support is her sps, and his family. Reports MIL is doing a lot for her two boys.      Pt reports hx of anxiety, or depression, including PPA/PPD in both pregnancies.  Denies any immediate concerns for PPA/PPD. SW offered support and encouraged her to reach out to OBGYN/PCP with any further questions, or concerns for PPA/PPD.     Pt reports she was screened by [place] last week, and she qualified for Medicaid. Discussed [facility] in network with [employer]. Discussed  Special Beginnings program through [employer]. Discussed applying for WIC/ SNAP at her local DHS office.    SW reviewed support services for the NICU including [facility] family room and sleep room areas, NICU Facebook page, NICU support group and role of NICU  with contact information. SW reviewed Postpartum Depression warning signs and support services.    SW to remain available for any dc planning, or additional need for support.    PRANAY Rouse  Discharge Planner  [id number]

## 2024-08-19 NOTE — ANESTHESIA PROCEDURE NOTES
Spinal Block    Date/Time: 8/18/2024 11:05 PM    Performed by: Dallas Webb MD  Authorized by: Dallas Webb MD      General Information and Staff    Start Time:  8/18/2024 11:05 PM  End Time:  8/18/2024 11:10 PM  Anesthesiologist:  Dallas Webb MD  Performed by:  Anesthesiologist  Patient Location:  OB  Site identification: surface landmarks    Preanesthetic Checklist: patient identified, IV checked, risks and benefits discussed, monitors and equipment checked, pre-op evaluation, timeout performed, anesthesia consent and sterile technique used      Procedure Details    Patient Position:  Sitting  Prep: ChloraPrep    Monitoring:  Cardiac monitor, heart rate and continuous pulse ox  Approach:  Midline  Location:  L3-4  Injection Technique:  Single-shot    Needle    Needle Type:  Sprotte  Needle Gauge:  24 G  Needle Length:  3.5 in    Assessment    Sensory Level:   Events: clear CSF, CSF aspirated, well tolerated and blood negative      Additional Comments

## 2024-08-19 NOTE — PLAN OF CARE
Problem: Patient/Family Goals  Goal: Patient/Family Long Term Goal  Description: Patient's Long Term Goal: maintain independence while hospitalized as much as possible    Interventions:    - See additional Care Plan goals for specific interventions  Outcome: Progressing  Goal: Patient/Family Short Term Goal  Description: Patient's Short Term Goal: maximize rest    Interventions:     - See additional Care Plan goals for specific interventions  Outcome: Progressing     Problem: ANTEPARTUM/LABOR and DELIVERY  Goal: Maintain pregnancy as long as maternal and/or fetal condition is stable  Description: INTERVENTIONS:  - Maternal surveillance  - Fetal surveillance  - Monitor uterine activity  - Medications as ordered  - Bedrest  Outcome: Progressing  Goal: Anxiety is at manageable level  Description: INTERVENTIONS:  - [place] patient to unit/surroundings  - Establish a trusting relationship with patient  - Discuss possible complications or alterations in birth plan  - Explain treatment plan  - Explain testing/procedures prior to initiation  - Encourage participation in care  - Encourage verbalization of concerns/fears  - Identify coping mechanisms  - Administer/offer alternative therapies (Aroma therapy, distraction, guided imagery, massage, music therapy, therapeutic touch)  - Manage patient's environment (Avoid overstimulation of patient)  Outcome: Progressing  Goal: Demonstrates ability to cope with hospitalization/illness  Description: INTERVENTIONS:  - Encourage verbalization of feelings/concerns/expectations  - Provide quiet environment  - Assist patient to identify own strengths and abilities  - Encourage patient to set small goals for self  - Encourage participation in diversional activity  - Reinforce positive adaptation of new coping behaviors  - Include patient/family/caregiver in decisions  Outcome: Progressing     Problem: SKIN/TISSUE INTEGRITY - ADULT  Goal: Skin integrity remains intact  Description:  INTERVENTIONS  - Assess and document risk factors for pressure ulcer development  - Assess and document skin integrity  - Monitor for areas of redness and/or skin breakdown  - Initiate interventions, skin care algorithm/standards of care as needed  Outcome: Progressing  Goal: Incision(s), wounds(s) or drain site(s) healing without S/S of infection  Description: INTERVENTIONS:  - Assess and document risk factors for pressure ulcer development  - Assess and document skin integrity  - Assess and document dressing/incision, wound bed, drain sites and surrounding tissue  - Implement wound care per orders  - Initiate isolation precautions as appropriate  - Initiate Pressure Ulcer prevention bundle as indicated  Outcome: Progressing  Goal: Oral mucous membranes remain intact  Description: INTERVENTIONS  - Assess oral mucosa and hygiene practices  - Implement preventative oral hygiene regimen  - Implement oral medicated treatments as ordered  Outcome: Progressing

## 2024-08-19 NOTE — PROGRESS NOTES
[facility] 1SW-J davy Liaofadi Merchantnina Patient Status:  Inpatient   Age/Gender 33 year old female MRN [id number]   Location [facility] 1SW-J Attending Beth Daly MD   Hosp Day # 1 PCP Dalia Lambert MD      Anesthesia Pain Progress Note    Anesthesia Technique:   Spinal Anesthesia     Pain Management Technique:  In addition to available oral supplemental and IV medications  Patient received neuraxial preservative free morphine for post procedural pain control.    Post Procedure Pain Quality:    Adequate    Pain Management Side Effects:  None     /74   Pulse 79   Temp 97.9 °F (36.6 °C) (Oral)   Resp 14   Ht 1.651 m (5' 5\")   Wt (!) 152.9 kg (337 lb)   LMP 2023   SpO2 96%   Breastfeeding Unknown   BMI 56.08 kg/m²       Injection Site: Injection site is intact on inspection     Complications from Pain Management or Anesthesia:   None    All patient questions were answered.  Follow up pain management is separate from intraoperative anesthetic needs.  Pain care is transitioned to primary service, with management by oral medications.    Thank you for asking us to participate in the care of your patient.    David Myerson, MD, 24, 8:56 AM      David Myerson, MD, 24, 8:56 AM

## 2024-08-19 NOTE — PROGRESS NOTES
Notified by RN that patient meets the criteria for IV antihypertensive medication.  Therefore, patient now meets diagnostic criteria for chronic hypertension with superimposed preeclampsia with severe features.  Delivery is now indicated.  New clinical findings reviewed and discussed with M, Dr. Manzano.  Recommend to proceed with delivery.  However, patient has a history of  section x 2.  Plan for repeat  section.  Patient's surgical plan is further complicated by di/di twin gestation and BMI of 56.  Patient last had a full meal at noon but had a few noodles at 5 PM.  She did not eat a meal for dinner time.  Patient is at high risk for aspiration.  Therefore, recommend to delay delivery until adequate gastric emptying is noted.  Reviewed and discussed with the anesthesiologist.  May proceed with delivery at 6 hours from her last intake of food.  Therefore, plan for repeat  section at 11 PM.    Recommendations reviewed and discussed with patient.  Discussed with patient risks, benefits and alternatives to repeat  section.  Discussed with patient risk of infection, bleeding and injury.  Patient requested permanent sterilization at the time of repeat  section.  Patient is confident that she no longer desires future childbearing.  Discussed with patient diagnosis of chronic hypertension with superimposed preeclampsia with severe features.  Discussed with patient the recommendation for magnesium sulfate initiation.  Discussed with patient the recommendation for IV antihypertensive medication.  All questions concerns were addressed.  Patient agreeable plan.    MD XUAN Tucker - OBGYN

## 2024-08-19 NOTE — PLAN OF CARE
Problem: ANTEPARTUM/LABOR and DELIVERY  Goal: Maintain pregnancy as long as maternal and/or fetal condition is stable  Description: INTERVENTIONS:  - Maternal surveillance  - Fetal surveillance  - Monitor uterine activity  - Medications as ordered  - Bedrest  Outcome: Completed  Goal: Anxiety is at manageable level  Description: INTERVENTIONS:  - [place] patient to unit/surroundings  - Establish a trusting relationship with patient  - Discuss possible complications or alterations in birth plan  - Explain treatment plan  - Explain testing/procedures prior to initiation  - Encourage participation in care  - Encourage verbalization of concerns/fears  - Identify coping mechanisms  - Administer/offer alternative therapies (Aroma therapy, distraction, guided imagery, massage, music therapy, therapeutic touch)  - Manage patient's environment (Avoid overstimulation of patient)  Outcome: Completed  Goal: Demonstrates ability to cope with hospitalization/illness  Description: INTERVENTIONS:  - Encourage verbalization of feelings/concerns/expectations  - Provide quiet environment  - Assist patient to identify own strengths and abilities  - Encourage patient to set small goals for self  - Encourage participation in diversional activity  - Reinforce positive adaptation of new coping behaviors  - Include patient/family/caregiver in decisions  Outcome: Completed     Problem: SKIN/TISSUE INTEGRITY - ADULT  Goal: Skin integrity remains intact  Description: INTERVENTIONS  - Assess and document risk factors for pressure ulcer development  - Assess and document skin integrity  - Monitor for areas of redness and/or skin breakdown  - Initiate interventions, skin care algorithm/standards of care as needed  Outcome: Adequate for Discharge  Goal: Incision(s), wounds(s) or drain site(s) healing without S/S of infection  Description: INTERVENTIONS:  - Assess and document risk factors for pressure ulcer development  - Assess and document skin  integrity  - Assess and document dressing/incision, wound bed, drain sites and surrounding tissue  - Implement wound care per orders  - Initiate isolation precautions as appropriate  - Initiate Pressure Ulcer prevention bundle as indicated  Outcome: Adequate for Discharge  Goal: Oral mucous membranes remain intact  Description: INTERVENTIONS  - Assess oral mucosa and hygiene practices  - Implement preventative oral hygiene regimen  - Implement oral medicated treatments as ordered  Outcome: Adequate for Discharge     Problem: POSTPARTUM  Goal: Long Term Goal:Experiences normal postpartum course  Description: INTERVENTIONS:  - Assess and monitor vital signs and lab values.  - Assess fundus and lochia.  - Provide ice/sitz baths for perineum discomfort.  - Monitor healing of incision/episiotomy/laceration, and assess for signs and symptoms of infection and hematoma.  - Assess bladder function and monitor for bladder distention.  - Provide/instruct/assist with pericare as needed.  - Provide VTE prophylaxis as needed.  - Monitor bowel function.  - Encourage ambulation and provide assistance as needed.  - Assess and monitor emotional status and provide social service/psych resources as needed.  - Utilize standard precautions and use personal protective equipment as indicated. Ensure aseptic care of all intravenous lines and invasive tubes/drains.  - Obtain immunization and exposure to communicable diseases history.  Outcome: Progressing  Goal: Establishment of adequate milk supply with medication/procedure interruptions  Description: INTERVENTIONS:  - Review techniques for milk expression (breast pumping).   - Provide pumping equipment/supplies, instructions, and assistance until it is safe to breastfeed infant.  Outcome: Progressing

## 2024-08-19 NOTE — PROGRESS NOTES
08/19/24 1224   Provider Notification   Reason for Communication Review case  (mag level 3.9)   Provider Name   (DR Murcia)   Method of Communication Call   Response No new orders  (continue present management)   Notification Time 1224

## 2024-08-19 NOTE — ANESTHESIA PREPROCEDURE EVALUATION
PRE-OP EVALUATION    Patient Name: Aspen Patel    Admit Diagnosis: pregnancy  Pregnancy (HCC)  Morbid obesity (HCC)    Pre-op Diagnosis: * No pre-op diagnosis entered *     SECTION with bilateral salpingectomy    Anesthesia Procedure:  SECTION with bilateral salpingectomy    Surgeons and Role:     * Beth Daly MD - Primary     * Crys Neff DO - Assisting Surgeon    Pre-op vitals reviewed.  Temp: 97.4 °F (36.3 °C)  Pulse: 79  Resp: 18  BP: 138/78     Body mass index is 56.08 kg/m².    Current medications reviewed.  Hospital Medications:   [COMPLETED] magnesium sulfate 40 mg/mL 6 g BOLUS FROM BAG infusion *For OB Use*  6 g Intravenous Once    Followed by    magnesium sulfate 40 mg/mL infusion premix  2 g/hr Intravenous Continuous    calcium gluconate injection 1 g  1 g Intravenous Once PRN    [COMPLETED] labetalol (Trandate) 5 mg/mL injection 20 mg  20 mg Intravenous Once PRN    And    [COMPLETED] labetalol (Trandate) 5 mg/mL injection 40 mg  40 mg Intravenous Once PRN    And    [COMPLETED] labetalol (Trandate) 5 mg/mL injection 80 mg  80 mg Intravenous Once PRN    And    hydrALAzine (Apresoline) 20 mg/mL injection 10 mg  10 mg Intravenous Once PRN    lactated ringers infusion   Intravenous Continuous    oxyTOCIN in sodium chloride 0.9% (Pitocin) 30 Units/500mL infusion premix  62.5-900 macarena-units/min Intravenous Continuous    ceFAZolin (Ancef) 3 g in sodium chloride 0.9% 100mL IVPB premix  3 g Intravenous Once    oxyTOCIN in sodium chloride 0.9% (Pitocin) 30 Units/500mL infusion premix        carboprost tromethamine (Hemabate) 250 mcg/mL injection 250 mcg  250 mcg Intramuscular Q15 Min PRN    miSOPROStol (Cytotec) tab 1,000 mcg  1,000 mcg Rectal Once    tranexamic acid in sodium chloride 0.7% (Cyklokapron) 1000 mg/100mL infusion premix 1,000 mg  1,000 mg Intravenous Q30 Min PRN    iron sucrose (Venofer) 20 mg/mL injection 200 mg  200 mg Intravenous Daily    aspirin chewable tab 162  mg  162 mg Oral Daily    hydrOXYzine (Atarax) tab 25 mg  25 mg Oral TID PRN    magnesium oxide (Mag-Ox) tab 400 mg  400 mg Oral Nightly    Omeprazole CPDR 40 mg  40 mg Oral Daily    Choline Chloride 550mg  2 capsule Oral Daily    Ritual Essential for Women Prenatal  4 capsule Oral Daily    butalbital-acetaminophen-caffeine (Fioricet) -40 MG per tab 1 tablet  1 tablet Oral Q4H PRN    acetaminophen (Tylenol Extra Strength) tab 500 mg  500 mg Oral Q6H PRN    Or    acetaminophen (Tylenol Extra Strength) tab 1,000 mg  1,000 mg Oral Q6H PRN    zolpidem (Ambien) tab 5 mg  5 mg Oral Nightly PRN    docusate sodium (Colace) cap 100 mg  100 mg Oral BID    calcium carbonate (Tums) chewable tab 1,000 mg  1,000 mg Oral Daily PRN    [COMPLETED] betamethasone sodium phosphate & acetate (Celestone) 6 (3-3) MG/ML injection 12 mg  12 mg Intramuscular Q24H    labetalol (Normodyne) tab 200 mg  200 mg Oral TID       Outpatient Medications:     Facility-Administered Medications Prior to Admission   Medication Dose Route Frequency Provider Last Rate Last Admin    [COMPLETED] lidocaine (Xylocaine) 1 % injection  2 mL Other Once    2 mL at 08/06/24 0954    [COMPLETED] methylPREDNISolone acetate (DEPO-medrol) 40 MG/ML injection 40 mg  40 mg Intra-articular Once    40 mg at 08/06/24 0954     Medications Prior to Admission   Medication Sig Dispense Refill Last Dose    Magnesium Oxide -Mg Supplement 400 (240 Mg) MG Oral Tab Take 1 tablet (400 mg total) by mouth nightly.   8/10/2024    aspirin 81 MG Oral Tab EC Take 2 tablets (162 mg total) by mouth daily. 60 tablet 5 8/11/2024    Omeprazole 40 MG Oral Capsule Delayed Release Take 1 capsule (40 mg total) by mouth daily. If not controlling sxs, please see GI 90 capsule 3 8/11/2024    labetalol 200 MG Oral Tab Take 1 tablet (200 mg total) by mouth every 8 (eight) hours. 270 tablet 5 8/11/2024 at 1500    Choline Bitartrate (CHOLINE OR) Take 550 mg by mouth daily.   8/11/2024    prenatal  vitamin with DHA 27-0.8-228 MG Oral Cap Take 1 capsule by mouth daily.   2024    hydrOXYzine Pamoate (VISTARIL) 25 MG Oral Cap Take 1 capsule (25 mg total) by mouth every evening. (Patient not taking: Reported on 2024) 30 capsule 0        Allergies: Patient has no known allergies.      Anesthesia Evaluation    Patient summary reviewed.    Anesthetic Complications  (-) history of anesthetic complications         GI/Hepatic/Renal      (+) GERD                           Cardiovascular        Exercise tolerance: good     MET: >4    (+) obesity  (+) hypertension                                     Endo/Other    Negative endo/other ROS.                              Pulmonary      (+) asthma                     Neuro/Psych      (+) depression                                Past Surgical History:   Procedure Laterality Date      2019    at 35w5d for severe preeclampsia      2020    at 37 wk for gestational hypertension without severe features    Colonoscopy  10/03/2019    repeat when 48 y/o, Dr. Gordon, [facility]    Egd  10/03/2019    normal, Dr. Gordon, [facility]    Other surgical history Right 2024    During pregnancy - Right carpal tunnel injection and median nerve release by hydrolysis (saline, lidocaine, depo-medrol) - Lory Soriano, DO    Sigmoidoscopy,diagnostic      normal per pt     Social History     Socioeconomic History    Marital status:    Tobacco Use    Smoking status: Former     Current packs/day: 0.00     Average packs/day: 1 pack/day for 13.0 years (13.0 ttl pk-yrs)     Types: Cigarettes     Start date: 2004     Quit date: 2017     Years since quittin.6    Smokeless tobacco: Never   Vaping Use    Vaping status: Never Used   Substance and Sexual Activity    Alcohol use: No     Alcohol/week: 0.0 standard drinks of alcohol     Comment: never a problem    Drug use: Not Currently     Types: Cannabis     Comment: daily, has a medical  marijuana card    Sexual activity: Yes     Partners: Male     Comment: open to preg, but has to get off ozempic & vyvanse beforehand   Other Topics Concern    Caffeine Concern Yes     Comment: coffee 1-2 cup daily    Exercise Yes     Comment: daily    Seat Belt Yes     History   Drug Use Unknown     Comment: daily, has a medical marijuana card     Available pre-op labs reviewed.  Lab Results   Component Value Date    WBC 14.1 (H) 2024    RBC 3.61 (L) 2024    HGB 10.7 (L) 2024    HCT 31.8 (L) 2024    MCV 88.1 2024    MCH 29.6 2024    MCHC 33.6 2024    RDW 14.6 2024    .0 2024     Lab Results   Component Value Date     (L) 2024    K 4.2 2024     2024    CO2 24.0 2024    BUN 11 2024    CREATSERUM 0.48 (L) 2024     (H) 2024    CA 9.6 2024            Airway      Mallampati: IV  Mouth opening: >3 FB  TM distance: > 6 cm  Neck ROM: full Cardiovascular    Cardiovascular exam normal.  Rhythm: regular  Rate: normal     Dental    Dentition appears grossly intact         Pulmonary    Pulmonary exam normal.  Breath sounds clear to auscultation bilaterally.               Other findings              ASA: 3   Plan: spinal  NPO status verified and patient meets guidelines.  Patient has not taken beta blockers in last 24 hours.      Comment: The risks of neuraxial anesthesia, which include bleeding, infection, headache, nerve injury, and failed attempts, were discussed with the patient and her partner. The patient understands the risks, benefits, and alternatives, and agrees to proceed with a spinal anesthetic for her .  Plan/risks discussed with: patient and spouse                Present on Admission:   Pregnancy (HCC)   Chronic hypertension with superimposed pre-eclampsia ([facility])   History of 2  sections   Dichorionic diamniotic twin pregnancy in third trimester ([facility])   Obesity affecting  pregnancy in third trimester (HCC)   Maternal iron deficiency anemia affecting pregnancy in third trimester, antepartum (HCC)

## 2024-08-19 NOTE — PROGRESS NOTES
POD#1  Patient c/o all over body itching, some relieve with benadryl . Her blood pressures sataying in severe range after delivery, required IV meds per protocol - started with IV hydralazine, then followed by labetalol - received 20 labetalol IV. Added procardia 60 XL daily and hydrochlorothiazide 12.5 TID    /72   Pulse 88   Temp 97.9 °F (36.6 °C) (Oral)   Resp 14   Ht 65\"   Wt (!) 337 lb (152.9 kg)   LMP 12/22/2023   SpO2 98%   Breastfeeding Unknown   BMI 56.08 kg/m²     Recent Labs   Lab 08/16/24  0802 08/17/24  1533 08/18/24  1937   RBC 3.49* 3.58* 3.61*   HGB 10.3* 10.5* 10.7*   HCT 31.9* 31.5* 31.8*   MCV 91.4 88.0 88.1   MCH 29.5 29.3 29.6   MCHC 32.3 33.3 33.6   RDW 14.4 14.5 14.6   NEPRELIM 9.39* 9.46* 10.85*   WBC 12.9* 12.6* 14.1*   .0 239.0 246.0       Recent Labs   Lab 08/16/24  0802 08/17/24  1533 08/18/24 1937   GLU 86 127* 135*   BUN 11 11 11   CREATSERUM 0.46* 0.49* 0.48*   EGFRCR 129 127 127   CA 9.7 9.6 9.6   ALB 3.8 3.8 3.9   * 135* 134*   K 4.2 4.1 4.2    105 106   CO2 24.0 23.0 24.0   ALKPHO 106* 115* 121*   AST 18 15 13   ALT 23 20 17   BILT 0.3 0.2* 0.2*   TP 6.3 6.3 6.6     Abdomen: obese, not tender, not distended   Uterus: firm, below umbilicus  Incision: wound vac in place      A/P: s/p LTCS, TWINs, chronic GTN with superimposed preeclampsia with severe features  - continue Magnesium sulfate for 24 hours  - labetalol 200 TID, Procardia 60 XL daily, hydrochlorothiazide 12.5 TID, Hydralazine 10 TID

## 2024-08-20 LAB
BASOPHILS # BLD AUTO: 0.03 X10(3) UL (ref 0–0.2)
BASOPHILS NFR BLD AUTO: 0.2 %
EOSINOPHIL # BLD AUTO: 0.13 X10(3) UL (ref 0–0.7)
EOSINOPHIL NFR BLD AUTO: 0.8 %
ERYTHROCYTE [DISTWIDTH] IN BLOOD BY AUTOMATED COUNT: 15.2 %
HCT VFR BLD AUTO: 22.3 %
HGB BLD-MCNC: 7.3 G/DL
IMM GRANULOCYTES # BLD AUTO: 0.22 X10(3) UL (ref 0–1)
IMM GRANULOCYTES NFR BLD: 1.4 %
LYMPHOCYTES # BLD AUTO: 1.72 X10(3) UL (ref 1–4)
LYMPHOCYTES NFR BLD AUTO: 11.1 %
MAGNESIUM SERPL-MCNC: 3.4 MG/DL (ref 1.6–2.6)
MCH RBC QN AUTO: 29.2 PG (ref 26–34)
MCHC RBC AUTO-ENTMCNC: 32.7 G/DL (ref 31–37)
MCV RBC AUTO: 89.2 FL
MONOCYTES # BLD AUTO: 1.42 X10(3) UL (ref 0.1–1)
MONOCYTES NFR BLD AUTO: 9.2 %
NEUTROPHILS # BLD AUTO: 11.91 X10 (3) UL (ref 1.5–7.7)
NEUTROPHILS # BLD AUTO: 11.91 X10(3) UL (ref 1.5–7.7)
NEUTROPHILS NFR BLD AUTO: 77.3 %
PLATELET # BLD AUTO: 217 10(3)UL (ref 150–450)
RBC # BLD AUTO: 2.5 X10(6)UL
WBC # BLD AUTO: 15.4 X10(3) UL (ref 4–11)

## 2024-08-20 RX ORDER — OXYCODONE HYDROCHLORIDE 5 MG/1
5 TABLET ORAL EVERY 4 HOURS PRN
Status: DISCONTINUED | OUTPATIENT
Start: 2024-08-20 | End: 2024-08-24

## 2024-08-20 RX ORDER — TRAZODONE HYDROCHLORIDE 50 MG/1
50 TABLET, FILM COATED ORAL NIGHTLY PRN
Status: DISCONTINUED | OUTPATIENT
Start: 2024-08-20 | End: 2024-08-24

## 2024-08-20 RX ORDER — NIFEDIPINE 30 MG/1
60 TABLET, EXTENDED RELEASE ORAL EVERY EVENING
Status: DISCONTINUED | OUTPATIENT
Start: 2024-08-20 | End: 2024-08-24

## 2024-08-20 RX ORDER — ZOLPIDEM TARTRATE 5 MG/1
5 TABLET ORAL NIGHTLY PRN
Status: DISCONTINUED | OUTPATIENT
Start: 2024-08-20 | End: 2024-08-20

## 2024-08-20 NOTE — DIETARY NOTE
[facility]   part of [facility]   CLINICAL NUTRITION    Aspne ROSALES Zoilakarnenina     Admitting diagnosis:  pregnancy  Pregnancy (HCC)  Morbid obesity (HCC)    Ht: 165.1 cm (5' 5\")  Wt: (!) 152.9 kg (337 lb).   Body mass index is 56.08 kg/m².      Wt Readings from Last 6 Encounters:   08/19/24 (!) 152.9 kg (337 lb)   08/08/24 (!) 153 kg (337 lb 6 oz)   08/01/24 (!) 150.7 kg (332 lb 4 oz)   07/30/24 (!) 150.1 kg (331 lb)   07/19/24 (!) 146.1 kg (322 lb)   07/17/24 (!) 141.1 kg (311 lb)        Labs/Meds reviewed    Diet:       Procedures    Regular/General diet Is Patient on Accuchecks? No     Percent Meals Eaten (last 3 days)       None              Pt chart reviewed d/t LOS.  Pt postpartum , pumping breast milk for twins.    Patient reports good appetite at this time.  Tolerating po diet without diarrhea, emesis, or constipation.   No significant weight changes noted.     Patient is at low nutrition risk at this time.    Please consult if patient status changes or nutrition issues arise.    Chely Angeles,RD,LDN  Clinical Dietitian

## 2024-08-20 NOTE — DISCHARGE INSTRUCTIONS
Hypertension related to pregnancy/postpartum    -Watch for symptoms of pre-eclampsia (severe or persistent headache not relieved with a dose of tylenol, visual disturbances, persistent or severe upper abdominal pain, shortness of breath, chest pain)  -Please obtain a blood pressure cuff for home from the list of US Blood Pressure Validated Device Listing- see www.validatebp.org  -Check blood pressure (and heart rate if you can) 2-3 times per day & more frequently if feeling poorly. Keep log & bring to visits.      If BP is 140/90 or higher (either number) you will likely be prescribed oral antihypertensive medication. Check blood pressure before a dose of medication. Can hold the medicine if you feel your blood pressure is getting too low (less than 110/70) or you are lightheaded.      If BP is 160/110 (either number) or higher, or you develop symptoms of pre-eclampsia, please immediately go to the emergency department at [facility] & let them know you may have pre-eclampsia. You will likely require IV antihypertensives and IV magnesium sulfate to prevent stroke and seizures.         Nothing in vagina for 6 weeks.     AVOID CONSTIPATION:   -Take Miralax one capful in water or juice each morning.  You can also take each evening iif needed.  -Take Fiber supplement along with Miralax as well.  -May also take milk of magnesia or Dulcolax over the counter if needing to have a BM more urgently than Miralax is providing    -Do NOT strain for bowel movements    No strenuous activity/exercise  No tub baths.  No lifting over 10 pounds (1 gallon of mild is 8 pounds) for 6 weeks   May shower immediately    The Prevena negative wound pressure dressing battery pack is designed to stop working after 7-8 days or so. When the batter stops working, please gently peel off the dressing & discard.   Keep wound(s) clean and dry. Wash daily with warm water & soap. Do not scrub. Gently pat dry. May cover with clean gauze or pad if  needed.   Can wash with Hibiclens over the abdomen/torso to help decrease bacterial colonization if you prefer. It is alcohol-based so it can be somewhat drying.     You may ride in a car immediately.  You may drive after 1-2 weeks. Must not drive if taking a narcotic (e.g. Norco, Oxycodone) or gabapentin as these are sedating.   Nothing in the vagina or 6 weeks     Please call office if:  -fever 100.4 or higher    Please proceed to the Emergency Department at [facility] for any of the following:   -vaginal bleeding soaking greater than 1 pad per hour  -severe pelvic pain  -shortness of breath  -chest pain  -leg pain or swelling  -persistent or severe headache  -vision changes  -persistent or severe upper abdominal pain  -feeling depressed or extremely anxious  -thoughts of self harm or harming infant(s).

## 2024-08-20 NOTE — PROGRESS NOTES
[facility]  Post-Partum Caesarean Section Progress Note    Aspen Patel Patient Status:  Inpatient    1990 MRN [id number]   Location [facility] 1SW-J Attending Beth Daly MD   Hosp Day # 3 PCP Dalia Lambert MD     SUBJECTIVE:    Aspen Patel is POD# 3 s/p CS. Her pain is well controlled. She is  ambulating, is voiding spontaneously, is tolerating a general diet, and is  passing gas. Her vaginal bleeding is appropriate.     Starting to get a headache this morning.   Feels like maybe she hasn't been drinking enough water  Did note some dizziness earlier today so she had to lie down.   Yesterday started to feel some dyspnea & chest heaviness  No coughing  Her vision changes are much less frequent than prior to delivery  No epigastric pain but just sore in general   Incision pain under control  Pumping for babies in the NICU        OBJECTIVE:    Vital signs in last 24 hours:  Temp:  [98.4 °F (36.9 °C)] 98.4 °F (36.9 °C)  Pulse:  [] 92  Resp:  [16-20] 16  BP: (116-159)/(55-80) 128/67    Input/Output:  No intake or output data in the 24 hours ending 24 1220      Gen: alert, normal affect, no apparent distress   CV: RRR, no murmurs  Resp: lungs CTA bilaterally  Abd: soft, obese, non-distended, appropriately tender, normal active bowel sounds; fundus firm and below umbilicus   INCISION: negative pressure dressing in place   Ext 2+ BLE edema     Data Reviewed:  Recent Labs   Lab 24  1937 24  0003 24  1057   RBC 3.61* 2.50* 2.28*   HGB 10.7* 7.3* 6.9*   HCT 31.8* 22.3* 21.3*   MCV 88.1 89.2 93.4   MCH 29.6 29.2 30.3   MCHC 33.6 32.7 32.4   RDW 14.6 15.2 16.3   NEPRELIM 10.85* 11.91* 9.82*   WBC 14.1* 15.4* 12.9*   .0 217.0 199.0     Component      Latest Ref Rng 2024  10:57 AM   WBC      4.0 - 11.0 x10(3) uL 12.9 (H)    RBC      3.80 - 5.30 x10(6)uL 2.28 (L)    Hemoglobin      12.0 - 16.0 g/dL 6.9 (LL)    Hematocrit      35.0 - 48.0 % 21.3 (L)     Platelet Count      150.0 - 450.0 10(3)uL 199.0    MCV      80.0 - 100.0 fL 93.4    MCH      26.0 - 34.0 pg 30.3    MCHC      31.0 - 37.0 g/dL 32.4    RDW      % 16.3    Prelim Neutrophil Abs      1.50 - 7.70 x10 (3) uL 9.82 (H)    Neutrophils Absolute      1.50 - 7.70 x10(3) uL 9.82 (H)    Lymphocytes Absolute      1.00 - 4.00 x10(3) uL 1.59    Monocytes Absolute      0.10 - 1.00 x10(3) uL 0.92    Eosinophils Absolute      0.00 - 0.70 x10(3) uL 0.17    Basophils Absolute      0.00 - 0.20 x10(3) uL 0.03    Immature Granulocyte Absolute      0.00 - 1.00 x10(3) uL 0.35    Neutrophils %      % 76.4    Lymphocytes %      % 12.3    Monocytes %      % 7.1    Eosinophils %      % 1.3    Basophils %      % 0.2    Immature Granulocyte %      % 2.7    Glucose      70 - 99 mg/dL 134 (H)    Sodium      136 - 145 mmol/L 139    Potassium      3.5 - 5.1 mmol/L 3.7    Chloride      98 - 112 mmol/L 107    Carbon Dioxide, Total      21.0 - 32.0 mmol/L 27.0    ANION GAP      0 - 18 mmol/L 5    BUN      9 - 23 mg/dL 12    CREATININE      0.55 - 1.02 mg/dL 0.52 (L)    CALCIUM      8.7 - 10.4 mg/dL 9.3    CALCULATED OSMOLALITY      275 - 295 mOsm/kg 290    EGFR      >=60 mL/min/1.73m2 125    AST (SGOT)      <34 U/L 17    ALT (SGPT)      10 - 49 U/L 19    ALKALINE PHOSPHATASE      37 - 98 U/L 84    Total Bilirubin      0.3 - 1.2 mg/dL 0.3    PROTEIN, TOTAL      5.7 - 8.2 g/dL 5.9    Albumin      3.2 - 4.8 g/dL 3.5    Globulin      2.0 - 3.5 g/dL 2.4    A/G Ratio      1.0 - 2.0  1.5       Legend:  (H) High  (L) Low  (LL) Low Panic    ASSESSMENT/PLAN:  33 year old  POD#3 s/p 24 RLTCS with bilateral salpingectomy with placement of negative wound pressure dressing at 34w2d for di di twins, morbid obesity, excessive weight gain >100 lb, history of  x 2, chronic HTN with superimposed pre-eclampsia with severe features. \"Murphy\" & \"Fischer\"     #Postpartum care:   Encourage ambulation   Encourage SCD and IS use   Pain control:  NSAIDS, narcotics PRN  Regular diet   Rh positive  Contraceptions - s/p surgical sterilization   Infant: male/female    #cHTN w/ superimposed preeclampsia with severe features  -s/p magnesium sulfate for 24 hr postpartum  -prior to delivery Labetalol 200 mg TID   8/18 IV labetalol x 3 given   -postpartum    8/19 IV hydralazine & IV labetalol given   8/19 hydrochlorothiazide 12.5 mg, Labetalol 200 BID, nifedipine 60 mg XR daily  8/20 medications held due to lower BP  -8/21 Hb 6.9, otherwise ok.Mild WEI. Will continue with some lasix & Kdur, but needs 1 unit PRBC as well    -8/22 CBC & CMP to be done in AM    #Acute on chronic iron deficiency anemia  -s/p hematology consult in 1/2024 - PO iron was advised  -Received IV iron x 3 doses (8/16, 8/18, 8/20)   -8/21 - POD3 Hb 6.9. Patient symptomatic. Will transfuse 1 unit PRBC. IV iron x 1 dose again (will be 4th dose this admission but only 2nd since delivery)     #Obesity  Lovenox DVT proph    Disposition: inpatient. Pending BP control, tolerance of anemia.     Frances Jung MD

## 2024-08-20 NOTE — PROGRESS NOTES
[facility]  Post-Partum Caesarean Section Progress Note    Aspen Patel Patient Status:  Inpatient    1990 MRN [id number]   Formerly [facility] 1SW-J Attending Beth Daly MD   Hosp Day # 2 PCP Dalia Lambert MD     SUBJECTIVE:    Aspen Patel is POD# 2 s/p CS. Her pain is well controlled. She is  ambulating, is voiding spontaneously, is tolerating a general diet, and is  passing gas. Her vaginal bleeding is appropriate. Reports gas pain. Reports difficulty sleeping.       OBJECTIVE:    Vital signs in last 24 hours:  Temp:  [97.7 °F (36.5 °C)-98.4 °F (36.9 °C)] 97.7 °F (36.5 °C)  Pulse:  [] 104  Resp:  [16-18] 18  BP: ()/(37-93) 122/63  SpO2:  [88 %-99 %] 99 %    Input/Output:    Intake/Output Summary (Last 24 hours) at 2024 1511  Last data filed at 2024 0530  Gross per 24 hour   Intake 2679.13 ml   Output 1600 ml   Net 1079.13 ml       Gen: alert, normal affect, no apparent distress   CV: RRR, no murmurs/rubs/gallops; 2+ edema bilaterally   Resp: lungs CTA bilaterally, no rales/rhonchi/wheezes; normal effort   Abd: soft, nondistended, appropriately tender, normal active bowel sounds; fundus firm and below umbilicus   INCISION: pressure dressing in place     Data Reviewed:  Recent Labs   Lab 24  1533 24  1937 24  0003   RBC 3.58* 3.61* 2.50*   HGB 10.5* 10.7* 7.3*   HCT 31.5* 31.8* 22.3*   MCV 88.0 88.1 89.2   MCH 29.3 29.6 29.2   MCHC 33.3 33.6 32.7   RDW 14.5 14.6 15.2   NEPRELIM 9.46* 10.85* 11.91*   WBC 12.6* 14.1* 15.4*   .0 246.0 217.0         ASSESSMENT/PLAN:  33 year old  POD#2 s/p repeat LTCS. Afebrile. Stable. Pain controlled. Her pregnancy was complicated by cHTN with superimposed preeclampsia with severe features, di/di pregnancy, obesity. Her labetalol and procardia have been held today due to normal blood pressures.       #Postpartum care:   Encourage ambulation   Encourage SCD and IS use   Pain control:  NSAIDS, narcotics PRN  Regular diet   Hg 7.3, s/p venofer x 3 doses, cbc tomorrow AM  Rh positive  Contraception tubal  Infant: male/female    #cHTN w/ superimposed preeclampsia with severe features s/p Mag: required IV antihypertensives yesterday. Today, PO antihypertensives have been held  Labetalol 200 TID, Procardia 60, titrate PRN    #Obesity  Lovenox    Disposition: anticipate discharge home on POD#3-4 pending BP control.     Valente Jernigan MD  [facility]  8/20/2024 10:25 AM

## 2024-08-20 NOTE — PLAN OF CARE
Problem: Patient/Family Goals  Goal: Patient/Family Long Term Goal  Description: Patient's Long Term Goal: maintain independence while hospitalized as much as possible    Interventions:    - See additional Care Plan goals for specific interventions  Outcome: Progressing  Goal: Patient/Family Short Term Goal  Description: Patient's Short Term Goal: maximize rest    Interventions:     - See additional Care Plan goals for specific interventions  Outcome: Progressing     Problem: POSTPARTUM  Goal: Long Term Goal:Experiences normal postpartum course  Description: INTERVENTIONS:  - Assess and monitor vital signs and lab values.  - Assess fundus and lochia.  - Provide ice/sitz baths for perineum discomfort.  - Monitor healing of incision/episiotomy/laceration, and assess for signs and symptoms of infection and hematoma.  - Assess bladder function and monitor for bladder distention.  - Provide/instruct/assist with pericare as needed.  - Provide VTE prophylaxis as needed.  - Monitor bowel function.  - Encourage ambulation and provide assistance as needed.  - Assess and monitor emotional status and provide social service/psych resources as needed.  - Utilize standard precautions and use personal protective equipment as indicated. Ensure aseptic care of all intravenous lines and invasive tubes/drains.  - Obtain immunization and exposure to communicable diseases history.  Outcome: Progressing  Goal: Optimize infant feeding at the breast  Description: INTERVENTIONS:  - Initiate breast feeding within first hour after birth.   - Monitor effectiveness of current breast feeding efforts.  - Assess support systems available to mother/family.  - Identify cultural beliefs/practices regarding lactation, letdown techniques, maternal food preferences.  - Assess mother's knowledge and previous experience with breast feeding.  - Provide information as needed about early infant feeding cues (e.g., rooting, lip smacking, sucking  fingers/hand) versus late cue of crying.  - Discuss/demonstrate breast feeding aids (e.g., infant sling, nursing footstool/pillows, and breast pumps).  - Encourage mother/other family members to express feelings/concerns, and actively listen.  - Educate father/SO about benefits of breast feeding and how to manage common lactation challenges.  - Recommend avoidance of specific medications or substances incompatible with breast feeding.  - Assess and monitor for signs of nipple pain/trauma.  - Instruct and provide assistance with proper latch.  - Review techniques for milk expression (breast pumping) and storage of breast milk. Provide pumping equipment/supplies, instructions and assistance, as needed.  - Encourage rooming-in and breast feeding on demand.  - Encourage skin-to-skin contact.  - Provide LC support as needed.  - Assess for and manage engorgement.  - Provide breast feeding education handouts and information on community breast feeding support.   Outcome: Progressing  Goal: Establishment of adequate milk supply with medication/procedure interruptions  Description: INTERVENTIONS:  - Review techniques for milk expression (breast pumping).   - Provide pumping equipment/supplies, instructions, and assistance until it is safe to breastfeed infant.  Outcome: Progressing  Goal: Experiences normal breast weaning course  Description: INTERVENTIONS:  - Assess for and manage engorgement.  - Instruct on breast care.  - Provide comfort measures.  Outcome: Progressing  Goal: Appropriate maternal -  bonding  Description: INTERVENTIONS:  - Assess caregiver- interactions.  - Assess caregiver's emotional status and coping mechanisms.  - Encourage caregiver to participate in  daily care.  - Assess support systems available to mother/family.  - Provide /case management support as needed.  Outcome: Progressing   Sat with patient and updated patient and family on plan of care. Pain medication  discussed and answered all questions. Vitals are WNL. Pumping every 3 hours as tolerated d/t to babies being in NICU. Lochia WNL and fundus is firm. Will call RN with any questions.

## 2024-08-20 NOTE — CM/SW NOTE
met with Aspen (patient) and spouse (Pablo) to review insurance and PCP for infant twins in NICU. Infant will be added to [employer] plan as primary and [place] twins.  reviewed insurance Auth process and discharge planning process. [place] was called and asked to follow up with [place] today [place]. [place] for [place]. PCP to be determined yet. Information provided to couple. Couple have crib and car seats for twins. Reviewed WIC information and printed information provided. [facility] through [employer] information given. Couple will have to select [employer] to get into this program, printed information on program provided. reviewed SDOH, housing, Food, Utilities, getting Med's, and transportation. Couple felt that [facility] would help with food and formula. Transportation is provided for medical appointments and going to pharmacy through [place]. Pablo's mother also drives Aspen to appointments as needs as well. Couple felt like this was help for now.

## 2024-08-20 NOTE — PLAN OF CARE
Problem: Patient/Family Goals  Goal: Patient/Family Long Term Goal  Description: Patient's Long Term Goal: maintain independence while hospitalized as much as possible    Interventions:    - See additional Care Plan goals for specific interventions  Outcome: Progressing  Goal: Patient/Family Short Term Goal  Description: Patient's Short Term Goal: maximize rest    Interventions:     - See additional Care Plan goals for specific interventions  Outcome: Progressing     Problem: POSTPARTUM  Goal: Long Term Goal:Experiences normal postpartum course  Description: INTERVENTIONS:  - Assess and monitor vital signs and lab values.  - Assess fundus and lochia.  - Provide ice/sitz baths for perineum discomfort.  - Monitor healing of incision/episiotomy/laceration, and assess for signs and symptoms of infection and hematoma.  - Assess bladder function and monitor for bladder distention.  - Provide/instruct/assist with pericare as needed.  - Provide VTE prophylaxis as needed.  - Monitor bowel function.  - Encourage ambulation and provide assistance as needed.  - Assess and monitor emotional status and provide social service/psych resources as needed.  - Utilize standard precautions and use personal protective equipment as indicated. Ensure aseptic care of all intravenous lines and invasive tubes/drains.  - Obtain immunization and exposure to communicable diseases history.  Outcome: Progressing  Goal: Optimize infant feeding at the breast  Description: INTERVENTIONS:  - Initiate breast feeding within first hour after birth.   - Monitor effectiveness of current breast feeding efforts.  - Assess support systems available to mother/family.  - Identify cultural beliefs/practices regarding lactation, letdown techniques, maternal food preferences.  - Assess mother's knowledge and previous experience with breast feeding.  - Provide information as needed about early infant feeding cues (e.g., rooting, lip smacking, sucking  fingers/hand) versus late cue of crying.  - Discuss/demonstrate breast feeding aids (e.g., infant sling, nursing footstool/pillows, and breast pumps).  - Encourage mother/other family members to express feelings/concerns, and actively listen.  - Educate father/SO about benefits of breast feeding and how to manage common lactation challenges.  - Recommend avoidance of specific medications or substances incompatible with breast feeding.  - Assess and monitor for signs of nipple pain/trauma.  - Instruct and provide assistance with proper latch.  - Review techniques for milk expression (breast pumping) and storage of breast milk. Provide pumping equipment/supplies, instructions and assistance, as needed.  - Encourage rooming-in and breast feeding on demand.  - Encourage skin-to-skin contact.  - Provide LC support as needed.  - Assess for and manage engorgement.  - Provide breast feeding education handouts and information on community breast feeding support.   Outcome: Progressing  Goal: Establishment of adequate milk supply with medication/procedure interruptions  Description: INTERVENTIONS:  - Review techniques for milk expression (breast pumping).   - Provide pumping equipment/supplies, instructions, and assistance until it is safe to breastfeed infant.  Outcome: Progressing  Goal: Experiences normal breast weaning course  Description: INTERVENTIONS:  - Assess for and manage engorgement.  - Instruct on breast care.  - Provide comfort measures.  Outcome: Progressing  Goal: Appropriate maternal -  bonding  Description: INTERVENTIONS:  - Assess caregiver- interactions.  - Assess caregiver's emotional status and coping mechanisms.  - Encourage caregiver to participate in  daily care.  - Assess support systems available to mother/family.  - Provide /case management support as needed.  Outcome: Progressing

## 2024-08-20 NOTE — PLAN OF CARE
Problem: POSTPARTUM  Goal: Long Term Goal:Experiences normal postpartum course  Description: INTERVENTIONS:  - Assess and monitor vital signs and lab values.  - Assess fundus and lochia.  - Provide ice/sitz baths for perineum discomfort.  - Monitor healing of incision/episiotomy/laceration, and assess for signs and symptoms of infection and hematoma.  - Assess bladder function and monitor for bladder distention.  - Provide/instruct/assist with pericare as needed.  - Provide VTE prophylaxis as needed.  - Monitor bowel function.  - Encourage ambulation and provide assistance as needed.  - Assess and monitor emotional status and provide social service/psych resources as needed.  - Utilize standard precautions and use personal protective equipment as indicated. Ensure aseptic care of all intravenous lines and invasive tubes/drains.  - Obtain immunization and exposure to communicable diseases history.  8/20/2024 1113 by Nguyen Ramos, RN  Outcome: Progressing  8/20/2024 0743 by Nguyen Ramos, RN  Outcome: Progressing  Goal: Optimize infant feeding at the breast  Description: INTERVENTIONS:  - Initiate breast feeding within first hour after birth.   - Monitor effectiveness of current breast feeding efforts.  - Assess support systems available to mother/family.  - Identify cultural beliefs/practices regarding lactation, letdown techniques, maternal food preferences.  - Assess mother's knowledge and previous experience with breast feeding.  - Provide information as needed about early infant feeding cues (e.g., rooting, lip smacking, sucking fingers/hand) versus late cue of crying.  - Discuss/demonstrate breast feeding aids (e.g., infant sling, nursing footstool/pillows, and breast pumps).  - Encourage mother/other family members to express feelings/concerns, and actively listen.  - Educate father/SO about benefits of breast feeding and how to manage common lactation challenges.  - Recommend avoidance of specific  medications or substances incompatible with breast feeding.  - Assess and monitor for signs of nipple pain/trauma.  - Instruct and provide assistance with proper latch.  - Review techniques for milk expression (breast pumping) and storage of breast milk. Provide pumping equipment/supplies, instructions and assistance, as needed.  - Encourage rooming-in and breast feeding on demand.  - Encourage skin-to-skin contact.  - Provide LC support as needed.  - Assess for and manage engorgement.  - Provide breast feeding education handouts and information on community breast feeding support.   2024 by Nguyen Ramos RN  Outcome: Progressing  2024 by Nguyen Ramos RN  Outcome: Progressing  Goal: Appropriate maternal -  bonding  Description: INTERVENTIONS:  - Assess caregiver- interactions.  - Assess caregiver's emotional status and coping mechanisms.  - Encourage caregiver to participate in  daily care.  - Assess support systems available to mother/family.  - Provide /case management support as needed.  2024 by Nguyen Ramos RN  Outcome: Progressing  2024 by Nguyen Ramos RN  Outcome: Progressing

## 2024-08-20 NOTE — PAYOR COMM NOTE
--------------  ADMISSION REVIEW     Payor: [facility] PLAN  Subscriber #:  [id number]  Authorization Number: [id number]    ADMIT TO INPT STATUS 24  ADMIT TO OBSERVATION 24:    OBGYN:    CC: Patient is here for chronic hypertension with superimposed preeclampsia, rule out severe features      SUBJECTIVE:     Aspen Patel is a 33 year old  female at 33w3d Estimated Date of Delivery: 24 who is being admitted for  chronic hypertension with superimposed preeclampsia, rule out severe features . Her current obstetrical history is significant for Di Di twin gestation, morbid obesity with BMI 56, anxiety/depression, history of preeclampsia with prior delivery at 35 weeks, history of gestational hypertension with prior delivery at 37 weeks, history of  section x 2, request for progress elevation, AMA and anemia.  Patient diagnosed with probable chronic hypertension early in pregnancy prior to 20 weeks.  Patient currently on labetalol 3 times daily.  Patient's baseline creatinine ratio was 0.25.  Patient reports severe range blood pressures at home.  She reported mild headache and nausea.  Denies any right upper quadrant or epigastric pain.  Denies severe headache or vomiting.  Denied dark spots in vision but reports grayish floaters. MFM consult obtained.  Protein creatinine ratio was elevated at 1.83.  Remaining laboratory findings were relatively wnl. The decision was made for admission for further evaluation.     Obstetric History:                    OB History    Para Term  AB Living   4 2 1 1 1 2   SAB IAB Ectopic Multiple Live Births      0 1 0 0 2          # Outcome Date GA Lbr Ulke/2nd Weight Sex Type Anes PTL Lv   4 Current                     3 Term 20 37w3d   8 lb 7.1 oz (3.83 kg) M Caesarean Spinal N TRAY   2  19 35w5d   6 lb 6.8 oz (2.915 kg) M Caesarean Spinal N TRAY      Complications: PIH, Preeclampsia   1 IAB                          Obstetric Comments   2019 -  pre-eclampsia with severe features, gained 85 lb during pregnancy, child dx autism        - gestational hypertension without severe features (took aspirin), gained 65 lb during pregnancy, child dx autism       OBJECTIVE:     Temp:  [98 °F (36.7 °C)-98.1 °F (36.7 °C)] 98.1 °F (36.7 °C)  Pulse:  [79-90] 87  Resp:  [14-15] 14  BP: (121-160)/(51-87) 124/62  Body mass index is 56.08 kg/m².     General: AAO. NAD.   Lungs: no tachypnea, retractions or cyanosis  CV: normal peripheral perfusion   Abdomen: FHT present, gravid.  Nontender.  Extremities: negative edema bilaterally, negative calf tenderness bilaterally, Tomás's sign negative bilaterally      FHT A: moderate variability/130 BPM / Positive accelerations/Negative decelerations  FHT B: moderate variability/125 BPM / Positive accelerations/Negative decelerations                        TOCO: q 2-7 minutes, irregular     SVE: Deferred    MFM US 24  Twin A -   IUP in cephalic presentation.    Placenta is anterior.   Cardiac activity is present, 129 bpm  MVP is 4 cm.   BPP is 8/8.  UA Doppler 4.64.       Twin B -   IUP in transverse head right presentation.   Placenta is posterior.   Cardiac activity is present, 139 bpm  MVP is 6.5 cm.   BPP is 8/8.  UA Doppler 2.81 .         ASSESSMENT/ PLAN:     Aspen Patel is a 33 year old  female at 33w3d Estimated Date of Delivery: 24 who is being admitted for  chronic hypertension with superimposed preeclampsia, rule out severe features .   chronic hypertension with superimposed preeclampsia  - rule out severe features   - labs reviewed, repeat every 48 hours   - 24 hour urine pending, collection ends tonight until 24  -Complete betamethasone course x 2 doses, second dose tonight 2024  -Continue to monitor for signs and symptoms of preeclampsia with severe features  -Continue blood pressure monitoring  -Continue labetalol 200 mg 3 times daily  -[facility]  consult: Per Dr. Miranda   PLAN:  Complete betamethasone  Monitor as inpatient for features of preeclampsia for at least 2 days  I would not increase her Labetalol.  If she has any severe features of preeclampsia then deliver.  -General Diet  -Activity as tolerated  -Fetal heart rate monitoring for 1 hour twice daily  -Reviewed new diagnosis the patient including recommendation for inpatient management  -Reviewed recommendation for delivery indicated by severe features     History of  section x 2  -Will need to schedule repeat  section     Request for permanent sterilization  D/w patient permanent sterilization at time of RCS including bilateral total or partial salpingectomy. D/w patient risks, benefits and alternatives including but not limited to risks of infection, bleeding and injury of sterilization procedure. D/w patient risk of subsequent  deliveries including but not limited risks of adhesions, infection, bleeding, abnormal placentation and injury. Patient advised to consider family planning options. Patient reports she would like to proceed with permanent sterilization at the time of repeat  section.     Di Di twin gestation  -MFM consult, appreciate further recommendations  -Fetal heart presentation of twin B  -Twin A noted for elevated umbilical artery Doppler  -Continue serial monitoring with MFM     Morbid obesity  -SCDs  -Plan for Lovenox postpartum     Risks, benefits, alternatives and possible complications have been discussed in detail with the patient.  Pre-admission, admission, and post admission procedures and expectations were discussed in detail.  All questions answered, all appropriate consents will be signed at the Hospital. Admission is planned for today.  anticipated.     Beth Daly MD   [facility] - OBGYN      :     Section - Operative Note     Date of procedure: 24     Preoperative Diagnosis: IUP at 34w2d, di/di twin gestation history of   section x 2, request for permanent sterilization, morbid obesity and chronic hypertension with superimposed preeclampsia with severe features                                           Postoperative Diagnosis: Same - Delivered     Procedure: Repeat low Transverse  Section with bilateral salpingectomy      Indications:  Patient is a 33 year old  at 34w3d who was admitted on 2024 2/2 chronic hypertension with superimposed preeclampsia.  Her pregnancy was complicated by di/di twin gestation, history of  section x 2, request for permanent sterilization, chronic anemia and morbid obesity. M consult was obtained.  She received a course of betamethasone.  Decision made for continue inpatient management as patient was not a good candidate for outpatient management.  Labetalol 200 mg 3 times daily was continued inpatient.  The patient subsequently developed severe range blood pressures requiring IV antihypertensive medication on 2024.  Therefore, the decision was made to proceed with delivery.  Delay of 6 hours for gastric emptying was recommended.  Magnesium sulfate for seizure prophylaxis was initiated.  The risks, benefits and alternatives were d/w patient including but not limited to risk of injury, infection, bleeding and subsequent  section deliveries. All questions and concerns were addressed. Patient provided verbal and written consent.      Surgical Findings: normal tubes and ovaries   Baby A- male \"Murphy\", apgars 9/9, wt 2720 g  Baby B - female \"Fischer\", apgar 9/9, wt 2840 g  No Nuchal x 2    Vitals (last day)       Date/Time Temp Pulse Resp BP SpO2 Weight O2 Device O2 Flow Rate (L/min) [facility]    24 0732 97.7 °F (36.5 °C) 94 18 126/60 -- -- -- -- GG    24 98.4 °F (36.9 °C) 83 16 130/51 93 % -- None (Room air) -- MM    24 1954 -- -- -- -- -- -- None (Room air) -- MM    24 1900 -- 81 -- 113/93 95 % -- -- -- ED    24 0830 -- 88 -- 162/79  96 % -- -- -- ED    08/19/24 0820 -- 84 -- 166/89 95 % -- -- -- ED    08/19/24 0815 -- 80 -- -- 98 % -- -- -- ED    08/19/24 0810 -- 82 -- 147/103 95 % -- -- -- ED    08/19/24 0800 -- 78 -- 149/88 95 % -- -- -- ED    08/19/24 0750 -- 80 -- 161/84 97 % -- -- -- ED    08/19/24 0740 -- 86 -- -- 98 % -- -- -- ED    08/19/24 0730 -- 79 -- 162/77 90 % -- -- -- ED    08/19/24 0720 -- 80 -- -- 96 % -- -- -- ED    08/19/24 0715 -- 80 -- 167/79 96 % -- -- -- ED    08/19/24 0710 -- 81 -- -- 97 % -- -- -- ED    08/19/24 0700 -- 82 -- -- 95 % -- -- -- ED    08/19/24 0700 -- -- -- 175/95 -- -- -- -- TG    08/19/24 0645 -- 89 -- -- 98 % -- -- -- ED    08/19/24 0630 -- 79 -- -- 89 % -- -- -- ED    08/19/24 0615 -- 84 -- -- 97 % -- -- -- ED    08/19/24 0600 -- 84 -- -- 97 % -- -- -- ED    08/19/24 0600 -- -- -- 158/79 -- -- None (Room air) -- TG    08/19/24 0500 -- 80 -- 134/68 -- -- None (Room air) -- TG    08/19/24 0100 97.9 °F (36.6 °C) 76 13 148/82 -- -- -- -- TG    08/19/24 0055 -- -- 16 150/69 -- -- -- -- TG    08/19/24 0050 -- 83 20 141/81 -- -- -- -- TG    08/19/24 0045 -- 75 23 146/73 -- -- -- -- TG    08/19/24 0040 -- 77 6 164/72 -- -- -- -- TG

## 2024-08-20 NOTE — PROGRESS NOTES
Patient admitted to mother baby and this RN assumed care. Vitals within limits set by MD. Fundus firm and bleeding WNL. Pt up to bathroom x1. Patient visited infants in NICU per request. All questions answered.

## 2024-08-21 PROBLEM — D62 ACUTE BLOOD LOSS ANEMIA: Status: ACTIVE | Noted: 2024-08-21

## 2024-08-21 PROBLEM — Z34.90 PREGNANCY (HCC): Status: RESOLVED | Noted: 2024-08-11 | Resolved: 2024-08-21

## 2024-08-21 LAB
ALBUMIN SERPL-MCNC: 3.5 G/DL (ref 3.2–4.8)
ALBUMIN/GLOB SERPL: 1.5 {RATIO} (ref 1–2)
ALP LIVER SERPL-CCNC: 84 U/L
ALT SERPL-CCNC: 19 U/L
ANION GAP SERPL CALC-SCNC: 5 MMOL/L (ref 0–18)
ANTIBODY SCREEN: NEGATIVE
AST SERPL-CCNC: 17 U/L (ref ?–34)
BASOPHILS # BLD AUTO: 0.03 X10(3) UL (ref 0–0.2)
BASOPHILS NFR BLD AUTO: 0.2 %
BILIRUB SERPL-MCNC: 0.3 MG/DL (ref 0.3–1.2)
BUN BLD-MCNC: 12 MG/DL (ref 9–23)
CALCIUM BLD-MCNC: 9.3 MG/DL (ref 8.7–10.4)
CHLORIDE SERPL-SCNC: 107 MMOL/L (ref 98–112)
CO2 SERPL-SCNC: 27 MMOL/L (ref 21–32)
CREAT BLD-MCNC: 0.52 MG/DL
EGFRCR SERPLBLD CKD-EPI 2021: 125 ML/MIN/1.73M2 (ref 60–?)
EOSINOPHIL # BLD AUTO: 0.17 X10(3) UL (ref 0–0.7)
EOSINOPHIL NFR BLD AUTO: 1.3 %
ERYTHROCYTE [DISTWIDTH] IN BLOOD BY AUTOMATED COUNT: 16.3 %
GLOBULIN PLAS-MCNC: 2.4 G/DL (ref 2–3.5)
GLUCOSE BLD-MCNC: 134 MG/DL (ref 70–99)
HCT VFR BLD AUTO: 21.3 %
HGB BLD-MCNC: 6.9 G/DL
HGB BLD-MCNC: 7.9 G/DL
IMM GRANULOCYTES # BLD AUTO: 0.35 X10(3) UL (ref 0–1)
IMM GRANULOCYTES NFR BLD: 2.7 %
LYMPHOCYTES # BLD AUTO: 1.59 X10(3) UL (ref 1–4)
LYMPHOCYTES NFR BLD AUTO: 12.3 %
MCH RBC QN AUTO: 30.3 PG (ref 26–34)
MCHC RBC AUTO-ENTMCNC: 32.4 G/DL (ref 31–37)
MCV RBC AUTO: 93.4 FL
MONOCYTES # BLD AUTO: 0.92 X10(3) UL (ref 0.1–1)
MONOCYTES NFR BLD AUTO: 7.1 %
NEUTROPHILS # BLD AUTO: 9.82 X10 (3) UL (ref 1.5–7.7)
NEUTROPHILS # BLD AUTO: 9.82 X10(3) UL (ref 1.5–7.7)
NEUTROPHILS NFR BLD AUTO: 76.4 %
OSMOLALITY SERPL CALC.SUM OF ELEC: 290 MOSM/KG (ref 275–295)
PLATELET # BLD AUTO: 199 10(3)UL (ref 150–450)
POTASSIUM SERPL-SCNC: 3.7 MMOL/L (ref 3.5–5.1)
PROT SERPL-MCNC: 5.9 G/DL (ref 5.7–8.2)
RBC # BLD AUTO: 2.28 X10(6)UL
RH BLOOD TYPE: POSITIVE
SODIUM SERPL-SCNC: 139 MMOL/L (ref 136–145)
WBC # BLD AUTO: 12.9 X10(3) UL (ref 4–11)

## 2024-08-21 PROCEDURE — 30233N1 TRANSFUSION OF NONAUTOLOGOUS RED BLOOD CELLS INTO PERIPHERAL VEIN, PERCUTANEOUS APPROACH: ICD-10-PCS | Performed by: OBSTETRICS & GYNECOLOGY

## 2024-08-21 RX ORDER — CYCLOBENZAPRINE HCL 10 MG
10 TABLET ORAL 3 TIMES DAILY PRN
Status: DISCONTINUED | OUTPATIENT
Start: 2024-08-21 | End: 2024-08-24

## 2024-08-21 RX ORDER — POTASSIUM CHLORIDE 1500 MG/1
20 TABLET, EXTENDED RELEASE ORAL DAILY
Status: DISCONTINUED | OUTPATIENT
Start: 2024-08-21 | End: 2024-08-24

## 2024-08-21 RX ORDER — SODIUM CHLORIDE 9 MG/ML
INJECTION, SOLUTION INTRAVENOUS ONCE
Status: DISCONTINUED | OUTPATIENT
Start: 2024-08-21 | End: 2024-08-24

## 2024-08-21 RX ORDER — ACYCLOVIR 400 MG/1
400 TABLET ORAL
Status: DISCONTINUED | OUTPATIENT
Start: 2024-08-21 | End: 2024-08-23

## 2024-08-21 RX ORDER — FUROSEMIDE 20 MG
20 TABLET ORAL DAILY
Status: DISCONTINUED | OUTPATIENT
Start: 2024-08-21 | End: 2024-08-23

## 2024-08-21 NOTE — PLAN OF CARE
Problem: POSTPARTUM  Goal: Long Term Goal:Experiences normal postpartum course  Description: INTERVENTIONS:  - Assess and monitor vital signs and lab values.  - Assess fundus and lochia.  - Provide ice/sitz baths for perineum discomfort.  - Monitor healing of incision/episiotomy/laceration, and assess for signs and symptoms of infection and hematoma.  - Assess bladder function and monitor for bladder distention.  - Provide/instruct/assist with pericare as needed.  - Provide VTE prophylaxis as needed.  - Monitor bowel function.  - Encourage ambulation and provide assistance as needed.  - Assess and monitor emotional status and provide social service/psych resources as needed.  - Utilize standard precautions and use personal protective equipment as indicated. Ensure aseptic care of all intravenous lines and invasive tubes/drains.  - Obtain immunization and exposure to communicable diseases history.  Outcome: Progressing  Goal: Optimize infant feeding at the breast  Description: INTERVENTIONS:  - Initiate breast feeding within first hour after birth.   - Monitor effectiveness of current breast feeding efforts.  - Assess support systems available to mother/family.  - Identify cultural beliefs/practices regarding lactation, letdown techniques, maternal food preferences.  - Assess mother's knowledge and previous experience with breast feeding.  - Provide information as needed about early infant feeding cues (e.g., rooting, lip smacking, sucking fingers/hand) versus late cue of crying.  - Discuss/demonstrate breast feeding aids (e.g., infant sling, nursing footstool/pillows, and breast pumps).  - Encourage mother/other family members to express feelings/concerns, and actively listen.  - Educate father/SO about benefits of breast feeding and how to manage common lactation challenges.  - Recommend avoidance of specific medications or substances incompatible with breast feeding.  - Assess and monitor for signs of nipple  pain/trauma.  - Instruct and provide assistance with proper latch.  - Review techniques for milk expression (breast pumping) and storage of breast milk. Provide pumping equipment/supplies, instructions and assistance, as needed.  - Encourage rooming-in and breast feeding on demand.  - Encourage skin-to-skin contact.  - Provide LC support as needed.  - Assess for and manage engorgement.  - Provide breast feeding education handouts and information on community breast feeding support.   Outcome: Progressing  Goal: Appropriate maternal -  bonding  Description: INTERVENTIONS:  - Assess caregiver- interactions.  - Assess caregiver's emotional status and coping mechanisms.  - Encourage caregiver to participate in  daily care.  - Assess support systems available to mother/family.  - Provide /case management support as needed.  Outcome: Progressing   Sat with patient and updated patient and family on plan of care. Pain medication discussed and answered all questions. Vitals are WNL. Pumping as tolerated d/t babies being in NICU. Lochia WNL and fundus is firm. Will call RN with any questions.

## 2024-08-22 LAB
ALBUMIN SERPL-MCNC: 3.6 G/DL (ref 3.2–4.8)
ALBUMIN/GLOB SERPL: 1.4 {RATIO} (ref 1–2)
ALP LIVER SERPL-CCNC: 90 U/L
ALT SERPL-CCNC: 47 U/L
ANION GAP SERPL CALC-SCNC: 4 MMOL/L (ref 0–18)
AST SERPL-CCNC: 53 U/L (ref ?–34)
BASOPHILS # BLD AUTO: 0.05 X10(3) UL (ref 0–0.2)
BASOPHILS NFR BLD AUTO: 0.4 %
BILIRUB SERPL-MCNC: 0.4 MG/DL (ref 0.3–1.2)
BLOOD TYPE BARCODE: 6200
BUN BLD-MCNC: 10 MG/DL (ref 9–23)
CALCIUM BLD-MCNC: 9.9 MG/DL (ref 8.7–10.4)
CHLORIDE SERPL-SCNC: 103 MMOL/L (ref 98–112)
CO2 SERPL-SCNC: 29 MMOL/L (ref 21–32)
CREAT BLD-MCNC: 0.47 MG/DL
EGFRCR SERPLBLD CKD-EPI 2021: 128 ML/MIN/1.73M2 (ref 60–?)
EOSINOPHIL # BLD AUTO: 0.24 X10(3) UL (ref 0–0.7)
EOSINOPHIL NFR BLD AUTO: 2.1 %
ERYTHROCYTE [DISTWIDTH] IN BLOOD BY AUTOMATED COUNT: 16.9 %
GLOBULIN PLAS-MCNC: 2.5 G/DL (ref 2–3.5)
GLUCOSE BLD-MCNC: 93 MG/DL (ref 70–99)
HCT VFR BLD AUTO: 23.7 %
HGB BLD-MCNC: 7.9 G/DL
IMM GRANULOCYTES # BLD AUTO: 0.46 X10(3) UL (ref 0–1)
IMM GRANULOCYTES NFR BLD: 4 %
LYMPHOCYTES # BLD AUTO: 1.33 X10(3) UL (ref 1–4)
LYMPHOCYTES NFR BLD AUTO: 11.7 %
MCH RBC QN AUTO: 30.4 PG (ref 26–34)
MCHC RBC AUTO-ENTMCNC: 33.3 G/DL (ref 31–37)
MCV RBC AUTO: 91.2 FL
MONOCYTES # BLD AUTO: 0.92 X10(3) UL (ref 0.1–1)
MONOCYTES NFR BLD AUTO: 8.1 %
NEUTROPHILS # BLD AUTO: 8.39 X10 (3) UL (ref 1.5–7.7)
NEUTROPHILS # BLD AUTO: 8.39 X10(3) UL (ref 1.5–7.7)
NEUTROPHILS NFR BLD AUTO: 73.7 %
OSMOLALITY SERPL CALC.SUM OF ELEC: 281 MOSM/KG (ref 275–295)
PLATELET # BLD AUTO: 218 10(3)UL (ref 150–450)
POTASSIUM SERPL-SCNC: 4.3 MMOL/L (ref 3.5–5.1)
PROT SERPL-MCNC: 6.1 G/DL (ref 5.7–8.2)
RBC # BLD AUTO: 2.6 X10(6)UL
SODIUM SERPL-SCNC: 136 MMOL/L (ref 136–145)
UNIT VOLUME: 350 ML
WBC # BLD AUTO: 11.4 X10(3) UL (ref 4–11)

## 2024-08-22 RX ORDER — POTASSIUM CHLORIDE 1500 MG/1
40 TABLET, EXTENDED RELEASE ORAL ONCE
Status: COMPLETED | OUTPATIENT
Start: 2024-08-22 | End: 2024-08-22

## 2024-08-22 RX ORDER — FUROSEMIDE 10 MG/ML
40 SOLUTION ORAL ONCE
Status: DISCONTINUED | OUTPATIENT
Start: 2024-08-22 | End: 2024-08-22

## 2024-08-22 RX ORDER — LABETALOL 200 MG/1
200 TABLET, FILM COATED ORAL
Status: DISCONTINUED | OUTPATIENT
Start: 2024-08-22 | End: 2024-08-23

## 2024-08-22 RX ORDER — LABETALOL 200 MG/1
200 TABLET, FILM COATED ORAL ONCE
Status: DISCONTINUED | OUTPATIENT
Start: 2024-08-22 | End: 2024-08-22

## 2024-08-22 RX ORDER — FUROSEMIDE 20 MG
40 TABLET ORAL ONCE
Status: COMPLETED | OUTPATIENT
Start: 2024-08-22 | End: 2024-08-22

## 2024-08-22 NOTE — PROGRESS NOTES
SECTION POSTOPERATIVE DAY 4    Subjective:   Patient without complaints.  Ambulating without difficulty.  Pain well controlled.  Tolerating general diet.  Bad headache yesterday but resolved     Objective:   Patient Vitals for the past 24 hrs:   BP Temp Temp src Pulse Resp   24 0830 125/63 97.8 °F (36.6 °C) Oral 79 24 0324 132/74 -- -- 90 24 0024 158/81 -- -- 96 24 2344 144/74 -- -- 90 24 2026 138/75 98.1 °F (36.7 °C) Oral 89 24 1820 132/72 98.5 °F (36.9 °C) Oral 94 24 1714 139/68 98.2 °F (36.8 °C) Oral 102 24 1611 126/61 98.2 °F (36.8 °C) Oral 101 24 1610 -- -- -- -- 24 1556 152/74 98.3 °F (36.8 °C) Oral 107 20     Tmax: Temp (24hrs), Av.2 °F (36.8 °C), Min:97.8 °F (36.6 °C), Max:98.5 °F (36.9 °C)    Abdomen- Soft, non-tender, non-distended, uterus firm, appropriate size  Incision- Clean, dry, wound vac in place  Extremities- Non-tender, no Tomás's sign    Assessment/Plan:   Postoperative day 4, status post RCS/BS, 34 week twins due to CHtn with superimposed preeclampsia  #CHtn/SPE  - status post magnesium for 24 hours  -prior to delivery Labetalol 200 mg TID                IV labetalol x 3 given   -postpartum                 IV hydralazine & IV labetalol given    hydrochlorothiazide 12.5 mg, Labetalol 200 BID, nifedipine 60 mg XR daily   medications held yesterday due to lower BP   restarted Labetalol 200 mg TID, lasix 20 mg po and Nifedipine ER 60 mg daily  #Anemia  - Hg was 6.9.  1 u PRBC given--> Hg 7.9  - Venofer 4th dose given.  #Obesity  Lovenox DVT proph    Recommend additional day inpatient for blood pressure monitoring, status post blood transfusion    Denilson Ascencio MD  [facility]- Obstetrics & Gynecology

## 2024-08-22 NOTE — PROGRESS NOTES
OB attending    Notified by RN that patient suspects she is getting a cold sore.  Will give acyclovir.     Frances Jung MD

## 2024-08-23 ENCOUNTER — APPOINTMENT (OUTPATIENT)
Dept: ULTRASOUND IMAGING | Facility: HOSPITAL | Age: 34
End: 2024-08-23
Attending: STUDENT IN AN ORGANIZED HEALTH CARE EDUCATION/TRAINING PROGRAM
Payer: COMMERCIAL

## 2024-08-23 ENCOUNTER — APPOINTMENT (OUTPATIENT)
Dept: CT IMAGING | Facility: HOSPITAL | Age: 34
End: 2024-08-23
Attending: STUDENT IN AN ORGANIZED HEALTH CARE EDUCATION/TRAINING PROGRAM
Payer: COMMERCIAL

## 2024-08-23 ENCOUNTER — APPOINTMENT (OUTPATIENT)
Facility: HOSPITAL | Age: 34
End: 2024-08-23
Attending: STUDENT IN AN ORGANIZED HEALTH CARE EDUCATION/TRAINING PROGRAM
Payer: COMMERCIAL

## 2024-08-23 PROBLEM — I82.612 SUPERFICIAL VENOUS THROMBOSIS OF LEFT UPPER EXTREMITY: Status: ACTIVE | Noted: 2024-08-23

## 2024-08-23 PROCEDURE — 93971 EXTREMITY STUDY: CPT | Performed by: STUDENT IN AN ORGANIZED HEALTH CARE EDUCATION/TRAINING PROGRAM

## 2024-08-23 PROCEDURE — 71260 CT THORAX DX C+: CPT | Performed by: STUDENT IN AN ORGANIZED HEALTH CARE EDUCATION/TRAINING PROGRAM

## 2024-08-23 PROCEDURE — 99223 1ST HOSP IP/OBS HIGH 75: CPT | Performed by: INTERNAL MEDICINE

## 2024-08-23 RX ORDER — ENOXAPARIN SODIUM 150 MG/ML
150 INJECTION SUBCUTANEOUS EVERY 12 HOURS SCHEDULED
Status: DISCONTINUED | OUTPATIENT
Start: 2024-08-23 | End: 2024-08-24

## 2024-08-23 RX ORDER — ENOXAPARIN SODIUM 150 MG/ML
150 INJECTION SUBCUTANEOUS EVERY 12 HOURS SCHEDULED
Qty: 60 EACH | Refills: 0 | Status: SHIPPED | OUTPATIENT
Start: 2024-08-23 | End: 2024-08-24

## 2024-08-23 RX ORDER — ACYCLOVIR 400 MG/1
400 TABLET ORAL EVERY 8 HOURS
Status: DISCONTINUED | OUTPATIENT
Start: 2024-08-23 | End: 2024-08-24

## 2024-08-23 RX ORDER — FUROSEMIDE 20 MG
40 TABLET ORAL DAILY
Status: DISCONTINUED | OUTPATIENT
Start: 2024-08-23 | End: 2024-08-24

## 2024-08-23 NOTE — PROGRESS NOTES
OB progress note    Subjective: Pain is moderately well controlled. Lochia normal. Tolerating PO.  Passing flatus & having BM. Voiding freely. Is ambulating.     Pt has several concerns today  1) L upper extremity had \"goose egg\" area of swelling yesterday but now overnight has developed large area of erythema, skin is tense & swollen, is more painful. Is right next to a site of prior IV attempt (but did not actually have an IV in this area)  2) feels short of breath, pleuritic chest pain, like chest is heavy, has felt like this since surgery  3) large LE edema, noticed more improvement with 40mg dose lasix & wants to do that       Vitals:    24 2136 24 2320 24 0257 24 0920   BP: 146/63 121/51 136/56 139/71   BP Location: Right arm Right arm Right arm    Pulse: 95 88 95 89   Resp:  16 16    Temp:       TempSrc:       SpO2:    97%   Weight:       Height:           Exam:  Gen: NAD, appears well  Abd soft, minimally tender, fundus firm under umbilicus  Incision: prevena in place functioning appropriately    L upper extremity: forearm with ~6cm area of erythema, tender, edematous, skin is taut  LE: +3 pitting edema bilaterally to above knees    Intake/Output                   24 0700 - 24 0659 24 0700 - 24 0659 24 0700 - 24 0659       Intake    Blood  328  --  --    Volume (Transfuse RBC) 328 -- --    Total Intake 328 -- --       Output    Total Output -- -- --       Net I/O     328 -- --             Recent Labs   Lab 24  0653   WBC 11.4*   HGB 7.9*   .0   NE 8.39*       A/P: 33 year old  now POD#5 s/p repeat  section with bilateral salpingectomy, 34 week twins due to CHtn with superimposed preeclampsia with severe features    #CHtn/SPE  - status post magnesium for 24 hours  -prior to delivery Labetalol 200 mg TID                IV labetalol x 3 given   -postpartum                 IV hydralazine & IV labetalol given     hydrochlorothiazide 12.5 mg, Labetalol 200 BID, nifedipine 60 mg XR daily  8/20 medications held yesterday due to lower BP  8/21 restarted Labetalol 200 mg TID, lasix 20 mg po and Nifedipine ER 60 mg daily - Bps improved this AM, will increase lasix to 40mg daily for edema      L upper extremity erythema/edema/tenderness:  -Doppler of LUE 8/23/24 - shows 6.5cm superficial thrombus  -discussed with hematology, would likely anticoagulate, will confirm dosing & follow up, Dr Alvarado to see pt today    Shortness of breath/chest pain-heaviness:  -CT chest done 8/23/24 - no pulmonary embolism, shows nonspecific airspace opacity - may be from motion or atelectasis (vs nonspecific infectious or inflammatory process)  -will have pt use incentive spirometer, monitor sx. No fevers    #Anemia  - Hg was 6.9.  1 u PRBC given--> Hg 7.9  - Venofer 4th dose given.    #Obesity  Lovenox DVT proph - may need therapeutic dosing for VTE, f/u heme recs    Dispo: likely will need to observe at least another night to ensure improvement in respiratory symptoms, start anticoagulation for upper ext VTE      Carmencita Acuna MD
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
## 2024-08-24 VITALS
BODY MASS INDEX: 48.82 KG/M2 | TEMPERATURE: 98 F | DIASTOLIC BLOOD PRESSURE: 68 MMHG | OXYGEN SATURATION: 100 % | HEART RATE: 98 BPM | SYSTOLIC BLOOD PRESSURE: 127 MMHG | WEIGHT: 293 LBS | RESPIRATION RATE: 16 BRPM | HEIGHT: 65 IN

## 2024-08-24 LAB
BLOOD TYPE BARCODE: 6200
UNIT VOLUME: 350 ML

## 2024-08-24 PROCEDURE — 99232 SBSQ HOSP IP/OBS MODERATE 35: CPT | Performed by: INTERNAL MEDICINE

## 2024-08-24 RX ORDER — FUROSEMIDE 20 MG
20 TABLET ORAL DAILY
Qty: 10 TABLET | Refills: 0 | Status: SHIPPED | OUTPATIENT
Start: 2024-08-24

## 2024-08-24 RX ORDER — OXYCODONE HYDROCHLORIDE 5 MG/1
5 TABLET ORAL EVERY 4 HOURS PRN
Qty: 10 TABLET | Refills: 0 | Status: SHIPPED | OUTPATIENT
Start: 2024-08-24 | End: 2024-08-24

## 2024-08-24 RX ORDER — ENOXAPARIN SODIUM 150 MG/ML
150 INJECTION SUBCUTANEOUS EVERY 12 HOURS SCHEDULED
Qty: 60 EACH | Refills: 0 | Status: SHIPPED | OUTPATIENT
Start: 2024-08-24 | End: 2024-09-23

## 2024-08-24 RX ORDER — OXYCODONE HYDROCHLORIDE 5 MG/1
5 TABLET ORAL EVERY 4 HOURS PRN
Qty: 10 TABLET | Refills: 0 | Status: SHIPPED | OUTPATIENT
Start: 2024-08-24

## 2024-08-24 RX ORDER — GABAPENTIN 300 MG/1
300 CAPSULE ORAL EVERY 8 HOURS PRN
Qty: 40 CAPSULE | Refills: 0 | Status: SHIPPED | OUTPATIENT
Start: 2024-08-24

## 2024-08-24 RX ORDER — FUROSEMIDE 20 MG
20 TABLET ORAL DAILY
Qty: 10 TABLET | Refills: 0 | Status: SHIPPED | OUTPATIENT
Start: 2024-08-24 | End: 2024-08-24

## 2024-08-24 RX ORDER — GABAPENTIN 300 MG/1
300 CAPSULE ORAL EVERY 8 HOURS PRN
Qty: 40 CAPSULE | Refills: 0 | Status: SHIPPED | OUTPATIENT
Start: 2024-08-24 | End: 2024-08-24

## 2024-08-24 NOTE — PLAN OF CARE
Problem: POSTPARTUM  Goal: Long Term Goal:Experiences normal postpartum course  Description: INTERVENTIONS:  - Assess and monitor vital signs and lab values.  - Assess fundus and lochia.  - Provide ice/sitz baths for perineum discomfort.  - Monitor healing of incision/episiotomy/laceration, and assess for signs and symptoms of infection and hematoma.  - Assess bladder function and monitor for bladder distention.  - Provide/instruct/assist with pericare as needed.  - Provide VTE prophylaxis as needed.  - Monitor bowel function.  - Encourage ambulation and provide assistance as needed.  - Assess and monitor emotional status and provide social service/psych resources as needed.  - Utilize standard precautions and use personal protective equipment as indicated. Ensure aseptic care of all intravenous lines and invasive tubes/drains.  - Obtain immunization and exposure to communicable diseases history.  8/24/2024 0938 by Emily Stacy, RN  Outcome: Completed  8/24/2024 0930 by Emily Stacy, RN  Outcome: Progressing  Goal: Optimize infant feeding at the breast  Description: INTERVENTIONS:  - Initiate breast feeding within first hour after birth.   - Monitor effectiveness of current breast feeding efforts.  - Assess support systems available to mother/family.  - Identify cultural beliefs/practices regarding lactation, letdown techniques, maternal food preferences.  - Assess mother's knowledge and previous experience with breast feeding.  - Provide information as needed about early infant feeding cues (e.g., rooting, lip smacking, sucking fingers/hand) versus late cue of crying.  - Discuss/demonstrate breast feeding aids (e.g., infant sling, nursing footstool/pillows, and breast pumps).  - Encourage mother/other family members to express feelings/concerns, and actively listen.  - Educate father/SO about benefits of breast feeding and how to manage common lactation challenges.  - Recommend avoidance of specific  medications or substances incompatible with breast feeding.  - Assess and monitor for signs of nipple pain/trauma.  - Instruct and provide assistance with proper latch.  - Review techniques for milk expression (breast pumping) and storage of breast milk. Provide pumping equipment/supplies, instructions and assistance, as needed.  - Encourage rooming-in and breast feeding on demand.  - Encourage skin-to-skin contact.  - Provide  support as needed.  - Assess for and manage engorgement.  - Provide breast feeding education handouts and information on community breast feeding support.   2024 by Emily Stacy, RN  Outcome: Completed  2024 by Emily Stacy, RN  Outcome: Progressing  Goal: Appropriate maternal -  bonding  Description: INTERVENTIONS:  - Assess caregiver- interactions.  - Assess caregiver's emotional status and coping mechanisms.  - Encourage caregiver to participate in  daily care.  - Assess support systems available to mother/family.  - Provide /case management support as needed.  2024 by Emily Stacy, RN  Outcome: Completed  2024 by Emily Stacy, RN  Outcome: Progressing

## 2024-08-24 NOTE — DISCHARGE PLANNING
Patient being dc home at this time. Patient provided dc instructions. Patient educated on wound vac, BP follow up appointment, and  aftercare. Pt verbalized understanding of dc instructions.  Patient being dc in stable condition.

## 2024-08-24 NOTE — PROGRESS NOTES
Hematology/Oncology Progress Note    Patient Name: Aspen Patel  Medical Record Number: [id number]    YOB: 1990     Reason for Consultation:  Aspen Patel was seen today for the diagnosis of superficial venous thrombosis, iron deficiency anemia    Interval events: Started on Lovenox yesterday.  She reports her left forearm feels about the same.  Denies any increased or abnormal bleeding.  Normal lochia.  No increased dyspnea or chest pain.    Inpatient Meds:   furosemide  40 mg Oral Daily    acyclovir  400 mg Oral Q8H    enoxaparin  150 mg Subcutaneous 2 times per day    labetalol  300 mg Oral TID Beta Blocker/Cardiac    sodium chloride   Intravenous Once    potassium chloride  20 mEq Oral Daily    sodium chloride   Intravenous Once    NIFEdipine ER  60 mg Oral QPM    acetaminophen  1,000 mg Oral Q6H    ibuprofen  600 mg Oral Q6H    docusate sodium  100 mg Oral BID@0600,1800    [Transfer Hold] miSOPROStol  1,000 mcg Rectal Once    [Transfer Hold] prenatal vitamin with DHA  4 capsule Oral Daily      dextrose in lactated ringers       PRN Meds:    cyclobenzaprine    oxyCODONE    traZODone    dextrose in lactated ringers    gabapentin    witch hazel-glycerin    phenylephrine-min oil-belinda    simethicone    magnesium hydroxide    bisacodyl    HYDROmorphone    HYDROmorphone    ondansetron    [Transfer Hold] calcium gluconate    [COMPLETED] labetalol **AND** [COMPLETED] labetalol **AND** [COMPLETED] labetalol **AND** [Transfer Hold] hydrALAzine    [Transfer Hold] carboprost tromethamine    [Transfer Hold] tranexamic acid    ondansetron    diphenhydrAMINE **OR** diphenhydrAMINE    nalbuphine  Allergies:   No Known Allergies    Vital Signs:  Height: --  Weight: --  BSA (Calculated - sq m): --  Pulse: 98 (08/24 0725)  BP: 127/68 (08/24 0725)  Temp: 98.2 °F (36.8 °C) (08/24 0725)  Do Not Use - Resp Rate: --  SpO2: 100 % (08/24 0725)  Wt Readings from Last 6 Encounters:   08/19/24 (!) 152.9 kg (337  lb)   24 (!) 153 kg (337 lb 6 oz)   24 (!) 150.7 kg (332 lb 4 oz)   24 (!) 150.1 kg (331 lb)   24 (!) 146.1 kg (322 lb)   24 (!) 141.1 kg (311 lb)     Physical Examination:  General: Patient is alert and oriented, not in acute distress  CV: Regular rate and rhythm, no murmurs  Respiratory: Lungs clear to auscultation bilaterally  GI/Abd: Soft, non-tender   Skin: no petechiae  Ext: Left forearm erythema, mild induration and tenderness.    Laboratory:  Recent Labs   Lab 24  0653   WBC 15.4* 12.9*  --  11.4*   HGB 7.3* 6.9* 7.9* 7.9*   HCT 22.3* 21.3*  --  23.7*   .0 199.0  --  218.0   MCV 89.2 93.4  --  91.2   RDW 15.2 16.3  --  16.9   NEPRELIM 11.91* 9.82*  --  8.39*     Recent Labs   Lab 24  0653   * 139 136   K 4.2 3.7 4.3    107 103   CO2 24.0 27.0 29.0   BUN 11 12 10   CREATSERUM 0.48* 0.52* 0.47*   * 134* 93   CA 9.6 9.3 9.9   TP 6.6 5.9 6.1   ALB 3.9 3.5 3.6   ALKPHO 121* 84 90   AST 13 17 53*   ALT 17 19 47   BILT 0.2* 0.3 0.4     No results for input(s): \"PT\", \"INR\", \"PTT\" in the last 168 hours.    Impression & Plan:     *KIERA ADITYAT- dx 24 after  complicated by severe pre-eclampsia.   -Followed by .  provoked by peripheral Ivs in her arm.  Clot is large and symptomatic, developed while on prophylactic Lovenox..  -Started on therapeutic Lovenox 150mg BID .  Plan for anti-Xa monitoring.  Will arrange for patient to return early next week to have a 4-6 hour peak antiXa level drawn.  Encouraged movement, heat packs and elevation      *Iron Deficiency Anemia: s/p IV Venofer 200 mg x 4 and 1 unit of PRBCs transfused on .      Okay to discharge home today from hematology standpoint.  We will contact the patient to arrange follow-up with Dr. Alvarado in clinic as an outpatient.    Hemanth Graham MD  Hematology/Medical Oncology  [facility]

## 2024-08-26 ENCOUNTER — NURSE ONLY (OUTPATIENT)
Dept: HEMATOLOGY/ONCOLOGY | Age: 34
End: 2024-08-26
Attending: INTERNAL MEDICINE
Payer: COMMERCIAL

## 2024-08-26 DIAGNOSIS — I82.612 SUPERFICIAL VENOUS THROMBOSIS OF LEFT UPPER EXTREMITY: ICD-10-CM

## 2024-08-26 DIAGNOSIS — I82.612 SUPERFICIAL VENOUS THROMBOSIS OF LEFT UPPER EXTREMITY: Primary | ICD-10-CM

## 2024-08-26 LAB — UFH PPP CHRO-ACNC: 1.17 IU/ML

## 2024-08-26 PROCEDURE — 85520 HEPARIN ASSAY: CPT

## 2024-08-26 PROCEDURE — 36415 COLL VENOUS BLD VENIPUNCTURE: CPT

## 2024-08-27 ENCOUNTER — TELEPHONE (OUTPATIENT)
Dept: OBGYN UNIT | Facility: HOSPITAL | Age: 34
End: 2024-08-27

## 2024-08-27 ENCOUNTER — TELEPHONE (OUTPATIENT)
Dept: OBGYN CLINIC | Facility: CLINIC | Age: 34
End: 2024-08-27

## 2024-08-27 ENCOUNTER — TELEPHONE (OUTPATIENT)
Dept: HEMATOLOGY/ONCOLOGY | Facility: HOSPITAL | Age: 34
End: 2024-08-27

## 2024-08-27 DIAGNOSIS — O11.9: Primary | ICD-10-CM

## 2024-08-27 DIAGNOSIS — I82.612 SUPERFICIAL VENOUS THROMBOSIS OF LEFT UPPER EXTREMITY: Primary | ICD-10-CM

## 2024-08-27 NOTE — TELEPHONE ENCOUNTER
Called to follow up with patient.     S/p C section on 08/18 and discharged home on 08/24 told to follow up in 4-5 days for BP check. Sent home on labetalol and nifedipine. Patient is taking both as directed. Takes blood pressure daily, usually around 130/80.     Denies blurry vision, swelling in hands and feet, and abd pain. Has been experiencing migraine headaches, believes it was associated with oxycodone so she stopped taking this, and it has improved.     Education provided, precautions discussed. All questions answered.   BP visit scheduled for 08/29 in [place].

## 2024-08-27 NOTE — TELEPHONE ENCOUNTER
Pt called to schedule PP Bp Check, pt prefers to have appt in [facility] as she is already in/out of the NICU.    Future Appointments   Date Time Provider Department Center   9/3/2024  1:15 PM Denilson Ascencio MD [facility] [address]   9/3/2024  2:10 PM PF [facility] [place]   9/30/2024  2:45 PM Valente Jernigan MD [facility] [address]

## 2024-08-27 NOTE — PROGRESS NOTES
REV'D SELF CARE WITH MOM. VERBALIZES UNDERSTANDING OF INSTRUCTIONS REV'D. ENCOURAGED TO FOLLOW-UP WITH MD AS DIRECTED AND WITH QUESTIONS. PT HAS BEEN CHECKING HER BP'S AT HOME AND IS RUNNING CONSISTENTLY 130'S/80'S. PT VERBALIZES UNDERSTANDING OF S/S OF PRE-ECLAMPSIA AND KNOWS WHEN TO CALL HER DR. TWINS REMAINING IN NICU D/T PREMATURITY.

## 2024-08-27 NOTE — TELEPHONE ENCOUNTER
Spoke with patient regarding results. She verbalized understanding. Lab appointment scheduled for next week with patient.     Ritchie Alvarado MD  [facility]  Results reviewed. She can reduce her lovenox to 140 mg BID. AntiXa level in 1 weeks. She should have it drawn 4-6 hours after her last dose.

## 2024-08-28 ENCOUNTER — TELEPHONE (OUTPATIENT)
Dept: OBGYN CLINIC | Facility: CLINIC | Age: 34
End: 2024-08-28

## 2024-08-28 RX ORDER — LABETALOL 100 MG/1
100 TABLET, FILM COATED ORAL 3 TIMES DAILY
Qty: 90 TABLET | Refills: 0 | Status: SHIPPED | OUTPATIENT
Start: 2024-08-28 | End: 2024-08-29

## 2024-08-28 NOTE — TELEPHONE ENCOUNTER
I recommend Labetalol 300 mg PO TID. I will send in new prescription for Labetalol 100 mg PO TID and recommend to start her new dose at 5 pm per her schedule.

## 2024-08-28 NOTE — TELEPHONE ENCOUNTER
Called to follow up with patient.   Notified of updated prescription.   Instructions provided. All questions answered.   To see patient in office tomorrow.    HTN (hypertension)

## 2024-08-28 NOTE — TELEPHONE ENCOUNTER
Called to follow up with patient.     Offered patient appointment today for blood pressure check. Declined, does not have transportation to be seen today. Still scheduled for tomorrow, advised patient to bring her blood pressure cuff to visit so nursing can compare values. Agreeable to plan. All questions answered.     Routed back to provider.

## 2024-08-28 NOTE — TELEPHONE ENCOUNTER
S/P  .  Patient reports concern with elevated blood pressure readings today.  158/91 this morning upon waking at 6am  Took Labetalol 200 mg yesterday at 9am and 5pm and today at 1am and 9am.  Patient takes Nifedipine 60 mg at 9pm every evening  Patient took a nap and then rechecked her blood pressure upon waking, just before calling our office- 158/89.  Patient states she hasn't been too good about checking her blood pressure regularly but all previous readings have been in the range of 130's-140's/70's-80's.  Patient also reports migraines overnight that linger a few hours into the morning and then go away.  Denies unusual swelling, vision changes, nausea/vomiting or epigastric pain.  Patient has a BP check scheduled in office tomorrow.  Instructed patient to go to L&D immediately with any symptoms of preeclampsia or if blood pressure elevates to 160/100.  Patient verbalized understanding.   Routed to Dr. Daly for review.

## 2024-08-28 NOTE — TELEPHONE ENCOUNTER
Pt called stating BP is elevated. Pt is requesting a call back from a nurse. Please advise, thank you.

## 2024-08-28 NOTE — TELEPHONE ENCOUNTER
Can patient be seen today for BP check in office? Bring her blood pressure cuff as well. Agree with precautions.

## 2024-08-29 ENCOUNTER — NURSE ONLY (OUTPATIENT)
Dept: OBGYN CLINIC | Facility: CLINIC | Age: 34
End: 2024-08-29
Payer: COMMERCIAL

## 2024-08-29 VITALS — DIASTOLIC BLOOD PRESSURE: 78 MMHG | HEART RATE: 90 BPM | SYSTOLIC BLOOD PRESSURE: 142 MMHG

## 2024-08-29 DIAGNOSIS — I82.890 SUPERFICIAL VEIN THROMBOSIS: ICD-10-CM

## 2024-08-29 DIAGNOSIS — O11.9: ICD-10-CM

## 2024-08-29 RX ORDER — LABETALOL 100 MG/1
100 TABLET, FILM COATED ORAL 3 TIMES DAILY
Qty: 90 TABLET | Refills: 0 | Status: SHIPPED | OUTPATIENT
Start: 2024-08-29

## 2024-08-29 NOTE — PROGRESS NOTES
Pt. Here for blood pressure check.       Preoperative Diagnosis: IUP at 34w2d, di/di twin gestation history of  section x 2, request for permanent sterilization, morbid obesity and chronic hypertension with superimposed preeclampsia with severe features    Indications:  Patient is a 33 year old  at 34w3d who was admitted on 2024 2/2 chronic hypertension with superimposed preeclampsia.  Her pregnancy was complicated by di/di twin gestation, history of  section x 2, request for permanent sterilization, chronic anemia and morbid obesity. MFM consult was obtained.  She received a course of betamethasone.  Decision made for continue inpatient management as patient was not a good candidate for outpatient management.  Labetalol 200 mg 3 times daily was continued inpatient.  The patient subsequently developed severe range blood pressures requiring IV antihypertensive medication on 2024.  Therefore, the decision was made to proceed with delivery.  Delay of 6 hours for gastric emptying was recommended.  Magnesium sulfate for seizure prophylaxis was initiated.  The risks, benefits and alternatives were d/w patient including but not limited to risk of injury, infection, bleeding and subsequent  section deliveries. All questions and concerns were addressed. Patient provided verbal and written consent.     Pt's weight ....310 lb.  Bp ...142/78 on our cuff in office   Pt. also brought her bp cuff from home and we checked BP...130/79 and 130/78.  Pulse Ox..96%    Pt's BP at home this am 123/67  But yesterday her bp's were in the 140's/150's  over 80's/90's.    Reviewed pre-eclampsia symptoms with pt.  No headache now in office but   Pt. does complain about waking everyday since she has been home from hospital pot partum  with a headache/migraine and rating it a \"10\" on pain scale. And has not been taking anything for it???Suggested that she take tylenol consistently every 6 hours to see if  that helps , and alternate with motrin but make sure she is taking something consistently.  Also to cont. eating and increasing her fluid intake  along with rest ..  To ER if no improvement  She agrees to plan.  Pt. denies n/v, epigastric pain, right upper quadrant pain, visual disturbances, unusual swelling, . Has 1+ pedal edema  much improved per pt.....has been experiencing SOB at night and it is waking her up where she feels like she is experiencing air hunger, and has to remind herself to breath and take a deep breath..I discussed this with Dr. Daly and recommends pt. To  bring this concern up with PCP possibility of sleep apnea..Told pt. Of this recommendations and she verbalizes understanding ..    Pt. taking Nifedipine 60 mg nightly, pt. also taking Labetalol 300 mg TID and also Lovenox 140 mg 9a/9p daily for a left superficial thrombus.  This was discussed with Dr. Daly also today. With the following recommendations :  Pt. Is to cont. on this same medication regime for now until she has her 2 week incision check with Dr. Ascencio 9/3/24    Pt. Is eating ok, not much of an appetite but makes herself eat, addition calories encourage especially for breast feeding/pumping. Along with increasing her fluids which she knows she has to do ..  Reviewed balancing rest with activity, especially with babies in NICU and her recovery period and problems with er BP.  To cont. To report any pre-eclampsia symptoms to office or consistent bp's 150's /100.    Pt. Verbalizes understanding of all information and agrees to plan.

## 2024-08-29 NOTE — PATIENT INSTRUCTIONS
-Please call the office if you have any blood pressures greater than 150/100, have a persistent headache that is not improved with rest/fluids/food/over the counter pain medication, vision changes, pain in your right upper side, or unusual/new swelling.    -Continue to take medications as recommended.. Labetalol 300 mg 9am-5 pm- 1 am,  Nifedipine 60 mg @ 9 pm , Lovenox 140 mg as directed.  -Please call office and speak with nurses with any questions or concerns.  -remember to cont. taking tylenol/motrin every 6 hours to help with headaches/abdominal discomfort  -2 week check up appointment. With Dr. Silva Waggoner. 9/3/24 @ 115 pm in [place] office.  -6 week postpartum check up with Dr. Jernigan 9/30/24 @ 245 pm in [place] office.    You are doing a fabulous job, keep up the good work and balance your rest with activity     Take Care,  [facility]

## 2024-08-29 NOTE — PROGRESS NOTES
Pt. called and asked that new rx of Labetalol 100 mg that was ordered yesterday by Dr. Daly be sent to [facility] instead of the [facility] where it was sent yesterday.  Pt. has appointment. today for BP Check in [place] office.

## 2024-09-02 PROBLEM — D62 ACUTE BLOOD LOSS ANEMIA: Status: RESOLVED | Noted: 2024-08-21 | Resolved: 2024-09-02

## 2024-09-02 PROBLEM — O99.213 OBESITY AFFECTING PREGNANCY IN THIRD TRIMESTER (HCC): Status: RESOLVED | Noted: 2018-10-31 | Resolved: 2024-09-02

## 2024-09-02 PROBLEM — Z98.891 HISTORY OF 2 CESAREAN SECTIONS: Status: RESOLVED | Noted: 2020-05-29 | Resolved: 2024-09-02

## 2024-09-03 ENCOUNTER — APPOINTMENT (OUTPATIENT)
Dept: HEMATOLOGY/ONCOLOGY | Age: 34
End: 2024-09-03
Attending: INTERNAL MEDICINE
Payer: COMMERCIAL

## 2024-09-03 ENCOUNTER — POSTPARTUM (OUTPATIENT)
Dept: OBGYN CLINIC | Facility: CLINIC | Age: 34
End: 2024-09-03
Payer: COMMERCIAL

## 2024-09-03 ENCOUNTER — OFFICE VISIT (OUTPATIENT)
Dept: HEMATOLOGY/ONCOLOGY | Age: 34
End: 2024-09-03
Attending: INTERNAL MEDICINE
Payer: COMMERCIAL

## 2024-09-03 ENCOUNTER — LAB ENCOUNTER (OUTPATIENT)
Dept: LAB | Facility: HOSPITAL | Age: 34
End: 2024-09-03
Attending: NURSE PRACTITIONER
Payer: COMMERCIAL

## 2024-09-03 VITALS
HEART RATE: 83 BPM | BODY MASS INDEX: 48.82 KG/M2 | WEIGHT: 293 LBS | HEIGHT: 65 IN | DIASTOLIC BLOOD PRESSURE: 72 MMHG | SYSTOLIC BLOOD PRESSURE: 128 MMHG

## 2024-09-03 VITALS
OXYGEN SATURATION: 96 % | TEMPERATURE: 98 F | HEIGHT: 65 IN | RESPIRATION RATE: 18 BRPM | BODY MASS INDEX: 48.82 KG/M2 | DIASTOLIC BLOOD PRESSURE: 82 MMHG | SYSTOLIC BLOOD PRESSURE: 137 MMHG | WEIGHT: 293 LBS | HEART RATE: 85 BPM

## 2024-09-03 DIAGNOSIS — I82.612 SUPERFICIAL VENOUS THROMBOSIS OF LEFT UPPER EXTREMITY: ICD-10-CM

## 2024-09-03 DIAGNOSIS — Z98.890 POST-OPERATIVE STATE: Primary | ICD-10-CM

## 2024-09-03 DIAGNOSIS — L03.311 CELLULITIS OF ABDOMINAL WALL: ICD-10-CM

## 2024-09-03 DIAGNOSIS — I82.612 SUPERFICIAL VENOUS THROMBOSIS OF LEFT UPPER EXTREMITY: Primary | ICD-10-CM

## 2024-09-03 DIAGNOSIS — D50.9 IRON DEFICIENCY ANEMIA, UNSPECIFIED IRON DEFICIENCY ANEMIA TYPE: ICD-10-CM

## 2024-09-03 LAB
BASOPHILS # BLD AUTO: 0.01 X10(3) UL (ref 0–0.2)
BASOPHILS NFR BLD AUTO: 0.1 %
DEPRECATED HBV CORE AB SER IA-ACNC: 210.5 NG/ML
EOSINOPHIL # BLD AUTO: 0.12 X10(3) UL (ref 0–0.7)
EOSINOPHIL NFR BLD AUTO: 1.4 %
ERYTHROCYTE [DISTWIDTH] IN BLOOD BY AUTOMATED COUNT: 16.1 %
HCT VFR BLD AUTO: 34.6 %
HGB BLD-MCNC: 11.3 G/DL
IMM GRANULOCYTES # BLD AUTO: 0.04 X10(3) UL (ref 0–1)
IMM GRANULOCYTES NFR BLD: 0.5 %
LYMPHOCYTES # BLD AUTO: 1.4 X10(3) UL (ref 1–4)
LYMPHOCYTES NFR BLD AUTO: 16.6 %
MCH RBC QN AUTO: 29.5 PG (ref 26–34)
MCHC RBC AUTO-ENTMCNC: 32.7 G/DL (ref 31–37)
MCV RBC AUTO: 90.3 FL
MONOCYTES # BLD AUTO: 0.56 X10(3) UL (ref 0.1–1)
MONOCYTES NFR BLD AUTO: 6.6 %
NEUTROPHILS # BLD AUTO: 6.3 X10 (3) UL (ref 1.5–7.7)
NEUTROPHILS # BLD AUTO: 6.3 X10(3) UL (ref 1.5–7.7)
NEUTROPHILS NFR BLD AUTO: 74.8 %
PLATELET # BLD AUTO: 524 10(3)UL (ref 150–450)
RBC # BLD AUTO: 3.83 X10(6)UL
WBC # BLD AUTO: 8.4 X10(3) UL (ref 4–11)

## 2024-09-03 PROCEDURE — 86022 PLATELET ANTIBODIES: CPT

## 2024-09-03 PROCEDURE — 85025 COMPLETE CBC W/AUTO DIFF WBC: CPT

## 2024-09-03 PROCEDURE — 99214 OFFICE O/P EST MOD 30 MIN: CPT | Performed by: NURSE PRACTITIONER

## 2024-09-03 PROCEDURE — 82728 ASSAY OF FERRITIN: CPT

## 2024-09-03 PROCEDURE — 99213 OFFICE O/P EST LOW 20 MIN: CPT | Performed by: OBSTETRICS & GYNECOLOGY

## 2024-09-03 RX ORDER — AMOXICILLIN AND CLAVULANATE POTASSIUM 500; 125 MG/1; MG/1
1 TABLET, FILM COATED ORAL
Qty: 14 TABLET | Refills: 0 | Status: SHIPPED | OUTPATIENT
Start: 2024-09-03 | End: 2024-09-10

## 2024-09-03 NOTE — PROGRESS NOTES
Education Record    Learner:  Patient    Disease / Diagnosis: superficial venous thrombosis in pregnancy    Barriers / Limitations:  None   Comments:    Method:  Discussion   Comments:    General Topics:  Plan of care reviewed   Comments:    Outcome:  Shows understanding   Comments: JODIE see. States she has been taking 140mg BID of lovenox but did not take last night or this morning d.t symptoms she had been having. States she has knots from injections. Saw OB/GYNE this morning who suspects cellulitis and prescribed abx. Also has been having redness, itching and swelling. Started on her scalp and has moved to face, lips and now hands. States it is also painful.

## 2024-09-03 NOTE — PROGRESS NOTES
Subjective:  Chief Complaint   Patient presents with    Postpartum Care     2 wk PP - pt noticed rashes/red spots on her face and hands since yesterday, is painful and itchy. Is seeing hematologist today.  Is getting injections in her abd and noticed one of the injection sites is red.     Patient presents for 2 week post operative visit from  section.  Recently diagnosed with superficial thrombosis near IV site on left upper extremity, doing better.  Diagnosed by hematology Dr. Alvarado.  Taking Lovenox BID and develops lumps at injection site.  Also has developed redness on lower left side of abdomen that started at injection site.  No fever.  Infants doing well.  BP's at home 130-140's in the afternoon/evening, 110's in the am.  Taking Labetalol 300 mg TID and Nifedipine ER 60 mg at night.  Babies doing well in NICU.    Objective:  /72   Pulse 83   Ht 65\"   Wt (!) 306 lb 12.8 oz (139.2 kg)   LMP 2023   Breastfeeding Yes   BMI 51.05 kg/m²   Physical Exam:    General:  Well dressed, well nourished, no apparent distress  Abdomen: Soft, non-tender, non-distended, faint erythema of RLQ of abdomen with 2 cm subcutaneous indurated non-fluctuant area at center of area  Physical Exam  Abdominal:            Incision:   Steri-strips removed.  Incision clean, dry, intact  Extremities: Non-tender, no edema    Assessment/Plan:  2 week post op RCS/BS CHtn with severe preeclampsia  Will decrease evening Nifedipine ER to 30 mg  and see if am blood pressures come up.  Cellulitis at Lovenox injection site- Augmentin BID x 7 days.  Follow up one week.  Patient also has appt with Hematology and can see if they have any concerns about continuing lovenox injections.    Diagnoses and all orders for this visit:    Post-operative state    Cellulitis of abdominal wall  -     amoxicillin clavulanate (AUGMENTIN) 500-125 MG Oral Tab; Take 1 tablet by mouth 2 times daily after a meal for 7 days.      Return in about 1  week (around 9/10/2024) for Follow-up Visit BP and cellulitis.
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
## 2024-09-04 LAB
HEPARIN AB PPP QL PL AGG: NEGATIVE
UFH PPP CHRO-ACNC: <0.1 IU/ML

## 2024-09-10 ENCOUNTER — POSTPARTUM (OUTPATIENT)
Dept: OBGYN CLINIC | Facility: CLINIC | Age: 34
End: 2024-09-10
Payer: COMMERCIAL

## 2024-09-10 VITALS
BODY MASS INDEX: 48.82 KG/M2 | HEIGHT: 65 IN | DIASTOLIC BLOOD PRESSURE: 78 MMHG | SYSTOLIC BLOOD PRESSURE: 130 MMHG | HEART RATE: 82 BPM | WEIGHT: 293 LBS

## 2024-09-10 DIAGNOSIS — O11.9: Primary | ICD-10-CM

## 2024-09-10 PROCEDURE — 99213 OFFICE O/P EST LOW 20 MIN: CPT | Performed by: OBSTETRICS & GYNECOLOGY

## 2024-09-10 NOTE — PROGRESS NOTES
Subjective:  Chief Complaint   Patient presents with    Postpartum Care     Incision check     Patient presents for 2 1/2 week post operative visit from  section.  Currently without complaints.  Tolerating general diet. Infants doing well.  Pumping without difficulty.  Lovenox held due to possible reaction.  Follow up ultrasound of left upper extremity ordered by hematology for follow up superficial thrombosis and then they may recommend restarting the Lovenox.  Antibiotics worked well for the abdominal wall cellulitis.  BP's doing well on lower dose Nifedipine ER 30 mg daily and Labetalol 300 mg TID     Objective:  /78   Pulse 82   Ht 65\"   Wt (!) 305 lb 6.4 oz (138.5 kg)   LMP 2023   Breastfeeding Yes   BMI 50.82 kg/m²   Physical Exam:    General:  Well dressed, well nourished, no apparent distress  Abdomen: Soft, non-tender, non-distended, oblong mass in subcutaneous tissues right mid abdomen, 4.5 x 2 cm long, no erythema or fluctuance, non-tender  Physical Exam  Abdominal:            Incision:  Incision clean, dry, intact  Extremities: Non-tender, no edema    Assessment/Plan:  Normal post operative examination, doing well.  Continue with no strenuous activity.  OK to drive.  Continue current antihypertensive regimen  Cellulitis at injection site resolved, but residual hematoma- call if increase in size, pain, redness or swelling.  Anemia- follow up CBC after 6 wk postpartum visit.  Fe supplement    Diagnoses and all orders for this visit:    Chronic hypertension with superimposed pre-eclampsia (HCC)      Return in about 3 weeks (around 10/1/2024) for Postpartum Visit.

## 2024-09-22 ENCOUNTER — TELEPHONE (OUTPATIENT)
Dept: OBGYN CLINIC | Facility: CLINIC | Age: 34
End: 2024-09-22

## 2024-09-22 NOTE — TELEPHONE ENCOUNTER
TC through answering service.  4 wks post op from RCS/BS.  Patient with heavy bleeding last couple days.  Normal bleeding for two weeks after delivery, then got better, but now recurrent heavy bleeding.  Advised to monitor for 1-2 days and call if office if persistent heavy bleeding.  Bleeding precautions given.

## 2024-09-30 ENCOUNTER — POSTPARTUM (OUTPATIENT)
Dept: OBGYN CLINIC | Facility: CLINIC | Age: 34
End: 2024-09-30
Payer: COMMERCIAL

## 2024-09-30 VITALS
DIASTOLIC BLOOD PRESSURE: 72 MMHG | HEIGHT: 65 IN | BODY MASS INDEX: 48.82 KG/M2 | WEIGHT: 293 LBS | HEART RATE: 89 BPM | SYSTOLIC BLOOD PRESSURE: 134 MMHG
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
## 2024-10-02 ENCOUNTER — HOSPITAL ENCOUNTER (OUTPATIENT)
Dept: ULTRASOUND IMAGING | Age: 34
Discharge: HOME | End: 2024-10-02
Attending: NURSE PRACTITIONER
Payer: COMMERCIAL

## 2024-10-02 DIAGNOSIS — I82.612 SUPERFICIAL VENOUS THROMBOSIS OF LEFT UPPER EXTREMITY: ICD-10-CM

## 2024-10-02 PROCEDURE — 93971 EXTREMITY STUDY: CPT | Performed by: NURSE PRACTITIONER

## 2024-10-03 ENCOUNTER — TELEPHONE (OUTPATIENT)
Dept: HEMATOLOGY/ONCOLOGY | Facility: HOSPITAL | Age: 34
End: 2024-10-03

## 2024-10-03 NOTE — TELEPHONE ENCOUNTER
Spoke with patient regarding results. She verbalized understanding.         Doppler shows resolution of her superficial clot. Ok to stay off of Lovenox. See me in 3-4 months for MD/Labs     Thanks

## 2025-03-20 ENCOUNTER — APPOINTMENT (OUTPATIENT)
Dept: GENERAL RADIOLOGY | Age: 35
End: 2025-03-20
Attending: PHYSICIAN ASSISTANT
Payer: COMMERCIAL

## 2025-03-20 ENCOUNTER — HOSPITAL ENCOUNTER (OUTPATIENT)
Age: 35
Discharge: HOME | End: 2025-03-20
Payer: COMMERCIAL

## 2025-03-20 VITALS
SYSTOLIC BLOOD PRESSURE: 153 MMHG | RESPIRATION RATE: 18 BRPM | OXYGEN SATURATION: 96 % | DIASTOLIC BLOOD PRESSURE: 85 MMHG | TEMPERATURE: 98 F | HEART RATE: 88 BPM

## 2025-03-20 DIAGNOSIS — J98.11 ATELECTASIS: ICD-10-CM

## 2025-03-20 DIAGNOSIS — R05.1 ACUTE COUGH: Primary | ICD-10-CM

## 2025-03-20 LAB
ATRIAL RATE: 68 BPM
P AXIS: 48 DEGREES
P-R INTERVAL: 176 MS
Q-T INTERVAL: 414 MS
QRS DURATION: 78 MS
QTC CALCULATION (BEZET): 440 MS
R AXIS: 57 DEGREES
T AXIS: 20 DEGREES
VENTRICULAR RATE: 68 BPM

## 2025-03-20 PROCEDURE — 71046 X-RAY EXAM CHEST 2 VIEWS: CPT | Performed by: PHYSICIAN ASSISTANT

## 2025-03-20 PROCEDURE — 99214 OFFICE O/P EST MOD 30 MIN: CPT

## 2025-03-20 PROCEDURE — 93005 ELECTROCARDIOGRAM TRACING: CPT

## 2025-03-20 PROCEDURE — 93010 ELECTROCARDIOGRAM REPORT: CPT

## 2025-03-20 PROCEDURE — 94640 AIRWAY INHALATION TREATMENT: CPT

## 2025-03-20 RX ORDER — PREDNISONE 20 MG/1
40 TABLET ORAL DAILY
Qty: 10 TABLET | Refills: 0 | Status: SHIPPED | OUTPATIENT
Start: 2025-03-20 | End: 2025-03-25

## 2025-03-20 RX ORDER — CODEINE PHOSPHATE AND GUAIFENESIN 10; 100 MG/5ML; MG/5ML
10 SOLUTION ORAL 3 TIMES DAILY PRN
Qty: 180 ML | Refills: 0 | Status: SHIPPED | OUTPATIENT
Start: 2025-03-20

## 2025-03-20 RX ORDER — PREDNISONE 20 MG/1
40 TABLET ORAL ONCE
Status: COMPLETED | OUTPATIENT
Start: 2025-03-20 | End: 2025-03-20

## 2025-03-20 RX ORDER — IPRATROPIUM BROMIDE AND ALBUTEROL SULFATE 2.5; .5 MG/3ML; MG/3ML
3 SOLUTION RESPIRATORY (INHALATION) ONCE
Status: COMPLETED | OUTPATIENT
Start: 2025-03-20 | End: 2025-03-20

## 2025-03-20 RX ORDER — BENZONATATE 100 MG/1
100 CAPSULE ORAL 3 TIMES DAILY PRN
Qty: 30 CAPSULE | Refills: 0 | Status: SHIPPED | OUTPATIENT
Start: 2025-03-20 | End: 2025-04-19

## 2025-03-20 RX ORDER — ALBUTEROL SULFATE 90 UG/1
2 INHALANT RESPIRATORY (INHALATION) EVERY 4 HOURS PRN
Qty: 1 EACH | Refills: 0 | Status: SHIPPED | OUTPATIENT
Start: 2025-03-20 | End: 2025-04-19
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
## 2025-03-20 NOTE — ED INITIAL ASSESSMENT (HPI)
Presents for dry cough since November. Having difficulty breathing, episodes of coughing make it difficult to take full deep breath.

## 2025-03-31 ENCOUNTER — OFFICE VISIT (OUTPATIENT)
Facility: CLINIC | Age: 35
End: 2025-03-31
Payer: COMMERCIAL

## 2025-03-31 VITALS
WEIGHT: 293 LBS | SYSTOLIC BLOOD PRESSURE: 138 MMHG | RESPIRATION RATE: 18 BRPM | HEART RATE: 110 BPM | HEIGHT: 65 IN | DIASTOLIC BLOOD PRESSURE: 86 MMHG | BODY MASS INDEX: 48.82 KG/M2

## 2025-03-31 DIAGNOSIS — E53.8 VITAMIN B12 DEFICIENCY: ICD-10-CM

## 2025-03-31 DIAGNOSIS — E78.2 MIXED HYPERLIPIDEMIA: ICD-10-CM

## 2025-03-31 DIAGNOSIS — F32.A ANXIETY AND DEPRESSION: ICD-10-CM

## 2025-03-31 DIAGNOSIS — F41.9 ANXIETY AND DEPRESSION: ICD-10-CM

## 2025-03-31 DIAGNOSIS — E66.813 CLASS 3 SEVERE OBESITY WITH SERIOUS COMORBIDITY AND BODY MASS INDEX (BMI) OF 50.0 TO 59.9 IN ADULT, UNSPECIFIED OBESITY TYPE (HCC): ICD-10-CM

## 2025-03-31 DIAGNOSIS — K76.0 FATTY LIVER: ICD-10-CM

## 2025-03-31 DIAGNOSIS — F50.819 BINGE EATING DISORDER, UNSPECIFIED SEVERITY: ICD-10-CM

## 2025-03-31 DIAGNOSIS — E55.9 VITAMIN D DEFICIENCY: ICD-10-CM

## 2025-03-31 DIAGNOSIS — Z51.81 THERAPEUTIC DRUG MONITORING: Primary | ICD-10-CM

## 2025-03-31 DIAGNOSIS — E66.01 CLASS 3 SEVERE OBESITY WITH SERIOUS COMORBIDITY AND BODY MASS INDEX (BMI) OF 50.0 TO 59.9 IN ADULT, UNSPECIFIED OBESITY TYPE (HCC): ICD-10-CM

## 2025-03-31 DIAGNOSIS — R73.01 IFG (IMPAIRED FASTING GLUCOSE): ICD-10-CM

## 2025-03-31 PROCEDURE — 99214 OFFICE O/P EST MOD 30 MIN: CPT | Performed by: PHYSICIAN ASSISTANT

## 2025-03-31 RX ORDER — TIRZEPATIDE 2.5 MG/.5ML
2.5 INJECTION, SOLUTION SUBCUTANEOUS WEEKLY
Qty: 2 ML | Refills: 0 | Status: SHIPPED | OUTPATIENT
Start: 2025-03-31

## 2025-03-31 NOTE — PROGRESS NOTES
HISTORY OF PRESENT ILLNESS  Chief Complaint   Patient presents with    Weight Check     +130lb       Aspen Patel is a 34 year old female here for follow up in medical weight loss program.   +130lbs  No AOM  Denies chest pain, shortness of breath, dizziness, blurred vision, headache, paresthesia, nausea/vomiting.   Just had boy girl twins, 7 months old  Mentally in a good place after this pregnancy  Everything is very routine and scheduled  Still trying to focus on herself as well  Has lost about 30lbs since pregnancy  Feels like no matter what she did was gaining weight  No longer breastfeeding    Exercise/Activity: walking, moving more, going up and downstairs  Nutrition: 24 hour food log reviewed, eating regular meals, +protein  Stress: moderate, with kids but feels like managing well  Sleep:  twins are sleeping through the night    [facility] Follow Up    General Information  Nutrition Recall  Exercise     Sleep              Wt Readings from Last 6 Encounters:   03/31/25 (!) 325 lb (147.4 kg)   09/30/24 (!) 307 lb 4 oz (139.4 kg)   09/10/24 (!) 305 lb 6.4 oz (138.5 kg)   09/03/24 (!) 306 lb (138.8 kg)   09/03/24 (!) 306 lb 12.8 oz (139.2 kg)   08/19/24 (!) 337 lb (152.9 kg)            Breakfast Lunch Dinner Snacks Fluids   Reviewed           REVIEW OF SYSTEMS  GENERAL HEALTH: feels well otherwise, denied any fevers chills or night sweats   RESPIRATORY: denies shortness of breath   CARDIOVASCULAR: denies chest pain  GI: denies abdominal pain    EXAM  /86   Pulse 110   Resp 18   Ht 5' 5\" (1.651 m)   Wt (!) 325 lb (147.4 kg)   BMI 54.08 kg/m²   GENERAL: well developed, well nourished,in no apparent distress, A/O x3  SKIN: no rashes,no suspicious lesions  HEENT: atraumatic, normocephalic, OP-clear, PERRL  NECK: supple,no adenopathy  LUNGS: clear to auscultation bilaterally   CARDIO: RRR without murmur  GI: good BS's,NT/ND, no masses or HSM  EXTREMITIES: no cyanosis, no clubbing, no edema    Lab Results    Component Value Date    WBC 8.4 09/03/2024    RBC 3.83 09/03/2024    HGB 11.3 (L) 09/03/2024    HCT 34.6 (L) 09/03/2024    MCV 90.3 09/03/2024    MCH 29.5 09/03/2024    MCHC 32.7 09/03/2024    RDW 16.1 09/03/2024    .0 (H) 09/03/2024     Lab Results   Component Value Date    GLU 93 08/22/2024    BUN 10 08/22/2024    BUNCREA 20.3 (H) 12/17/2020    CREATSERUM 0.47 (L) 08/22/2024    ANIONGAP 4 08/22/2024    GFRNAA 117 09/11/2021    GFRAA 135 09/11/2021    CA 9.9 08/22/2024    OSMOCALC 281 08/22/2024    ALKPHO 90 08/22/2024    AST 53 (H) 08/22/2024    ALT 47 08/22/2024    ALKPHOS 46 11/27/2013    BILT 0.4 08/22/2024    TP 6.1 08/22/2024    ALB 3.6 08/22/2024    GLOBULIN 2.5 08/22/2024     08/22/2024    K 4.3 08/22/2024     08/22/2024    CO2 29.0 08/22/2024     Lab Results   Component Value Date     06/30/2024    A1C 5.9 (H) 06/30/2024     Lab Results   Component Value Date    CHOLEST 190 09/23/2023    TRIG 65 09/23/2023    HDL 64 (H) 09/23/2023     (H) 09/23/2023    VLDL 11 09/23/2023    NONHDLC 126 09/23/2023     Lab Results   Component Value Date    TSH 1.310 05/26/2022     Lab Results   Component Value Date    B12 371 09/02/2023     Lab Results   Component Value Date    VITD 47.3 09/02/2023       Medications Ordered Prior to Encounter[1]    ASSESSMENT  Analyzed weight data:       Diagnoses and all orders for this visit:    Therapeutic drug monitoring  -     [facility] Dietician Referral  ([facility] USE ONLY)  -     CBC With Differential With Platelet; Future  -     Comp Metabolic Panel (14); Future  -     Hemoglobin A1C; Future  -     Lipid Panel; Future  -     TSH and Free T4; Future  -     Vitamin B12; Future  -     Vitamin D; Future  -     Tirzepatide-Weight Management (ZEPBOUND) 2.5 MG/0.5ML Subcutaneous Solution Auto-injector; Inject 2.5 mg into the skin once a week.    Class 3 severe obesity with serious comorbidity and body mass index (BMI) of 50.0 to 59.9 in adult, unspecified obesity  type (HCC)  -     [facility] Dietician Referral  ([facility] USE ONLY)  -     CBC With Differential With Platelet; Future  -     Comp Metabolic Panel (14); Future  -     Hemoglobin A1C; Future  -     Lipid Panel; Future  -     TSH and Free T4; Future  -     Vitamin B12; Future  -     Vitamin D; Future  -     Tirzepatide-Weight Management (ZEPBOUND) 2.5 MG/0.5ML Subcutaneous Solution Auto-injector; Inject 2.5 mg into the skin once a week.    Binge eating disorder, unspecified severity  -     [facility] Dietician Referral  ([facility] USE ONLY)  -     CBC With Differential With Platelet; Future  -     Comp Metabolic Panel (14); Future  -     Hemoglobin A1C; Future  -     Lipid Panel; Future  -     TSH and Free T4; Future  -     Vitamin B12; Future  -     Vitamin D; Future  -     Tirzepatide-Weight Management (ZEPBOUND) 2.5 MG/0.5ML Subcutaneous Solution Auto-injector; Inject 2.5 mg into the skin once a week.    Mixed hyperlipidemia  -     [facility] Dietician Referral  ([facility] USE ONLY)  -     CBC With Differential With Platelet; Future  -     Comp Metabolic Panel (14); Future  -     Hemoglobin A1C; Future  -     Lipid Panel; Future  -     TSH and Free T4; Future  -     Vitamin B12; Future  -     Vitamin D; Future  -     Tirzepatide-Weight Management (ZEPBOUND) 2.5 MG/0.5ML Subcutaneous Solution Auto-injector; Inject 2.5 mg into the skin once a week.    IFG (impaired fasting glucose)  -     [facility] Dietician Referral  ([facility] USE ONLY)  -     CBC With Differential With Platelet; Future  -     Comp Metabolic Panel (14); Future  -     Hemoglobin A1C; Future  -     Lipid Panel; Future  -     TSH and Free T4; Future  -     Vitamin B12; Future  -     Vitamin D; Future  -     Tirzepatide-Weight Management (ZEPBOUND) 2.5 MG/0.5ML Subcutaneous Solution Auto-injector; Inject 2.5 mg into the skin once a week.    Fatty liver  -     [facility] Dietician Referral  ([facility] USE ONLY)  -     CBC With Differential With Platelet; Future  -     Comp Metabolic Panel (14); Future  -      Hemoglobin A1C; Future  -     Lipid Panel; Future  -     TSH and Free T4; Future  -     Vitamin B12; Future  -     Vitamin D; Future  -     Tirzepatide-Weight Management (ZEPBOUND) 2.5 MG/0.5ML Subcutaneous Solution Auto-injector; Inject 2.5 mg into the skin once a week.    Anxiety and depression  -     [facility] Dietician Referral  ([facility] USE ONLY)  -     CBC With Differential With Platelet; Future  -     Comp Metabolic Panel (14); Future  -     Hemoglobin A1C; Future  -     Lipid Panel; Future  -     TSH and Free T4; Future  -     Vitamin B12; Future  -     Vitamin D; Future  -     Tirzepatide-Weight Management (ZEPBOUND) 2.5 MG/0.5ML Subcutaneous Solution Auto-injector; Inject 2.5 mg into the skin once a week.    Vitamin B12 deficiency  -     [facility] Dietician Referral  ([facility] USE ONLY)  -     CBC With Differential With Platelet; Future  -     Comp Metabolic Panel (14); Future  -     Hemoglobin A1C; Future  -     Lipid Panel; Future  -     TSH and Free T4; Future  -     Vitamin B12; Future  -     Vitamin D; Future  -     Tirzepatide-Weight Management (ZEPBOUND) 2.5 MG/0.5ML Subcutaneous Solution Auto-injector; Inject 2.5 mg into the skin once a week.    Vitamin D deficiency  -     [facility] Dietician Referral  ([facility] USE ONLY)  -     CBC With Differential With Platelet; Future  -     Comp Metabolic Panel (14); Future  -     Hemoglobin A1C; Future  -     Lipid Panel; Future  -     TSH and Free T4; Future  -     Vitamin B12; Future  -     Vitamin D; Future  -     Tirzepatide-Weight Management (ZEPBOUND) 2.5 MG/0.5ML Subcutaneous Solution Auto-injector; Inject 2.5 mg into the skin once a week.        PLAN  Initial Weight: 269lbs  Initial Weight Date: 6/15/21  Today's Weight: 325lbs  5% Goal: 13.45lbs  10% Goal: 26.9lbs  Total Weight Loss: Net gain 56lbs    Will begin Zepbound 2.5mg weekly - denies any personal or family history of pancreatitis, pancreatic cancer, thyroid cancer, MEN2 - discussed MOA, advised side effects and adverse  effects of medication.  Will check labs  Continue to work on mindful eating  Mood is stable, continue to work on stress management  HLD - lifestyle changes  Fatty liver - low carb, low fat diet  IFG - begin medication as prescribed, low carb diet  Vitamin D and B12 supplement, take with food  Begin logging foods - discussed macronutrient goals, referral for dietician  Incorporate more movement, low impact  Nutrition: low carb diet/ recommended to eat breakfast daily/ regular protein intake  Medication use and side effects reviewed with patient.  Medication contraindications: n/a  Follow up with dietitian and psychologist as recommended.  Discussed the role of sleep and stress in weight management.  Counseled on comprehensive weight loss plan including attention to nutrition, exercise and behavior/stress management for success. See patient instruction below for more details.  Discussed strategies to overcome barriers to successful weight loss and weight maintenance  FITTE: ACSM recommendations (150-300 minutes/ week in active weight loss)   Weight Loss consent to treat reviewed and signed       NOTE TO PATIENT: The 21st Century Cures Act makes clinical notes like these available to patients in the interest of transparency. Clinical notes are medical documents used by physicians and care providers to communicate with each other. These documents include medical language and terminology, abbreviations, and treatment information that may sound technical and at times possibly unfamiliar. In addition, at times, the verbiage may appear blunt or direct. These documents are one tool providers use to communicate relevant information and clinical opinions of the care providers in a way that allows common understanding of the clinical context.     There are no Patient Instructions on file for this visit.    No follow-ups on file.    Patient verbalizes understanding.    Daniela Aguillon PA-C  3/31/2025           [1]   Current  Outpatient Medications on File Prior to Visit   Medication Sig Dispense Refill    albuterol 108 (90 Base) MCG/ACT Inhalation Aero Soln Inhale 2 puffs into the lungs every 4 (four) hours as needed for Wheezing. 1 each 0    Omeprazole 40 MG Oral Capsule Delayed Release Take 1 capsule (40 mg total) by mouth daily. If not controlling sxs, please see GI 90 capsule 3     No current facility-administered medications on file prior to visit.

## 2025-04-02 ENCOUNTER — TELEPHONE (OUTPATIENT)
Dept: INTERNAL MEDICINE CLINIC | Facility: CLINIC | Age: 35
End: 2025-04-02

## 2025-04-02 NOTE — TELEPHONE ENCOUNTER
Forms were faxed from [facility] with complete and return form with clinical notes  Zepbound 2.5mg  Awaiting on approval or denial

## 2025-04-03 NOTE — TELEPHONE ENCOUNTER
Approved    Prior authorization approved  Payer: [employer] Case ID: [id number]    [phone]    [phone]  Note from payer: CaseId:[id number];Status:Approved;Review Type:Prior Auth;Coverage Start Date:03/03/2025;Coverage End Date:11/29/2025;  Approval Details    Authorized from March 3, 2025 to November 29, 2025

## 2025-04-03 NOTE — TELEPHONE ENCOUNTER
Patient was seen by Daniela on 3/31/25 and an order for med was to be sent.  [facility] does not have the order.  She wants to know what is going on.  Med was not released in epic - done now.  PA was done by MA yesterday pending Zepbound 2.5 mg  2 ml for 28 days    I called [facility] to check on that   [place]    [id number] ID  I did verify they have the pa and it is processing  ETA for decision  4/15/25.  I asked if it could be expedited. She said she would do on the phone.  They will have decision tomorrow     Class 3 severe obesity with serious comorbidity and body mass index (BMI) of 50.0 to 59.9 in adult, unspecified obesity type (HCC) E66.813, E66.01, Z68.43    Binge eating disorder, unspecified severity F50.819    Mixed hyperlipidemia E78.2    IFG (impaired fasting glucose) R73.01    Fatty liver K76.0    BMI 54.08 kg/m²

## 2025-04-04 NOTE — TELEPHONE ENCOUNTER
Patient never read the approval.  I called her as she wanted to start on Monday.  She will contact the pharmacy.

## 2025-04-29 DIAGNOSIS — F50.819 BINGE EATING DISORDER, UNSPECIFIED SEVERITY: ICD-10-CM

## 2025-04-29 DIAGNOSIS — F32.A ANXIETY AND DEPRESSION: ICD-10-CM

## 2025-04-29 DIAGNOSIS — E66.813 CLASS 3 SEVERE OBESITY WITH SERIOUS COMORBIDITY AND BODY MASS INDEX (BMI) OF 50.0 TO 59.9 IN ADULT, UNSPECIFIED OBESITY TYPE: ICD-10-CM

## 2025-04-29 DIAGNOSIS — Z51.81 THERAPEUTIC DRUG MONITORING: ICD-10-CM

## 2025-04-29 DIAGNOSIS — R73.01 IFG (IMPAIRED FASTING GLUCOSE): ICD-10-CM

## 2025-04-29 DIAGNOSIS — E53.8 VITAMIN B12 DEFICIENCY: ICD-10-CM

## 2025-04-29 DIAGNOSIS — F41.9 ANXIETY AND DEPRESSION: ICD-10-CM

## 2025-04-29 DIAGNOSIS — E55.9 VITAMIN D DEFICIENCY: ICD-10-CM

## 2025-04-29 DIAGNOSIS — K76.0 FATTY LIVER: ICD-10-CM

## 2025-04-29 DIAGNOSIS — E78.2 MIXED HYPERLIPIDEMIA: ICD-10-CM

## 2025-04-30 ENCOUNTER — TELEPHONE (OUTPATIENT)
Dept: INTERNAL MEDICINE CLINIC | Facility: CLINIC | Age: 35
End: 2025-04-30

## 2025-04-30 NOTE — TELEPHONE ENCOUNTER
Patient called to request a refill for the prescription Tirzepatide-Weight Management (ZEPBOUND) 2.5 MG/0.5ML Subcutaneous Solution Auto-injector     [address], [address] [phone], [phone]     Please advise, Patient is unsure as to whether she needs an increase in her next dose or if it needs to stay the same        Future Appointments   Date Time Provider Department Center   7/1/2025 10:40 AM Daniela Aguillon PA-C [facility] [address]   10/6/2025  7:40 AM Daniela Aguillon PA-C [facility] [address]

## 2025-04-30 NOTE — TELEPHONE ENCOUNTER
Requesting zepbound  LOV: 3/31/25  RTC: not noted  Last Relevant Labs: na  Filled: 3/31/25 #2ml with 0 refills 2.5 mg dose    Future Appointments   Date Time Provider Department Center   7/1/2025 10:40 AM Daniela Aguillon PA-C [facility] [address]   10/6/2025  7:40 AM Daniela Aguillon PA-C [facility] [address]

## 2025-05-09 RX ORDER — TIRZEPATIDE 5 MG/.5ML
5 INJECTION, SOLUTION SUBCUTANEOUS WEEKLY
Qty: 2 ML | Refills: 2 | Status: SHIPPED | OUTPATIENT
Start: 2025-05-09 | End: 2025-05-31

## 2025-05-09 RX ORDER — TIRZEPATIDE 2.5 MG/.5ML
2.5 INJECTION, SOLUTION SUBCUTANEOUS WEEKLY
Qty: 2 ML | Refills: 0 | OUTPATIENT
Start: 2025-05-09

## 2025-05-09 NOTE — TELEPHONE ENCOUNTER
Requesting zepbound increase  LOV: 3/31/25  RTC: not noted  Last Relevant Labs: na  Filled: 3/31/25 #2ml with 0 refills  2.5 mg dose    Future Appointments   Date Time Provider Department Center   7/1/2025 10:40 AM Daniela Aguillon PA-C [facility] [address]   10/6/2025  7:40 AM Daniela Aguillon PA-C [facility] [address]

## 2025-05-29 ENCOUNTER — PATIENT MESSAGE (OUTPATIENT)
Facility: CLINIC | Age: 35
End: 2025-05-29

## 2025-07-03 ENCOUNTER — TELEMEDICINE (OUTPATIENT)
Facility: CLINIC | Age: 35
End: 2025-07-03
Payer: COMMERCIAL

## 2025-07-03 DIAGNOSIS — K76.0 FATTY LIVER: ICD-10-CM

## 2025-07-03 DIAGNOSIS — E78.2 MIXED HYPERLIPIDEMIA: ICD-10-CM

## 2025-07-03 DIAGNOSIS — F41.9 ANXIETY AND DEPRESSION: ICD-10-CM

## 2025-07-03 DIAGNOSIS — F32.A ANXIETY AND DEPRESSION: ICD-10-CM

## 2025-07-03 DIAGNOSIS — Z51.81 THERAPEUTIC DRUG MONITORING: Primary | ICD-10-CM

## 2025-07-03 DIAGNOSIS — F50.819 BINGE EATING DISORDER, UNSPECIFIED SEVERITY: ICD-10-CM

## 2025-07-03 DIAGNOSIS — R73.01 IFG (IMPAIRED FASTING GLUCOSE): ICD-10-CM

## 2025-07-03 DIAGNOSIS — E66.813 CLASS 3 SEVERE OBESITY WITH SERIOUS COMORBIDITY AND BODY MASS INDEX (BMI) OF 50.0 TO 59.9 IN ADULT, UNSPECIFIED OBESITY TYPE: ICD-10-CM

## 2025-07-03 DIAGNOSIS — K21.9 GASTROESOPHAGEAL REFLUX DISEASE WITHOUT ESOPHAGITIS: ICD-10-CM

## 2025-07-03 RX ORDER — TIRZEPATIDE 7.5 MG/.5ML
7.5 INJECTION, SOLUTION SUBCUTANEOUS WEEKLY
Qty: 2 ML | Refills: 0 | Status: SHIPPED | OUTPATIENT
Start: 2025-07-03

## 2025-07-03 RX ORDER — OMEPRAZOLE 40 MG/1
40 CAPSULE, DELAYED RELEASE ORAL DAILY
Qty: 30 CAPSULE | Refills: 0 | Status: SHIPPED | OUTPATIENT
Start: 2025-07-03

## 2025-07-03 NOTE — PROGRESS NOTES
This visit is conducted using Telemedicine with live, interactive video and audio.    Patient has been referred to the Critical access hospital website at [email or web address] to review the yearly Consent to Treat document.    Patient understands and accepts financial responsibility for any deductible, co-insurance and/or co-pays associated with this service.      HISTORY OF PRESENT ILLNESS  Chief Complaint   Patient presents with    Weight Check       Aspen Patel is a 34 year old female here for follow up in medical weight loss program.   297lbs  Pt is compliant on zepbound  Denies chest pain, shortness of breath, dizziness, blurred vision, headache, paresthesia, nausea/vomiting.   Less side effects than with ozempic  Prioritizing protein  More conscious with food choices    Exercise/Activity: more active with the kids, able to get up and down better  Nutrition: 24 hour food log reviewed, eating regular meals, +protein  Stress: 5/10  Sleep: 4 hours/night uninterrupted    [facility] Follow Up    General Information  Success Moment: Increasing proteins significantly  Challenging Moment: Lower calorie/sugar iced coffee  Nutrition Recall  Breakfast: 2 Bunless burgers,2 slices American cheese, lettuce wrapped,   with onion, & squirt of mustard and wild    Dinner: 2 chicken sausage links,1 small teri salad dressed with freshly   grated parm and lemon Snacks: 2 durham beef sticks   Fluids: Water all day, coffee 8 oz usually, Isac energy drink   occasionally Dining Out: 0   Exercise   Patient stated exercises # days/week: 4  Patient stated perceived level of   exertion: 2 Anaerobic Days: 4   Aerobic Days: 1   Patient stated average level of stress: 5  Sleep   Patient stated # hours uninterrupted sleep: 4   Patient stated feels   restful: No      Cause of disruption of sleep: Babies and special needs children   Goals: Get bloodwork done and continue to increase proteins              Wt Readings from Last 6 Encounters:   03/31/25 (!)  325 lb (147.4 kg)   09/30/24 (!) 307 lb 4 oz (139.4 kg)   09/10/24 (!) 305 lb 6.4 oz (138.5 kg)   09/03/24 (!) 306 lb (138.8 kg)   09/03/24 (!) 306 lb 12.8 oz (139.2 kg)   08/19/24 (!) 337 lb (152.9 kg)            Breakfast Lunch Dinner Snacks Fluids              REVIEW OF SYSTEMS  GENERAL HEALTH: feels well otherwise, denied any fevers chills or night sweats   RESPIRATORY: denies shortness of breath   CARDIOVASCULAR: denies chest pain  GI: denies abdominal pain    EXAM  There were no vitals taken for this visit.  GENERAL: well developed, well nourished,in no apparent distress, A/O x3  SKIN: no rashes,no suspicious lesions on visible skin  HEENT: atraumatic, normocephalic  LUNGS: no increased effort or work with breathing   NEURO: speaking fluently and in clear sentences    Lab Results   Component Value Date    WBC 8.4 09/03/2024    RBC 3.83 09/03/2024    HGB 11.3 (L) 09/03/2024    HCT 34.6 (L) 09/03/2024    MCV 90.3 09/03/2024    MCH 29.5 09/03/2024    MCHC 32.7 09/03/2024    RDW 16.1 09/03/2024    .0 (H) 09/03/2024     Lab Results   Component Value Date    GLU 93 08/22/2024    BUN 10 08/22/2024    BUNCREA 20.3 (H) 12/17/2020    CREATSERUM 0.47 (L) 08/22/2024    ANIONGAP 4 08/22/2024    GFRNAA 117 09/11/2021    GFRAA 135 09/11/2021    CA 9.9 08/22/2024    OSMOCALC 281 08/22/2024    ALKPHO 90 08/22/2024    AST 53 (H) 08/22/2024    ALT 47 08/22/2024    ALKPHOS 46 11/27/2013    BILT 0.4 08/22/2024    TP 6.1 08/22/2024    ALB 3.6 08/22/2024    GLOBULIN 2.5 08/22/2024     08/22/2024    K 4.3 08/22/2024     08/22/2024    CO2 29.0 08/22/2024     Lab Results   Component Value Date     06/30/2024    A1C 5.9 (H) 06/30/2024     Lab Results   Component Value Date    CHOLEST 190 09/23/2023    TRIG 65 09/23/2023    HDL 64 (H) 09/23/2023     (H) 09/23/2023    VLDL 11 09/23/2023    NONHDLC 126 09/23/2023     Lab Results   Component Value Date    TSH 1.310 05/26/2022     Lab Results   Component  Value Date    B12 371 09/02/2023     Lab Results   Component Value Date    VITD 47.3 09/02/2023       Medications Ordered Prior to Encounter[1]    ASSESSMENT  Analyzed weight data:       Diagnoses and all orders for this visit:    Therapeutic drug monitoring  -     Tirzepatide-Weight Management (ZEPBOUND) 7.5 MG/0.5ML Subcutaneous Solution Auto-injector; Inject 7.5 mg into the skin once a week.    Class 3 severe obesity with serious comorbidity and body mass index (BMI) of 50.0 to 59.9 in adult, unspecified obesity type  -     Tirzepatide-Weight Management (ZEPBOUND) 7.5 MG/0.5ML Subcutaneous Solution Auto-injector; Inject 7.5 mg into the skin once a week.    Binge eating disorder, unspecified severity  -     Tirzepatide-Weight Management (ZEPBOUND) 7.5 MG/0.5ML Subcutaneous Solution Auto-injector; Inject 7.5 mg into the skin once a week.    Mixed hyperlipidemia  -     Tirzepatide-Weight Management (ZEPBOUND) 7.5 MG/0.5ML Subcutaneous Solution Auto-injector; Inject 7.5 mg into the skin once a week.    IFG (impaired fasting glucose)  -     Tirzepatide-Weight Management (ZEPBOUND) 7.5 MG/0.5ML Subcutaneous Solution Auto-injector; Inject 7.5 mg into the skin once a week.    Fatty liver  -     Tirzepatide-Weight Management (ZEPBOUND) 7.5 MG/0.5ML Subcutaneous Solution Auto-injector; Inject 7.5 mg into the skin once a week.    Anxiety and depression  -     Tirzepatide-Weight Management (ZEPBOUND) 7.5 MG/0.5ML Subcutaneous Solution Auto-injector; Inject 7.5 mg into the skin once a week.    Gastroesophageal reflux disease without esophagitis  -     Omeprazole 40 MG Oral Capsule Delayed Release; Take 1 capsule (40 mg total) by mouth daily. If not controlling sxs, please see GI        PLAN  Initial Weight: 269lbs  Initial Weight Date: 6/15/21  Today's Weight: 297lbs  5% Goal: 13.45lbs  10% Goal: 26.9lbs  Total Weight Loss: Net gain 28lbs    Will continue and increase zepbound - advised side effects and adverse effects of  medication   Continue to work on mindful eating  HLD - lifestyle changes  IFG - continue current medications, low carb diet  Fatty liver - low carb, low fat diet, incorporate more movement  Consistency with logging foods - protein and produce  Nutrition: low carb diet/ recommended to eat breakfast daily/ regular protein intake  Medication use and side effects reviewed with patient.  Medication contraindications: n/a  Follow up with dietitian and psychologist as recommended.  Discussed the role of sleep and stress in weight management.  Counseled on comprehensive weight loss plan including attention to nutrition, exercise and behavior/stress management for success. See patient instruction below for more details.  Discussed strategies to overcome barriers to successful weight loss and weight maintenance  FITTE: ACSM recommendations (150-300 minutes/ week in active weight loss)   Weight Loss consent to treat reviewed and signed     There are no Patient Instructions on file for this visit.    No follow-ups on file.    Patient verbalizes understanding.    Daniela Aguillon PA-C  7/3/2025    Please note that the following visit was completed using two-way, real-time interactive audio and video communication.  This has been done in good ace to provide continuity of care in the best interest of the provider-patient relationship, due to the ongoing public health crisis/national emergency and because of restrictions of visitation.  There are limitations of this visit as no physical exam could be performed.  Every conscious effort was taken to allow for sufficient and adequate time.  This billing was spent on reviewing labs, medications, radiology tests and decision making.  Appropriate medical decision-making and tests are ordered as detailed in the plan of care above    Daniela Aguillon PA-C           [1]   Current Outpatient Medications on File Prior to Visit   Medication Sig Dispense Refill    Tirzepatide-Weight  Management (ZEPBOUND) 2.5 MG/0.5ML Subcutaneous Solution Auto-injector Inject 2.5 mg into the skin once a week. 2 mL 0     No current facility-administered medications on file prior to visit.

## 2025-07-14 ENCOUNTER — TELEPHONE (OUTPATIENT)
Dept: FAMILY MEDICINE CLINIC | Facility: CLINIC | Age: 35
End: 2025-07-14

## 2025-07-14 NOTE — TELEPHONE ENCOUNTER
Patient will need a refill on the Omeprazole prior to her upcoming appointment. Please send to [facility].  Future Appointments   Date Time Provider Department Center   8/21/2025  1:00 PM Dalia Lambert MD [facility] [address]   10/6/2025  7:40 AM Daniela Aguillon PA-C [facility] [address]

## 2025-08-01 ENCOUNTER — PATIENT MESSAGE (OUTPATIENT)
Facility: CLINIC | Age: 35
End: 2025-08-01

## 2025-08-01 DIAGNOSIS — K21.9 GASTROESOPHAGEAL REFLUX DISEASE WITHOUT ESOPHAGITIS: ICD-10-CM

## 2025-08-03 RX ORDER — OMEPRAZOLE 40 MG/1
40 CAPSULE, DELAYED RELEASE ORAL DAILY
Qty: 30 CAPSULE | Refills: 0 | Status: SHIPPED | OUTPATIENT
Start: 2025-08-03

## 2025-08-21 ENCOUNTER — OFFICE VISIT (OUTPATIENT)
Dept: FAMILY MEDICINE CLINIC | Facility: CLINIC | Age: 35
End: 2025-08-21

## 2025-08-21 VITALS
HEART RATE: 90 BPM | RESPIRATION RATE: 16 BRPM | TEMPERATURE: 98 F | OXYGEN SATURATION: 98 % | BODY MASS INDEX: 48.32 KG/M2 | HEIGHT: 65 IN | DIASTOLIC BLOOD PRESSURE: 74 MMHG | SYSTOLIC BLOOD PRESSURE: 124 MMHG | WEIGHT: 290 LBS

## 2025-08-21 DIAGNOSIS — Z00.00 LABORATORY EXAM ORDERED AS PART OF ROUTINE GENERAL MEDICAL EXAMINATION: ICD-10-CM

## 2025-08-21 DIAGNOSIS — Z79.899 MEDICATION MANAGEMENT: ICD-10-CM

## 2025-08-21 DIAGNOSIS — Z12.39 ENCOUNTER FOR BREAST CANCER SCREENING USING NON-MAMMOGRAM MODALITY: ICD-10-CM

## 2025-08-21 DIAGNOSIS — K21.9 GASTROESOPHAGEAL REFLUX DISEASE WITHOUT ESOPHAGITIS: ICD-10-CM

## 2025-08-21 DIAGNOSIS — Z00.00 ROUTINE MEDICAL EXAM: Primary | ICD-10-CM

## 2025-08-21 DIAGNOSIS — Z86.2 HISTORY OF ANEMIA: ICD-10-CM

## 2025-08-21 PROBLEM — D50.9 MATERNAL IRON DEFICIENCY ANEMIA AFFECTING PREGNANCY IN THIRD TRIMESTER, ANTEPARTUM (HCC): Status: RESOLVED | Noted: 2024-06-30 | Resolved: 2025-08-21

## 2025-08-21 PROBLEM — I82.612 SUPERFICIAL VENOUS THROMBOSIS OF LEFT UPPER EXTREMITY: Status: RESOLVED | Noted: 2024-08-23 | Resolved: 2025-08-21

## 2025-08-21 PROBLEM — O26.02 EXCESSIVE WEIGHT GAIN DURING PREGNANCY IN SECOND TRIMESTER (HCC): Status: RESOLVED | Noted: 2024-06-30 | Resolved: 2025-08-21

## 2025-08-21 PROBLEM — O09.522 AMA (ADVANCED MATERNAL AGE) MULTIGRAVIDA 35+, SECOND TRIMESTER (HCC): Status: RESOLVED | Noted: 2024-02-13 | Resolved: 2025-08-21

## 2025-08-21 PROBLEM — I10 HYPERTENSION, UNSPECIFIED TYPE: Status: RESOLVED | Noted: 2024-05-30 | Resolved: 2025-08-21

## 2025-08-21 PROBLEM — O99.013 MATERNAL IRON DEFICIENCY ANEMIA AFFECTING PREGNANCY IN THIRD TRIMESTER, ANTEPARTUM (HCC): Status: RESOLVED | Noted: 2024-06-30 | Resolved: 2025-08-21

## 2025-08-21 PROBLEM — O09.292 HISTORY OF PRE-ECLAMPSIA IN PRIOR PREGNANCY, CURRENTLY PREGNANT IN SECOND TRIMESTER (HCC): Status: RESOLVED | Noted: 2020-05-28 | Resolved: 2025-08-21

## 2025-08-21 PROCEDURE — 99395 PREV VISIT EST AGE 18-39: CPT | Performed by: FAMILY MEDICINE

## 2025-08-21 RX ORDER — OMEPRAZOLE 40 MG/1
40 CAPSULE, DELAYED RELEASE ORAL DAILY
Qty: 90 CAPSULE | Refills: 3 | Status: SHIPPED | OUTPATIENT
Start: 2025-08-21

## 2025-08-26 ENCOUNTER — PATIENT MESSAGE (OUTPATIENT)
Facility: CLINIC | Age: 35
End: 2025-08-26

## 2025-08-27 RX ORDER — TIRZEPATIDE 10 MG/.5ML
10 INJECTION, SOLUTION SUBCUTANEOUS WEEKLY
Qty: 2 ML | Refills: 0 | Status: SHIPPED | OUTPATIENT
Start: 2025-08-27 | End: 2025-09-18

## (undated) DIAGNOSIS — Z30.41 SURVEILLANCE FOR BIRTH CONTROL, ORAL CONTRACEPTIVES: ICD-10-CM

## (undated) DEVICE — STAPLER SKIN 30 ABSRB STPL RAP INSORB

## (undated) DEVICE — STAPLER SKIN INSORB 2030

## (undated) DEVICE — LARGE, DISPOSABLE ALEXIS O C-SECTION PROTECTOR - RETRACTOR: Brand: ALEXIS ® O C-SECTION PROTECTOR - RETRACTOR

## (undated) DEVICE — Device

## (undated) DEVICE — TRAXI PANNICULUS RETRACTOR WITH RETENTUS TECHNOLOGY (BMI 30-50): Brand: TRAXI® PANNICULUS RETRACTOR

## (undated) NOTE — LETTER
05/21/18        [address] [id number]      Dear Quita More,    [facility] records indicate that you have outstanding lab work and or testing that was ordered for you and has not yet been completed:          Comp Metabolic Pane

## (undated) NOTE — Clinical Note
Please check PA for US guided carpal tunnel injections and then okay to add.  Lory Soriano, DO [facility] 5/29/2024

## (undated) NOTE — MR AVS SNAPSHOT
[facility]  [address] [facility]  [place] [id number] [phone]               Thank you for choosing us for your health care visit with KADE Rao. We are glad to serve you and happy to provide you with this summary of your visit.   Please h ever had a stomach ulcer or GI bleeding, talk with your doctor before using these medicines.) (NOTE: Aspirin should never be used in anyone under 25years of age who is ill with a fever. It may cause severe liver damage. )   · For adults: You may use acetam © 8095-7585 [address], [address]. All rights reserved. This information is not intended as a substitute for professional medical care. Always follow your healthcare professional's instructions. discharge instructions in [facility] by going to Visits < Admission Summaries. If you've been to the Emergency Department or your doctor's office, you can view your past visit information in [facility] by going to Visits < Visit Summaries. [facility] [employer] questions?

## (undated) NOTE — Clinical Note
Good Morning Dr. Nelia Suh,   I saw this mutual patient of ours the other day. She had questions regarding her anemia labs, I did start her on monthly B12 injections and also had her start OTC 325mg ferrous sulfate daily, however, her iron was fairly low, and I did not know if you wanted to recommend hematology or see how she does with supplement. I know she had an appointment with you recently, but I don't think she brought these labs up as they weren't ordered by you. Wanted to let you know what I had recommended for her.   Hope you and the family are doing well!!  Take care,  Yvrose Lindsey

## (undated) NOTE — Clinical Note
Good Morning Dr. Hector Graham! I hope you are doing well! I saw this mutual patient of ours the other day and she is really struggling with chronic pain due to her fibro. I told her I would reach out to you to see if there were any options. I did not know you thoughts on something like Cymbalta for her? Thank you! Hope you and the family are doing well and enjoying summer!     Take care,   Alexander Gale

## (undated) NOTE — Clinical Note
1.  IUP at  23 1/7 wks  2. Scan consistent with dates   3. No ultrasound evidence of structural abnormalities are seen 4.  morbid obesity5. low lying placenta 1.8 cm from internal osRecommendations:  1.   Weekly NST at  34  wks   2.  growth US in 8wks

## (undated) NOTE — Clinical Note
Hi, Dr. Allegra Buenrostro and Dr. Mary Lagunas! I hope you are both doing well. Thank you for referring Ms. Octaviano Goode for rheumatologic evaluation. Please see the discussion portion of my note and let me know if you have any questions.      Lory Soriano, DO  [facility]

## (undated) NOTE — LETTER
17          RE:  Kelby Tate  :  1990      To Whom It May Concern:    Kelby Tate  is currently under our care for pregnancy. It is permissible at her gestational age for dental work to be performed.   However, if x-rays are needed,

## (undated) NOTE — LETTER
Date: 2/14/2020    Patient Name: Marisela Hughes          To Whom it may concern:    Upon chart review patient has tried sucralfate (8/22/19), pantoprazole (4/6/19), and famotidine (4/11/19) for her GERD but are not effective enough to control her sympt

## (undated) NOTE — Clinical Note
Please check BI for US guided carpal tunnel injection and schedule pt when available.  Remind pt that MRIs were ordered through [facility] and she has to call them for the initiation of insurance approval.     Suzi Chacon,   [facility]  10/26/2021

## (undated) NOTE — LETTER
Xi[address] Monae [address]  [address] [facility] [address], [address] [facility]  Authorization for Surgical Operation and Procedure     Date:___________                                                                                                         Time:__________ 4.   Should the need arise during my operation or immediate post-operative period, I also consent to the administration of blood and/or blood products.   Further, I understand that despite careful testing and screening of blood or blood products by jonah 8.   I recognize that in the event my procedure results in extended X-Ray/fluoroscopy time, I may develop a skin reaction. 9.  If I have a Do Not Attempt Resuscitation (DNAR) order in place, that status will be suspended while in the operating room, proc 1. Tamica RILEY agree to be cared for by an anesthesiologist, who is specially trained to monitor me and give me medicine to put me to sleep or keep me comfortable during my procedure.     I understand that my anesthesiologist is not an employee or 5. My doctor has explained to me other choices available to me for my care (alternatives).     6. Pregnant Patients (“epidural”):  I understand that the risks of having an epidural (medicine given into my back to help control pain during labor), include itc

## (undated) NOTE — MR AVS SNAPSHOT
After Visit Summary   9/29/2021    Ivy Alarcon   MRN: [id number]           Visit Information     Date & Time  9/29/2021  1:30 PM Provider  Anderson Marcano MD Department  [address] [place] [address] Dept.  Phone  [facility] [employer] [employer] paid hassle-free using a credit, debit, or health savings card. Not active on [employer]? Ask us how to get signed up today! If you receive a survey from [employer], please take a few minutes to complete it and provide feedback.  We strive to delive

## (undated) NOTE — MR AVS SNAPSHOT
[facility]. [address] [place] [address]  [address]  [id number] [phone]               Thank you for choosing us for your health care visit with Merilynn Apley, MD.  We are glad to serve you and happy to provide you with this summary of [address]   Phone:  [phone]   Fax:  [phone]    Diagnoses:   Morbid obesity due to excess calories [facility])   Order:  Bariatrics - Internal    Ivette Hudson MD   [address] [facility] [address]   [place] [address]   Phone:  13 your appointment immediately. However, if you are unsure about the requirements for authorization, please wait 5-7 days and then contact your physician's office.  At that time, you will be provided with any authorization numbers or be assured that none are office, you can view your past visit information in [employer] by going to Visits < Visit Summaries. [employer] questions? Call [phone] for help. [employer] is NOT to be used for urgent needs. For medical emergencies, dial 911.            Visit [facility]

## (undated) NOTE — LETTER
[facility]  [address] [address] [facility], [address] [place] [address]    Consent for Operation    Date: __________________    Time: _______________    1.  I authorize the performance upon Cliffodr Mcdonald the following operation:                              Shashi procedure has been videotaped, the surgeon will obtain the original videotape. The hospital will not be responsible for storage or maintenance of this tape.     6. For the purpose of advancing medical education, I consent to the admittance of observers to t STATEMENTS REQUIRING INSERTION OR COMPLETION WERE FILLED IN.     Signature of Patient:   ___________________________    When the patient is a minor or mentally incompetent to give consent:  Signature of person authorized to consent for patient: ____________ · If I am allergic to anything or have had a reaction to anesthesia before. 3. I understand how the anesthesia medicine will help me (benefits). 4. I understand that with any type of anesthesia medicine there are risks:  a.  The most common risks are: their representative has agreed to have anesthesia services.     _____________________________________________________________________________  Witness        Date   Time  I have verified that the signature is that of the patient or patient’s representative

## (undated) NOTE — LETTER
[address], [address] [facility], [id number] [place], [place] [id number]            August 3, 2021      Juanito Dillon [facility] [id number]      Dear Ms. Triston Morales

## (undated) NOTE — LETTER
24          RE:  Aspen Patel  :  1990      To Whom It May Concern:    Aspen Patel  is currently under our care for pregnancy.  It is permissible at her gestational age to receive steroid injections for Carpal Tunnel Syndrome.      If you have any additional concerns, do not hesitate to contact our office.  Thank you for your care of our shared patient.      Sincerely,    Elizabeth Fisher MD  [facility]

## (undated) NOTE — Clinical Note
Hi Dr. Ascencio! I was able to get Keshawn in for the right injection today. Will hopefully add her next week for the left side. It was definitely harder to do her injection compared to prior years and she will need sx post-partum Take care, Lory Soriano, DO [facility] 6/5/2024

## (undated) NOTE — Clinical Note
Navneet Rg,  I am sending this patient your way. She would like to be seen sooner than her April appt. It's not urgent but if she can be added to your cancellation list that would be great. Her mom has MS and pt has symptoms out of proportion to her exam or labs. Not sure if there is something neuromuscular going on. I appreciate your help and please keep me posted after you assess her!   Take care!  - Lory

## (undated) NOTE — Clinical Note
Please let pt know not to get CT of pelvis done, she already had the CT to work up the benign tumors. She needs to get repeat imaging monitored by her PCP in 3-6 months per the radiology recommendations. Not rheumatologic so will defer to PCP.      Clifford Husain, DO  [facility]  3/7/2022